# Patient Record
Sex: FEMALE | Race: OTHER | HISPANIC OR LATINO | URBAN - METROPOLITAN AREA
[De-identification: names, ages, dates, MRNs, and addresses within clinical notes are randomized per-mention and may not be internally consistent; named-entity substitution may affect disease eponyms.]

---

## 2020-01-06 ENCOUNTER — INPATIENT (INPATIENT)
Facility: HOSPITAL | Age: 60
LOS: 3 days | Discharge: HOME HEALTH | DRG: 330 | End: 2020-01-10
Attending: SURGERY | Admitting: SURGERY
Payer: COMMERCIAL

## 2020-01-06 ENCOUNTER — TRANSCRIPTION ENCOUNTER (OUTPATIENT)
Age: 60
End: 2020-01-06

## 2020-01-06 VITALS
HEART RATE: 68 BPM | RESPIRATION RATE: 17 BRPM | OXYGEN SATURATION: 97 % | DIASTOLIC BLOOD PRESSURE: 80 MMHG | WEIGHT: 250 LBS | TEMPERATURE: 98 F | SYSTOLIC BLOOD PRESSURE: 168 MMHG

## 2020-01-06 DIAGNOSIS — E87.6 HYPOKALEMIA: ICD-10-CM

## 2020-01-06 DIAGNOSIS — Z90.710 ACQUIRED ABSENCE OF BOTH CERVIX AND UTERUS: ICD-10-CM

## 2020-01-06 DIAGNOSIS — Z90.49 ACQUIRED ABSENCE OF OTHER SPECIFIED PARTS OF DIGESTIVE TRACT: ICD-10-CM

## 2020-01-06 DIAGNOSIS — E66.9 OBESITY, UNSPECIFIED: ICD-10-CM

## 2020-01-06 DIAGNOSIS — K56.699 OTHER INTESTINAL OBSTRUCTION UNSPECIFIED AS TO PARTIAL VERSUS COMPLETE OBSTRUCTION: ICD-10-CM

## 2020-01-06 DIAGNOSIS — Z91.010 ALLERGY TO PEANUTS: ICD-10-CM

## 2020-01-07 DIAGNOSIS — K56.609 UNSPECIFIED INTESTINAL OBSTRUCTION, UNSPECIFIED AS TO PARTIAL VERSUS COMPLETE OBSTRUCTION: ICD-10-CM

## 2020-01-07 DIAGNOSIS — Z90.49 ACQUIRED ABSENCE OF OTHER SPECIFIED PARTS OF DIGESTIVE TRACT: Chronic | ICD-10-CM

## 2020-01-07 DIAGNOSIS — Z90.710 ACQUIRED ABSENCE OF BOTH CERVIX AND UTERUS: Chronic | ICD-10-CM

## 2020-01-07 LAB
ALBUMIN SERPL ELPH-MCNC: 3.6 G/DL — SIGNIFICANT CHANGE UP (ref 3.5–5)
ALP SERPL-CCNC: 99 U/L — SIGNIFICANT CHANGE UP (ref 40–120)
ALT FLD-CCNC: 32 U/L DA — SIGNIFICANT CHANGE UP (ref 10–60)
ANION GAP SERPL CALC-SCNC: 7 MMOL/L — SIGNIFICANT CHANGE UP (ref 5–17)
APTT BLD: 30 SEC — SIGNIFICANT CHANGE UP (ref 27.5–36.3)
AST SERPL-CCNC: 19 U/L — SIGNIFICANT CHANGE UP (ref 10–40)
BASOPHILS # BLD AUTO: 0.02 K/UL — SIGNIFICANT CHANGE UP (ref 0–0.2)
BASOPHILS NFR BLD AUTO: 0.2 % — SIGNIFICANT CHANGE UP (ref 0–2)
BILIRUB SERPL-MCNC: 0.5 MG/DL — SIGNIFICANT CHANGE UP (ref 0.2–1.2)
BUN SERPL-MCNC: 6 MG/DL — LOW (ref 7–18)
CALCIUM SERPL-MCNC: 8.4 MG/DL — SIGNIFICANT CHANGE UP (ref 8.4–10.5)
CHLORIDE SERPL-SCNC: 107 MMOL/L — SIGNIFICANT CHANGE UP (ref 96–108)
CO2 SERPL-SCNC: 26 MMOL/L — SIGNIFICANT CHANGE UP (ref 22–31)
CREAT SERPL-MCNC: 0.65 MG/DL — SIGNIFICANT CHANGE UP (ref 0.5–1.3)
EOSINOPHIL # BLD AUTO: 0.09 K/UL — SIGNIFICANT CHANGE UP (ref 0–0.5)
EOSINOPHIL NFR BLD AUTO: 1 % — SIGNIFICANT CHANGE UP (ref 0–6)
GLUCOSE BLDC GLUCOMTR-MCNC: 111 MG/DL — HIGH (ref 70–99)
GLUCOSE BLDC GLUCOMTR-MCNC: 97 MG/DL — SIGNIFICANT CHANGE UP (ref 70–99)
GLUCOSE SERPL-MCNC: 125 MG/DL — HIGH (ref 70–99)
HCT VFR BLD CALC: 43.7 % — SIGNIFICANT CHANGE UP (ref 34.5–45)
HGB BLD-MCNC: 14 G/DL — SIGNIFICANT CHANGE UP (ref 11.5–15.5)
IMM GRANULOCYTES NFR BLD AUTO: 0.6 % — SIGNIFICANT CHANGE UP (ref 0–1.5)
INR BLD: 0.98 RATIO — SIGNIFICANT CHANGE UP (ref 0.88–1.16)
LIDOCAIN IGE QN: 88 U/L — SIGNIFICANT CHANGE UP (ref 73–393)
LYMPHOCYTES # BLD AUTO: 1.11 K/UL — SIGNIFICANT CHANGE UP (ref 1–3.3)
LYMPHOCYTES # BLD AUTO: 12.3 % — LOW (ref 13–44)
MCHC RBC-ENTMCNC: 29.7 PG — SIGNIFICANT CHANGE UP (ref 27–34)
MCHC RBC-ENTMCNC: 32 GM/DL — SIGNIFICANT CHANGE UP (ref 32–36)
MCV RBC AUTO: 92.8 FL — SIGNIFICANT CHANGE UP (ref 80–100)
MONOCYTES # BLD AUTO: 0.39 K/UL — SIGNIFICANT CHANGE UP (ref 0–0.9)
MONOCYTES NFR BLD AUTO: 4.3 % — SIGNIFICANT CHANGE UP (ref 2–14)
NEUTROPHILS # BLD AUTO: 7.34 K/UL — SIGNIFICANT CHANGE UP (ref 1.8–7.4)
NEUTROPHILS NFR BLD AUTO: 81.6 % — HIGH (ref 43–77)
NRBC # BLD: 0 /100 WBCS — SIGNIFICANT CHANGE UP (ref 0–0)
PLATELET # BLD AUTO: 242 K/UL — SIGNIFICANT CHANGE UP (ref 150–400)
POTASSIUM SERPL-MCNC: 3.5 MMOL/L — SIGNIFICANT CHANGE UP (ref 3.5–5.3)
POTASSIUM SERPL-SCNC: 3.5 MMOL/L — SIGNIFICANT CHANGE UP (ref 3.5–5.3)
PROT SERPL-MCNC: 7.2 G/DL — SIGNIFICANT CHANGE UP (ref 6–8.3)
PROTHROM AB SERPL-ACNC: 10.9 SEC — SIGNIFICANT CHANGE UP (ref 10–12.9)
RBC # BLD: 4.71 M/UL — SIGNIFICANT CHANGE UP (ref 3.8–5.2)
RBC # FLD: 12.8 % — SIGNIFICANT CHANGE UP (ref 10.3–14.5)
SODIUM SERPL-SCNC: 140 MMOL/L — SIGNIFICANT CHANGE UP (ref 135–145)
WBC # BLD: 9 K/UL — SIGNIFICANT CHANGE UP (ref 3.8–10.5)
WBC # FLD AUTO: 9 K/UL — SIGNIFICANT CHANGE UP (ref 3.8–10.5)

## 2020-01-07 PROCEDURE — 99285 EMERGENCY DEPT VISIT HI MDM: CPT

## 2020-01-07 PROCEDURE — 44320 COLOSTOMY: CPT

## 2020-01-07 PROCEDURE — 74177 CT ABD & PELVIS W/CONTRAST: CPT | Mod: 26

## 2020-01-07 PROCEDURE — 43753 TX GASTRO INTUB W/ASP: CPT

## 2020-01-07 PROCEDURE — 44320 COLOSTOMY: CPT | Mod: AS

## 2020-01-07 RX ORDER — SODIUM CHLORIDE 9 MG/ML
1000 INJECTION INTRAMUSCULAR; INTRAVENOUS; SUBCUTANEOUS ONCE
Refills: 0 | Status: COMPLETED | OUTPATIENT
Start: 2020-01-07 | End: 2020-01-07

## 2020-01-07 RX ORDER — DEXTROSE MONOHYDRATE, SODIUM CHLORIDE, AND POTASSIUM CHLORIDE 50; .745; 4.5 G/1000ML; G/1000ML; G/1000ML
1000 INJECTION, SOLUTION INTRAVENOUS
Refills: 0 | Status: DISCONTINUED | OUTPATIENT
Start: 2020-01-07 | End: 2020-01-07

## 2020-01-07 RX ORDER — SODIUM CHLORIDE 9 MG/ML
1000 INJECTION, SOLUTION INTRAVENOUS
Refills: 0 | Status: DISCONTINUED | OUTPATIENT
Start: 2020-01-07 | End: 2020-01-08

## 2020-01-07 RX ORDER — SODIUM CHLORIDE 9 MG/ML
1000 INJECTION, SOLUTION INTRAVENOUS
Refills: 0 | Status: DISCONTINUED | OUTPATIENT
Start: 2020-01-07 | End: 2020-01-07

## 2020-01-07 RX ORDER — MORPHINE SULFATE 50 MG/1
4 CAPSULE, EXTENDED RELEASE ORAL ONCE
Refills: 0 | Status: DISCONTINUED | OUTPATIENT
Start: 2020-01-07 | End: 2020-01-07

## 2020-01-07 RX ORDER — ENOXAPARIN SODIUM 100 MG/ML
40 INJECTION SUBCUTANEOUS EVERY 12 HOURS
Refills: 0 | Status: DISCONTINUED | OUTPATIENT
Start: 2020-01-07 | End: 2020-01-07

## 2020-01-07 RX ORDER — ACETAMINOPHEN 500 MG
1000 TABLET ORAL ONCE
Refills: 0 | Status: COMPLETED | OUTPATIENT
Start: 2020-01-07 | End: 2020-01-08

## 2020-01-07 RX ORDER — HYDROMORPHONE HYDROCHLORIDE 2 MG/ML
0.5 INJECTION INTRAMUSCULAR; INTRAVENOUS; SUBCUTANEOUS
Refills: 0 | Status: DISCONTINUED | OUTPATIENT
Start: 2020-01-07 | End: 2020-01-07

## 2020-01-07 RX ORDER — KETOROLAC TROMETHAMINE 30 MG/ML
15 SYRINGE (ML) INJECTION EVERY 6 HOURS
Refills: 0 | Status: DISCONTINUED | OUTPATIENT
Start: 2020-01-07 | End: 2020-01-07

## 2020-01-07 RX ORDER — HYDROMORPHONE HYDROCHLORIDE 2 MG/ML
1 INJECTION INTRAMUSCULAR; INTRAVENOUS; SUBCUTANEOUS
Refills: 0 | Status: DISCONTINUED | OUTPATIENT
Start: 2020-01-07 | End: 2020-01-07

## 2020-01-07 RX ORDER — MORPHINE SULFATE 50 MG/1
2 CAPSULE, EXTENDED RELEASE ORAL ONCE
Refills: 0 | Status: DISCONTINUED | OUTPATIENT
Start: 2020-01-07 | End: 2020-01-07

## 2020-01-07 RX ORDER — ONDANSETRON 8 MG/1
4 TABLET, FILM COATED ORAL EVERY 6 HOURS
Refills: 0 | Status: DISCONTINUED | OUTPATIENT
Start: 2020-01-07 | End: 2020-01-07

## 2020-01-07 RX ORDER — ONDANSETRON 8 MG/1
4 TABLET, FILM COATED ORAL ONCE
Refills: 0 | Status: COMPLETED | OUTPATIENT
Start: 2020-01-07 | End: 2020-01-07

## 2020-01-07 RX ORDER — BENZOCAINE AND MENTHOL 5; 1 G/100ML; G/100ML
1 LIQUID ORAL EVERY 6 HOURS
Refills: 0 | Status: DISCONTINUED | OUTPATIENT
Start: 2020-01-07 | End: 2020-01-10

## 2020-01-07 RX ORDER — ENOXAPARIN SODIUM 100 MG/ML
40 INJECTION SUBCUTANEOUS EVERY 12 HOURS
Refills: 0 | Status: DISCONTINUED | OUTPATIENT
Start: 2020-01-07 | End: 2020-01-10

## 2020-01-07 RX ORDER — ONDANSETRON 8 MG/1
4 TABLET, FILM COATED ORAL EVERY 6 HOURS
Refills: 0 | Status: DISCONTINUED | OUTPATIENT
Start: 2020-01-07 | End: 2020-01-10

## 2020-01-07 RX ORDER — ACETAMINOPHEN 500 MG
1000 TABLET ORAL ONCE
Refills: 0 | Status: DISCONTINUED | OUTPATIENT
Start: 2020-01-07 | End: 2020-01-07

## 2020-01-07 RX ORDER — MORPHINE SULFATE 50 MG/1
4 CAPSULE, EXTENDED RELEASE ORAL EVERY 6 HOURS
Refills: 0 | Status: DISCONTINUED | OUTPATIENT
Start: 2020-01-07 | End: 2020-01-08

## 2020-01-07 RX ORDER — METOCLOPRAMIDE HCL 10 MG
10 TABLET ORAL ONCE
Refills: 0 | Status: COMPLETED | OUTPATIENT
Start: 2020-01-07 | End: 2020-01-07

## 2020-01-07 RX ORDER — IOHEXOL 300 MG/ML
30 INJECTION, SOLUTION INTRAVENOUS ONCE
Refills: 0 | Status: COMPLETED | OUTPATIENT
Start: 2020-01-07 | End: 2020-01-07

## 2020-01-07 RX ORDER — HYDROMORPHONE HYDROCHLORIDE 2 MG/ML
0.5 INJECTION INTRAMUSCULAR; INTRAVENOUS; SUBCUTANEOUS ONCE
Refills: 0 | Status: DISCONTINUED | OUTPATIENT
Start: 2020-01-07 | End: 2020-01-07

## 2020-01-07 RX ADMIN — SODIUM CHLORIDE 1000 MILLILITER(S): 9 INJECTION INTRAMUSCULAR; INTRAVENOUS; SUBCUTANEOUS at 02:35

## 2020-01-07 RX ADMIN — HYDROMORPHONE HYDROCHLORIDE 0.5 MILLIGRAM(S): 2 INJECTION INTRAMUSCULAR; INTRAVENOUS; SUBCUTANEOUS at 07:53

## 2020-01-07 RX ADMIN — BENZOCAINE AND MENTHOL 1 LOZENGE: 5; 1 LIQUID ORAL at 17:31

## 2020-01-07 RX ADMIN — DEXTROSE MONOHYDRATE, SODIUM CHLORIDE, AND POTASSIUM CHLORIDE 135 MILLILITER(S): 50; .745; 4.5 INJECTION, SOLUTION INTRAVENOUS at 09:56

## 2020-01-07 RX ADMIN — Medication 10 MILLIGRAM(S): at 07:53

## 2020-01-07 RX ADMIN — MORPHINE SULFATE 4 MILLIGRAM(S): 50 CAPSULE, EXTENDED RELEASE ORAL at 20:55

## 2020-01-07 RX ADMIN — MORPHINE SULFATE 4 MILLIGRAM(S): 50 CAPSULE, EXTENDED RELEASE ORAL at 04:16

## 2020-01-07 RX ADMIN — IOHEXOL 30 MILLILITER(S): 300 INJECTION, SOLUTION INTRAVENOUS at 04:25

## 2020-01-07 RX ADMIN — MORPHINE SULFATE 4 MILLIGRAM(S): 50 CAPSULE, EXTENDED RELEASE ORAL at 20:36

## 2020-01-07 RX ADMIN — ONDANSETRON 4 MILLIGRAM(S): 8 TABLET, FILM COATED ORAL at 04:16

## 2020-01-07 RX ADMIN — ONDANSETRON 4 MILLIGRAM(S): 8 TABLET, FILM COATED ORAL at 02:35

## 2020-01-07 RX ADMIN — ENOXAPARIN SODIUM 40 MILLIGRAM(S): 100 INJECTION SUBCUTANEOUS at 17:31

## 2020-01-07 RX ADMIN — MORPHINE SULFATE 2 MILLIGRAM(S): 50 CAPSULE, EXTENDED RELEASE ORAL at 02:36

## 2020-01-07 RX ADMIN — MORPHINE SULFATE 4 MILLIGRAM(S): 50 CAPSULE, EXTENDED RELEASE ORAL at 05:03

## 2020-01-07 RX ADMIN — MORPHINE SULFATE 2 MILLIGRAM(S): 50 CAPSULE, EXTENDED RELEASE ORAL at 04:31

## 2020-01-07 NOTE — PROGRESS NOTE ADULT - ASSESSMENT
60 y/o Male s/p transverse loop colostomy 01/07 for LBO; stable     -NPO  -NGT   -IVF   -Pain medication PRN   -Cepacol PRN   -Fragoso Catheter  -TOV in AM   -Stoma care   -OOB/Ambulate   -Incentive spirometry   -DVT ppx

## 2020-01-07 NOTE — H&P ADULT - NSHPPHYSICALEXAM_GEN_ALL_CORE
T(C): 36.8 (01-06-20 @ 23:56), Max: 36.8 (01-06-20 @ 23:56)  HR: 68 (01-06-20 @ 23:56) (68 - 68)  BP: 168/80 (01-06-20 @ 23:56) (168/80 - 168/80)  RR: 17 (01-06-20 @ 23:56) (17 - 17)  SpO2: 97% (01-06-20 @ 23:56) (97% - 97%)  Wt(kg): --Vital Signs Last 24 Hrs  T(C): 36.8 (06 Jan 2020 23:56), Max: 36.8 (06 Jan 2020 23:56)  T(F): 98.2 (06 Jan 2020 23:56), Max: 98.2 (06 Jan 2020 23:56)  HR: 68 (06 Jan 2020 23:56) (68 - 68)  BP: 168/80 (06 Jan 2020 23:56) (168/80 - 168/80)  BP(mean): --  RR: 17 (06 Jan 2020 23:56) (17 - 17)  SpO2: 97% (06 Jan 2020 23:56) (97% - 97%)    PHYSICAL EXAM:  GENERAL: NAD, well-groomed, well-developed, obese  NECK: Supple, No JVD, Normal thyroid  CHEST/LUNG: Clear to percussion bilaterally; No rales, rhonchi, wheezing, or rubs  HEART: Regular rate and rhythm; No murmurs, rubs, or gallops  ABDOMEN: Soft, obesed distended, tender to deep palpation in the periumbilical area ;

## 2020-01-07 NOTE — H&P ADULT - NSHPLABSRESULTS_GEN_ALL_CORE
14.0   9.00  )-----------( 242      ( 07 Jan 2020 02:35 )             43.7   01-07    140  |  107  |  6<L>  ----------------------------<  125<H>  3.5   |  26  |  0.65    Ca    8.4      07 Jan 2020 02:35    TPro  7.2  /  Alb  3.6  /  TBili  0.5  /  DBili  x   /  AST  19  /  ALT  32  /  AlkPhos  99  01-07    < from: CT Abdomen and Pelvis w/ IV Cont (01.07.20 @ 05:56) >    IMPRESSION:     Question colitis versus mass of the mid and distal descending colon (less likely diverticulitis). Shouldering of the margins and subsequent intraluminal narrowing at the proximal/mid descending colon with upstream dilatation of the fluid-filled colon. Large bowel obstruction cannot be excluded. Recommend clinical correlation and follow-up.    < end of copied text > 14.0   9.00  )-----------( 242      ( 07 Jan 2020 02:35 )             43.7   01-07    140  |  107  |  6<L>  ----------------------------<  125<H>  3.5   |  26  |  0.65    Ca    8.4      07 Jan 2020 02:35    TPro  7.2  /  Alb  3.6  /  TBili  0.5  /  DBili  x   /  AST  19  /  ALT  32  /  AlkPhos  99  01-07    < from: CT Abdomen and Pelvis w/ IV Cont (01.07.20 @ 05:56) >    BOWEL: Small hiatal hernia. Colon diverticulosis. Mid and distal descending colon wall thickening with mild surrounding stranding. Shouldering of the margins and subsequent intraluminal narrowing (4:62) of the proximal/mid descending colon with upstream dilatation of the fluid-filled colon. Small focal fat at the distal descending/proximal sigmoid colon lumen, which may represent a lipoma. The appendix not visualized, but no secondary signs of appendicitis.   PERITONEUM: No drainable fluid collection or free air. Trace free fluid in the left abdomen and pelvis.  VESSELS: Atherosclerosis.   RETROPERITONEUM: No lymphadenopathy.    ABDOMINAL WALL/SOFT TISSUES: Moderate-sized supraumbilical ventral hernia containing fat/omentum. Small fat-containing umbilical hernia.  BONES: Degenerative changes/anterior longitudinal ligament ossification of the spine. Grade 1 anterolisthesis of L4 on L5.    IMPRESSION:     Question colitis versus mass of the mid and distal descending colon (less likely diverticulitis). Shouldering of the margins and subsequent intraluminal narrowing at the proximal/mid descending colon with upstream dilatation of the fluid-filled colon. Large bowel obstruction cannot be excluded. Recommend clinical correlation and follow-up.      < end of copied text >

## 2020-01-07 NOTE — PROGRESS NOTE ADULT - SUBJECTIVE AND OBJECTIVE BOX
INTERVAL HPI/OVERNIGHT EVENTS:    Pt seen and examined at bedside, admits to incisional pain along with sore throat, no nausea or vomiting, no CP or SOB.   Pt is NPO.      Vital Signs Last 24 Hrs  T(C): 36.4 (07 Jan 2020 15:57), Max: 37.1 (07 Jan 2020 09:17)  T(F): 97.6 (07 Jan 2020 15:57), Max: 98.8 (07 Jan 2020 09:17)  HR: 74 (07 Jan 2020 15:57) (60 - 75)  BP: 134/75 (07 Jan 2020 15:57) (134/75 - 170/65)  BP(mean): 94 (07 Jan 2020 14:34) (93 - 128)  RR: 17 (07 Jan 2020 15:57) (11 - 18)  SpO2: 95% (07 Jan 2020 15:57) (88% - 100%)  I&O's Detail    07 Jan 2020 07:01  -  07 Jan 2020 18:57  --------------------------------------------------------  IN:    Lactated Ringers IV Bolus: 1000 mL  Total IN: 1000 mL    OUT:    Indwelling Catheter - Urethral: 100 mL  Total OUT: 100 mL    Total NET: 900 mL        Physical Exam  General: AAOx3, No acute distress  Skin: No jaundice, no icterus  Abdomen: soft, nondistended, incisional TTP, stoma w/ mucosa pink and viable, stool in stoma  : Normal external genitalia, Fragoso Catheter in place, UO yellow and clear   Extremities: non edematous, no calf pain bilaterally        Labs:                        14.0   9.00  )-----------( 242      ( 07 Jan 2020 02:35 )             43.7     01-07    140  |  107  |  6<L>  ----------------------------<  125<H>  3.5   |  26  |  0.65    Ca    8.4      07 Jan 2020 02:35    TPro  7.2  /  Alb  3.6  /  TBili  0.5  /  DBili  x   /  AST  19  /  ALT  32  /  AlkPhos  99  01-07    PT/INR - ( 07 Jan 2020 09:38 )   PT: 10.9 sec;   INR: 0.98 ratio         PTT - ( 07 Jan 2020 09:38 )  PTT:30.0 sec

## 2020-01-07 NOTE — ED PROVIDER NOTE - CLINICAL SUMMARY MEDICAL DECISION MAKING FREE TEXT BOX
58 y/o F patient presents to the ED w/ L sided abd pain and vomiting. Will obtain labs, UA, CT Abd, provide morphine for pain, IV fluids, Zofran for pain, and reassess. 60 y/o F patient presents to the ED w/ L sided abd pain and vomiting. Will obtain labs, UA, CT Abd, provide morphine for pain, IV fluids, Zofran for pain, and reassess.    labs unremarkable, awaiting UA  CT A/P Question colitis versus mass of the mid and distal descending colon (less likely diverticulitis). Shouldering of the margins and subsequent intraluminal narrowing at the proximal/mid descending colon with upstream dilatation of the fluid-filled colon. Large bowel obstruction cannot be excluded. Recommend clinical correlation and follow-up.  Surgery house officer consulted at 630am, will admit for further management.

## 2020-01-07 NOTE — H&P ADULT - NSICDXPASTSURGICALHX_GEN_ALL_CORE_FT
PAST SURGICAL HISTORY:  History of appendectomy     S/P laparoscopic cholecystectomy     S/P MARIELLA (total abdominal hysterectomy)

## 2020-01-07 NOTE — ED ADULT NURSE NOTE - ED STAT RN HANDOFF DETAILS
Report given to Sumaya ROMERO. Pending NGT placement. Dilaudid 0.5 mg IV and and Toni Iv was given as per order. No distress at this time.

## 2020-01-07 NOTE — H&P ADULT - ASSESSMENT
58y/o f with PMHx of Diverticulosis, PSHx of lap macarena, open appendectomy, MARIELLA presents to the ED with Abdominal Pain and Nausea x 3 days . Pain is mostly periumbilical , considered an 8/10 on pain scale, often intermittent and most times sharp with no referral. Admits lots of NBNB vomiting and nausea but denies fever, chills. Last BM was on Saturday . Pt also denies flatus. Afebrile, Labs are wnl, lactic acid is pending but CT scan cannot r/o a LBO . Pt will be admitted to Surgery Dept     Admit to Surgery under Dr Lewis   NPO   NGT  IVF on NPO  f/up labs inc lactic acid  Pain/Nausea control   Serial Abd Exams  DVT PPx 60y/o f with PMHx of Diverticulosis, PSHx of lap macarena, open appendectomy, MARIELLA presents to the ED with Abdominal Pain and Nausea x 3 days . Pain is mostly periumbilical , considered an 8/10 on pain scale, often intermittent and most times sharp with no referral. Admits lots of NBNB vomiting and nausea but denies fever, chills. Last BM was on Saturday . Pt also denies flatus. Afebrile, Labs are wnl, lactic acid is pending but CT scan cannot r/o a LBO . Of note clinically Pain is mostly coming from the Umbilical /Ventral Hernia area .SBO cannot be r/o  Pt will be admitted to Surgery Dept     Admit to Surgery under Dr Lewis   NPO   NGT  IVF on NPO  f/up labs inc lactic acid  Pain/Nausea control   Serial Abd Exams  DVT PPx

## 2020-01-07 NOTE — ED PROVIDER NOTE - OBJECTIVE STATEMENT
60 y/o F patient, w/ PMHx of Abdominal hernia, presents to the ED w/ worsening L sided abd pain that began x3 days ago associated w/ nausea and vomiting. Patient reports she had x2 episodes of vomiting. Patient endorses she noticed her abdominal hernia bulging after onset of pain. Patient notes last bowel movement was x3 days ago. Patient denies fever, diarrhea, or any other acute complaints. Patient denies urinary symptoms. Patient denies history of SBO's in the past. Allergies: Peanuts: Rash and throat tightness.

## 2020-01-07 NOTE — PROGRESS NOTE ADULT - SUBJECTIVE AND OBJECTIVE BOX
pt seen and examined; briefly a 59F with LBO, last passed anything per rectum more than 24 hours ago; - flatus, - stool; before that she was passing liquid and gas; colonoscopy 9 years ago showed diverticulosis only per patient report; she is obese, soft, distended and minimally tender; CT showing LBO with point at mid-descending colon; needs OR; risks, benefits alternatives discussed; all questions answered., agrees to proceed;

## 2020-01-07 NOTE — ED PROVIDER NOTE - NSTIMEPROVIDERCAREINITIATE_GEN_ER
Alert and oriented to person, place and time, memory intact, behavior appropriate to situation, PERRL.
07-Jan-2020 02:50

## 2020-01-07 NOTE — BRIEF OPERATIVE NOTE - PRIMARY SURGEON
Dr Lewis [place] INPATIENT ENCOUNTER   INTERNAL MEDICINE HISTORY AND PHYSICAL    ADMISSION DATE:  6/30/2018  ADMITTING PHYSICIAN:  Lashawn Cee MD  ATTENDING PHYSICIAN:  Lashawn Cee MD  PRIMARY CARE PHYSICIAN:  Jatinder Henriquez DO    CHIEF COMPLAINT/REASON FOR ADMISSION: Confusion, wound check    HISTORY OF PRESENT ILLNESS:    Myah Barron is a 53 year old female with a medical history significant for alcoholic liver cirrhosis, diabetes mellitus type 2, chronic thrombocytopenia, who was brought to the ER of [facility] for evaluation of confusion, right shin wound. Please note that the patient is confused and therefore history taking was limited. As per the nursing home staff, the patient had been having abdominal discomfort, itching around her right shin wound site. No reports of nausea vomiting. Patient was noted to have fever at the nursing home. Patient reports of generalized muscle aches and pains. She denies any chest pain, shortness of breath or cough. No urinary complaints. In the ER patient was noted to be febrile. Lab work showed evidence of urinary tract infection, SBP, elevated ammonia level. Patient was started on IV antibiotics, admitted to the floor for further evaluation and treatment    MEDICATIONS PRIOR TO ADMISSION:    Prescriptions Prior to Admission   Medication Sig Dispense Refill   • furosemide (LASIX) 20 MG tablet Take three 20mg tablets=(60mg) daily.     • cetirizine (ZYRTEC) 10 MG tablet Take 10 mg by mouth daily.     • potassium chloride (KLOR-CON M) 20 MEQ fito ER tablet Take two 20meq tablets=(40meq) two times a day.     • propranolol (INDERAL) 10 MG tablet Take one 10mg tablet and one 20mg tablet=(30mg) two times a day.     • propranolol (INDERAL) 20 MG tablet Take one 20mg tablet and one 10mg tablet=(30mg) daily.     • insulin glargine (LANTUS) 100 UNIT/ML injectable solution Inject 30 Units into the skin nightly. 10 mL 12   • insulin lispro (HUMALOG) 100  UNIT/ML correction dose Inject into the skin 3 times daily (before meals). SCHEDULED DOSE: 10 units  Correction Dose:  - Add 1 unit if metered blood glucose 150 to 199 mg/dL  - Add 2 units if metered blood glucose 200 to 249 mg/dL  - Add 3 units if metered blood glucose 250 to 299 mg/dL  - Add 4 units if metered blood glucose 300 to 349 mg/dL  - Add 5 units if metered blood glucose greater than 349 mg/dL   HOLD dose if meal skipped or not eating.  12   • cholestyramine (QUESTRAN) 4 g packet Take 1 packet by mouth daily (with breakfast).     • QUEtiapine (SEROQUEL) 25 MG tablet Take 25 mg by mouth at bedtime.     • hydroCORTisone (CORTIZONE) 2.5 % cream Apply 1 application topically 2 times daily as needed for Rash. Apply to affected area(s)     • spironolactone (ALDACTONE) 100 MG tablet Take 1.5 tablets by mouth daily. 45 tablet 2   • traZODone (DESYREL) 50 MG tablet Take 50 mg by mouth nightly.      • fluticasone-salmeterol (ADVAIR DISKUS) 500-50 MCG/DOSE inhaler Inhale 1 puff into the lungs two times daily.     • diphenhydrAMINE (BENADRYL) 25 MG capsule Take 25 mg by mouth nightly as needed for Itching.     • ciprofloxacin (CIPRO) 500 MG tablet Take 500 mg by mouth every morning.      • rifAXIMIN (XIFAXAN) 550 MG Tab Take 1 tablet by mouth every 12 hours. For hepatic encephalopathy 180 tablet 3   • zinc sulfate (ZINCATE) 220 MG capsule Take 220 mg by mouth every morning.      • aluminum-magnesium hydroxide-simethicone (MAALOX) 200-200-20 MG/5ML Suspension Take 15 mLs by mouth every 4 hours as needed (GI Upset).     • PARoxetine (PAXIL) 10 MG tablet Take 1 tablet by mouth daily. 30 tablet 0   • albuterol-ipratropium 2.5 mg/0.5 mg (DUONEB) 0.5-2.5 (3) MG/3ML nebulizer solution Take 3 mLs by nebulization every 4 hours as needed for Wheezing or Shortness of Breath.      • calcium carbonate-vitamin D (CALTRATE+D) 600-400 MG-UNIT per tablet Take 1 tablet by mouth 3 times daily (with meals). 90 tablet 0   • vitamin -  therapeutic multivitamins w/minerals (CENTRUM SILVER,THERA-M) TABS Take 1 tablet by mouth daily. 30 tablet 0   • omeprazole (PRILOSEC) 20 MG capsule Take 1 capsule by mouth daily. 90 capsule 3   • folic acid (FOLATE) 1 MG tablet Take 1 tablet by mouth 2 times daily. 30 tablet 6   • tiotropium (SPIRIVA HANDIHALER) 18 MCG inhalation capsule Inhale 1 capsule into the lungs daily.          ALLERGIES:    ALLERGIES:  No Known Allergies    PAST MEDICAL HISTORY:    Past Medical History:   Diagnosis Date   • Cirrhosis (CMS/HCC)    • COPD (chronic obstructive pulmonary disease) (CMS/HCC)    • Diabetes mellitus (CMS/HCC)    • History of ESBL E. coli infection 13    UTI   • Hypertension    • Liver cirrhosis, alcoholic (CMS/HCC)    • Personal history of traumatic fracture    • RAD (reactive airway disease)    • Uncomplicated senile dementia        SURGICAL HISTORY:    Past Surgical History:   Procedure Laterality Date   • Abdomen surgery     •  section, classic         FAMILY HISTORY:    Family History   Problem Relation Age of Onset   • Cancer Mother    • Asthma Sister    • Depression Brother    • Psychiatric Brother    • Depression Sister    • High cholesterol Sister    • Cancer Maternal Grandfather        SOCIAL HISTORY:    Social History   Substance Use Topics   • Smoking status: Current Every Day Smoker     Packs/day: 0.50     Years: 20.00     Types: Cigarettes   • Smokeless tobacco: Never Used      Comment: 5 cig / day   • Alcohol use No       REVIEW OF SYSTEMS:  12 point review of sytem was obtained. No pertinent positives or negatives were appreciated other than those mentioned in the HPI.     PHYSICAL EXAM:    VITAL SIGNS:    Vital Last Value 24 Hour Range   Temperature 99.7 °F (37.6 °C) (18 0300) Temp  Min: 99.7 °F (37.6 °C)  Max: 102.9 °F (39.4 °C)   Pulse 88 (18 0452) Pulse  Min: 88  Max: 100   Respiratory 20 (18 0452) Resp  Min: 20  Max: 24   Non-Invasive  Blood Pressure 140/72  (06/30/18 2326) BP  Min: 100/59  Max: 150/90   Pulse Oximetry 98 % (06/30/18 2326) SpO2  Min: 96 %  Max: 100 %     Vital Today Admitted   Weight 94 kg (06/30/18 2112) Weight: (!) 347 kg (06/30/18 1340)   Height N/A Height: 5' 6\" (167.6 cm) (06/30/18 1340)   BMI N/A BMI (Calculated): 123.73 (06/30/18 1340)     GENERAL: Awake, confused. No apparent distress  HEENT:  Head is normocephalic, atraumatic.  Pupils equal, round, reactive to light.   Mild scleral icterus is noted  NECK:  Supple, no thyromegaly, JVD or carotid bruit appreciated.  LUNGS:  Equal breath sounds noted bilaterally.  No wheeze or rhonchi appreciated.  CARDIOVASCULAR:  Regular rate and rhythm.  Normal S1, S2.  No S3.  ABDOMEN:  Distended, nontender. Small periumbilical hernia. Bowel sounds positive.  EXTREMITIES:  Trace lower extremity edema is noted.  SKIN:  Appeared warm and moist.  PSYCHIATRIC:  Patient is confused  NEUROLOGIC: Awake, confused. Moving all 4 extremities.    LABORATORY DATA:      Recent Labs  Lab 07/01/18  0630 06/30/18  1512   SODIUM 136 135   POTASSIUM 3.2* 3.8   CHLORIDE 103 100   CO2 26 26   BUN 14 14   CREATININE 0.60 0.71   GLUCOSE 233* 218*   ALBUMIN 2.1* 2.3*   AST 60* 95*   BILIRUBIN 4.4* 4.0*       IMAGING STUDIES:    Imaging studies reviewed.      ASSESSMENT:    Acute UTI  Spontaneous bacterial peritonitis  Hepatic encephalopathy  History of alcoholic liver cirrhosis  Chronic thrombocytopenia  Diabetes mellitus type 2  Hypokalemia    PLAN: Patient will be admitted as inpatient. She is expected to remain in the hospital for more than 2 midnights. Patient will be started on IV antibiotics empirically. Will await culture results. We'll start her on lactulose, increase dose. Continue propanolol, rifaximin, zinc. Continue Lasix, Aldactone. Patient will need paracentesis. PT, OT will be consulted. SCDs and teds will be provided for DVT prophylaxis.    Further recommendations will be based on patient hospital course and further  workup while she is here.    Lashawn Cee MD  7/1/2018

## 2020-01-07 NOTE — H&P ADULT - HISTORY OF PRESENT ILLNESS
60y/o f with PMHx of Diverticulosis, PSHx of lap macarena, open appendectomy, MARIELLA presents to the ED with Abdominal Pain and Nausea x 3 days . Pain is mostly periumbilical , considered an 8/10 on pain scale, often intermittent and most times sharp with no referral. Admits lots of NBNB vomiting and nausea but denies fever, chills, chest pain, palpitations SOB or any other complaints. Last BM was on Saturday . Pt also denies flatus. Denies urinary symptoms or any other complaints at this time.

## 2020-01-08 LAB
ANION GAP SERPL CALC-SCNC: 6 MMOL/L — SIGNIFICANT CHANGE UP (ref 5–17)
BASOPHILS # BLD AUTO: 0.03 K/UL — SIGNIFICANT CHANGE UP (ref 0–0.2)
BASOPHILS NFR BLD AUTO: 0.3 % — SIGNIFICANT CHANGE UP (ref 0–2)
BUN SERPL-MCNC: 5 MG/DL — LOW (ref 7–18)
CALCIUM SERPL-MCNC: 7.9 MG/DL — LOW (ref 8.4–10.5)
CHLORIDE SERPL-SCNC: 108 MMOL/L — SIGNIFICANT CHANGE UP (ref 96–108)
CO2 SERPL-SCNC: 28 MMOL/L — SIGNIFICANT CHANGE UP (ref 22–31)
CREAT SERPL-MCNC: 0.78 MG/DL — SIGNIFICANT CHANGE UP (ref 0.5–1.3)
EOSINOPHIL # BLD AUTO: 0.1 K/UL — SIGNIFICANT CHANGE UP (ref 0–0.5)
EOSINOPHIL NFR BLD AUTO: 0.8 % — SIGNIFICANT CHANGE UP (ref 0–6)
GLUCOSE SERPL-MCNC: 121 MG/DL — HIGH (ref 70–99)
HCT VFR BLD CALC: 40.9 % — SIGNIFICANT CHANGE UP (ref 34.5–45)
HCV AB S/CO SERPL IA: 0.06 S/CO — SIGNIFICANT CHANGE UP (ref 0–0.99)
HCV AB SERPL-IMP: SIGNIFICANT CHANGE UP
HGB BLD-MCNC: 13.1 G/DL — SIGNIFICANT CHANGE UP (ref 11.5–15.5)
IMM GRANULOCYTES NFR BLD AUTO: 0.4 % — SIGNIFICANT CHANGE UP (ref 0–1.5)
LYMPHOCYTES # BLD AUTO: 1.78 K/UL — SIGNIFICANT CHANGE UP (ref 1–3.3)
LYMPHOCYTES # BLD AUTO: 15.1 % — SIGNIFICANT CHANGE UP (ref 13–44)
MCHC RBC-ENTMCNC: 30.3 PG — SIGNIFICANT CHANGE UP (ref 27–34)
MCHC RBC-ENTMCNC: 32 GM/DL — SIGNIFICANT CHANGE UP (ref 32–36)
MCV RBC AUTO: 94.7 FL — SIGNIFICANT CHANGE UP (ref 80–100)
MONOCYTES # BLD AUTO: 0.89 K/UL — SIGNIFICANT CHANGE UP (ref 0–0.9)
MONOCYTES NFR BLD AUTO: 7.5 % — SIGNIFICANT CHANGE UP (ref 2–14)
NEUTROPHILS # BLD AUTO: 8.94 K/UL — HIGH (ref 1.8–7.4)
NEUTROPHILS NFR BLD AUTO: 75.9 % — SIGNIFICANT CHANGE UP (ref 43–77)
NRBC # BLD: 0 /100 WBCS — SIGNIFICANT CHANGE UP (ref 0–0)
PLATELET # BLD AUTO: 233 K/UL — SIGNIFICANT CHANGE UP (ref 150–400)
POTASSIUM SERPL-MCNC: 3 MMOL/L — LOW (ref 3.5–5.3)
POTASSIUM SERPL-SCNC: 3 MMOL/L — LOW (ref 3.5–5.3)
RBC # BLD: 4.32 M/UL — SIGNIFICANT CHANGE UP (ref 3.8–5.2)
RBC # FLD: 12.9 % — SIGNIFICANT CHANGE UP (ref 10.3–14.5)
SODIUM SERPL-SCNC: 142 MMOL/L — SIGNIFICANT CHANGE UP (ref 135–145)
WBC # BLD: 11.79 K/UL — HIGH (ref 3.8–10.5)
WBC # FLD AUTO: 11.79 K/UL — HIGH (ref 3.8–10.5)

## 2020-01-08 RX ORDER — POTASSIUM CHLORIDE 20 MEQ
10 PACKET (EA) ORAL
Refills: 0 | Status: COMPLETED | OUTPATIENT
Start: 2020-01-08 | End: 2020-01-08

## 2020-01-08 RX ORDER — SODIUM CHLORIDE 9 MG/ML
500 INJECTION INTRAMUSCULAR; INTRAVENOUS; SUBCUTANEOUS ONCE
Refills: 0 | Status: COMPLETED | OUTPATIENT
Start: 2020-01-08 | End: 2020-01-08

## 2020-01-08 RX ORDER — OXYCODONE AND ACETAMINOPHEN 5; 325 MG/1; MG/1
1 TABLET ORAL EVERY 4 HOURS
Refills: 0 | Status: DISCONTINUED | OUTPATIENT
Start: 2020-01-08 | End: 2020-01-10

## 2020-01-08 RX ORDER — SODIUM CHLORIDE 9 MG/ML
1000 INJECTION, SOLUTION INTRAVENOUS
Refills: 0 | Status: DISCONTINUED | OUTPATIENT
Start: 2020-01-08 | End: 2020-01-10

## 2020-01-08 RX ADMIN — OXYCODONE AND ACETAMINOPHEN 1 TABLET(S): 5; 325 TABLET ORAL at 20:04

## 2020-01-08 RX ADMIN — Medication 100 MILLIEQUIVALENT(S): at 11:49

## 2020-01-08 RX ADMIN — Medication 400 MILLIGRAM(S): at 07:02

## 2020-01-08 RX ADMIN — Medication 100 MILLIEQUIVALENT(S): at 13:46

## 2020-01-08 RX ADMIN — SODIUM CHLORIDE 125 MILLILITER(S): 9 INJECTION, SOLUTION INTRAVENOUS at 10:30

## 2020-01-08 RX ADMIN — ENOXAPARIN SODIUM 40 MILLIGRAM(S): 100 INJECTION SUBCUTANEOUS at 17:35

## 2020-01-08 RX ADMIN — OXYCODONE AND ACETAMINOPHEN 1 TABLET(S): 5; 325 TABLET ORAL at 20:56

## 2020-01-08 RX ADMIN — SODIUM CHLORIDE 250 MILLILITER(S): 9 INJECTION INTRAMUSCULAR; INTRAVENOUS; SUBCUTANEOUS at 07:04

## 2020-01-08 RX ADMIN — Medication 1000 MILLIGRAM(S): at 07:17

## 2020-01-08 RX ADMIN — Medication 100 MILLIEQUIVALENT(S): at 10:30

## 2020-01-08 RX ADMIN — ENOXAPARIN SODIUM 40 MILLIGRAM(S): 100 INJECTION SUBCUTANEOUS at 05:40

## 2020-01-08 NOTE — PROGRESS NOTE ADULT - ASSESSMENT
60 y/o female s/p loop colostomy secondary to large bowel obstruction POD # 1 with hypokalemia     1. oob   2. prn pain control   3. clear liquid diet   4. potassium repletions   5. ostomy teaching

## 2020-01-08 NOTE — PROGRESS NOTE ADULT - SUBJECTIVE AND OBJECTIVE BOX
58 y/o female s/p loop colostomy secondary to large bowel obstruction POD # 1. Patient examined at bedside, complains of ngt, wants it to be removed   No nausea, no vomiting      T(F): 98.7 (01-08-20 @ 05:05), Max: 99.9 (01-07-20 @ 20:21)  HR: 70 (01-08-20 @ 06:51) (60 - 75)  BP: 153/72 (01-08-20 @ 06:51) (134/75 - 170/65)  RR: 19 (01-08-20 @ 06:51) (11 - 19)  SpO2: 94% (01-08-20 @ 06:51) (88% - 100%)  Wt(kg): --      01-07 @ 07:01  -  01-08 @ 07:00  --------------------------------------------------------  IN:    dextrose 5% + sodium chloride 0.9%.: 1550 mL    Lactated Ringers IV Bolus: 1000 mL  Total IN: 2550 mL    OUT:    Colostomy: 100 mL    Indwelling Catheter - Urethral: 550 mL    Nasoenteral Tube: 300 mL  Total OUT: 950 mL    Total NET: 1600 mL                            13.1   11.79 )-----------( 233      ( 08 Jan 2020 07:07 )             40.9   01-08    142  |  108  |  5<L>  ----------------------------<  121<H>  3.0<L>   |  28  |  0.78    Ca    7.9<L>      08 Jan 2020 07:07    TPro  7.2  /  Alb  3.6  /  TBili  0.5  /  DBili  x   /  AST  19  /  ALT  32  /  AlkPhos  99  01-07      Physical Exam  General: AAOx3, No acute distress  Skin: No jaundice, no icterus  Abdomen: soft, nontender, nondistended, + ostomy with air in bag and bowel sweat   : Normal external genitalia, her in place   Extremities: non edematous, no calf pain bilaterally

## 2020-01-09 LAB
ANION GAP SERPL CALC-SCNC: 5 MMOL/L — SIGNIFICANT CHANGE UP (ref 5–17)
BUN SERPL-MCNC: 3 MG/DL — LOW (ref 7–18)
CALCIUM SERPL-MCNC: 7.7 MG/DL — LOW (ref 8.4–10.5)
CHLORIDE SERPL-SCNC: 107 MMOL/L — SIGNIFICANT CHANGE UP (ref 96–108)
CO2 SERPL-SCNC: 28 MMOL/L — SIGNIFICANT CHANGE UP (ref 22–31)
CREAT SERPL-MCNC: 0.56 MG/DL — SIGNIFICANT CHANGE UP (ref 0.5–1.3)
GLUCOSE SERPL-MCNC: 128 MG/DL — HIGH (ref 70–99)
HCT VFR BLD CALC: 36.6 % — SIGNIFICANT CHANGE UP (ref 34.5–45)
HGB BLD-MCNC: 11.7 G/DL — SIGNIFICANT CHANGE UP (ref 11.5–15.5)
MCHC RBC-ENTMCNC: 30.4 PG — SIGNIFICANT CHANGE UP (ref 27–34)
MCHC RBC-ENTMCNC: 32 GM/DL — SIGNIFICANT CHANGE UP (ref 32–36)
MCV RBC AUTO: 95.1 FL — SIGNIFICANT CHANGE UP (ref 80–100)
NRBC # BLD: 0 /100 WBCS — SIGNIFICANT CHANGE UP (ref 0–0)
PLATELET # BLD AUTO: 217 K/UL — SIGNIFICANT CHANGE UP (ref 150–400)
POTASSIUM SERPL-MCNC: 3.3 MMOL/L — LOW (ref 3.5–5.3)
POTASSIUM SERPL-SCNC: 3.3 MMOL/L — LOW (ref 3.5–5.3)
RBC # BLD: 3.85 M/UL — SIGNIFICANT CHANGE UP (ref 3.8–5.2)
RBC # FLD: 12.8 % — SIGNIFICANT CHANGE UP (ref 10.3–14.5)
SODIUM SERPL-SCNC: 140 MMOL/L — SIGNIFICANT CHANGE UP (ref 135–145)
WBC # BLD: 10.9 K/UL — HIGH (ref 3.8–10.5)
WBC # FLD AUTO: 10.9 K/UL — HIGH (ref 3.8–10.5)

## 2020-01-09 RX ORDER — POTASSIUM CHLORIDE 20 MEQ
40 PACKET (EA) ORAL ONCE
Refills: 0 | Status: COMPLETED | OUTPATIENT
Start: 2020-01-09 | End: 2020-01-09

## 2020-01-09 RX ADMIN — ENOXAPARIN SODIUM 40 MILLIGRAM(S): 100 INJECTION SUBCUTANEOUS at 05:35

## 2020-01-09 RX ADMIN — OXYCODONE AND ACETAMINOPHEN 1 TABLET(S): 5; 325 TABLET ORAL at 21:41

## 2020-01-09 RX ADMIN — SODIUM CHLORIDE 125 MILLILITER(S): 9 INJECTION, SOLUTION INTRAVENOUS at 05:58

## 2020-01-09 RX ADMIN — Medication 40 MILLIEQUIVALENT(S): at 10:18

## 2020-01-09 RX ADMIN — OXYCODONE AND ACETAMINOPHEN 1 TABLET(S): 5; 325 TABLET ORAL at 21:11

## 2020-01-09 RX ADMIN — OXYCODONE AND ACETAMINOPHEN 1 TABLET(S): 5; 325 TABLET ORAL at 05:14

## 2020-01-09 RX ADMIN — OXYCODONE AND ACETAMINOPHEN 1 TABLET(S): 5; 325 TABLET ORAL at 04:01

## 2020-01-09 RX ADMIN — ENOXAPARIN SODIUM 40 MILLIGRAM(S): 100 INJECTION SUBCUTANEOUS at 17:21

## 2020-01-09 NOTE — ADVANCED PRACTICE NURSE CONSULT - ASSESSMENT
This is a 59yr old female patient admitted for Intestinal Obstruction, to which a consult was done for Ostomy teaching. The patient has a red stoma with small amounts of semi-soft stool in the ostomy bag and no irritation to the peristomal skin. The patient and  was instructed on how to identify her stomal size (2 1/4 two piece colostomy), empty out the colostomy bag, clean the peristomal site, apply skin prep, and apply the new ostomy bag. The patient and  returned understanding and demonstration without complications. Educational material and my contact information was provided to the patient for further questions or concerns that they may have. I will continue to monitor

## 2020-01-09 NOTE — PROGRESS NOTE ADULT - SUBJECTIVE AND OBJECTIVE BOX
INTERVAL HPI/OVERNIGHT EVENTS:  Pt resting comfortably. No acute complaints.   Tolerating clear liquid diet.   +stool/flatus in ostomy bag.   Denies N/V.  Ambulating well.    MEDICATIONS  (STANDING):  dextrose 5% + sodium chloride 0.45% 1000 milliLiter(s) (125 mL/Hr) IV Continuous <Continuous>  enoxaparin Injectable 40 milliGRAM(s) SubCutaneous every 12 hours    MEDICATIONS  (PRN):  benzocaine 15 mG/menthol 3.6 mG (Sugar-Free) Lozenge 1 Lozenge Oral every 6 hours PRN Sore Throat  ondansetron Injectable 4 milliGRAM(s) IV Push every 6 hours PRN Nausea  oxycodone    5 mG/acetaminophen 325 mG 1 Tablet(s) Oral every 4 hours PRN Moderate Pain (4 - 6)    Vital Signs Last 24 Hrs  T(C): 36.7 (09 Jan 2020 05:27), Max: 37.8 (08 Jan 2020 14:00)  T(F): 98 (09 Jan 2020 05:27), Max: 100 (08 Jan 2020 14:00)  HR: 57 (09 Jan 2020 05:27) (57 - 81)  BP: 124/46 (09 Jan 2020 05:27) (124/46 - 131/68)  BP(mean): --  RR: 18 (09 Jan 2020 05:27) (16 - 18)  SpO2: 98% (09 Jan 2020 05:27) (95% - 98%)    Physical:  General: A&Ox3. NAD.  Abdomen: Soft nondistended, ostomy patent, viable. No surrounding skin discoloration.    I&O's Detail    08 Jan 2020 07:01  -  09 Jan 2020 07:00  --------------------------------------------------------  IN:    dextrose 5% + sodium chloride 0.45%: 1500 mL  Total IN: 1500 mL    OUT:    Colostomy: 450 mL  Total OUT: 450 mL    Total NET: 1050 mL    LABS:                        11.7   10.90 )-----------( 217      ( 09 Jan 2020 06:30 )             36.6             01-09    140  |  107  |  3<L>  ----------------------------<  128<H>  3.3<L>   |  28  |  0.56    Ca    7.7<L>      09 Jan 2020 06:30

## 2020-01-09 NOTE — PROGRESS NOTE ADULT - ASSESSMENT
59y.o. Female s/p transverse loop colostomy POD#2    -Advance diet as tolerated  -VNS set up  -Colostomy education  -d/c planning 1/10/2020    Hypokalemia, improving  -PO repletion

## 2020-01-10 ENCOUNTER — TRANSCRIPTION ENCOUNTER (OUTPATIENT)
Age: 60
End: 2020-01-10

## 2020-01-10 VITALS
OXYGEN SATURATION: 98 % | TEMPERATURE: 98 F | HEART RATE: 66 BPM | RESPIRATION RATE: 18 BRPM | DIASTOLIC BLOOD PRESSURE: 66 MMHG | SYSTOLIC BLOOD PRESSURE: 138 MMHG

## 2020-01-10 LAB
ANION GAP SERPL CALC-SCNC: 6 MMOL/L — SIGNIFICANT CHANGE UP (ref 5–17)
BUN SERPL-MCNC: 6 MG/DL — LOW (ref 7–18)
CALCIUM SERPL-MCNC: 8.4 MG/DL — SIGNIFICANT CHANGE UP (ref 8.4–10.5)
CHLORIDE SERPL-SCNC: 108 MMOL/L — SIGNIFICANT CHANGE UP (ref 96–108)
CO2 SERPL-SCNC: 28 MMOL/L — SIGNIFICANT CHANGE UP (ref 22–31)
CREAT SERPL-MCNC: 0.52 MG/DL — SIGNIFICANT CHANGE UP (ref 0.5–1.3)
GLUCOSE SERPL-MCNC: 91 MG/DL — SIGNIFICANT CHANGE UP (ref 70–99)
HCT VFR BLD CALC: 37.1 % — SIGNIFICANT CHANGE UP (ref 34.5–45)
HGB BLD-MCNC: 11.7 G/DL — SIGNIFICANT CHANGE UP (ref 11.5–15.5)
MCHC RBC-ENTMCNC: 29.7 PG — SIGNIFICANT CHANGE UP (ref 27–34)
MCHC RBC-ENTMCNC: 31.5 GM/DL — LOW (ref 32–36)
MCV RBC AUTO: 94.2 FL — SIGNIFICANT CHANGE UP (ref 80–100)
NRBC # BLD: 0 /100 WBCS — SIGNIFICANT CHANGE UP (ref 0–0)
PLATELET # BLD AUTO: 228 K/UL — SIGNIFICANT CHANGE UP (ref 150–400)
POTASSIUM SERPL-MCNC: 3.4 MMOL/L — LOW (ref 3.5–5.3)
POTASSIUM SERPL-SCNC: 3.4 MMOL/L — LOW (ref 3.5–5.3)
RBC # BLD: 3.94 M/UL — SIGNIFICANT CHANGE UP (ref 3.8–5.2)
RBC # FLD: 12.6 % — SIGNIFICANT CHANGE UP (ref 10.3–14.5)
SODIUM SERPL-SCNC: 142 MMOL/L — SIGNIFICANT CHANGE UP (ref 135–145)
WBC # BLD: 9.38 K/UL — SIGNIFICANT CHANGE UP (ref 3.8–10.5)
WBC # FLD AUTO: 9.38 K/UL — SIGNIFICANT CHANGE UP (ref 3.8–10.5)

## 2020-01-10 PROCEDURE — 74177 CT ABD & PELVIS W/CONTRAST: CPT

## 2020-01-10 PROCEDURE — 99285 EMERGENCY DEPT VISIT HI MDM: CPT | Mod: 25

## 2020-01-10 PROCEDURE — 86850 RBC ANTIBODY SCREEN: CPT

## 2020-01-10 PROCEDURE — 86901 BLOOD TYPING SEROLOGIC RH(D): CPT

## 2020-01-10 PROCEDURE — 82962 GLUCOSE BLOOD TEST: CPT

## 2020-01-10 PROCEDURE — 85730 THROMBOPLASTIN TIME PARTIAL: CPT

## 2020-01-10 PROCEDURE — 85027 COMPLETE CBC AUTOMATED: CPT

## 2020-01-10 PROCEDURE — 93005 ELECTROCARDIOGRAM TRACING: CPT

## 2020-01-10 PROCEDURE — 80053 COMPREHEN METABOLIC PANEL: CPT

## 2020-01-10 PROCEDURE — 86900 BLOOD TYPING SEROLOGIC ABO: CPT

## 2020-01-10 PROCEDURE — 85610 PROTHROMBIN TIME: CPT

## 2020-01-10 PROCEDURE — 36415 COLL VENOUS BLD VENIPUNCTURE: CPT

## 2020-01-10 PROCEDURE — 80048 BASIC METABOLIC PNL TOTAL CA: CPT

## 2020-01-10 PROCEDURE — 86803 HEPATITIS C AB TEST: CPT

## 2020-01-10 PROCEDURE — 83690 ASSAY OF LIPASE: CPT

## 2020-01-10 RX ORDER — ACETAMINOPHEN WITH CODEINE 300MG-30MG
1 TABLET ORAL
Qty: 10 | Refills: 0
Start: 2020-01-10

## 2020-01-10 RX ORDER — POTASSIUM CHLORIDE 20 MEQ
20 PACKET (EA) ORAL ONCE
Refills: 0 | Status: COMPLETED | OUTPATIENT
Start: 2020-01-10 | End: 2020-01-10

## 2020-01-10 RX ADMIN — ENOXAPARIN SODIUM 40 MILLIGRAM(S): 100 INJECTION SUBCUTANEOUS at 05:50

## 2020-01-10 RX ADMIN — Medication 20 MILLIEQUIVALENT(S): at 12:16

## 2020-01-10 NOTE — DISCHARGE NOTE PROVIDER - CARE PROVIDER_API CALL
Valeriy Lewis (MD)  Surgery  [address] [place] [address]  Phone: [phone]  Fax: [phone]  Follow Up Time:

## 2020-01-10 NOTE — DISCHARGE NOTE NURSING/CASE MANAGEMENT/SOCIAL WORK - PATIENT PORTAL LINK FT
You can access the FollowMyHealth Patient Portal offered by [facility] by registering at the following website: [email or web address]. By joining [facility]’s FollowMyHealth portal, you will also be able to view your health information using other applications (apps) compatible with our system.

## 2020-01-10 NOTE — DISCHARGE NOTE PROVIDER - NSFOLLOWUPCLINICS_GEN_ALL_ED_FT
[facility]  Surgery  [address] [place] [address]  Phone: [phone]  Fax: [phone]  Follow Up Time:

## 2020-01-10 NOTE — PROGRESS NOTE ADULT - ASSESSMENT
59y.o. Female s/p transverse loop colostomy 1/7    -d/c planning with VNS  -Colostomy education  -oob ambulate encouraged 59y.o. Female s/p transverse loop colostomy 1/7, hypokalemia    -replete potassium with PO potassium chloride 20meq once   -d/c planning with VNS  -Colostomy education  -oob ambulate encouraged

## 2020-01-10 NOTE — PROGRESS NOTE ADULT - SUBJECTIVE AND OBJECTIVE BOX
Pt resting comfortably. No acute complaints.   Tolerating clear reg diet.   +stool/flatus in ostomy bag.   Denies N/V.  Ambulating well.    MEDICATIONS  (STANDING):  dextrose 5% + sodium chloride 0.45% 1000 milliLiter(s) (125 mL/Hr) IV Continuous <Continuous>  enoxaparin Injectable 40 milliGRAM(s) SubCutaneous every 12 hours    MEDICATIONS  (PRN):  benzocaine 15 mG/menthol 3.6 mG (Sugar-Free) Lozenge 1 Lozenge Oral every 6 hours PRN Sore Throat  ondansetron Injectable 4 milliGRAM(s) IV Push every 6 hours PRN Nausea  oxycodone    5 mG/acetaminophen 325 mG 1 Tablet(s) Oral every 4 hours PRN Moderate Pain (4 - 6)    Vital Signs Last 24 Hrs  T(C): 36.5 (10 Darion 2020 05:02), Max: 36.9 (09 Jan 2020 21:40)  T(F): 97.7 (10 Darion 2020 05:02), Max: 98.4 (09 Jan 2020 21:40)  HR: 60 (10 Darion 2020 05:02) (60 - 70)  BP: 122/64 (10 Darion 2020 05:02) (114/61 - 142/59)  BP(mean): --  RR: 18 (10 Darion 2020 05:02) (17 - 18)  SpO2: 96% (10 Darion 2020 05:02) (96% - 97%)    Physical:  General: A&Ox3. NAD.  Abdomen: Soft nondistended, ostomy patent, viable, with stool and gas. No surrounding skin discoloration.                            11.7   9.38  )-----------( 228      ( 10 Darion 2020 06:19 )             37.1   01-10    142  |  108  |  6<L>  ----------------------------<  91  3.4<L>   |  28  |  0.52    Ca    8.4      10 Darion 2020 06:19

## 2020-01-10 NOTE — DISCHARGE NOTE PROVIDER - NSDCCPCAREPLAN_GEN_ALL_CORE_FT
PRINCIPAL DISCHARGE DIAGNOSIS  Diagnosis: Large bowel obstruction  Assessment and Plan of Treatment: s/p transverse loop colostomy on 1/7/20. surgery was uneventful and patient was discharged home with pain meds, vns servces, and appropriate follow-up

## 2020-01-10 NOTE — DISCHARGE NOTE PROVIDER - NSDCCPTREATMENT_GEN_ALL_CORE_FT
PRINCIPAL PROCEDURE  Procedure: Colostomy, loop, transverse  Findings and Treatment: s/p transverse loop colostomy on 1/7/20. surgery was uneventful and patient was discharged home with pain meds, vns servces, and appropriate follow-up

## 2020-01-10 NOTE — DISCHARGE NOTE PROVIDER - HOSPITAL COURSE
58y/o f with PMHx of Diverticulosis, PSHx of lap macarena, open appendectomy, MARIELLA presents to the ED with Abdominal Pain and Nausea x 3 days . Pain is mostly periumbilical , considered an 8/10 on pain scale, often intermittent and most times sharp with no referral. Admits lots of NBNB vomiting and nausea but denies fever, chills, chest pain, palpitations SOB or any other complaints. Patient was found to have large bowel obstruction. She underwent transverse loop colostomy 1/7/20. Surgery was uneventful and patient was discharged home once regular diet was tolerated with pain meds, vns services for ostomy care, and appropriate follow-up.

## 2020-01-13 PROBLEM — K46.9 UNSPECIFIED ABDOMINAL HERNIA WITHOUT OBSTRUCTION OR GANGRENE: Chronic | Status: ACTIVE | Noted: 2020-01-07

## 2020-01-13 PROBLEM — K57.90 DIVERTICULOSIS OF INTESTINE, PART UNSPECIFIED, WITHOUT PERFORATION OR ABSCESS WITHOUT BLEEDING: Chronic | Status: ACTIVE | Noted: 2020-01-07

## 2020-01-13 PROBLEM — Z00.00 ENCOUNTER FOR PREVENTIVE HEALTH EXAMINATION: Status: ACTIVE | Noted: 2020-01-13

## 2020-01-15 ENCOUNTER — TRANSCRIPTION ENCOUNTER (OUTPATIENT)
Age: 60
End: 2020-01-15

## 2020-01-15 ENCOUNTER — APPOINTMENT (OUTPATIENT)
Dept: SURGERY | Facility: CLINIC | Age: 60
End: 2020-01-15
Payer: COMMERCIAL

## 2020-01-15 VITALS
DIASTOLIC BLOOD PRESSURE: 92 MMHG | BODY MASS INDEX: 49.08 KG/M2 | WEIGHT: 250 LBS | HEART RATE: 65 BPM | SYSTOLIC BLOOD PRESSURE: 169 MMHG | HEIGHT: 60 IN | TEMPERATURE: 98.1 F | OXYGEN SATURATION: 98 %

## 2020-01-15 PROCEDURE — 99024 POSTOP FOLLOW-UP VISIT: CPT

## 2020-01-15 RX ORDER — ADHESIVE REMOVER
PACKET (EA) MISCELLANEOUS
Qty: 1 | Refills: 5 | Status: ACTIVE | COMMUNITY
Start: 2020-01-15 | End: 1900-01-01

## 2020-01-15 RX ORDER — COLOSTOMY BAGS 1 3/4"
POUCH EACH MISCELLANEOUS
Qty: 1 | Refills: 5 | Status: ACTIVE | COMMUNITY
Start: 2020-01-15 | End: 1900-01-01

## 2020-01-15 RX ORDER — OSTOMY SUPPLY 1 3/4"
EACH MISCELLANEOUS
Qty: 1 | Refills: 5 | Status: ACTIVE | COMMUNITY
Start: 2020-01-15 | End: 1900-01-01

## 2020-01-15 NOTE — PLAN
[FreeTextEntry1] : 1) Refer to Dr. Fraga for colonoscopy at appropriate timeframe post-op\par 2) f/u 1 month\par 3) plan for definitive operation after colonoscopy\par 4) colostomy supplies prescribed

## 2020-01-15 NOTE — ASSESSMENT
[FreeTextEntry1] : ROBER WILSON is a 59 year old female who present in the office for postop visit. Patient s/p exploratory laparotomy, transverse loop diverting colostomy on 01/07/2020. Needs completion colonoscopy and ultimately a definitive operation.

## 2020-01-15 NOTE — HISTORY OF PRESENT ILLNESS
[de-identified] : ROBER WILSON is a 59 year old female who present in the office for post hospitalization and postop visit. Patient was in [facility] from 01/07/2020 to 01/10/2020 for Abdominal Pain and Nausea. Patient had CT of abdomen and pelvis done that showed large bowel obstruction. Patient s/p  exploratory laparotomy, transverse loop diverting colostomy on 01/07/2020. She feels well today. No fevers, chills, nausea or vomiting, Her ostomy is functioning.

## 2020-01-15 NOTE — PHYSICAL EXAM
[Normal Breath Sounds] : Normal breath sounds [Normal Heart Sounds] : normal heart sounds [No Rash or Lesion] : No rash or lesion [Alert] : alert [Oriented to Person] : oriented to person [Oriented to Place] : oriented to place [Oriented to Time] : oriented to time [Calm] : calm [de-identified] : NAD [de-identified] : NCAT; sclerae anicteric; [de-identified] : obese, soft, NT, ND, ostomy viable and productive of stool; skin around area is erythematous from contact dermatitis

## 2020-01-17 ENCOUNTER — APPOINTMENT (OUTPATIENT)
Dept: SURGERY | Facility: CLINIC | Age: 60
End: 2020-01-17
Payer: COMMERCIAL

## 2020-01-17 VITALS
RESPIRATION RATE: 12 BRPM | HEIGHT: 60 IN | SYSTOLIC BLOOD PRESSURE: 133 MMHG | OXYGEN SATURATION: 97 % | DIASTOLIC BLOOD PRESSURE: 82 MMHG | HEART RATE: 58 BPM

## 2020-01-17 PROCEDURE — 99024 POSTOP FOLLOW-UP VISIT: CPT

## 2020-01-17 NOTE — PLAN
[FreeTextEntry1] : Please follow up at the office within  3 weeks  and as needed for any questions or concerns

## 2020-01-17 NOTE — PHYSICAL EXAM
[Normal Breath Sounds] : Normal breath sounds [Normal Heart Sounds] : normal heart sounds [Alert] : alert [Oriented to Person] : oriented to person [Oriented to Place] : oriented to place [Oriented to Time] : oriented to time [Calm] : calm [de-identified] : The patient is alert, well-groomed, well developed and cheerful.  [de-identified] : Breath sounds equal and bilateral, no wheezing no rales or rhonchi  [de-identified] : Normoactive bowel sounds, soft and nontender, no hepatosplenomegaly or masses noted, ostomy viable and productive of stool; skin around area is red erythematous from contact dermatitis. [de-identified] : WNL [de-identified] :  good S1, S2, no m/r/g bilateral  [de-identified] : Incision site is healing well. lower pole of the wound is open with minimal serosanguineous drainage

## 2020-01-17 NOTE — HISTORY OF PRESENT ILLNESS
[de-identified] : ROBER WILSON is a 59 year old female who present in the office for post hospitalization and postop visit. Patient was in [facility] from 01/07/2020 to 01/10/2020 for Abdominal Pain and Nausea. Patient had CT of abdomen and pelvis done that showed large bowel obstruction. Patient s/p exploratory laparotomy, transverse loop diverting colostomy on 01/07/2020. Today patient is doing well, Patient complains that the lower pole of the wound got opened yesterday and the site was bleeding oozing with some serosanguineous fluid . Ostomy viable and productive of stool; skin around area is red erythematous from contact dermatitis. Surgical wound is healing well. No sign of inflammation or exudate. Patient denies any fevers, chills, nausea, vomiting, diarrhea or constipation. Patient able to tolerate regular diet with normal bowel movements via colostomy. Patient denies any pain at present time \par   Paramedian Forehead Flap Text: A decision was made to reconstruct the defect utilizing an interpolation axial flap and a staged reconstruction.  A telfa template was made of the defect.  This telfa template was then used to outline the paramedian forehead pedicle flap.  The donor area for the pedicle flap was then injected with anesthesia.  The flap was excised through the skin and subcutaneous tissue down to the layer of the underlying musculature.  The pedicle flap was carefully excised within this deep plane to maintain its blood supply.  The edges of the donor site were undermined.   The donor site was closed in a primary fashion.  The pedicle was then rotated into position and sutured.  Once the tube was sutured into place, adequate blood supply was confirmed with blanching and refill.  The pedicle was then wrapped with xeroform gauze and dressed appropriately with a telfa and gauze bandage to ensure continued blood supply and protect the attached pedicle.

## 2020-01-17 NOTE — ASSESSMENT
[FreeTextEntry1] : ROBER MARINO is a 59 year old female who present in the office  s/p exploratory laparotomy, transverse loop diverting colostomy on 01/07/2020.\par Patient is doing well, with expected post-operative recovery.  Incision site is healing well, lower pole of the wound is open with minimal serosanguineous drainage. There is no evidence of infection or complication, and patient is progressing as expected. Post-operative wound care, activity, restrictions and precautions reinforced.  Patient was instructed no heavy lifting  4 weeks. Patient's questions and concerns addressed to patient's satisfaction. \par

## 2020-01-29 ENCOUNTER — APPOINTMENT (OUTPATIENT)
Dept: SURGERY | Facility: CLINIC | Age: 60
End: 2020-01-29
Payer: COMMERCIAL

## 2020-01-29 VITALS
HEIGHT: 60 IN | DIASTOLIC BLOOD PRESSURE: 92 MMHG | TEMPERATURE: 98 F | BODY MASS INDEX: 47.51 KG/M2 | HEART RATE: 64 BPM | WEIGHT: 242 LBS | SYSTOLIC BLOOD PRESSURE: 153 MMHG

## 2020-01-29 PROCEDURE — 99024 POSTOP FOLLOW-UP VISIT: CPT

## 2020-01-31 NOTE — PLAN
[FreeTextEntry1] : Please follow up at the office within 2 weeks and as needed for any questions or concerns

## 2020-01-31 NOTE — ASSESSMENT
[FreeTextEntry1] : ROBER MARINO is a 59 year old female who present in the office  s/p exploratory laparotomy, transverse loop diverting colostomy on 01/07/2020.\par Patient is doing well, with expected post-operative recovery.  Incision site is healing well, lower pole of the wound is healing and granulating well . There is no evidence of infection or complication, and patient is progressing as expected. Post-operative wound care, activity, restrictions and precautions reinforced.  Patient was instructed no heavy lifting. Patient's questions and concerns addressed to patient's satisfaction. \par

## 2020-02-06 ENCOUNTER — APPOINTMENT (OUTPATIENT)
Dept: GASTROENTEROLOGY | Facility: CLINIC | Age: 60
End: 2020-02-06
Payer: COMMERCIAL

## 2020-02-06 VITALS
OXYGEN SATURATION: 98 % | SYSTOLIC BLOOD PRESSURE: 150 MMHG | TEMPERATURE: 98.4 F | BODY MASS INDEX: 44.53 KG/M2 | HEIGHT: 62 IN | WEIGHT: 242 LBS | DIASTOLIC BLOOD PRESSURE: 88 MMHG | HEART RATE: 57 BPM

## 2020-02-06 PROCEDURE — 99204 OFFICE O/P NEW MOD 45 MIN: CPT

## 2020-02-06 NOTE — CONSULT LETTER
[Dear  ___] : Dear  [unfilled], [Please see my note below.] : Please see my note below. [Consult Letter:] : I had the pleasure of evaluating your patient, [unfilled]. [Consult Closing:] : Thank you very much for allowing me to participate in the care of this patient.  If you have any questions, please do not hesitate to contact me. [Sincerely,] : Sincerely, [FreeTextEntry3] : Phil Rubin MD\par Chief, Gastroenterology [facility]\par

## 2020-02-06 NOTE — PHYSICAL EXAM
[General Appearance - Alert] : alert [General Appearance - In No Acute Distress] : in no acute distress [Sclera] : the sclera and conjunctiva were normal [PERRL With Normal Accommodation] : pupils were equal in size, round, and reactive to light [Extraocular Movements] : extraocular movements were intact [Oropharynx] : the oropharynx was normal [Outer Ear] : the ears and nose were normal in appearance [Neck Appearance] : the appearance of the neck was normal [Neck Cervical Mass (___cm)] : no neck mass was observed [Jugular Venous Distention Increased] : there was no jugular-venous distention [Thyroid Diffuse Enlargement] : the thyroid was not enlarged [Thyroid Nodule] : there were no palpable thyroid nodules [] : no respiratory distress [Auscultation Breath Sounds / Voice Sounds] : lungs were clear to auscultation bilaterally [Heart Rate And Rhythm] : heart rate was normal and rhythm regular [Heart Sounds] : normal S1 and S2 [Murmurs] : no murmurs [Heart Sounds Gallop] : no gallops [Heart Sounds Pericardial Friction Rub] : no pericardial rub [Full Pulse] : the pedal pulses are present [Edema] : there was no peripheral edema [Breast Palpation Mass] : no palpable masses [Bowel Sounds] : normal bowel sounds [FreeTextEntry1] : A female chaperone was present during my exam.

## 2020-02-06 NOTE — HISTORY OF PRESENT ILLNESS
[Heartburn] : denies heartburn [Nausea] : denies nausea [Diarrhea] : denies diarrhea [Vomiting] : denies vomiting [Yellow Skin Or Eyes (Jaundice)] : denies jaundice [Constipation] : denies constipation [Abdominal Pain] : denies abdominal pain [Abdominal Swelling] : denies abdominal swelling [Rectal Pain] : denies rectal pain [GERD] : no gastroesophageal reflux disease [Hiatus Hernia] : no hiatus hernia [Peptic Ulcer Disease] : no peptic ulcer disease [Pancreatitis] : no pancreatitis [Cholelithiasis] : no cholelithiasis [Kidney Stone] : no kidney stone [Inflammatory Bowel Disease] : no inflammatory bowel disease [Irritable Bowel Syndrome] : no irritable bowel syndrome [Diverticulitis] : no diverticulitis [Alcohol Abuse] : no alcohol abuse [de-identified] : \par ROBER WILSON is a 59 year old female who present in the office for post hospitalization and postop visit. Patient was in [facility] from 01/07/2020 to 01/10/2020 for Abdominal Pain and Nausea. Patient had CT of abdomen and pelvis done that showed large bowel obstruction. Patient s/p exploratory laparotomy, transverse loop diverting colostomy on 01/07/2020. Today patient is doing well, Patient complains that the lower pole of the wound got opened yesterday and the site was bleeding oozing with some serosanguineous fluid. Ostomy viable and productive of stool; skin around area is red erythematous from contact dermatitis. Surgical wound is healing well. No sign of inflammation or exudate. Patient denies any fevers, chills, nausea, vomiting, diarrhea or constipation. Patient able to tolerate regular diet with normal bowel movements via colostomy. Patient denies any pain at present time\par Now here for reversal of transverse loop colostomy done 4 weeks ago . CT showed ? of narrowing due to tics or mass. wants reversal. Too early to scope now

## 2020-02-12 ENCOUNTER — APPOINTMENT (OUTPATIENT)
Dept: SURGERY | Facility: CLINIC | Age: 60
End: 2020-02-12
Payer: COMMERCIAL

## 2020-02-12 VITALS
SYSTOLIC BLOOD PRESSURE: 143 MMHG | HEART RATE: 70 BPM | DIASTOLIC BLOOD PRESSURE: 79 MMHG | BODY MASS INDEX: 44.53 KG/M2 | OXYGEN SATURATION: 99 % | WEIGHT: 242 LBS | HEIGHT: 62 IN | TEMPERATURE: 98.2 F

## 2020-02-12 PROCEDURE — 99024 POSTOP FOLLOW-UP VISIT: CPT

## 2020-03-01 RX ORDER — ENEMA 19; 7 G/133ML; G/133ML
7-19 ENEMA RECTAL
Qty: 2 | Refills: 0 | Status: DISCONTINUED | COMMUNITY
Start: 2020-02-06 | End: 2020-03-01

## 2020-03-01 RX ORDER — POLYETHYLENE GLYCOL 3350, SODIUM SULFATE, SODIUM CHLORIDE, POTASSIUM CHLORIDE, ASCORBIC ACID, SODIUM ASCORBATE 140-9-5.2G
140 KIT ORAL
Qty: 1 | Refills: 0 | Status: DISCONTINUED | COMMUNITY
Start: 2020-02-06 | End: 2020-03-01

## 2020-03-01 NOTE — HISTORY OF PRESENT ILLNESS
[de-identified] : ROBER MARINO is a 59 year old female who present in the office for follow up visit. Patient s/p exploratory laparotomy, transverse loop diverting colostomy on 01/07/2020. She presents for follow-up and evaluation of her colostomy site wound. SHe noted some oozing once or twice after changing appliance and wished an evaluation and also for planning regarding reversal. SHe has no other complaints and is feeling quite well.

## 2020-03-01 NOTE — PHYSICAL EXAM
[de-identified] : soft, NT, ND, well healed scars, no hernias; ostomy at upper midline functional and healthy appearing; lower wound is granulating and healing well.\par

## 2020-03-01 NOTE — ASSESSMENT
[FreeTextEntry1] : 59F s/p diverting transverse loop colostomy for sigmoid obstruction likely from diverticular stricture.\par \par 1) f/u after colonoscopy\par 2) plan for reversal and stricture resection after colonoscopy

## 2020-03-11 ENCOUNTER — RESULT REVIEW (OUTPATIENT)
Age: 60
End: 2020-03-11

## 2020-03-11 ENCOUNTER — OUTPATIENT (OUTPATIENT)
Dept: OUTPATIENT SERVICES | Facility: HOSPITAL | Age: 60
LOS: 1 days | End: 2020-03-11
Payer: COMMERCIAL

## 2020-03-11 ENCOUNTER — APPOINTMENT (OUTPATIENT)
Dept: GASTROENTEROLOGY | Facility: HOSPITAL | Age: 60
End: 2020-03-11

## 2020-03-11 DIAGNOSIS — K56.699 OTHER INTESTINAL OBSTRUCTION UNSPECIFIED AS TO PARTIAL VERSUS COMPLETE OBSTRUCTION: ICD-10-CM

## 2020-03-11 DIAGNOSIS — Z90.49 ACQUIRED ABSENCE OF OTHER SPECIFIED PARTS OF DIGESTIVE TRACT: Chronic | ICD-10-CM

## 2020-03-11 DIAGNOSIS — Z90.710 ACQUIRED ABSENCE OF BOTH CERVIX AND UTERUS: Chronic | ICD-10-CM

## 2020-03-11 DIAGNOSIS — Z93.3 COLOSTOMY STATUS: ICD-10-CM

## 2020-03-11 PROCEDURE — C1889: CPT

## 2020-03-11 PROCEDURE — 44394 COLONOSCOPY W/SNARE: CPT

## 2020-03-11 PROCEDURE — 45378 DIAGNOSTIC COLONOSCOPY: CPT

## 2020-03-11 PROCEDURE — 45385 COLONOSCOPY W/LESION REMOVAL: CPT

## 2020-03-11 PROCEDURE — 88305 TISSUE EXAM BY PATHOLOGIST: CPT

## 2020-03-11 PROCEDURE — 45381 COLONOSCOPY SUBMUCOUS NJX: CPT

## 2020-03-11 PROCEDURE — 88305 TISSUE EXAM BY PATHOLOGIST: CPT | Mod: 26

## 2020-03-13 LAB — SURGICAL PATHOLOGY STUDY: SIGNIFICANT CHANGE UP

## 2020-03-18 ENCOUNTER — APPOINTMENT (OUTPATIENT)
Dept: SURGERY | Facility: CLINIC | Age: 60
End: 2020-03-18

## 2020-03-31 ENCOUNTER — APPOINTMENT (OUTPATIENT)
Dept: GASTROENTEROLOGY | Facility: CLINIC | Age: 60
End: 2020-03-31
Payer: COMMERCIAL

## 2020-03-31 PROCEDURE — 99213 OFFICE O/P EST LOW 20 MIN: CPT

## 2020-03-31 NOTE — ASSESSMENT
[FreeTextEntry1] : s/p colostomy with both loop entrances at ostomy site\par PLAN\par Repeat con via ostomy when cases resume.

## 2020-03-31 NOTE — HISTORY OF PRESENT ILLNESS
[Home] : at home, [unfilled] , at the time of the visit. [Medical Office: ([facility])___] : at ~his/her~ medical office located in [place] [Patient] : the patient [Self] : self [FreeTextEntry4] : Kanchan [de-identified] : Doing well and i reviewed my conversation with Dr Lewis regarding her surgically created anatomy and why we had trouble finding the proximal portion of her colon. reviewed pathology of polyp and described how it could have contributed to her obstruction via intussusception.\par Explained covid crisis has postponed a relook. All questions were answered\par Time spent 14 minutes

## 2020-04-08 ENCOUNTER — APPOINTMENT (OUTPATIENT)
Dept: SURGERY | Facility: CLINIC | Age: 60
End: 2020-04-08
Payer: COMMERCIAL

## 2020-04-08 VITALS
WEIGHT: 242 LBS | HEIGHT: 62 IN | OXYGEN SATURATION: 95 % | SYSTOLIC BLOOD PRESSURE: 165 MMHG | BODY MASS INDEX: 44.53 KG/M2 | TEMPERATURE: 98.5 F | DIASTOLIC BLOOD PRESSURE: 95 MMHG | HEART RATE: 69 BPM

## 2020-04-08 PROCEDURE — 99213 OFFICE O/P EST LOW 20 MIN: CPT

## 2020-04-15 NOTE — PHYSICAL EXAM
[JVD] : jugular venous distention ~L [Normal Breath Sounds] : Normal breath sounds [Normal Heart Sounds] : normal heart sounds [No Rash or Lesion] : No rash or lesion [Alert] : alert [Oriented to Person] : oriented to person [Oriented to Place] : oriented to place [Oriented to Time] : oriented to time [Calm] : calm [de-identified] : NAD [de-identified] : NCAT; sclerae anicteric; [de-identified] : obese, soft, NT, midline transverse loop colostomy at upper aspect; it appears normal. upon standing and slight bulge is visible on the right side of the colostomy site--possibly a parastomal hernia

## 2020-04-15 NOTE — HISTORY OF PRESENT ILLNESS
[de-identified] : 59F presents for follow up. She had recently undergone breast surgery and was diagnosed with cancer. She has an appointment with oncology this week. She feels well overall. Reports occasional protrusion of colostomy that is mildly uncomfortable. Today it is normal. No other complaints. Presents for follow-up. She underwent colonoscopy that was incomplete and has another scheduled once the viral pandemic subsides.

## 2020-04-15 NOTE — ASSESSMENT
[FreeTextEntry1] : 59F with morbid obesity, s/p loop transverse colostomy for obstructing colon mass. On colonoscopy found to be a large benign polyp that was removed en toto. \par \par 1) f/u oncology for breast ca plan\par 2) f/u repeat colonoscopy\par 3) would do BE after completion colonoscopy and then schedule for reversal once the pandemic subsides. The lesions of the sigmoid was resected completely and was benign--there is no apparent need for a sigmoid colectomy.

## 2020-04-21 ENCOUNTER — APPOINTMENT (OUTPATIENT)
Dept: GASTROENTEROLOGY | Facility: CLINIC | Age: 60
End: 2020-04-21

## 2020-04-21 NOTE — HISTORY OF PRESENT ILLNESS
[Medical Office: ([facility])___] : at the medical office located in  [Home] : at home, [unfilled] , at the time of the visit. [Patient] : the patient [FreeTextEntry4] : Kanchan

## 2020-04-30 ENCOUNTER — APPOINTMENT (OUTPATIENT)
Dept: GASTROENTEROLOGY | Facility: CLINIC | Age: 60
End: 2020-04-30
Payer: COMMERCIAL

## 2020-04-30 PROCEDURE — 99213 OFFICE O/P EST LOW 20 MIN: CPT | Mod: 95

## 2020-04-30 NOTE — REASON FOR VISIT
[Home] : at home, [unfilled] , at the time of the visit. [Patient] : the patient [Medical Office: ([facility])___] : at the medical office located in  [Self] : self [FreeTextEntry4] : Vandana DOLL)

## 2020-04-30 NOTE — ASSESSMENT
[FreeTextEntry1] : S/p diverting colostomy for repeat look to find and examine other loop\par \par Plan \par Colonoscopy when cases resume from COVID crisis

## 2020-04-30 NOTE — HISTORY OF PRESENT ILLNESS
[Heartburn] : denies heartburn [Vomiting] : denies vomiting [Nausea] : denies nausea [Constipation] : denies constipation [Yellow Skin Or Eyes (Jaundice)] : denies jaundice [Diarrhea] : denies diarrhea [Rectal Pain] : denies rectal pain [Abdominal Pain] : denies abdominal pain [Abdominal Swelling] : denies abdominal swelling [de-identified] : C/o some bulging at stoma and states having some BM's thru rectum. Started radiation to Breast after b/l lumpectomy. No other problems. Still awaits repeat colonoscopy to check other loop not found on recent attempt. Spoke with Dr Lewis. Lesion removed may have been nidus for bowel obstruction.

## 2020-06-10 ENCOUNTER — APPOINTMENT (OUTPATIENT)
Dept: DISASTER EMERGENCY | Facility: CLINIC | Age: 60
End: 2020-06-10

## 2020-06-11 LAB — SARS-COV-2 N GENE NPH QL NAA+PROBE: NOT DETECTED

## 2020-06-12 ENCOUNTER — OUTPATIENT (OUTPATIENT)
Dept: OUTPATIENT SERVICES | Facility: HOSPITAL | Age: 60
LOS: 1 days | End: 2020-06-12
Payer: COMMERCIAL

## 2020-06-12 ENCOUNTER — APPOINTMENT (OUTPATIENT)
Dept: GASTROENTEROLOGY | Facility: HOSPITAL | Age: 60
End: 2020-06-12

## 2020-06-12 DIAGNOSIS — Z90.49 ACQUIRED ABSENCE OF OTHER SPECIFIED PARTS OF DIGESTIVE TRACT: Chronic | ICD-10-CM

## 2020-06-12 DIAGNOSIS — Z93.3 COLOSTOMY STATUS: ICD-10-CM

## 2020-06-12 DIAGNOSIS — Z90.710 ACQUIRED ABSENCE OF BOTH CERVIX AND UTERUS: Chronic | ICD-10-CM

## 2020-06-12 DIAGNOSIS — K56.609 UNSPECIFIED INTESTINAL OBSTRUCTION, UNSPECIFIED AS TO PARTIAL VERSUS COMPLETE OBSTRUCTION: ICD-10-CM

## 2020-06-12 PROCEDURE — 44388 COLONOSCOPY THRU STOMA SPX: CPT

## 2020-07-09 ENCOUNTER — APPOINTMENT (OUTPATIENT)
Dept: RADIOLOGY | Facility: HOSPITAL | Age: 60
End: 2020-07-09
Payer: COMMERCIAL

## 2020-07-09 ENCOUNTER — OUTPATIENT (OUTPATIENT)
Dept: OUTPATIENT SERVICES | Facility: HOSPITAL | Age: 60
LOS: 1 days | End: 2020-07-09
Payer: COMMERCIAL

## 2020-07-09 ENCOUNTER — RESULT REVIEW (OUTPATIENT)
Age: 60
End: 2020-07-09

## 2020-07-09 DIAGNOSIS — Z90.49 ACQUIRED ABSENCE OF OTHER SPECIFIED PARTS OF DIGESTIVE TRACT: Chronic | ICD-10-CM

## 2020-07-09 DIAGNOSIS — Z90.710 ACQUIRED ABSENCE OF BOTH CERVIX AND UTERUS: Chronic | ICD-10-CM

## 2020-07-09 DIAGNOSIS — Z93.3 COLOSTOMY STATUS: ICD-10-CM

## 2020-07-09 PROCEDURE — 74270 X-RAY XM COLON 1CNTRST STD: CPT | Mod: 26

## 2020-07-09 PROCEDURE — 74270 X-RAY XM COLON 1CNTRST STD: CPT

## 2020-07-09 NOTE — DATA REVIEWED
Pt notified of result    She said that when she came in for her lab she was fasting and did not get any water in at all. She normally drinks lots of water throughout the day and her urine is clear by the end of the day    Please advise in what you would like her to do about her sodium, potassium and kidney levels   [FreeTextEntry1] : EXAM:  CT ABDOMEN AND PELVIS IC                      \par \par \par PROCEDURE DATE:  01/07/2020  \par \par \par \par INTERPRETATION:  CLINICAL INFORMATION: Left-sided abdominal pain and vomiting.\par \par PROCEDURE: \par Helical axial images were obtained from the domes of the diaphragm through the pubic symphysis following the administration of intravenous contrast. 90 mls of Omnipaque-350 administered without complication. 10 ml(s) discarded. Coronal and sagittal reformats were also obtained.\par \par COMPARISON: None.\par \par FINDINGS:\par \par LOWER CHEST: Subsegmental atelectasis. Borderline enlarged heart.\par \par LIVER: Unremarkable.\par GALLBLADDER/BILE DUCTS: No intrahepatic biliary dilatation. Common bile duct dilatation, reflecting postcholecystectomy state. \par PANCREAS: Diffuse fatty atrophy.\par SPLEEN: Unremarkable.\par \par ADRENALS: Unremarkable.\par KIDNEYS/URETERS: Nonspecific mild bilateral perinephric stranding without hydroureteronephrosis.  \par BLADDER: Partially distended.\par REPRODUCTIVE ORGANS: Unremarkable.\par \par BOWEL: Small hiatal hernia. Colon diverticulosis. Mid and distal descending colon wall thickening with mild surrounding stranding. Shouldering of the margins and subsequent intraluminal narrowing (4:62) of the proximal/mid descending colon with upstream dilatation of the fluid-filled colon. Small focal fat at the distal descending/proximal sigmoid colon lumen, which may represent a lipoma. The appendix not visualized, but no secondary signs of appendicitis. \par PERITONEUM: No drainable fluid collection or free air. Trace free fluid in the left abdomen and pelvis.\par VESSELS: Atherosclerosis. \par RETROPERITONEUM: No lymphadenopathy.  \par ABDOMINAL WALL/SOFT TISSUES: Moderate-sized supraumbilical ventral hernia containing fat/omentum. Small fat-containing umbilical hernia.\par BONES: Degenerative changes/anterior longitudinal ligament ossification of the spine. Grade 1 anterolisthesis of L4 on L5.\par \par IMPRESSION: \par \par Question colitis versus mass of the mid and distal descending colon (less likely diverticulitis). Shouldering of the margins and subsequent intraluminal narrowing at the proximal/mid descending colon with upstream dilatation of the fluid-filled colon. Large bowel obstruction cannot be excluded. Recommend clinical correlation and follow-up.\par \par Additional findings as described.\par \par \par \par \par \par \par \par LITA GARCIA M.D., ATTENDING RADIOLOGIST\par This document has been electronically signed. Jan 7 2020  6:27AM\par   \par \par   \par \par

## 2020-07-15 ENCOUNTER — APPOINTMENT (OUTPATIENT)
Dept: SURGERY | Facility: CLINIC | Age: 60
End: 2020-07-15
Payer: COMMERCIAL

## 2020-07-15 PROCEDURE — 99213 OFFICE O/P EST LOW 20 MIN: CPT | Mod: 95

## 2020-07-21 LAB — SARS-COV-2 N GENE NPH QL NAA+PROBE: NOT DETECTED

## 2020-07-22 ENCOUNTER — TRANSCRIPTION ENCOUNTER (OUTPATIENT)
Age: 60
End: 2020-07-22

## 2020-07-22 ENCOUNTER — OUTPATIENT (OUTPATIENT)
Dept: OUTPATIENT SERVICES | Facility: HOSPITAL | Age: 60
LOS: 1 days | End: 2020-07-22

## 2020-07-22 VITALS
OXYGEN SATURATION: 98 % | WEIGHT: 253.97 LBS | DIASTOLIC BLOOD PRESSURE: 47 MMHG | HEART RATE: 61 BPM | RESPIRATION RATE: 18 BRPM | HEIGHT: 60 IN | TEMPERATURE: 98 F | SYSTOLIC BLOOD PRESSURE: 150 MMHG

## 2020-07-22 DIAGNOSIS — Z93.3 COLOSTOMY STATUS: ICD-10-CM

## 2020-07-22 DIAGNOSIS — Z93.3 COLOSTOMY STATUS: Chronic | ICD-10-CM

## 2020-07-22 DIAGNOSIS — Z01.818 ENCOUNTER FOR OTHER PREPROCEDURAL EXAMINATION: ICD-10-CM

## 2020-07-22 DIAGNOSIS — Z98.890 OTHER SPECIFIED POSTPROCEDURAL STATES: Chronic | ICD-10-CM

## 2020-07-22 DIAGNOSIS — Z90.49 ACQUIRED ABSENCE OF OTHER SPECIFIED PARTS OF DIGESTIVE TRACT: Chronic | ICD-10-CM

## 2020-07-22 DIAGNOSIS — Z90.89 ACQUIRED ABSENCE OF OTHER ORGANS: Chronic | ICD-10-CM

## 2020-07-22 DIAGNOSIS — Z90.710 ACQUIRED ABSENCE OF BOTH CERVIX AND UTERUS: Chronic | ICD-10-CM

## 2020-07-22 RX ORDER — CHLORHEXIDINE GLUCONATE 213 G/1000ML
1 SOLUTION TOPICAL
Qty: 1 | Refills: 0
Start: 2020-07-22

## 2020-07-22 NOTE — H&P PST ADULT - NSICDXPASTSURGICALHX_GEN_ALL_CORE_FT
PAST SURGICAL HISTORY:  Colostomy present     History of appendectomy     History of lumpectomy of both breasts     History of tonsillectomy     S/P laparoscopic cholecystectomy     S/P MARIELLA (total abdominal hysterectomy)

## 2020-07-22 NOTE — H&P PST ADULT - NSANTHOSAYNRD_GEN_A_CORE
No. LARISSA screening performed.  STOP BANG Legend: 0-2 = LOW Risk; 3-4 = INTERMEDIATE Risk; 5-8 = HIGH Risk

## 2020-07-22 NOTE — H&P PST ADULT - NSICDXPROBLEM_GEN_ALL_CORE_FT
PROBLEM DIAGNOSES  Problem: Colostomy status  Assessment and Plan: Patient scheduled for Laparoscopic Partial Colostomy Possible Open on 7/23/20.

## 2020-07-22 NOTE — H&P PST ADULT - ASSESSMENT
59-year-old female with history of obesity, diverticulosis, abdominal hernia, S/P colostomy bilateral breast cancer, S/P lumpectomy scheduled for Laparoscopic Partial Colostomy, Possible Open on 7/23/20.

## 2020-07-23 ENCOUNTER — RESULT REVIEW (OUTPATIENT)
Age: 60
End: 2020-07-23

## 2020-07-23 ENCOUNTER — APPOINTMENT (OUTPATIENT)
Dept: SURGERY | Facility: HOSPITAL | Age: 60
End: 2020-07-23

## 2020-07-23 ENCOUNTER — INPATIENT (INPATIENT)
Facility: HOSPITAL | Age: 60
LOS: 3 days | Discharge: HOME | DRG: 330 | End: 2020-07-27
Attending: SURGERY | Admitting: SURGERY
Payer: COMMERCIAL

## 2020-07-23 VITALS
SYSTOLIC BLOOD PRESSURE: 150 MMHG | OXYGEN SATURATION: 98 % | WEIGHT: 242.07 LBS | TEMPERATURE: 98 F | RESPIRATION RATE: 16 BRPM | DIASTOLIC BLOOD PRESSURE: 47 MMHG | HEART RATE: 61 BPM

## 2020-07-23 DIAGNOSIS — Z90.710 ACQUIRED ABSENCE OF BOTH CERVIX AND UTERUS: Chronic | ICD-10-CM

## 2020-07-23 DIAGNOSIS — Z98.890 OTHER SPECIFIED POSTPROCEDURAL STATES: ICD-10-CM

## 2020-07-23 DIAGNOSIS — K46.9 UNSPECIFIED ABDOMINAL HERNIA WITHOUT OBSTRUCTION OR GANGRENE: ICD-10-CM

## 2020-07-23 DIAGNOSIS — Z98.890 OTHER SPECIFIED POSTPROCEDURAL STATES: Chronic | ICD-10-CM

## 2020-07-23 DIAGNOSIS — Z90.49 ACQUIRED ABSENCE OF OTHER SPECIFIED PARTS OF DIGESTIVE TRACT: Chronic | ICD-10-CM

## 2020-07-23 DIAGNOSIS — Z90.89 ACQUIRED ABSENCE OF OTHER ORGANS: Chronic | ICD-10-CM

## 2020-07-23 DIAGNOSIS — Z93.3 COLOSTOMY STATUS: ICD-10-CM

## 2020-07-23 DIAGNOSIS — K57.90 DIVERTICULOSIS OF INTESTINE, PART UNSPECIFIED, WITHOUT PERFORATION OR ABSCESS WITHOUT BLEEDING: ICD-10-CM

## 2020-07-23 DIAGNOSIS — Z93.3 COLOSTOMY STATUS: Chronic | ICD-10-CM

## 2020-07-23 DIAGNOSIS — C50.919 MALIGNANT NEOPLASM OF UNSPECIFIED SITE OF UNSPECIFIED FEMALE BREAST: ICD-10-CM

## 2020-07-23 DIAGNOSIS — R10.9 UNSPECIFIED ABDOMINAL PAIN: ICD-10-CM

## 2020-07-23 LAB
A1C WITH ESTIMATED AVERAGE GLUCOSE RESULT: 5.6 % — SIGNIFICANT CHANGE UP (ref 4–5.6)
BLD GP AB SCN SERPL QL: SIGNIFICANT CHANGE UP
ESTIMATED AVERAGE GLUCOSE: 114 MG/DL — SIGNIFICANT CHANGE UP (ref 68–114)
GLUCOSE BLDC GLUCOMTR-MCNC: 103 MG/DL — HIGH (ref 70–99)
GLUCOSE BLDC GLUCOMTR-MCNC: 134 MG/DL — HIGH (ref 70–99)

## 2020-07-23 PROCEDURE — 88305 TISSUE EXAM BY PATHOLOGIST: CPT | Mod: 26

## 2020-07-23 PROCEDURE — 44204 LAPARO PARTIAL COLECTOMY: CPT

## 2020-07-23 PROCEDURE — 44204 LAPARO PARTIAL COLECTOMY: CPT | Mod: AS

## 2020-07-23 PROCEDURE — 99221 1ST HOSP IP/OBS SF/LOW 40: CPT

## 2020-07-23 RX ORDER — SODIUM CHLORIDE 9 MG/ML
1000 INJECTION, SOLUTION INTRAVENOUS
Refills: 0 | Status: DISCONTINUED | OUTPATIENT
Start: 2020-07-23 | End: 2020-07-23

## 2020-07-23 RX ORDER — ACETAMINOPHEN 500 MG
1000 TABLET ORAL ONCE
Refills: 0 | Status: COMPLETED | OUTPATIENT
Start: 2020-07-24 | End: 2020-07-24

## 2020-07-23 RX ORDER — SODIUM CHLORIDE 9 MG/ML
3 INJECTION INTRAMUSCULAR; INTRAVENOUS; SUBCUTANEOUS EVERY 8 HOURS
Refills: 0 | Status: DISCONTINUED | OUTPATIENT
Start: 2020-07-23 | End: 2020-07-23

## 2020-07-23 RX ORDER — MORPHINE SULFATE 50 MG/1
2 CAPSULE, EXTENDED RELEASE ORAL EVERY 6 HOURS
Refills: 0 | Status: DISCONTINUED | OUTPATIENT
Start: 2020-07-23 | End: 2020-07-25

## 2020-07-23 RX ORDER — ACETAMINOPHEN 500 MG
1000 TABLET ORAL ONCE
Refills: 0 | Status: COMPLETED | OUTPATIENT
Start: 2020-07-23 | End: 2020-07-23

## 2020-07-23 RX ORDER — ACETAMINOPHEN 500 MG
1000 TABLET ORAL ONCE
Refills: 0 | Status: DISCONTINUED | OUTPATIENT
Start: 2020-07-23 | End: 2020-07-23

## 2020-07-23 RX ORDER — HEPARIN SODIUM 5000 [USP'U]/ML
5000 INJECTION INTRAVENOUS; SUBCUTANEOUS EVERY 8 HOURS
Refills: 0 | Status: DISCONTINUED | OUTPATIENT
Start: 2020-07-24 | End: 2020-07-27

## 2020-07-23 RX ORDER — MORPHINE SULFATE 50 MG/1
4 CAPSULE, EXTENDED RELEASE ORAL EVERY 6 HOURS
Refills: 0 | Status: DISCONTINUED | OUTPATIENT
Start: 2020-07-23 | End: 2020-07-25

## 2020-07-23 RX ORDER — ALVIMOPAN 12 MG/1
12 CAPSULE ORAL ONCE
Refills: 0 | Status: COMPLETED | OUTPATIENT
Start: 2020-07-23 | End: 2020-07-23

## 2020-07-23 RX ORDER — HYDROMORPHONE HYDROCHLORIDE 2 MG/ML
0.5 INJECTION INTRAMUSCULAR; INTRAVENOUS; SUBCUTANEOUS
Refills: 0 | Status: DISCONTINUED | OUTPATIENT
Start: 2020-07-23 | End: 2020-07-23

## 2020-07-23 RX ORDER — HYDROMORPHONE HYDROCHLORIDE 2 MG/ML
1 INJECTION INTRAMUSCULAR; INTRAVENOUS; SUBCUTANEOUS
Refills: 0 | Status: DISCONTINUED | OUTPATIENT
Start: 2020-07-23 | End: 2020-07-23

## 2020-07-23 RX ORDER — CEFOTETAN DISODIUM 1 G
2 VIAL (EA) INJECTION EVERY 12 HOURS
Refills: 0 | Status: COMPLETED | OUTPATIENT
Start: 2020-07-23 | End: 2020-07-24

## 2020-07-23 RX ORDER — SODIUM CHLORIDE 9 MG/ML
1000 INJECTION, SOLUTION INTRAVENOUS
Refills: 0 | Status: DISCONTINUED | OUTPATIENT
Start: 2020-07-23 | End: 2020-07-25

## 2020-07-23 RX ADMIN — Medication 400 MILLIGRAM(S): at 20:35

## 2020-07-23 RX ADMIN — HYDROMORPHONE HYDROCHLORIDE 0.5 MILLIGRAM(S): 2 INJECTION INTRAMUSCULAR; INTRAVENOUS; SUBCUTANEOUS at 16:31

## 2020-07-23 RX ADMIN — SODIUM CHLORIDE 3 MILLILITER(S): 9 INJECTION INTRAMUSCULAR; INTRAVENOUS; SUBCUTANEOUS at 10:32

## 2020-07-23 RX ADMIN — MORPHINE SULFATE 4 MILLIGRAM(S): 50 CAPSULE, EXTENDED RELEASE ORAL at 18:11

## 2020-07-23 RX ADMIN — MORPHINE SULFATE 2 MILLIGRAM(S): 50 CAPSULE, EXTENDED RELEASE ORAL at 23:15

## 2020-07-23 RX ADMIN — MORPHINE SULFATE 2 MILLIGRAM(S): 50 CAPSULE, EXTENDED RELEASE ORAL at 22:42

## 2020-07-23 RX ADMIN — Medication 100 GRAM(S): at 23:19

## 2020-07-23 RX ADMIN — Medication 1000 MILLIGRAM(S): at 21:05

## 2020-07-23 RX ADMIN — ALVIMOPAN 12 MILLIGRAM(S): 12 CAPSULE ORAL at 10:32

## 2020-07-23 RX ADMIN — MORPHINE SULFATE 4 MILLIGRAM(S): 50 CAPSULE, EXTENDED RELEASE ORAL at 17:56

## 2020-07-23 NOTE — CONSULT NOTE ADULT - ASSESSMENT
Confidential Drug Utilization Report  Search Terms: Fiorella Smith, 1960   Search Date: 07/23/2020 16:23:01 PM   The Drug Utilization Report below displays all of the controlled substance prescriptions, if any, that your patient has filled in the last twelve months. The information displayed on this report is compiled from pharmacy submissions to the Department, and accurately reflects the information as submitted by the pharmacies.  This report was requested by: Love Dillard | Reference #: [id number]   You have not added a KELSEY number. Keeping your KELSEY number(s) up to date on the My KELSEY Numbers </doh2/applinks/cspnp/myDeaNumbers> page will enable the separation of your prescriptions from others in the search results.   Others' Prescriptions  Patient Name: Fiorella Arteaga   YOB: 1960   Address: [address] [place] [address]   Sex: Female   Rx Written	Rx Dispensed	Drug	Quantity	Days Supply	Prescriber Name	Payment Method	Dispenser	  01/02/2020	01/07/2020	qsymia 7.5 mg-46 mg capsule 	30	30	Stevie Heredia DO	[place]	[facility]	  10/04/2019	10/07/2019	qsymia 7.5 mg-46 mg capsule 	30	30	Stevie Heredia DO	[place]	[facility]	  07/18/2019	08/19/2019	qsymia 7.5 mg-46 mg capsule 	30	30	Stevie Heredia DO	[place]	[facility]	  07/22/2019	08/19/2019	qsymia 3.75 mg-23 mg capsule 	14	14	Stevie Heredia DO	[place]	[facility]	  * - Drugs marked with an asterisk are compound drugs. If the compound drug is made up of more than one controlled substance, then each controlled substance will be a separate row in the table.

## 2020-07-23 NOTE — ASU PREOP CHECKLIST - SELECT TESTS ORDERED
Results in MD note/Type and Screen/HgA1C and T&S sent stat to the LAB as ordered (drawn by NICK Garzon)

## 2020-07-23 NOTE — CONSULT NOTE ADULT - SUBJECTIVE AND OBJECTIVE BOX
Source of information: ROBER WILSON, Chart review  Patient language: English  : n/a    HPI:  Patient Reports emergency surgery requiring a colostomy due to a stricture. (22 Jul 2020 15:46)      /Patient is a 59y old  Female who presents with a a colon stricture and is now s/p Exploratory laparotomy, Laparoscopic partial colectomy on 7/23 , POD #0. Pt seen and examined at bedside. Reports pain score 9/10 and not tolerable SCALE USED: (1-10 VNRS). Pt describes pain as aching, non-radiating, alleviated by pain medication, exacerbated by movement. Pt currently NPO. Denies lethargy, nausea, vomiting, constipation, itchiness. Patient stated goal for pain control: to be able to take deep breaths, get out of bed to chair and ambulate with tolerable pain control. Pt denies taking medications for pain at home.     PAST MEDICAL & SURGICAL HISTORY:  Breast cancer: Bilateral  Abdominal hernia  Diverticulosis  Colostomy present  History of lumpectomy of both breasts  History of tonsillectomy  S/P laparoscopic cholecystectomy  S/P MARIELLA (total abdominal hysterectomy)  History of appendectomy      FAMILY HISTORY:  FH: hypertension      Social History:   [X ] Denies ETOH use, illicit drug use and smoking    Allergies    No Known Drug Allergies  peanuts (Other; Rash)      MEDICATIONS  (STANDING):  acetaminophen  IVPB .. 1000 milliGRAM(s) IV Intermittent once  cefoTEtan  IVPB 2 Gram(s) IV Intermittent every 12 hours  lactated ringers. 1000 milliLiter(s) (150 mL/Hr) IV Continuous <Continuous>    MEDICATIONS  (PRN):  morphine  - Injectable 4 milliGRAM(s) IV Push every 6 hours PRN Severe Pain (7 - 10)      Vital Signs Last 24 Hrs  T(C): 36.9 (23 Jul 2020 17:41), Max: 37.4 (23 Jul 2020 16:01)  T(F): 98.4 (23 Jul 2020 17:41), Max: 99.3 (23 Jul 2020 16:01)  HR: 70 (23 Jul 2020 17:41) (61 - 70)  BP: 140/47 (23 Jul 2020 17:41) (111/51 - 150/47)  BP(mean): 89 (23 Jul 2020 17:01) (68 - 89)  RR: 18 (23 Jul 2020 17:41) (12 - 21)  SpO2: 98% (23 Jul 2020 17:41) (95% - 99%)    LABS: Reviewed.    CAPILLARY BLOOD GLUCOSE      POCT Blood Glucose.: 134 mg/dL (23 Jul 2020 15:58)  POCT Blood Glucose.: 103 mg/dL (23 Jul 2020 10:12)    ORT Score -   Family Hx of substance abuse	Female	      Male  Alcohol 	                                           1                     3  Illegal drugs	                                   2                     3  Rx drugs                                           4 	                  4  Personal Hx of substance abuse		  Alcohol 	                                          3	                  3  Illegal drugs                                     4	                  4  Rx drugs                                            5 	                  5  Age between 16- 45 years	           1                     1  hx preadolescent sexual abuse	   3 	                  0  Psychological disease		  ADD, OCD, bipolar, schizophrenia   2	          2  Depression                                           1 	          1  Total: 0    a score of 3 or lower indicates low risk for opioid abuse		  a score of 4-7 indicates moderate risk for opioid abuse		  a score of 8 or higher indicates high risk for opioid abuse  	    REVIEW OF SYSTEMS:  CONSTITUTIONAL: No fever or fatigue  RESPIRATORY: No cough, wheezing, chills or hemoptysis; No shortness of breath  CARDIOVASCULAR: No chest pain, palpitations, dizziness, or leg swelling  GASTROINTESTINAL: + abdominal pain. No nausea, vomiting; No diarrhea or constipation.   GENITOURINARY: No dysuria, frequency, hematuria, retention or incontinence  MUSCULOSKELETAL: No joint pain or swelling; No muscle, back, or extremity pain, no upper or lower motor strength weakness, no saddle anesthesia, bowel/bladder incontinence  NEURO: No numbness/ tingling in b/l LE   PSYCHIATRIC: No depression, anxiety, mood swings, or difficulty sleeping    PHYSICAL EXAM:  GENERAL:  Alert & Oriented X3, NAD, Good concentration  CHEST/LUNG: Clear to auscultation bilaterally; No rales, rhonchi, wheezing, or rubs; O2 NC  HEART: Regular rate and rhythm; No murmurs, rubs, or gallops  ABDOMEN: Obese, Soft, tender to palpation, No bowel sounds present, ex lap left lower quadrant KAYCE dressing c/d/i, Rt lower colostomy in place.   GENITOURINARY: Urinary catheter in place draining clear yellow urine  EXTREMITIES:  2+ Peripheral Pulses, No cyanosis, or edema  MUSCULOSKELETAL: Motor Strength 5/5 B/L upper and lower extremities; moves all extremities equally against gravity; ROM intact; negative SLR  SKIN: No rashes or lesions    Risk factors associated with adverse outcomes related to opioid treatment  [ ]  Concurrent benzodiazepine use  [ ]  History/ Active substance use or alcohol use disorder  [ ] Psychiatric co-morbidity  [ ] Sleep apnea  [ ] COPD  [X ] BMI> 35  [ ] Liver dysfunction  [ ] Renal dysfunction  [ ] CHF  [ ] Smoker  [ X]  Age > 60 years    [X ]  [place]  Reviewed and Copied to Chart. See below.    Plan of care and goal oriented pain management treatment options were discussed with patient and /or primary care giver; all questions and concerns were addressed and care was aligned with patient's wishes.    Educated patient on goal oriented pain management treatment options

## 2020-07-23 NOTE — BRIEF OPERATIVE NOTE - NSICDXBRIEFPOSTOP_GEN_ALL_CORE_FT
POST-OP DIAGNOSIS:  Stricture of colon 23-Jul-2020 16:05:59 due to diverticular disease Chayito Molina

## 2020-07-23 NOTE — CONSULT NOTE ADULT - PROBLEM SELECTOR RECOMMENDATION 9
Pt with postoperative abdominal pain which is somatic and neuropathic in nature due to s/p Exploratory laparotomy, Laparoscopic partial colectomy on 7/23 , POD #0.   Pt NPO.   Opioid pain recommendations   - Continue morphine 2/4mg IV q6h PRN moderate/severe pain   - Consider transitioning to oxycodone 5/10mg PO q6h PRN moderate/ severe pain once tolerating PO.  Non-opioid pain recommendations   - Continue Acetaminophen 1 gram IV q 6 hours for 24 hours only. Monitor LFTs  Bowel Regimen  - Per primary team due to abdominal surgery  Mild pain   - Non-pharmacological pain treatment recommendations  - Warm/ Cool packs PRN   - Repositioning, imagery, relaxation, distraction.  - Physical therapy OOB if no contraindications   Recommendations discussed with primary team and RN

## 2020-07-23 NOTE — BRIEF OPERATIVE NOTE - NSICDXBRIEFPROCEDURE_GEN_ALL_CORE_FT
PROCEDURES:  Exploratory laparotomy 23-Jul-2020 16:05:20  Chayito Molina  Laparoscopic partial colectomy 23-Jul-2020 16:04:56  Chayito Molina

## 2020-07-24 LAB
ALBUMIN SERPL ELPH-MCNC: 3 G/DL — LOW (ref 3.5–5)
ALP SERPL-CCNC: 93 U/L — SIGNIFICANT CHANGE UP (ref 40–120)
ALT FLD-CCNC: 34 U/L DA — SIGNIFICANT CHANGE UP (ref 10–60)
ANION GAP SERPL CALC-SCNC: 6 MMOL/L — SIGNIFICANT CHANGE UP (ref 5–17)
AST SERPL-CCNC: 21 U/L — SIGNIFICANT CHANGE UP (ref 10–40)
BILIRUB DIRECT SERPL-MCNC: <0.1 MG/DL — SIGNIFICANT CHANGE UP (ref 0–0.2)
BILIRUB INDIRECT FLD-MCNC: >0.5 MG/DL — SIGNIFICANT CHANGE UP (ref 0.2–1)
BILIRUB SERPL-MCNC: 0.6 MG/DL — SIGNIFICANT CHANGE UP (ref 0.2–1.2)
BUN SERPL-MCNC: 6 MG/DL — LOW (ref 7–18)
CALCIUM SERPL-MCNC: 8.4 MG/DL — SIGNIFICANT CHANGE UP (ref 8.4–10.5)
CHLORIDE SERPL-SCNC: 107 MMOL/L — SIGNIFICANT CHANGE UP (ref 96–108)
CO2 SERPL-SCNC: 28 MMOL/L — SIGNIFICANT CHANGE UP (ref 22–31)
CREAT SERPL-MCNC: 0.81 MG/DL — SIGNIFICANT CHANGE UP (ref 0.5–1.3)
GLUCOSE SERPL-MCNC: 131 MG/DL — HIGH (ref 70–99)
HCT VFR BLD CALC: 39.5 % — SIGNIFICANT CHANGE UP (ref 34.5–45)
HGB BLD-MCNC: 12.7 G/DL — SIGNIFICANT CHANGE UP (ref 11.5–15.5)
MCHC RBC-ENTMCNC: 30.5 PG — SIGNIFICANT CHANGE UP (ref 27–34)
MCHC RBC-ENTMCNC: 32.2 GM/DL — SIGNIFICANT CHANGE UP (ref 32–36)
MCV RBC AUTO: 94.7 FL — SIGNIFICANT CHANGE UP (ref 80–100)
NRBC # BLD: 0 /100 WBCS — SIGNIFICANT CHANGE UP (ref 0–0)
PLATELET # BLD AUTO: 241 K/UL — SIGNIFICANT CHANGE UP (ref 150–400)
POTASSIUM SERPL-MCNC: 3.7 MMOL/L — SIGNIFICANT CHANGE UP (ref 3.5–5.3)
POTASSIUM SERPL-SCNC: 3.7 MMOL/L — SIGNIFICANT CHANGE UP (ref 3.5–5.3)
PROT SERPL-MCNC: 6.9 G/DL — SIGNIFICANT CHANGE UP (ref 6–8.3)
RBC # BLD: 4.17 M/UL — SIGNIFICANT CHANGE UP (ref 3.8–5.2)
RBC # FLD: 12.6 % — SIGNIFICANT CHANGE UP (ref 10.3–14.5)
SODIUM SERPL-SCNC: 141 MMOL/L — SIGNIFICANT CHANGE UP (ref 135–145)
WBC # BLD: 11.38 K/UL — HIGH (ref 3.8–10.5)
WBC # FLD AUTO: 11.38 K/UL — HIGH (ref 3.8–10.5)

## 2020-07-24 RX ADMIN — SODIUM CHLORIDE 150 MILLILITER(S): 9 INJECTION, SOLUTION INTRAVENOUS at 00:45

## 2020-07-24 RX ADMIN — Medication 100 GRAM(S): at 11:22

## 2020-07-24 RX ADMIN — Medication 1000 MILLIGRAM(S): at 18:31

## 2020-07-24 RX ADMIN — MORPHINE SULFATE 4 MILLIGRAM(S): 50 CAPSULE, EXTENDED RELEASE ORAL at 22:00

## 2020-07-24 RX ADMIN — HEPARIN SODIUM 5000 UNIT(S): 5000 INJECTION INTRAVENOUS; SUBCUTANEOUS at 14:21

## 2020-07-24 RX ADMIN — Medication 400 MILLIGRAM(S): at 05:33

## 2020-07-24 RX ADMIN — HYDROMORPHONE HYDROCHLORIDE 0.5 MILLIGRAM(S): 2 INJECTION INTRAMUSCULAR; INTRAVENOUS; SUBCUTANEOUS at 20:40

## 2020-07-24 RX ADMIN — HEPARIN SODIUM 5000 UNIT(S): 5000 INJECTION INTRAVENOUS; SUBCUTANEOUS at 05:33

## 2020-07-24 RX ADMIN — MORPHINE SULFATE 2 MILLIGRAM(S): 50 CAPSULE, EXTENDED RELEASE ORAL at 15:43

## 2020-07-24 RX ADMIN — HEPARIN SODIUM 5000 UNIT(S): 5000 INJECTION INTRAVENOUS; SUBCUTANEOUS at 21:42

## 2020-07-24 RX ADMIN — Medication 1000 MILLIGRAM(S): at 06:03

## 2020-07-24 RX ADMIN — Medication 400 MILLIGRAM(S): at 01:00

## 2020-07-24 RX ADMIN — SODIUM CHLORIDE 150 MILLILITER(S): 9 INJECTION, SOLUTION INTRAVENOUS at 11:21

## 2020-07-24 RX ADMIN — Medication 1000 MILLIGRAM(S): at 01:30

## 2020-07-24 RX ADMIN — MORPHINE SULFATE 2 MILLIGRAM(S): 50 CAPSULE, EXTENDED RELEASE ORAL at 16:00

## 2020-07-24 RX ADMIN — Medication 400 MILLIGRAM(S): at 18:16

## 2020-07-24 RX ADMIN — MORPHINE SULFATE 4 MILLIGRAM(S): 50 CAPSULE, EXTENDED RELEASE ORAL at 21:45

## 2020-07-24 RX ADMIN — Medication 1000 MILLIGRAM(S): at 11:45

## 2020-07-24 RX ADMIN — Medication 400 MILLIGRAM(S): at 11:22

## 2020-07-25 LAB
ANION GAP SERPL CALC-SCNC: 8 MMOL/L — SIGNIFICANT CHANGE UP (ref 5–17)
BUN SERPL-MCNC: 9 MG/DL — SIGNIFICANT CHANGE UP (ref 7–18)
CALCIUM SERPL-MCNC: 8.4 MG/DL — SIGNIFICANT CHANGE UP (ref 8.4–10.5)
CHLORIDE SERPL-SCNC: 107 MMOL/L — SIGNIFICANT CHANGE UP (ref 96–108)
CO2 SERPL-SCNC: 27 MMOL/L — SIGNIFICANT CHANGE UP (ref 22–31)
CREAT SERPL-MCNC: 0.6 MG/DL — SIGNIFICANT CHANGE UP (ref 0.5–1.3)
GLUCOSE SERPL-MCNC: 91 MG/DL — SIGNIFICANT CHANGE UP (ref 70–99)
HCT VFR BLD CALC: 35.3 % — SIGNIFICANT CHANGE UP (ref 34.5–45)
HGB BLD-MCNC: 11.3 G/DL — LOW (ref 11.5–15.5)
MCHC RBC-ENTMCNC: 30.1 PG — SIGNIFICANT CHANGE UP (ref 27–34)
MCHC RBC-ENTMCNC: 32 GM/DL — SIGNIFICANT CHANGE UP (ref 32–36)
MCV RBC AUTO: 93.9 FL — SIGNIFICANT CHANGE UP (ref 80–100)
NRBC # BLD: 0 /100 WBCS — SIGNIFICANT CHANGE UP (ref 0–0)
PLATELET # BLD AUTO: 209 K/UL — SIGNIFICANT CHANGE UP (ref 150–400)
POTASSIUM SERPL-MCNC: 3.4 MMOL/L — LOW (ref 3.5–5.3)
POTASSIUM SERPL-SCNC: 3.4 MMOL/L — LOW (ref 3.5–5.3)
RBC # BLD: 3.76 M/UL — LOW (ref 3.8–5.2)
RBC # FLD: 13 % — SIGNIFICANT CHANGE UP (ref 10.3–14.5)
SODIUM SERPL-SCNC: 142 MMOL/L — SIGNIFICANT CHANGE UP (ref 135–145)
WBC # BLD: 8.65 K/UL — SIGNIFICANT CHANGE UP (ref 3.8–10.5)
WBC # FLD AUTO: 8.65 K/UL — SIGNIFICANT CHANGE UP (ref 3.8–10.5)

## 2020-07-25 RX ORDER — OXYCODONE AND ACETAMINOPHEN 5; 325 MG/1; MG/1
1 TABLET ORAL EVERY 6 HOURS
Refills: 0 | Status: DISCONTINUED | OUTPATIENT
Start: 2020-07-25 | End: 2020-07-27

## 2020-07-25 RX ORDER — ACETAMINOPHEN 500 MG
650 TABLET ORAL EVERY 6 HOURS
Refills: 0 | Status: DISCONTINUED | OUTPATIENT
Start: 2020-07-25 | End: 2020-07-27

## 2020-07-25 RX ORDER — MORPHINE SULFATE 50 MG/1
2 CAPSULE, EXTENDED RELEASE ORAL EVERY 6 HOURS
Refills: 0 | Status: DISCONTINUED | OUTPATIENT
Start: 2020-07-25 | End: 2020-07-27

## 2020-07-25 RX ADMIN — HEPARIN SODIUM 5000 UNIT(S): 5000 INJECTION INTRAVENOUS; SUBCUTANEOUS at 05:28

## 2020-07-25 RX ADMIN — OXYCODONE AND ACETAMINOPHEN 1 TABLET(S): 5; 325 TABLET ORAL at 18:12

## 2020-07-25 RX ADMIN — OXYCODONE AND ACETAMINOPHEN 1 TABLET(S): 5; 325 TABLET ORAL at 10:51

## 2020-07-25 RX ADMIN — HEPARIN SODIUM 5000 UNIT(S): 5000 INJECTION INTRAVENOUS; SUBCUTANEOUS at 14:15

## 2020-07-25 RX ADMIN — OXYCODONE AND ACETAMINOPHEN 1 TABLET(S): 5; 325 TABLET ORAL at 18:45

## 2020-07-25 RX ADMIN — OXYCODONE AND ACETAMINOPHEN 1 TABLET(S): 5; 325 TABLET ORAL at 11:21

## 2020-07-25 RX ADMIN — HEPARIN SODIUM 5000 UNIT(S): 5000 INJECTION INTRAVENOUS; SUBCUTANEOUS at 21:40

## 2020-07-26 LAB
ANION GAP SERPL CALC-SCNC: 6 MMOL/L — SIGNIFICANT CHANGE UP (ref 5–17)
BUN SERPL-MCNC: 7 MG/DL — SIGNIFICANT CHANGE UP (ref 7–18)
CALCIUM SERPL-MCNC: 8.2 MG/DL — LOW (ref 8.4–10.5)
CHLORIDE SERPL-SCNC: 107 MMOL/L — SIGNIFICANT CHANGE UP (ref 96–108)
CO2 SERPL-SCNC: 30 MMOL/L — SIGNIFICANT CHANGE UP (ref 22–31)
CREAT SERPL-MCNC: 0.63 MG/DL — SIGNIFICANT CHANGE UP (ref 0.5–1.3)
GLUCOSE SERPL-MCNC: 94 MG/DL — SIGNIFICANT CHANGE UP (ref 70–99)
HCT VFR BLD CALC: 35 % — SIGNIFICANT CHANGE UP (ref 34.5–45)
HGB BLD-MCNC: 11.1 G/DL — LOW (ref 11.5–15.5)
MCHC RBC-ENTMCNC: 30.2 PG — SIGNIFICANT CHANGE UP (ref 27–34)
MCHC RBC-ENTMCNC: 31.7 GM/DL — LOW (ref 32–36)
MCV RBC AUTO: 95.1 FL — SIGNIFICANT CHANGE UP (ref 80–100)
NRBC # BLD: 0 /100 WBCS — SIGNIFICANT CHANGE UP (ref 0–0)
PLATELET # BLD AUTO: 233 K/UL — SIGNIFICANT CHANGE UP (ref 150–400)
POTASSIUM SERPL-MCNC: 3.4 MMOL/L — LOW (ref 3.5–5.3)
POTASSIUM SERPL-SCNC: 3.4 MMOL/L — LOW (ref 3.5–5.3)
RBC # BLD: 3.68 M/UL — LOW (ref 3.8–5.2)
RBC # FLD: 12.9 % — SIGNIFICANT CHANGE UP (ref 10.3–14.5)
SODIUM SERPL-SCNC: 143 MMOL/L — SIGNIFICANT CHANGE UP (ref 135–145)
WBC # BLD: 7.39 K/UL — SIGNIFICANT CHANGE UP (ref 3.8–10.5)
WBC # FLD AUTO: 7.39 K/UL — SIGNIFICANT CHANGE UP (ref 3.8–10.5)

## 2020-07-26 RX ORDER — POTASSIUM CHLORIDE 20 MEQ
20 PACKET (EA) ORAL
Refills: 0 | Status: COMPLETED | OUTPATIENT
Start: 2020-07-26 | End: 2020-07-26

## 2020-07-26 RX ADMIN — Medication 20 MILLIEQUIVALENT(S): at 11:56

## 2020-07-26 RX ADMIN — OXYCODONE AND ACETAMINOPHEN 1 TABLET(S): 5; 325 TABLET ORAL at 17:51

## 2020-07-26 RX ADMIN — HEPARIN SODIUM 5000 UNIT(S): 5000 INJECTION INTRAVENOUS; SUBCUTANEOUS at 21:35

## 2020-07-26 RX ADMIN — OXYCODONE AND ACETAMINOPHEN 1 TABLET(S): 5; 325 TABLET ORAL at 17:21

## 2020-07-26 RX ADMIN — HEPARIN SODIUM 5000 UNIT(S): 5000 INJECTION INTRAVENOUS; SUBCUTANEOUS at 13:59

## 2020-07-26 RX ADMIN — HEPARIN SODIUM 5000 UNIT(S): 5000 INJECTION INTRAVENOUS; SUBCUTANEOUS at 05:15

## 2020-07-27 ENCOUNTER — TRANSCRIPTION ENCOUNTER (OUTPATIENT)
Age: 60
End: 2020-07-27

## 2020-07-27 VITALS
DIASTOLIC BLOOD PRESSURE: 83 MMHG | TEMPERATURE: 98 F | OXYGEN SATURATION: 97 % | HEART RATE: 84 BPM | RESPIRATION RATE: 17 BRPM | SYSTOLIC BLOOD PRESSURE: 133 MMHG

## 2020-07-27 LAB
ANION GAP SERPL CALC-SCNC: 7 MMOL/L — SIGNIFICANT CHANGE UP (ref 5–17)
BUN SERPL-MCNC: 7 MG/DL — SIGNIFICANT CHANGE UP (ref 7–18)
CALCIUM SERPL-MCNC: 8.5 MG/DL — SIGNIFICANT CHANGE UP (ref 8.4–10.5)
CHLORIDE SERPL-SCNC: 108 MMOL/L — SIGNIFICANT CHANGE UP (ref 96–108)
CO2 SERPL-SCNC: 28 MMOL/L — SIGNIFICANT CHANGE UP (ref 22–31)
CREAT SERPL-MCNC: 0.6 MG/DL — SIGNIFICANT CHANGE UP (ref 0.5–1.3)
GLUCOSE SERPL-MCNC: 91 MG/DL — SIGNIFICANT CHANGE UP (ref 70–99)
HCT VFR BLD CALC: 36.6 % — SIGNIFICANT CHANGE UP (ref 34.5–45)
HGB BLD-MCNC: 11.5 G/DL — SIGNIFICANT CHANGE UP (ref 11.5–15.5)
MCHC RBC-ENTMCNC: 30.3 PG — SIGNIFICANT CHANGE UP (ref 27–34)
MCHC RBC-ENTMCNC: 31.4 GM/DL — LOW (ref 32–36)
MCV RBC AUTO: 96.6 FL — SIGNIFICANT CHANGE UP (ref 80–100)
NRBC # BLD: 0 /100 WBCS — SIGNIFICANT CHANGE UP (ref 0–0)
PLATELET # BLD AUTO: 246 K/UL — SIGNIFICANT CHANGE UP (ref 150–400)
POTASSIUM SERPL-MCNC: 4.1 MMOL/L — SIGNIFICANT CHANGE UP (ref 3.5–5.3)
POTASSIUM SERPL-SCNC: 4.1 MMOL/L — SIGNIFICANT CHANGE UP (ref 3.5–5.3)
RBC # BLD: 3.79 M/UL — LOW (ref 3.8–5.2)
RBC # FLD: 13 % — SIGNIFICANT CHANGE UP (ref 10.3–14.5)
SODIUM SERPL-SCNC: 143 MMOL/L — SIGNIFICANT CHANGE UP (ref 135–145)
WBC # BLD: 6.98 K/UL — SIGNIFICANT CHANGE UP (ref 3.8–10.5)
WBC # FLD AUTO: 6.98 K/UL — SIGNIFICANT CHANGE UP (ref 3.8–10.5)

## 2020-07-27 PROCEDURE — 80048 BASIC METABOLIC PNL TOTAL CA: CPT

## 2020-07-27 PROCEDURE — 86850 RBC ANTIBODY SCREEN: CPT

## 2020-07-27 PROCEDURE — 80076 HEPATIC FUNCTION PANEL: CPT

## 2020-07-27 PROCEDURE — 86900 BLOOD TYPING SEROLOGIC ABO: CPT

## 2020-07-27 PROCEDURE — 86901 BLOOD TYPING SEROLOGIC RH(D): CPT

## 2020-07-27 PROCEDURE — 82962 GLUCOSE BLOOD TEST: CPT

## 2020-07-27 PROCEDURE — 88305 TISSUE EXAM BY PATHOLOGIST: CPT

## 2020-07-27 PROCEDURE — 99231 SBSQ HOSP IP/OBS SF/LOW 25: CPT

## 2020-07-27 PROCEDURE — 83036 HEMOGLOBIN GLYCOSYLATED A1C: CPT

## 2020-07-27 PROCEDURE — 36415 COLL VENOUS BLD VENIPUNCTURE: CPT

## 2020-07-27 PROCEDURE — C1889: CPT

## 2020-07-27 PROCEDURE — 85027 COMPLETE CBC AUTOMATED: CPT

## 2020-07-27 RX ADMIN — HEPARIN SODIUM 5000 UNIT(S): 5000 INJECTION INTRAVENOUS; SUBCUTANEOUS at 05:58

## 2020-07-27 NOTE — REASON FOR VISIT
[Follow-Up: _____] : a [unfilled] follow-up visit [Home] : at home, [unfilled] , at the time of the visit. [Medical Office: ([facility])___] : at the medical office located in  [Verbal consent obtained from patient] : the patient, [unfilled]

## 2020-07-27 NOTE — HISTORY OF PRESENT ILLNESS
[de-identified] : ROBER WILSON is a 59 year old female who is here for preop visit. Patient is scheduled for laparoscopic assisted left colectomy to resect the stricture seen on barium enema. I discussed the planned operation with the patient as the stricture should be resected prior to closure of the colostomy to prevent clinically significant leak. Risks, benefits alternatives discussed with ample time for questions.

## 2020-07-27 NOTE — DISCHARGE NOTE PROVIDER - CARE PROVIDER_API CALL
Valeriy Lewis  SURGERY  [id number] [place] [address]  Phone: [phone]  Fax: [phone]  Follow Up Time:

## 2020-07-27 NOTE — ASSESSMENT
[FreeTextEntry1] : 59F with diverticular disease and identified stricture causing LBO s/p diverting ostomy in January 2020. Now for lap assisted left colon resection. Risks, benefits alternatives discussed all questions answered. agrees to proceed.Closure of colostomy would be arranged after recovery fro this procedure.

## 2020-07-27 NOTE — PROGRESS NOTE ADULT - SUBJECTIVE AND OBJECTIVE BOX
S/p left partial colon resection POD#2  Patient seen and examined at bedside.   Admits to ostomy function.  Denies nausea/ vomiting.   Voiding and ambulating.    Vital Signs Last 24 Hrs  T(F): 97.7 (07-25-20 @ 05:36), Max: 98 (07-24-20 @ 13:05)  HR: 63 (07-25-20 @ 05:36)  BP: 129/63 (07-25-20 @ 05:36)  RR: 18 (07-25-20 @ 05:36)  SpO2: 100% (07-25-20 @ 05:36)  POCT Blood Glucose.: 134 mg/dL (23 Jul 2020 15:58)    GENERAL: Alert, NAD  CHEST/LUNG: respirations nonlabored  ABDOMEN: soft, appropriate alek-incisional tenderness, mild distention, obese, KAYCE intact with good seal, colostomy pink and viable with dark loose stool and air in bag.  EXTREMITIES:  no calf tenderness, No edema    I&O's Detail    LABS:                        11.3   8.65  )-----------( 209      ( 25 Jul 2020 08:05 )             35.3     07-25    142  |  107  |  9   ----------------------------<  91  3.4<L>   |  27  |  0.60    Ca    8.4      25 Jul 2020 08:05    TPro  6.9  /  Alb  3.0<L>  /  TBili  0.6  /  DBili  <0.1  /  AST  21  /  ALT  34  /  AlkPhos  93  07-24
S/p left partial colon resection POD#3  Patient seen and examined at bedside.   Admits to ostomy function.  Denies nausea/ vomiting.   Voiding and ambulating.    Vital Signs Last 24 Hrs  T(F): 98 (07-26-20 @ 05:13), Max: 98.7 (07-25-20 @ 13:16)  HR: 63 (07-26-20 @ 05:13)  BP: 143/61 (07-26-20 @ 05:13)  RR: 18 (07-26-20 @ 05:13)  SpO2: 100% (07-26-20 @ 05:13)    GENERAL: Alert, NAD  CHEST/LUNG: respirations nonlabored  ABDOMEN: soft, appropriate alek-incisional tenderness, mild distention, obese, KAYCE intact with good seal, colostomy pink and viable with dark loose stool and air in bag.  EXTREMITIES:  no calf tenderness, No edema      I&O's Detail    25 Jul 2020 07:01  -  26 Jul 2020 07:00  --------------------------------------------------------  IN:  Total IN: 0 mL    OUT:    Colostomy: 250 mL  Total OUT: 250 mL    Total NET: -250 mL    LABS:                        11.1   7.39  )-----------( 233      ( 26 Jul 2020 08:34 )             35.0     07-26    143  |  107  |  7   ----------------------------<  94  3.4<L>   |  30  |  0.63    Ca    8.2<L>      26 Jul 2020 08:34
SUBJECTIVE    Nausea [ ] YES [X ] NO  Vomiting [ ] YES [X ] NO  Voiding normally [ ] YES [X] NO-her in place for strict I/O  Flatus [ ] YES [X ] NO--stoma viable but no function yet  BM [ ] YES [ X] NO       Diarrhea [ ] YES [ ] NO  Pain under control [X ] YES [ ] NO  NPO  Ambulated [X ] YES NO [ ]        VITALS    ICU Vital Signs Last 24 Hrs  T(C): 36.6 (24 Jul 2020 05:41), Max: 37.4 (23 Jul 2020 16:01)  T(F): 97.9 (24 Jul 2020 05:41), Max: 99.4 (23 Jul 2020 21:37)  HR: 66 (24 Jul 2020 05:41) (61 - 73)  BP: 146/82 (24 Jul 2020 05:41) (111/51 - 150/47)  BP(mean): 89 (23 Jul 2020 17:01) (68 - 89)  ABP: --  ABP(mean): --  RR: 16 (24 Jul 2020 05:41) (12 - 21)  SpO2: 96% (24 Jul 2020 05:41) (95% - 99%)    I&O's Detail    23 Jul 2020 07:01  -  24 Jul 2020 07:00  --------------------------------------------------------  IN:  Total IN: 0 mL    OUT:    Indwelling Catheter - Urethral: 700 mL  Total OUT: 700 mL    Total NET: -700 mL            PHYSICAL EXAMINATION    Abdomen: obese, soft, ND appropriately tender along incision; KAYCE with good seal; stoma viable and pink; no flatus in bag as yet    I&O's Summary    23 Jul 2020 07:01  -  24 Jul 2020 07:00  --------------------------------------------------------  IN: 0 mL / OUT: 700 mL / NET: -700 mL        LABS                        12.7   11.38 )-----------( 241      ( 24 Jul 2020 07:55 )             39.5                       MEDICATIONS:  MEDICATIONS  (STANDING):  acetaminophen  IVPB .. 1000 milliGRAM(s) IV Intermittent once  acetaminophen  IVPB .. 1000 milliGRAM(s) IV Intermittent once  cefoTEtan  IVPB 2 Gram(s) IV Intermittent every 12 hours  heparin   Injectable 5000 Unit(s) SubCutaneous every 8 hours  lactated ringers. 1000 milliLiter(s) (150 mL/Hr) IV Continuous <Continuous>    MEDICATIONS  (PRN):  morphine  - Injectable 4 milliGRAM(s) IV Push every 6 hours PRN Severe Pain (7 - 10)  morphine  - Injectable 2 milliGRAM(s) IV Push every 6 hours PRN Moderate Pain (4 - 6)      RELEVANT IMAGING:
SUBJECTIVE    Nausea [ ] YES [X ] NO  Vomiting [ ] YES [X ] NO  Voiding normally [X ] YES [ ] NO  Flatus [X ] YES [ ] NO  BM [X ] YES [ ] NO--flatus and stool in colostomy       Diarrhea [ ] YES [X ] NO  Pain under control [X ] YES [ ] NO  Tolerating regular diet  Ambulated [X ] YES NO [ ]        VITALS    ICU Vital Signs Last 24 Hrs  T(C): 36.7 (27 Jul 2020 05:38), Max: 37.4 (26 Jul 2020 21:58)  T(F): 98 (27 Jul 2020 05:38), Max: 99.4 (26 Jul 2020 21:58)  HR: 69 (27 Jul 2020 05:38) (68 - 69)  BP: 152/85 (27 Jul 2020 05:38) (128/60 - 152/85)  BP(mean): --  ABP: --  ABP(mean): --  RR: 18 (27 Jul 2020 05:38) (17 - 18)  SpO2: 96% (27 Jul 2020 05:38) (96% - 98%)    I&O's Detail        PHYSICAL EXAMINATION    Abdomen: obese, soft, appropriately tender along incision and LLQ; stoma viable and productive of stool and flatus    I&O's Summary      LABS                        11.5   6.98  )-----------( 246      ( 27 Jul 2020 07:51 )             36.6             07-27    143  |  108  |  7   ----------------------------<  91  4.1   |  28  |  0.60    Ca    8.5      27 Jul 2020 07:51          MEDICATIONS:  MEDICATIONS  (STANDING):  heparin   Injectable 5000 Unit(s) SubCutaneous every 8 hours    MEDICATIONS  (PRN):  acetaminophen   Tablet .. 650 milliGRAM(s) Oral every 6 hours PRN Mild Pain (1 - 3)  oxycodone    5 mG/acetaminophen 325 mG 1 Tablet(s) Oral every 6 hours PRN Moderate Pain (4 - 6)
Surgery    Subjective:  Pt resting comfortably. c/o hotness in room and back being uncomfortably from lying in bed.  c/o incisional pain, not fully controlled with Ofirmev.  NPO.   Denies N/V  Fragoso in place.    T(C): 37.4 (07-23-20 @ 21:37), Max: 37.4 (07-23-20 @ 16:01)  HR: 73 (07-23-20 @ 21:37) (61 - 73)  BP: 130/50 (07-23-20 @ 21:37) (111/51 - 150/47)  RR: 16 (07-23-20 @ 21:37) (12 - 21)  SpO2: 96% (07-23-20 @ 21:37) (95% - 99%)    Physical:  Gen: A&O x3  Abd: Soft ND, Dressing C/D/I. KAYCE dressing with good seal. Ostomy pink, patent. No output noted in bag.   UO clear
Source of information: ROBER WILSON, Chart review  Patient language: English  : n/a    HPI:  Patient Reports emergency surgery requiring a colostomy due to a stricture. (22 Jul 2020 15:46)      /Patient is a 59y old  Female who presents with a a colon stricture and is now s/p Exploratory laparotomy, Laparoscopic partial colectomy on 7/23 , POD #4. Pt seen and examined at bedside. Reports pain score 5/10 and tolerable SCALE USED: (1-10 VNRS). Pt describes pain as aching, non-radiating, alleviated by pain medication, exacerbated by movement. Reports Percocet helps manage pain effectively. Pt ambulating with tolerable pain control. Pt tolerating PO diet. Denies lethargy, nausea, vomiting, constipation, itchiness. Patient stated goal for pain control: to be able to take deep breaths, get out of bed to chair and ambulate with tolerable pain control. Pt denies taking medications for pain at home. Plan to be discharged home today.     PAST MEDICAL & SURGICAL HISTORY:  Breast cancer: Bilateral  Abdominal hernia  Diverticulosis  Colostomy present  History of lumpectomy of both breasts  History of tonsillectomy  S/P laparoscopic cholecystectomy  S/P MARIELLA (total abdominal hysterectomy)  History of appendectomy      FAMILY HISTORY:  FH: hypertension      Social History:   [X ] Denies ETOH use, illicit drug use and smoking    Allergies    No Known Drug Allergies  peanuts (Other; Rash)      MEDICATIONS  (STANDING):  heparin   Injectable 5000 Unit(s) SubCutaneous every 8 hours    MEDICATIONS  (PRN):  acetaminophen   Tablet .. 650 milliGRAM(s) Oral every 6 hours PRN Mild Pain (1 - 3)  oxycodone    5 mG/acetaminophen 325 mG 1 Tablet(s) Oral every 6 hours PRN Moderate Pain (4 - 6)      Vital Signs Last 24 Hrs  T(C): 36.6 (27 Jul 2020 11:45), Max: 37.4 (26 Jul 2020 21:58)  T(F): 97.9 (27 Jul 2020 11:45), Max: 99.4 (26 Jul 2020 21:58)  HR: 84 (27 Jul 2020 11:45) (68 - 84)  BP: 133/83 (27 Jul 2020 11:45) (128/60 - 152/85)  BP(mean): --  RR: 17 (27 Jul 2020 11:45) (17 - 18)  SpO2: 97% (27 Jul 2020 11:45) (96% - 98%)    LABS: Reviewed                          11.5   6.98  )-----------( 246      ( 27 Jul 2020 07:51 )             36.6     07-27    143  |  108  |  7   ----------------------------<  91  4.1   |  28  |  0.60    Ca    8.5      27 Jul 2020 07:51      ORT Score -   Family Hx of substance abuse	Female	      Male  Alcohol 	                                           1                     3  Illegal drugs	                                   2                     3  Rx drugs                                           4 	                  4  Personal Hx of substance abuse		  Alcohol 	                                          3	                  3  Illegal drugs                                     4	                  4  Rx drugs                                            5 	                  5  Age between 16- 45 years	           1                     1  hx preadolescent sexual abuse	   3 	                  0  Psychological disease		  ADD, OCD, bipolar, schizophrenia   2	          2  Depression                                           1 	          1  Total: 0    a score of 3 or lower indicates low risk for opioid abuse		  a score of 4-7 indicates moderate risk for opioid abuse		  a score of 8 or higher indicates high risk for opioid abuse  	    REVIEW OF SYSTEMS:  CONSTITUTIONAL: No fever or fatigue  RESPIRATORY: No cough, wheezing, chills or hemoptysis; No shortness of breath  CARDIOVASCULAR: No chest pain, palpitations, dizziness, or leg swelling  GASTROINTESTINAL: + abdominal pain. No nausea, vomiting; No diarrhea or constipation.   GENITOURINARY: No dysuria, frequency, hematuria, retention or incontinence  MUSCULOSKELETAL: No joint pain or swelling; No muscle, back, or extremity pain, no upper or lower motor strength weakness, no saddle anesthesia, bowel/bladder incontinence  NEURO: No numbness/ tingling in b/l LE   PSYCHIATRIC: No depression, anxiety, mood swings, or difficulty sleeping    PHYSICAL EXAM:  GENERAL:  Alert & Oriented X3, NAD, Good concentration  CHEST/LUNG: Clear to auscultation bilaterally; No rales, rhonchi, wheezing, or rubs; O2 NC  HEART: Regular rate and rhythm; No murmurs, rubs, or gallops  ABDOMEN: Obese, Soft, tender to palpation, + bowel sounds, ex lap left lower quadrant KAYCE dressing c/d/i, Rt lower colostomy in place with brown stool noted.   EXTREMITIES:  2+ Peripheral Pulses, No cyanosis, or edema  MUSCULOSKELETAL: Motor Strength 5/5 B/L upper and lower extremities; moves all extremities equally against gravity; ROM intact; negative SLR  SKIN: No rashes or lesions    Risk factors associated with adverse outcomes related to opioid treatment  [ ]  Concurrent benzodiazepine use  [ ]  History/ Active substance use or alcohol use disorder  [ ] Psychiatric co-morbidity  [ ] Sleep apnea  [ ] COPD  [X ] BMI> 35  [ ] Liver dysfunction  [ ] Renal dysfunction  [ ] CHF  [ ] Smoker  [ X]  Age > 60 years    [X ]  [place]  Reviewed and Copied to Chart. See below.    Plan of care and goal oriented pain management treatment options were discussed with patient and /or primary care giver; all questions and concerns were addressed and care was aligned with patient's wishes.    Educated patient on goal oriented pain management treatment options     07-27-20 @ 12:22

## 2020-07-27 NOTE — PHYSICAL EXAM
[JVD] : no jugular venous distention  [Normal Breath Sounds] : Normal breath sounds [Normal Heart Sounds] : normal heart sounds [Tender] : was nontender [No Rash or Lesion] : No rash or lesion [Alert] : alert [Oriented to Person] : oriented to person [Oriented to Place] : oriented to place [de-identified] : NAD [Oriented to Time] : oriented to time [Calm] : calm [de-identified] : obese, soft, ND, NT; viable ostomy with stool in bag [de-identified] : NCAT; sclerae anicteric;

## 2020-07-27 NOTE — DISCHARGE NOTE PROVIDER - NSDCCPTREATMENT_GEN_ALL_CORE_FT
PRINCIPAL PROCEDURE  Procedure: Exploratory laparotomy  Findings and Treatment: status post exploratory lapartomy, laparoscopic partial colectomy with stoma formation. Do not lift anything heavier than 10 pounds. You may take over the counter medications such as tylenol or advil for mild pain as needed. Call for any fever over 101, nausea, vomiting, or severe pain. You may shower 48 hours postoperatively, you may remove dressing at that point. Keep white steri-strips in place and allow them to fall off on their own.      SECONDARY PROCEDURE  Procedure: Laparoscopic partial colectomy  Findings and Treatment: Colostomy

## 2020-07-27 NOTE — PROGRESS NOTE ADULT - PROBLEM SELECTOR PLAN 1
Pt with postoperative abdominal pain which is somatic and neuropathic in nature due to s/p Exploratory laparotomy, Laparoscopic partial colectomy on 7/23 , POD #4.  Opioid pain recommendations   - Discontinued morphine IV.  - Continue Percocet 5-325mg PO q6h PRN severe pain  Bowel Regimen  - Per primary team due to abdominal surgery  Mild pain   - Non-pharmacological pain treatment recommendations  - Warm/ Cool packs PRN   - Repositioning, imagery, relaxation, distraction.  - Physical therapy OOB if no contraindications   Recommendations discussed with primary team and RN.

## 2020-07-27 NOTE — DISCHARGE NOTE NURSING/CASE MANAGEMENT/SOCIAL WORK - PATIENT PORTAL LINK FT
You can access the FollowMyHealth Patient Portal offered by [facility] by registering at the following website: [email or web address]. By joining [facility]’s FollowMyHealth portal, you will also be able to view your health information using other applications (apps) compatible with our system.

## 2020-07-27 NOTE — DISCHARGE NOTE PROVIDER - HOSPITAL COURSE
59F with PMHx of diverticulitis who was found to have colonic stricture underwent exploratory laparotomy and Laparoscopic partial colectomy 7/23/20 with colostomy formation. Patient vital signs are now stable, pain is well controlled, tolerating a diet, and colostomy is functioning appropriately. Patient is now stable and cleared for discharge.

## 2020-07-27 NOTE — CHART NOTE - NSCHARTNOTEFT_GEN_A_CORE
Removal of KAYCE dressing at bedside  No induration, erythema, bleeding, or discharged are noted.  Incision was clean and dry. Covered with Methylex dressing  Stoma viable and productive of stool and flatus.

## 2020-07-27 NOTE — PROGRESS NOTE ADULT - ASSESSMENT
Confidential Drug Utilization Report  Search Terms: Fiorella Smith, 1960   Search Date: 07/23/2020 16:23:01 PM   The Drug Utilization Report below displays all of the controlled substance prescriptions, if any, that your patient has filled in the last twelve months. The information displayed on this report is compiled from pharmacy submissions to the Department, and accurately reflects the information as submitted by the pharmacies.  This report was requested by: Love Dillard | Reference #: [id number]   You have not added a KELSEY number. Keeping your KELSEY number(s) up to date on the My KELSEY Numbers </doh2/applinks/cspnp/myDeaNumbers> page will enable the separation of your prescriptions from others in the search results.   Others' Prescriptions  Patient Name: Fiorella Arteaga   YOB: 1960   Address: [address] [place] [address]   Sex: Female   Rx Written	Rx Dispensed	Drug	Quantity	Days Supply	Prescriber Name	Payment Method	Dispenser	  01/02/2020	01/07/2020	qsymia 7.5 mg-46 mg capsule 	30	30	Stevie Heredia DO	[place]	[facility]	  10/04/2019	10/07/2019	qsymia 7.5 mg-46 mg capsule 	30	30	Stevie Heredia DO	[place]	[facility]	  07/18/2019	08/19/2019	qsymia 7.5 mg-46 mg capsule 	30	30	Stevie Heredia DO	[place]	[facility]	  07/22/2019	08/19/2019	qsymia 3.75 mg-23 mg capsule 	14	14	Stevie Heredia DO	[place]	[facility]	  * - Drugs marked with an asterisk are compound drugs. If the compound drug is made up of more than one controlled substance, then each controlled substance will be a separate row in the table.
59F s/p lap assisted left colectomy. Afebrile, nml VS and tolerating diet with normal ostomy function    1) remove KAYCE dressing  2) DC home
59F with morbid obesity (truncal) s/o laparoscopic assisted left segmental colon resection. Afebrile, nml VS.    1) CBC noted--no evidence of bleeding  2) DVT chemoprophylaxis  3) abx to end after 2 doses from OR  4) ambulate  5) awaiting bowel function before beginning diet
59y old Female s/p left partial colon resection POD#2    - advance to clear liquid diet  - monitor ostomy function   - continue KAYCE for wound care  - follow up pathology  - DVT prophylaxis, Incentive Spirometer, OOB, Ambulating, pain control
59y old Female s/p left partial colon resection POD#3, hypokalemia    - advance to regular diet  - monitor ostomy function   - continue KAYCE for wound care  - oral potassium replacement  - follow up pathology  - DVT prophylaxis, Incentive Spirometer, OOB, Ambulating, pain control
59y.o. Female s/p left partial colectomy    -Keep NPO  -IVF  -Pain control prn  -mechanical DVT ppx only tonight, restart subq heparin in AM  -no Toradol tonight  -complete 24h periop abx  -Incentive spirometry

## 2020-07-27 NOTE — DISCHARGE NOTE PROVIDER - NSDCMRMEDTOKEN_GEN_ALL_CORE_FT
biotin: 1 tab(s) orally once a day  Citracal + D: 1 tab(s) orally once a day  letrozole 2.5 mg oral tablet: 1 tab(s) orally once a day

## 2020-07-27 NOTE — DISCHARGE NOTE PROVIDER - NSDCCPCAREPLAN_GEN_ALL_CORE_FT
PRINCIPAL DISCHARGE DIAGNOSIS  Diagnosis: Colonic stricture  Assessment and Plan of Treatment:       SECONDARY DISCHARGE DIAGNOSES  Diagnosis: Diverticulosis  Assessment and Plan of Treatment: Diverticulosis

## 2020-07-28 LAB — SURGICAL PATHOLOGY STUDY: SIGNIFICANT CHANGE UP

## 2020-07-29 ENCOUNTER — APPOINTMENT (OUTPATIENT)
Dept: SURGERY | Facility: CLINIC | Age: 60
End: 2020-07-29
Payer: COMMERCIAL

## 2020-07-29 VITALS
HEIGHT: 62 IN | WEIGHT: 242 LBS | OXYGEN SATURATION: 96 % | BODY MASS INDEX: 44.53 KG/M2 | DIASTOLIC BLOOD PRESSURE: 78 MMHG | TEMPERATURE: 96.5 F | HEART RATE: 82 BPM | SYSTOLIC BLOOD PRESSURE: 133 MMHG

## 2020-07-29 PROCEDURE — 99024 POSTOP FOLLOW-UP VISIT: CPT

## 2020-08-07 RX ORDER — ERYTHROMYCIN 500 MG/1
500 TABLET, FILM COATED ORAL
Qty: 3 | Refills: 0 | Status: DISCONTINUED | COMMUNITY
Start: 2020-07-22 | End: 2020-08-07

## 2020-08-07 RX ORDER — ENEMA 19; 7 G/133ML; G/133ML
7-19 ENEMA RECTAL TWICE DAILY
Qty: 2 | Refills: 0 | Status: DISCONTINUED | COMMUNITY
Start: 2020-07-22 | End: 2020-08-07

## 2020-08-07 RX ORDER — SODIUM SULFATE, POTASSIUM SULFATE, MAGNESIUM SULFATE 17.5; 3.13; 1.6 G/ML; G/ML; G/ML
17.5-3.13-1.6 SOLUTION, CONCENTRATE ORAL
Qty: 1 | Refills: 0 | Status: DISCONTINUED | COMMUNITY
Start: 2020-06-09 | End: 2020-08-07

## 2020-08-07 RX ORDER — NEOMYCIN SULFATE 500 MG/1
500 TABLET ORAL
Qty: 6 | Refills: 0 | Status: DISCONTINUED | COMMUNITY
Start: 2020-07-22 | End: 2020-08-07

## 2020-08-07 NOTE — HISTORY OF PRESENT ILLNESS
[de-identified] : ROBER WILSON is a 59 year old female who presents in the office for postop visit. Patient had Exploratory laparotomy and Laparoscopic partial colectomy done on 07/23/2020. No fevers, chills, nausea, vomiting, diarrhea or constipation. Tolerating regular diet with normal BM. Pain is well controlled\par

## 2020-08-07 NOTE — PHYSICAL EXAM
[de-identified] : obese, soft, LLQ incision healing well without erythema, drainage or induration
none

## 2020-08-07 NOTE — ASSESSMENT
[FreeTextEntry1] : ROBER WILSON is a 59 year old female s/p Exploratory laparotomy and Laparoscopic partial colectomy. Given anatomic difficulty and morbid obesity with truncal distribution,. she will need midline laparotomy, takewdown of splenic flexure, takedown of current colostomy and anastomosis. SHe will then be diverted with a loop ileostomy. It is too risky to attempt the surgery without another diversion and her body habitus will prevent colocolostomy without taking down current diverting loop. She relates her diverticular disease has been going on for more than 10 years, unmanaged. I have also recommended she lose weight to reduce the risks of complications which include VTE, wound infection, incisional hernia, leak and other complications.

## 2020-08-12 ENCOUNTER — APPOINTMENT (OUTPATIENT)
Dept: SURGERY | Facility: CLINIC | Age: 60
End: 2020-08-12
Payer: COMMERCIAL

## 2020-08-12 PROCEDURE — 99024 POSTOP FOLLOW-UP VISIT: CPT

## 2020-08-12 NOTE — HISTORY OF PRESENT ILLNESS
[de-identified] : ROBER WILSON is a 60 year old female who presents in the office for postop and follow up visit. Patient s/p Exploratory laparotomy and Laparoscopic partial colectomy done on 07/23/2020. Episode of cramping and chills over the weekend (no fever) that resolved after giving herself an enema and passing old stool from the rectum. She is now feeling well and returns via telehealth to discuss future steps. She is walking 2 - 3 miles a day and feels quite well.

## 2020-08-12 NOTE — ASSESSMENT
[FreeTextEntry1] : ROBER WILSON is a 60 year old female who presents in the office s/p Exploratory laparotomy and Laparoscopic partial colectomy done on 07/23/2020. Will need joint operation with C/R surgery to do ex-lap, takedown of stoma, takedown of splenic flexure, completion sigmoid colectomy, anastomosis and diverting lop ileostomy in RUQ. Risks, benefits alternatives were discussed and all questions were answered. \par

## 2020-09-09 ENCOUNTER — APPOINTMENT (OUTPATIENT)
Dept: SURGERY | Facility: CLINIC | Age: 60
End: 2020-09-09
Payer: COMMERCIAL

## 2020-09-09 VITALS
HEIGHT: 62 IN | HEART RATE: 71 BPM | BODY MASS INDEX: 46.56 KG/M2 | SYSTOLIC BLOOD PRESSURE: 165 MMHG | WEIGHT: 253 LBS | OXYGEN SATURATION: 98 % | DIASTOLIC BLOOD PRESSURE: 74 MMHG

## 2020-09-09 DIAGNOSIS — Z82.49 FAMILY HISTORY OF ISCHEMIC HEART DISEASE AND OTHER DISEASES OF THE CIRCULATORY SYSTEM: ICD-10-CM

## 2020-09-09 DIAGNOSIS — Z80.3 FAMILY HISTORY OF MALIGNANT NEOPLASM OF BREAST: ICD-10-CM

## 2020-09-09 DIAGNOSIS — Z78.9 OTHER SPECIFIED HEALTH STATUS: ICD-10-CM

## 2020-09-09 DIAGNOSIS — Z83.3 FAMILY HISTORY OF DIABETES MELLITUS: ICD-10-CM

## 2020-09-09 DIAGNOSIS — Z82.0 FAMILY HISTORY OF EPILEPSY AND OTHER DISEASES OF THE NERVOUS SYSTEM: ICD-10-CM

## 2020-09-09 PROCEDURE — 99024 POSTOP FOLLOW-UP VISIT: CPT

## 2020-09-09 NOTE — ASSESSMENT
[FreeTextEntry1] : ROBER WILSON is a 60 year old female s/p Exploratory laparotomy and Laparoscopic partial colectomy done on 07/23/2020. Discussed options during surgery for creating continuity, including a diverting loop ileostomy to protect the anastmosis (or anatomoses). Risks, benefits alternatives discussed, all questions answered and agrees to proceed. Will arrange to perform surgery with Dr. Noel at [facility]. Plan for end October to have given 3 months to reduce adhesion tenacity. She is at high risk for complications given body habitus and duration of diverticulitis (> 10 years). She is also aware of he need for further surgery.

## 2020-09-09 NOTE — HISTORY OF PRESENT ILLNESS
[de-identified] : ROBER WILSON is a 60 year old female who presents in the office for postop and follow up visit. Patient s/p Exploratory laparotomy and Laparoscopic partial colectomy done on 07/23/2020. She presents for discussion for future operation to recreate intestinal continuity. Seen together with Dr. Valeriy Noel from colorectal surgery.

## 2020-10-12 ENCOUNTER — OUTPATIENT (OUTPATIENT)
Dept: OUTPATIENT SERVICES | Facility: HOSPITAL | Age: 60
LOS: 1 days | End: 2020-10-12

## 2020-10-12 VITALS
TEMPERATURE: 97 F | HEART RATE: 70 BPM | WEIGHT: 259.93 LBS | RESPIRATION RATE: 16 BRPM | HEIGHT: 61 IN | OXYGEN SATURATION: 99 % | DIASTOLIC BLOOD PRESSURE: 70 MMHG | SYSTOLIC BLOOD PRESSURE: 110 MMHG

## 2020-10-12 DIAGNOSIS — E66.01 MORBID (SEVERE) OBESITY DUE TO EXCESS CALORIES: ICD-10-CM

## 2020-10-12 DIAGNOSIS — T14.8XXA OTHER INJURY OF UNSPECIFIED BODY REGION, INITIAL ENCOUNTER: ICD-10-CM

## 2020-10-12 DIAGNOSIS — K57.92 DIVERTICULITIS OF INTESTINE, PART UNSPECIFIED, WITHOUT PERFORATION OR ABSCESS WITHOUT BLEEDING: ICD-10-CM

## 2020-10-12 DIAGNOSIS — Z90.710 ACQUIRED ABSENCE OF BOTH CERVIX AND UTERUS: Chronic | ICD-10-CM

## 2020-10-12 DIAGNOSIS — Z98.890 OTHER SPECIFIED POSTPROCEDURAL STATES: Chronic | ICD-10-CM

## 2020-10-12 DIAGNOSIS — K57.32 DIVERTICULITIS OF LARGE INTESTINE WITHOUT PERFORATION OR ABSCESS WITHOUT BLEEDING: ICD-10-CM

## 2020-10-12 DIAGNOSIS — Z93.3 COLOSTOMY STATUS: Chronic | ICD-10-CM

## 2020-10-12 DIAGNOSIS — Z90.49 ACQUIRED ABSENCE OF OTHER SPECIFIED PARTS OF DIGESTIVE TRACT: Chronic | ICD-10-CM

## 2020-10-12 DIAGNOSIS — Z90.89 ACQUIRED ABSENCE OF OTHER ORGANS: Chronic | ICD-10-CM

## 2020-10-12 LAB
BLD GP AB SCN SERPL QL: NEGATIVE — SIGNIFICANT CHANGE UP
RH IG SCN BLD-IMP: POSITIVE — SIGNIFICANT CHANGE UP

## 2020-10-12 RX ORDER — LETROZOLE 2.5 MG/1
1 TABLET, FILM COATED ORAL
Qty: 0 | Refills: 0 | DISCHARGE

## 2020-10-12 RX ORDER — SODIUM CHLORIDE 9 MG/ML
1000 INJECTION, SOLUTION INTRAVENOUS
Refills: 0 | Status: DISCONTINUED | OUTPATIENT
Start: 2020-10-19 | End: 2020-10-20

## 2020-10-12 NOTE — H&P PST ADULT - NEGATIVE NEUROLOGICAL SYMPTOMS
no difficulty walking/no weakness/no paresthesias/no transient paralysis/no vertigo/no loss of sensation/no generalized seizures

## 2020-10-12 NOTE — H&P PST ADULT - NSICDXPASTSURGICALHX_GEN_ALL_CORE_FT
PAST SURGICAL HISTORY:  Colostomy present     History of appendectomy     History of lumpectomy of both breasts 2020    History of partial colectomy 7/2020    History of tonsillectomy     S/P laparoscopic cholecystectomy 1999    S/P MARIELLA (total abdominal hysterectomy) 2000, due to "infection"     PAST SURGICAL HISTORY:  Colostomy present     History of appendectomy     History of lumpectomy of both breasts 3/2020    History of partial colectomy 7/2020    History of tonsillectomy     S/P laparoscopic cholecystectomy 1999    S/P MARIELLA (total abdominal hysterectomy) 2000, due to "infection"

## 2020-10-12 NOTE — H&P PST ADULT - NSICDXPROBLEM_GEN_ALL_CORE_FT
PROBLEM DIAGNOSES  Problem: Diverticulitis  Assessment and Plan:        PROBLEM DIAGNOSES  Problem: Diverticulitis  Assessment and Plan:   Pt is scheduled for open lower anterior resection incisional hernia repair with abdominal wall reconstruction on 10/19/20.  Verbal and written pre op instructions reviewed with patient and pt able to verbalize understanding.   Verbal and written instructions given with chlorhexidine wash, pt able to verbalize understanding via teach back method.    COVID testing scheduled for 10/16/20 per patient.  Pt to obtain medical eval as requested by surgeon, in chart.    Problem: Skin excoriation  Assessment and Plan: Surgeon notified via email regarding skin excoriation around colostomy site.    Problem: Morbid obesity  Assessment and Plan: Pt met STOP BANG score for LARISSA precaution. OR booking notified via fax.   OR booking notified pt's BMI 49.

## 2020-10-12 NOTE — H&P PST ADULT - NSICDXPASTMEDICALHX_GEN_ALL_CORE_FT
PAST MEDICAL HISTORY:  Abdominal hernia     Breast cancer Bilateral, s/p RT    Diverticulitis     Diverticulosis      PAST MEDICAL HISTORY:  Abdominal hernia     Breast cancer Bilateral, s/p RT. Stopped letrozole Aug/Sept 2020    Diverticulitis     Diverticulosis

## 2020-10-12 NOTE — H&P PST ADULT - HISTORY OF PRESENT ILLNESS
61 yo female with preop dx. diverticulitis present to pre surgical testing.  Pt is scheduled for open lower anterior resection incisional hernia repair with abdominal wall reconstruction.  Pt is s/p partial colectomy, creation of colostomy 7/2020 for diverticulitis.

## 2020-10-12 NOTE — H&P PST ADULT - MUSCULOSKELETAL
negative details… detailed exam ROM intact/no joint warmth/no calf tenderness/no joint swelling/no joint erythema/normal strength

## 2020-10-12 NOTE — H&P PST ADULT - ENMT
"Oncology Rooming Note    September 18, 2020 3:09 PM   Nancy Bronson is a 48 year old female who presents for:    Chief Complaint   Patient presents with     Oncology Clinic Visit     MELANOMA      Initial Vitals: /86   Pulse 95   Resp 18   Wt 98.6 kg (217 lb 4.8 oz)   LMP 12/02/2012   SpO2 97%   BMI 35.09 kg/m   Estimated body mass index is 35.09 kg/m  as calculated from the following:    Height as of 3/3/20: 1.676 m (5' 5.98\").    Weight as of this encounter: 98.6 kg (217 lb 4.8 oz). Body surface area is 2.14 meters squared.  No Pain (0) Comment: Data Unavailable   Patient's last menstrual period was 12/02/2012.  Allergies reviewed: Yes  Medications reviewed: Yes    Medications: Medication refills not needed today.  Pharmacy name entered into [employer]:    [facility] [place], [place] - [address]  [id number] - [place], [place] - [address]  AB[address]ENESES    Clinical concerns: No new concerns today  Dr Richardson  was notified.      Liz Adams            " negative details… detailed exam mouth

## 2020-10-12 NOTE — H&P PST ADULT - NEGATIVE ENMT SYMPTOMS
no nasal congestion/no dysphagia/no ear pain/no tinnitus/no vertigo/no sinus symptoms/no hearing difficulty

## 2020-10-16 ENCOUNTER — APPOINTMENT (OUTPATIENT)
Dept: DISASTER EMERGENCY | Facility: CLINIC | Age: 60
End: 2020-10-16

## 2020-10-16 NOTE — ASU PATIENT PROFILE, ADULT - PSH
Colostomy present    History of appendectomy    History of lumpectomy of both breasts  3/2020  History of partial colectomy  7/2020  History of tonsillectomy    S/P laparoscopic cholecystectomy  1999  S/P MARIELLA (total abdominal hysterectomy)  2000, due to "infection"

## 2020-10-16 NOTE — ASU PATIENT PROFILE, ADULT - NS PRO LAST MENSTRUAL
Needs to establish care with a new PCP before further refills    
Patient scheduled to est care with Dr. Ghosh.   
not applicable

## 2020-10-16 NOTE — ASU PATIENT PROFILE, ADULT - PMH
Abdominal hernia    Breast cancer  Bilateral, s/p RT. Stopped letrozole Aug/Sept 2020  Diverticulitis    Diverticulosis

## 2020-10-17 LAB — SARS-COV-2 N GENE NPH QL NAA+PROBE: NOT DETECTED

## 2020-10-18 ENCOUNTER — TRANSCRIPTION ENCOUNTER (OUTPATIENT)
Age: 60
End: 2020-10-18

## 2020-10-19 ENCOUNTER — APPOINTMENT (OUTPATIENT)
Dept: COLORECTAL SURGERY | Facility: HOSPITAL | Age: 60
End: 2020-10-19
Payer: COMMERCIAL

## 2020-10-19 ENCOUNTER — INPATIENT (INPATIENT)
Facility: HOSPITAL | Age: 60
LOS: 5 days | Discharge: HOME HEALTH | End: 2020-10-25
Attending: STUDENT IN AN ORGANIZED HEALTH CARE EDUCATION/TRAINING PROGRAM | Admitting: STUDENT IN AN ORGANIZED HEALTH CARE EDUCATION/TRAINING PROGRAM
Payer: COMMERCIAL

## 2020-10-19 ENCOUNTER — RESULT REVIEW (OUTPATIENT)
Age: 60
End: 2020-10-19

## 2020-10-19 VITALS
WEIGHT: 259.93 LBS | DIASTOLIC BLOOD PRESSURE: 82 MMHG | HEART RATE: 61 BPM | TEMPERATURE: 99 F | SYSTOLIC BLOOD PRESSURE: 158 MMHG | OXYGEN SATURATION: 97 % | HEIGHT: 61 IN | RESPIRATION RATE: 16 BRPM

## 2020-10-19 DIAGNOSIS — Z90.710 ACQUIRED ABSENCE OF BOTH CERVIX AND UTERUS: Chronic | ICD-10-CM

## 2020-10-19 DIAGNOSIS — Z90.49 ACQUIRED ABSENCE OF OTHER SPECIFIED PARTS OF DIGESTIVE TRACT: Chronic | ICD-10-CM

## 2020-10-19 DIAGNOSIS — Z98.890 OTHER SPECIFIED POSTPROCEDURAL STATES: Chronic | ICD-10-CM

## 2020-10-19 DIAGNOSIS — Z90.89 ACQUIRED ABSENCE OF OTHER ORGANS: Chronic | ICD-10-CM

## 2020-10-19 DIAGNOSIS — K57.32 DIVERTICULITIS OF LARGE INTESTINE WITHOUT PERFORATION OR ABSCESS WITHOUT BLEEDING: ICD-10-CM

## 2020-10-19 DIAGNOSIS — Z93.3 COLOSTOMY STATUS: Chronic | ICD-10-CM

## 2020-10-19 LAB
ANION GAP SERPL CALC-SCNC: 12 MMO/L — SIGNIFICANT CHANGE UP (ref 7–14)
BASE EXCESS BLDA CALC-SCNC: -0.8 MMOL/L — SIGNIFICANT CHANGE UP
BASE EXCESS BLDA CALC-SCNC: -1.7 MMOL/L — SIGNIFICANT CHANGE UP
BASE EXCESS BLDA CALC-SCNC: -3.4 MMOL/L — SIGNIFICANT CHANGE UP
BUN SERPL-MCNC: 7 MG/DL — SIGNIFICANT CHANGE UP (ref 7–23)
CA-I BLDA-SCNC: 1.11 MMOL/L — LOW (ref 1.15–1.29)
CA-I BLDA-SCNC: 1.15 MMOL/L — SIGNIFICANT CHANGE UP (ref 1.15–1.29)
CA-I BLDA-SCNC: 1.17 MMOL/L — SIGNIFICANT CHANGE UP (ref 1.15–1.29)
CALCIUM SERPL-MCNC: 7.9 MG/DL — LOW (ref 8.4–10.5)
CHLORIDE SERPL-SCNC: 105 MMOL/L — SIGNIFICANT CHANGE UP (ref 98–107)
CO2 SERPL-SCNC: 21 MMOL/L — LOW (ref 22–31)
CREAT SERPL-MCNC: 0.52 MG/DL — SIGNIFICANT CHANGE UP (ref 0.5–1.3)
GLUCOSE BLDA-MCNC: 131 MG/DL — HIGH (ref 70–99)
GLUCOSE BLDA-MCNC: 165 MG/DL — HIGH (ref 70–99)
GLUCOSE BLDA-MCNC: 194 MG/DL — HIGH (ref 70–99)
GLUCOSE SERPL-MCNC: 198 MG/DL — HIGH (ref 70–99)
HCO3 BLDA-SCNC: 21 MMOL/L — LOW (ref 22–26)
HCO3 BLDA-SCNC: 23 MMOL/L — SIGNIFICANT CHANGE UP (ref 22–26)
HCO3 BLDA-SCNC: 24 MMOL/L — SIGNIFICANT CHANGE UP (ref 22–26)
HCT VFR BLD CALC: 41.2 % — SIGNIFICANT CHANGE UP (ref 34.5–45)
HCT VFR BLDA CALC: 38.6 % — SIGNIFICANT CHANGE UP (ref 34.5–46.5)
HCT VFR BLDA CALC: 39.4 % — SIGNIFICANT CHANGE UP (ref 34.5–46.5)
HCT VFR BLDA CALC: 42.1 % — SIGNIFICANT CHANGE UP (ref 34.5–46.5)
HGB BLD-MCNC: 12.9 G/DL — SIGNIFICANT CHANGE UP (ref 11.5–15.5)
HGB BLDA-MCNC: 12.6 G/DL — SIGNIFICANT CHANGE UP (ref 11.5–15.5)
HGB BLDA-MCNC: 12.8 G/DL — SIGNIFICANT CHANGE UP (ref 11.5–15.5)
HGB BLDA-MCNC: 13.7 G/DL — SIGNIFICANT CHANGE UP (ref 11.5–15.5)
LACTATE BLDA-SCNC: 2.3 MMOL/L — HIGH (ref 0.5–2)
MAGNESIUM SERPL-MCNC: 1.5 MG/DL — LOW (ref 1.6–2.6)
MCHC RBC-ENTMCNC: 29 PG — SIGNIFICANT CHANGE UP (ref 27–34)
MCHC RBC-ENTMCNC: 31.3 % — LOW (ref 32–36)
MCV RBC AUTO: 92.6 FL — SIGNIFICANT CHANGE UP (ref 80–100)
NRBC # FLD: 0 K/UL — SIGNIFICANT CHANGE UP (ref 0–0)
PCO2 BLDA: 39 MMHG — SIGNIFICANT CHANGE UP (ref 32–48)
PCO2 BLDA: 42 MMHG — SIGNIFICANT CHANGE UP (ref 32–48)
PCO2 BLDA: 43 MMHG — SIGNIFICANT CHANGE UP (ref 32–48)
PH BLDA: 7.32 PH — LOW (ref 7.35–7.45)
PH BLDA: 7.37 PH — SIGNIFICANT CHANGE UP (ref 7.35–7.45)
PH BLDA: 7.38 PH — SIGNIFICANT CHANGE UP (ref 7.35–7.45)
PHOSPHATE SERPL-MCNC: 4.1 MG/DL — SIGNIFICANT CHANGE UP (ref 2.5–4.5)
PLATELET # BLD AUTO: 279 K/UL — SIGNIFICANT CHANGE UP (ref 150–400)
PMV BLD: 10.4 FL — SIGNIFICANT CHANGE UP (ref 7–13)
PO2 BLDA: 102 MMHG — SIGNIFICANT CHANGE UP (ref 83–108)
PO2 BLDA: 205 MMHG — HIGH (ref 83–108)
PO2 BLDA: 76 MMHG — LOW (ref 83–108)
POTASSIUM BLDA-SCNC: 3.4 MMOL/L — SIGNIFICANT CHANGE UP (ref 3.4–4.5)
POTASSIUM SERPL-MCNC: 3.7 MMOL/L — SIGNIFICANT CHANGE UP (ref 3.5–5.3)
POTASSIUM SERPL-SCNC: 3.7 MMOL/L — SIGNIFICANT CHANGE UP (ref 3.5–5.3)
RBC # BLD: 4.45 M/UL — SIGNIFICANT CHANGE UP (ref 3.8–5.2)
RBC # FLD: 12.3 % — SIGNIFICANT CHANGE UP (ref 10.3–14.5)
RH IG SCN BLD-IMP: POSITIVE — SIGNIFICANT CHANGE UP
SAO2 % BLDA: 95.4 % — SIGNIFICANT CHANGE UP (ref 95–99)
SAO2 % BLDA: 97.2 % — SIGNIFICANT CHANGE UP (ref 95–99)
SAO2 % BLDA: 99.6 % — HIGH (ref 95–99)
SODIUM BLDA-SCNC: 136 MMOL/L — SIGNIFICANT CHANGE UP (ref 136–146)
SODIUM BLDA-SCNC: 137 MMOL/L — SIGNIFICANT CHANGE UP (ref 136–146)
SODIUM BLDA-SCNC: 137 MMOL/L — SIGNIFICANT CHANGE UP (ref 136–146)
SODIUM SERPL-SCNC: 138 MMOL/L — SIGNIFICANT CHANGE UP (ref 135–145)
WBC # BLD: 11.92 K/UL — HIGH (ref 3.8–10.5)
WBC # FLD AUTO: 11.92 K/UL — HIGH (ref 3.8–10.5)

## 2020-10-19 PROCEDURE — 88302 TISSUE EXAM BY PATHOLOGIST: CPT | Mod: 26

## 2020-10-19 PROCEDURE — 44310 ILEOSTOMY/JEJUNOSTOMY: CPT

## 2020-10-19 PROCEDURE — 50715 RELEASE OF URETER: CPT | Mod: 59,LT

## 2020-10-19 PROCEDURE — 44626 REPAIR BOWEL OPENING: CPT | Mod: 59

## 2020-10-19 PROCEDURE — 88304 TISSUE EXAM BY PATHOLOGIST: CPT | Mod: 26

## 2020-10-19 PROCEDURE — 44625 REPAIR BOWEL OPENING: CPT

## 2020-10-19 PROCEDURE — 45330 DIAGNOSTIC SIGMOIDOSCOPY: CPT

## 2020-10-19 PROCEDURE — 12002 RPR S/N/AX/GEN/TRNK2.6-7.5CM: CPT | Mod: 59

## 2020-10-19 PROCEDURE — 88305 TISSUE EXAM BY PATHOLOGIST: CPT | Mod: 26

## 2020-10-19 PROCEDURE — 49561: CPT | Mod: 59

## 2020-10-19 PROCEDURE — 88307 TISSUE EXAM BY PATHOLOGIST: CPT | Mod: 26

## 2020-10-19 RX ORDER — HEPARIN SODIUM 5000 [USP'U]/ML
5000 INJECTION INTRAVENOUS; SUBCUTANEOUS EVERY 8 HOURS
Refills: 0 | Status: DISCONTINUED | OUTPATIENT
Start: 2020-10-19 | End: 2020-10-22

## 2020-10-19 RX ORDER — ONDANSETRON 8 MG/1
4 TABLET, FILM COATED ORAL ONCE
Refills: 0 | Status: COMPLETED | OUTPATIENT
Start: 2020-10-19 | End: 2020-10-19

## 2020-10-19 RX ORDER — HYDROMORPHONE HYDROCHLORIDE 2 MG/ML
0.5 INJECTION INTRAMUSCULAR; INTRAVENOUS; SUBCUTANEOUS
Refills: 0 | Status: DISCONTINUED | OUTPATIENT
Start: 2020-10-19 | End: 2020-10-19

## 2020-10-19 RX ORDER — ACETAMINOPHEN 500 MG
1000 TABLET ORAL EVERY 6 HOURS
Refills: 0 | Status: COMPLETED | OUTPATIENT
Start: 2020-10-19 | End: 2020-10-20

## 2020-10-19 RX ORDER — MAGNESIUM SULFATE 500 MG/ML
2 VIAL (ML) INJECTION ONCE
Refills: 0 | Status: COMPLETED | OUTPATIENT
Start: 2020-10-19 | End: 2020-10-19

## 2020-10-19 RX ORDER — ONDANSETRON 8 MG/1
4 TABLET, FILM COATED ORAL ONCE
Refills: 0 | Status: DISCONTINUED | OUTPATIENT
Start: 2020-10-19 | End: 2020-10-19

## 2020-10-19 RX ORDER — NALOXONE HYDROCHLORIDE 4 MG/.1ML
0.1 SPRAY NASAL
Refills: 0 | Status: DISCONTINUED | OUTPATIENT
Start: 2020-10-19 | End: 2020-10-22

## 2020-10-19 RX ORDER — HYDROMORPHONE HYDROCHLORIDE 2 MG/ML
0.25 INJECTION INTRAMUSCULAR; INTRAVENOUS; SUBCUTANEOUS
Refills: 0 | Status: DISCONTINUED | OUTPATIENT
Start: 2020-10-19 | End: 2020-10-19

## 2020-10-19 RX ORDER — HYDROMORPHONE HYDROCHLORIDE 2 MG/ML
0.5 INJECTION INTRAMUSCULAR; INTRAVENOUS; SUBCUTANEOUS
Refills: 0 | Status: DISCONTINUED | OUTPATIENT
Start: 2020-10-19 | End: 2020-10-22

## 2020-10-19 RX ADMIN — HYDROMORPHONE HYDROCHLORIDE 0.5 MILLIGRAM(S): 2 INJECTION INTRAMUSCULAR; INTRAVENOUS; SUBCUTANEOUS at 15:59

## 2020-10-19 RX ADMIN — Medication 50 GRAM(S): at 17:13

## 2020-10-19 RX ADMIN — ONDANSETRON 4 MILLIGRAM(S): 8 TABLET, FILM COATED ORAL at 22:57

## 2020-10-19 RX ADMIN — Medication 1000 MILLIGRAM(S): at 21:11

## 2020-10-19 RX ADMIN — Medication 400 MILLIGRAM(S): at 20:41

## 2020-10-19 RX ADMIN — HEPARIN SODIUM 5000 UNIT(S): 5000 INJECTION INTRAVENOUS; SUBCUTANEOUS at 23:33

## 2020-10-19 RX ADMIN — SODIUM CHLORIDE 30 MILLILITER(S): 9 INJECTION, SOLUTION INTRAVENOUS at 07:01

## 2020-10-19 RX ADMIN — HYDROMORPHONE HYDROCHLORIDE 0.5 MILLIGRAM(S): 2 INJECTION INTRAMUSCULAR; INTRAVENOUS; SUBCUTANEOUS at 16:14

## 2020-10-19 RX ADMIN — SODIUM CHLORIDE 125 MILLILITER(S): 9 INJECTION, SOLUTION INTRAVENOUS at 16:59

## 2020-10-19 NOTE — PATIENT PROFILE ADULT - STATED REASON FOR ADMISSION
exploratory laparotomy, lysis of adhesions, transverse loop colostomy closed with TA stapler and the re-anastomosed., sigmoidectomy with EEA anastomosis, loop ileostomy, primary ventral hernia repair.

## 2020-10-19 NOTE — PATIENT PROFILE ADULT - DOES PATIENT HAVE ADVANCE DIRECTIVE
Ms. Dunne Javi Antonio MD    
No

## 2020-10-19 NOTE — BRIEF OPERATIVE NOTE - NSICDXBRIEFPROCEDURE_GEN_ALL_CORE_FT
PROCEDURES:  Flexible sigmoidoscopy 19-Oct-2020 15:34:26  Danny Ibarra  Ventral hernia repair 19-Oct-2020 15:34:13  Danny Ibarra  Reversal of Ernestine procedure 19-Oct-2020 15:33:59  Danny Ibarra

## 2020-10-19 NOTE — CHART NOTE - NSCHARTNOTEFT_GEN_A_CORE
Subjective: Patient complaining of pain exacerbated with motion. No return of bowel function yet. She endorses nausea but no emesis. Denies CP/SOB. Has not been OOB.     Objective:  Physical Exam:   Gen: NAD, nontoxic, appears nauseous  Chest: rrr, nonlabored respiration  Abd: s/nt/nd; ostomy pink and viable w/ red rubber cath in place, producing bilious succus into ostomy bag. MIL bulb serosanguineous  : her in draining clear urine    Vital Signs Last 24 Hrs  T(C): 36.8 (19 Oct 2020 19:56), Max: 37 (19 Oct 2020 06:38)  T(F): 98.3 (19 Oct 2020 19:56), Max: 98.6 (19 Oct 2020 06:38)  HR: 70 (19 Oct 2020 19:56) (60 - 73)  BP: 139/68 (19 Oct 2020 19:56) (110/52 - 158/82)  BP(mean): 87 (19 Oct 2020 17:30) (64 - 87)  RR: 18 (19 Oct 2020 19:56) (16 - 21)  SpO2: 95% (19 Oct 2020 19:56) (92% - 99%)    I&O's Detail    19 Oct 2020 07:01  -  19 Oct 2020 23:44  --------------------------------------------------------  IN:    IV PiggyBack: 50 mL    Lactated Ringers: 475 mL  Total IN: 525 mL    OUT:    Bulb (mL): 25 mL    Indwelling Catheter - Urethral (mL): 225 mL  Total OUT: 250 mL    Total NET: 275 mL      MEDICATIONS  (STANDING):  acetaminophen  IVPB .. 1000 milliGRAM(s) IV Intermittent every 6 hours  heparin   Injectable 5000 Unit(s) SubCutaneous every 8 hours  hydromorphone (10 MICROgram(s)/mL) + bupivacaine 0.0625% in 0.9% Sodium Chloride PCEA 250 milliLiter(s) Epidural PCA Continuous  lactated ringers. 1000 milliLiter(s) (125 mL/Hr) IV Continuous <Continuous>    MEDICATIONS  (PRN):  HYDROmorphone  Injectable 0.5 milliGRAM(s) IV Push every 3 hours PRN Severe Break Through Pain  hydromorphone (10 MICROgram(s)/mL) + bupivacaine 0.0625% in 0.9% Sodium Chloride PCEA Rescue Clinician  Bolus 5 milliLiter(s) Epidural every 15 minutes PRN for Pain Score greater than 6  naloxone Injectable 0.1 milliGRAM(s) IV Push every 3 minutes PRN For ANY of the following changes in patient status:  A. RR LESS THAN 10 breaths per minute, B. Oxygen saturation LESS THAN 90%, C. Sedation score of 6      LABS:                        12.9   11.92 )-----------( 279      ( 19 Oct 2020 15:50 )             41.2     10-19    138  |  105  |  7   ----------------------------<  198<H>  3.7   |  21<L>  |  0.52    Ca    7.9<L>      19 Oct 2020 15:50  Phos  4.1     10-19  Mg     1.5     10-19            ABO Interpretation: O (10-19-20 @ 06:55)      A/P: 60 F s/p Ernestine reversal, loop ileostomy, hernia repair for diverticulitis. Stable  - NPO/IVF  - monitor MIL output  - Monitor I/Os and vitals  - dvt ppx: sqh  - pain control: tylenol, dilaudid  - f/u am labs    A team surgery [id number]

## 2020-10-19 NOTE — CHART NOTE - NSCHARTNOTEFT_GEN_A_CORE
CAPRINI SCORE [CLOT]    AGE RELATED RISK FACTORS                                                       MOBILITY RELATED FACTORS  [ ] Age 41-60 years                                            (1 Point)                  [ ] Bed rest                                                        (1 Point)  [ 2] Age: 61-74 years                                           (2 Points)                 [ ] Plaster cast                                                   (2 Points)  [ ] Age= 75 years                                              (3 Points)                 [ ] Bed bound for more than 72 hours                 (2 Points)    DISEASE RELATED RISK FACTORS                                               GENDER SPECIFIC FACTORS  [ ] Edema in the lower extremities                       (1 Point)                  [ ] Pregnancy                                                     (1 Point)  [ ] Varicose veins                                               (1 Point)                  [ ] Post-partum < 6 weeks                                   (1 Point)             [ 1] BMI > 25 Kg/m2                                            (1 Point)                  [ ] Hormonal therapy  or oral contraception          (1 Point)                 [ ] Sepsis (in the previous month)                        (1 Point)                  [ ] History of pregnancy complications                 (1 point)  [ ] Pneumonia or serious lung disease                                               [ ] Unexplained or recurrent                     (1 Point)           (in the previous month)                               (1 Point)  [ ] Abnormal pulmonary function test                     (1 Point)                 SURGERY RELATED RISK FACTORS  [ ] Acute myocardial infarction                              (1 Point)                 [ ]  Section                                             (1 Point)  [ ] Congestive heart failure (in the previous month)  (1 Point)               [ ] Minor surgery                                                  (1 Point)   [ ] Inflammatory bowel disease                             (1 Point)                 [ ] Arthroscopic surgery                                        (2 Points)  [ ] Central venous access                                      (2 Points)                [2 ] General surgery lasting more than 45 minutes   (2 Points)       [ ] Stroke (in the previous month)                          (5 Points)               [ ] Elective arthroplasty                                         (5 Points)             [2 ] Previous or present malignancy                       (2 points)                                                                                                                                       HEMATOLOGY RELATED FACTORS                                                 TRAUMA RELATED RISK FACTORS  [ ] Prior episodes of VTE                                     (3 Points)                [ ] Fracture of the hip, pelvis, or leg                       (5 Points)  [ ] Positive family history for VTE                         (3 Points)                 [ ] Acute spinal cord injury (in the previous month)  (5 Points)  [ ] Prothrombin 03312 A                                     (3 Points)                 [ ] Paralysis  (less than 1 month)                             (5 Points)  [ ] Factor V Leiden                                             (3 Points)                  [ ] Multiple Trauma within 1 month                        (5 Points)  [ ] Lupus anticoagulants                                     (3 Points)                                                           [ ] Anticardiolipin antibodies                               (3 Points)                                                       [ ] High homocysteine in the blood                      (3 Points)                                             [ ] Other congenital or acquired thrombophilia      (3 Points)                                                [ ] Heparin induced thrombocytopenia                  (3 Points)                                          OTHER RISK FACTORS                                           (1 Point)  BMI >40, smoking, diabetes requiring insulin, chemotherapy  blood transfusions and length of surgery over 2 hours  Total Score [    7      ]    Caprini Score 0 - 2:  Low Risk, No VTE Prophylaxis required for most patients, encourage ambulation  Caprini Score 3 - 6:  At Risk, pharmacologic VTE prophylaxis is indicated for most patients (in the absence of a contraindication)  Caprini Score Greater than or = 7:  High Risk, pharmacologic VTE prophylaxis is indicated for most patients (in the absence of a contraindication) CAPRINI SCORE [CLOT]    AGE RELATED RISK FACTORS                                                       MOBILITY RELATED FACTORS  [ ] Age 41-60 years                                            (1 Point)                  [ ] Bed rest                                                        (1 Point)  [ 2] Age: 61-74 years                                           (2 Points)                 [ ] Plaster cast                                                   (2 Points)  [ ] Age= 75 years                                              (3 Points)                 [ ] Bed bound for more than 72 hours                 (2 Points)    DISEASE RELATED RISK FACTORS                                               GENDER SPECIFIC FACTORS  [ ] Edema in the lower extremities                       (1 Point)                  [ ] Pregnancy                                                     (1 Point)  [ ] Varicose veins                                               (1 Point)                  [ ] Post-partum < 6 weeks                                   (1 Point)             [ 1] BMI > 25 Kg/m2                                            (1 Point)                  [ ] Hormonal therapy  or oral contraception          (1 Point)                 [ ] Sepsis (in the previous month)                        (1 Point)                  [ ] History of pregnancy complications                 (1 point)  [ ] Pneumonia or serious lung disease                                               [ ] Unexplained or recurrent                     (1 Point)           (in the previous month)                               (1 Point)  [ ] Abnormal pulmonary function test                     (1 Point)                 SURGERY RELATED RISK FACTORS  [ ] Acute myocardial infarction                              (1 Point)                 [ ]  Section                                             (1 Point)  [ ] Congestive heart failure (in the previous month)  (1 Point)               [ ] Minor surgery                                                  (1 Point)   [ ] Inflammatory bowel disease                             (1 Point)                 [ ] Arthroscopic surgery                                        (2 Points)  [ ] Central venous access                                      (2 Points)                [2 ] General surgery lasting more than 45 minutes   (2 Points)       [ ] Stroke (in the previous month)                          (5 Points)               [ ] Elective arthroplasty                                         (5 Points)             [2 ] Previous or present malignancy                       (2 points)                                                                                                                                       HEMATOLOGY RELATED FACTORS                                                 TRAUMA RELATED RISK FACTORS  [ ] Prior episodes of VTE                                     (3 Points)                [ ] Fracture of the hip, pelvis, or leg                       (5 Points)  [ ] Positive family history for VTE                         (3 Points)                 [ ] Acute spinal cord injury (in the previous month)  (5 Points)  [ ] Prothrombin 58330 A                                     (3 Points)                 [ ] Paralysis  (less than 1 month)                             (5 Points)  [ ] Factor V Leiden                                             (3 Points)                  [ ] Multiple Trauma within 1 month                        (5 Points)  [ ] Lupus anticoagulants                                     (3 Points)                                                           [ ] Anticardiolipin antibodies                               (3 Points)                                                       [ ] High homocysteine in the blood                      (3 Points)                                             [ ] Other congenital or acquired thrombophilia      (3 Points)                                                [ ] Heparin induced thrombocytopenia                  (3 Points)                                          OTHER RISK FACTORS                                           (1 Point)  BMI >40, smoking, diabetes requiring insulin, chemotherapy +1  blood transfusions and length of surgery over 2 hours    Total Score [    8     ]    Caprini Score 0 - 2:  Low Risk, No VTE Prophylaxis required for most patients, encourage ambulation  Caprini Score 3 - 6:  At Risk, pharmacologic VTE prophylaxis is indicated for most patients (in the absence of a contraindication)  Caprini Score Greater than or = 7:  High Risk, pharmacologic VTE prophylaxis is indicated for most patients (in the absence of a contraindication)

## 2020-10-19 NOTE — BRIEF OPERATIVE NOTE - OPERATION/FINDINGS
exploratory laparotomy, lysis of adhesions, transverse loop colostomy closed with TA stapler and the re-anastomosed., sigmoidectomy with EEA anastomosis, loop ileostomy, primary ventral hernia repair. In summary has 2 anastomosis: 1 in pelvis and one where transverse loop colostomy was.

## 2020-10-20 LAB
ANION GAP SERPL CALC-SCNC: 14 MMO/L — SIGNIFICANT CHANGE UP (ref 7–14)
APTT BLD: 25.3 SEC — LOW (ref 27–36.3)
BUN SERPL-MCNC: 10 MG/DL — SIGNIFICANT CHANGE UP (ref 7–23)
CALCIUM SERPL-MCNC: 8.5 MG/DL — SIGNIFICANT CHANGE UP (ref 8.4–10.5)
CHLORIDE SERPL-SCNC: 103 MMOL/L — SIGNIFICANT CHANGE UP (ref 98–107)
CO2 SERPL-SCNC: 23 MMOL/L — SIGNIFICANT CHANGE UP (ref 22–31)
CREAT SERPL-MCNC: 0.73 MG/DL — SIGNIFICANT CHANGE UP (ref 0.5–1.3)
GLUCOSE SERPL-MCNC: 171 MG/DL — HIGH (ref 70–99)
HCT VFR BLD CALC: 38.7 % — SIGNIFICANT CHANGE UP (ref 34.5–45)
HGB BLD-MCNC: 12.1 G/DL — SIGNIFICANT CHANGE UP (ref 11.5–15.5)
INR BLD: 1.06 — SIGNIFICANT CHANGE UP (ref 0.88–1.16)
MAGNESIUM SERPL-MCNC: 2.2 MG/DL — SIGNIFICANT CHANGE UP (ref 1.6–2.6)
MCHC RBC-ENTMCNC: 29.1 PG — SIGNIFICANT CHANGE UP (ref 27–34)
MCHC RBC-ENTMCNC: 31.3 % — LOW (ref 32–36)
MCV RBC AUTO: 93 FL — SIGNIFICANT CHANGE UP (ref 80–100)
NRBC # FLD: 0 K/UL — SIGNIFICANT CHANGE UP (ref 0–0)
PHOSPHATE SERPL-MCNC: 4.1 MG/DL — SIGNIFICANT CHANGE UP (ref 2.5–4.5)
PLATELET # BLD AUTO: 256 K/UL — SIGNIFICANT CHANGE UP (ref 150–400)
PMV BLD: 10.8 FL — SIGNIFICANT CHANGE UP (ref 7–13)
POTASSIUM SERPL-MCNC: 4.2 MMOL/L — SIGNIFICANT CHANGE UP (ref 3.5–5.3)
POTASSIUM SERPL-SCNC: 4.2 MMOL/L — SIGNIFICANT CHANGE UP (ref 3.5–5.3)
PROTHROM AB SERPL-ACNC: 12 SEC — SIGNIFICANT CHANGE UP (ref 10.6–13.6)
RBC # BLD: 4.16 M/UL — SIGNIFICANT CHANGE UP (ref 3.8–5.2)
RBC # FLD: 12.8 % — SIGNIFICANT CHANGE UP (ref 10.3–14.5)
SODIUM SERPL-SCNC: 140 MMOL/L — SIGNIFICANT CHANGE UP (ref 135–145)
WBC # BLD: 11.66 K/UL — HIGH (ref 3.8–10.5)
WBC # FLD AUTO: 11.66 K/UL — HIGH (ref 3.8–10.5)

## 2020-10-20 PROCEDURE — 74018 RADEX ABDOMEN 1 VIEW: CPT | Mod: 26

## 2020-10-20 PROCEDURE — 93010 ELECTROCARDIOGRAM REPORT: CPT

## 2020-10-20 PROCEDURE — 71045 X-RAY EXAM CHEST 1 VIEW: CPT | Mod: 26

## 2020-10-20 RX ORDER — ONDANSETRON 8 MG/1
4 TABLET, FILM COATED ORAL EVERY 6 HOURS
Refills: 0 | Status: DISCONTINUED | OUTPATIENT
Start: 2020-10-20 | End: 2020-10-22

## 2020-10-20 RX ORDER — ONDANSETRON 8 MG/1
4 TABLET, FILM COATED ORAL ONCE
Refills: 0 | Status: COMPLETED | OUTPATIENT
Start: 2020-10-20 | End: 2020-10-20

## 2020-10-20 RX ORDER — METOCLOPRAMIDE HCL 10 MG
5 TABLET ORAL ONCE
Refills: 0 | Status: COMPLETED | OUTPATIENT
Start: 2020-10-20 | End: 2020-10-20

## 2020-10-20 RX ORDER — SODIUM CHLORIDE 9 MG/ML
1000 INJECTION, SOLUTION INTRAVENOUS
Refills: 0 | Status: DISCONTINUED | OUTPATIENT
Start: 2020-10-20 | End: 2020-10-22

## 2020-10-20 RX ORDER — ROPIVACAINE HCL/PF 5 MG/ML
200 AMPUL (ML) INJECTION
Refills: 0 | Status: DISCONTINUED | OUTPATIENT
Start: 2020-10-20 | End: 2020-10-22

## 2020-10-20 RX ORDER — DIPHENHYDRAMINE HCL 50 MG
12.5 CAPSULE ORAL ONCE
Refills: 0 | Status: DISCONTINUED | OUTPATIENT
Start: 2020-10-20 | End: 2020-10-21

## 2020-10-20 RX ORDER — ROPIVACAINE HCL/PF 5 MG/ML
4 AMPUL (ML) INJECTION
Refills: 0 | Status: DISCONTINUED | OUTPATIENT
Start: 2020-10-20 | End: 2020-10-22

## 2020-10-20 RX ORDER — DEXAMETHASONE 0.5 MG/5ML
4 ELIXIR ORAL EVERY 6 HOURS
Refills: 0 | Status: DISCONTINUED | OUTPATIENT
Start: 2020-10-20 | End: 2020-10-22

## 2020-10-20 RX ORDER — ACETAMINOPHEN 500 MG
1000 TABLET ORAL EVERY 6 HOURS
Refills: 0 | Status: COMPLETED | OUTPATIENT
Start: 2020-10-20 | End: 2020-10-21

## 2020-10-20 RX ORDER — SODIUM CHLORIDE 9 MG/ML
1000 INJECTION, SOLUTION INTRAVENOUS ONCE
Refills: 0 | Status: COMPLETED | OUTPATIENT
Start: 2020-10-20 | End: 2020-10-20

## 2020-10-20 RX ADMIN — Medication 1000 MILLIGRAM(S): at 09:01

## 2020-10-20 RX ADMIN — ONDANSETRON 4 MILLIGRAM(S): 8 TABLET, FILM COATED ORAL at 01:31

## 2020-10-20 RX ADMIN — SODIUM CHLORIDE 125 MILLILITER(S): 9 INJECTION, SOLUTION INTRAVENOUS at 09:48

## 2020-10-20 RX ADMIN — SODIUM CHLORIDE 125 MILLILITER(S): 9 INJECTION, SOLUTION INTRAVENOUS at 17:37

## 2020-10-20 RX ADMIN — Medication 200 MILLILITER(S): at 20:12

## 2020-10-20 RX ADMIN — HEPARIN SODIUM 5000 UNIT(S): 5000 INJECTION INTRAVENOUS; SUBCUTANEOUS at 05:56

## 2020-10-20 RX ADMIN — Medication 200 MILLILITER(S): at 18:49

## 2020-10-20 RX ADMIN — HEPARIN SODIUM 5000 UNIT(S): 5000 INJECTION INTRAVENOUS; SUBCUTANEOUS at 21:52

## 2020-10-20 RX ADMIN — Medication 4 MILLIGRAM(S): at 11:19

## 2020-10-20 RX ADMIN — ONDANSETRON 4 MILLIGRAM(S): 8 TABLET, FILM COATED ORAL at 09:48

## 2020-10-20 RX ADMIN — HEPARIN SODIUM 5000 UNIT(S): 5000 INJECTION INTRAVENOUS; SUBCUTANEOUS at 14:44

## 2020-10-20 RX ADMIN — Medication 1000 MILLIGRAM(S): at 18:07

## 2020-10-20 RX ADMIN — Medication 400 MILLIGRAM(S): at 08:31

## 2020-10-20 RX ADMIN — SODIUM CHLORIDE 1000 MILLILITER(S): 9 INJECTION, SOLUTION INTRAVENOUS at 09:48

## 2020-10-20 RX ADMIN — Medication 400 MILLIGRAM(S): at 02:46

## 2020-10-20 RX ADMIN — Medication 400 MILLIGRAM(S): at 17:37

## 2020-10-20 RX ADMIN — Medication 1000 MILLIGRAM(S): at 03:16

## 2020-10-20 RX ADMIN — Medication 5 MILLIGRAM(S): at 15:45

## 2020-10-20 NOTE — PROGRESS NOTE ADULT - SUBJECTIVE AND OBJECTIVE BOX
Surgery Progress Note    SUBJECTIVE: Pt seen and examined at bedside. Patient comfortable and in no-apparent distress. No nausea, vomiting, diarrhea. Pain is controlled.     Vital Signs Last 24 Hrs  T(C): 36.5 (20 Oct 2020 05:34), Max: 37 (19 Oct 2020 06:38)  T(F): 97.7 (20 Oct 2020 05:34), Max: 98.6 (19 Oct 2020 06:38)  HR: 70 (20 Oct 2020 05:34) (60 - 73)  BP: 140/76 (20 Oct 2020 05:34) (110/52 - 158/82)  BP(mean): 87 (19 Oct 2020 17:30) (64 - 87)  RR: 16 (20 Oct 2020 05:34) (16 - 21)  SpO2: 96% (20 Oct 2020 05:34) (92% - 99%)    Physical Exam:  Gen: NAD, nontoxic, appears nauseous  Chest: rrr, nonlabored respiration  Abd: s/nt/nd; ostomy pink and viable w/ red rubber cath in place, producing bilious succus into ostomy bag. MIL bulb serosanguineous  : her in draining clear urine      LABS:                        12.9   11.92 )-----------( 279      ( 19 Oct 2020 15:50 )             41.2     10-19    138  |  105  |  7   ----------------------------<  198<H>  3.7   |  21<L>  |  0.52    Ca    7.9<L>      19 Oct 2020 15:50  Phos  4.1     10-19  Mg     1.5     10-19            INs and OUTs:    10-19-20 @ 07:01  -  10-20-20 @ 05:37  --------------------------------------------------------  IN: 1725 mL / OUT: 655 mL / NET: 1070 mL     Surgery Progress Note    SUBJECTIVE: Pt seen and examined at bedside. Patient comfortable and in no-apparent distress. No nausea, vomiting, diarrhea. Pain is controlled.     Vital Signs Last 24 Hrs  T(C): 36.5 (20 Oct 2020 05:34), Max: 37 (19 Oct 2020 06:38)  T(F): 97.7 (20 Oct 2020 05:34), Max: 98.6 (19 Oct 2020 06:38)  HR: 70 (20 Oct 2020 05:34) (60 - 73)  BP: 140/76 (20 Oct 2020 05:34) (110/52 - 158/82)  BP(mean): 87 (19 Oct 2020 17:30) (64 - 87)  RR: 16 (20 Oct 2020 05:34) (16 - 21)  SpO2: 96% (20 Oct 2020 05:34) (92% - 99%)    Physical Exam:  Gen: NAD, nontoxic, appears nauseous  Chest: rrr, nonlabored respiration  Abd: s/nt/nd; ostomy pink and viable w/ red rubber cath in place, producing bilious succus into ostomy bag. MIL bulb serosanguineous  : her in draining clear urine      LABS:    CBC (10-20 @ 07:40)                              12.1                           11.66<H>  )----------------(  256        --    % Neutrophils, --    % Lymphocytes, ANC: --                                  38.7    CBC (10-19 @ 15:50)                              12.9                           11.92<H>  )----------------(  279        --    % Neutrophils, --    % Lymphocytes, ANC: --                                  41.2      BMP (10-19 @ 15:50)             138     |  105     |  7     		Ca++ --      Ca 7.9<L>             ---------------------------------( 198<H>		Mg 1.5<L>             3.7     |  21<L>   |  0.52  			Ph 4.1             ABG (10-19 @ 15:50)     7.32<L> / 43 / 102 / 21<L> / -3.4 / 97.2%     Lactate: 2.3<H>    ABG (10-19 @ 09:50)     7.38 / 39 / 205<H> / 23 / -1.7 / 99.6<H>%     Lactate:

## 2020-10-20 NOTE — CHART NOTE - NSCHARTNOTEFT_GEN_A_CORE
Patient seen at the bedside earlier complaining of nausea and pruritus. PCEA continuous rate initially reduced to 2ml/hr and now placed on hold for 2 hours per request of A team.  Discussed patient with team, and ok to order IV Benadryl 12.5 mg x1 and then reassess in 2 hours.

## 2020-10-20 NOTE — CHART NOTE - NSCHARTNOTEFT_GEN_A_CORE
Patient seen at the bedside after epidural on hold x2 hours.  Patient states that her nausea is resolving.  Patient currently is in 4-5/10 pain. Dilaudid and Bupivacaine PCEA discontinued, after being on hold x2 hours, reservoir 106.3ml and wasted solution with RN Rosaura.  Patient ordered Ropivacaine PCEA and it was connected by pain service.  Will endorse patient to overnight pain service coverage.  Pain service team to see patient in the morning.

## 2020-10-20 NOTE — PROGRESS NOTE ADULT - ASSESSMENT
A/P: 60 F  POD#1 Ernestine reversal, loop ileostomy, hernia repair for diverticulitis. Stable  - NPO/IVF  - monitor MIL output  - Monitor I/Os and vitals  - dvt ppx: sqh  - pain control: tylenol, dilaudid  - f/u am labs    A team surgery [id number].   A/P: 60 F  POD#1 Ernestine reversal, loop ileostomy, hernia repair for diverticulitis. Stable  - NPO with sips/IVF, 1L Bolus  - monitor MIL output  - Monitor I/Os and vitals  - dvt ppx: sqh  - pain control: tylenol, dilaudid    A team surgery [id number].

## 2020-10-20 NOTE — PROGRESS NOTE ADULT - SUBJECTIVE AND OBJECTIVE BOX
Day _2__ of Anesthesia Pain Management Service    SUBJECTIVE:    Therapy:	  [ ] IV PCA	   [x ] Epidural           [ ] s/p Spinal Opoid              [ ] Postpartum infusion	  [ ] Patient controlled regional anesthesia (PCRA)    [ ] prn Analgesics    OBJECTIVE:   [x ] No new signs     [ ] Other:    Side Effects:  [ ] None			[ x] Other: PONV    Assessment of Catheter Site:		[x ] Intact		[ ] Other:    ASSESSMENT/PLAN  [x ] Continue current therapy    [ ] Therapy changed to:    [ ] IV PCA       [ ] Epidural     [ ] prn Analgesics     Comments: patient feels less nauseous after dose adjustment

## 2020-10-20 NOTE — PROGRESS NOTE ADULT - SUBJECTIVE AND OBJECTIVE BOX
Anesthesia Pain Management Service: Day _2_ of Epidural    SUBJECTIVE: Patient doing well with PCEA and no problems.  Pain Scale Score:   Refer to charted pain scores    THERAPY:  [x ] Epidural Bupivacaine 0.0625% and Hydromorphone  		[ X] 10 micrograms/mL	[ ] 5 micrograms/mL  [ ] Epidural Bupivacaine 0.0625% and Fentanyl - 2 micrograms/mL  [ ] Epidural Ropivacaine 0.1% plain – 1 mg/mL  [ ] Patient Controlled Regional Anesthesia (PCRA) Ropivacaine  		[ ] 0.2%			[ ] 0.1%    Demand dose __3_ lockout __15_ (minutes) Continuous Rate _6__ Total: __100.4__ ml used (in past 24 hours)      MEDICATIONS  (STANDING):  heparin   Injectable 5000 Unit(s) SubCutaneous every 8 hours  hydromorphone (10 MICROgram(s)/mL) + bupivacaine 0.0625% in 0.9% Sodium Chloride PCEA 250 milliLiter(s) Epidural PCA Continuous  lactated ringers Bolus 1000 milliLiter(s) IV Bolus once  lactated ringers. 1000 milliLiter(s) (125 mL/Hr) IV Continuous <Continuous>    MEDICATIONS  (PRN):  dexAMETHasone  Injectable 4 milliGRAM(s) IV Push every 6 hours PRN Nausea, IF ondansetron is ineffective after 30 - 60 minutes  HYDROmorphone  Injectable 0.5 milliGRAM(s) IV Push every 3 hours PRN Severe Break Through Pain  hydromorphone (10 MICROgram(s)/mL) + bupivacaine 0.0625% in 0.9% Sodium Chloride PCEA Rescue Clinician  Bolus 5 milliLiter(s) Epidural every 15 minutes PRN for Pain Score greater than 6  naloxone Injectable 0.1 milliGRAM(s) IV Push every 3 minutes PRN For ANY of the following changes in patient status:  A. RR LESS THAN 10 breaths per minute, B. Oxygen saturation LESS THAN 90%, C. Sedation score of 6  ondansetron Injectable 4 milliGRAM(s) IV Push every 6 hours PRN Nausea      OBJECTIVE: sitting up in bed     Assessment of Catheter Site:	[ ] Left	[ ] Right  [x ] Epidural 	[ ] Femoral	      [ ] Saphenous   [ ] Supraclavicular   [ ] Other:    [x ] Dressing intact	[x ] Site non-tender	[ x] Site without erythema, discharge, edema  [x ] Epidural tubing and connection checked	[x] Gross neurological exam within normal limits  [ ] Catheter removed – tip intact		[ ] Afebrile  	[ ] Febrile: ___   [ X] see Temp under VS below)                          12.1   11.66 )-----------( 256      ( 20 Oct 2020 07:40 )             38.7     Vital Signs Last 24 Hrs  T(C): 36.5 (10-20-20 @ 05:34), Max: 36.9 (10-19-20 @ 15:15)  T(F): 97.7 (10-20-20 @ 05:34), Max: 98.4 (10-19-20 @ 15:15)  HR: 70 (10-20-20 @ 05:34) (60 - 73)  BP: 140/76 (10-20-20 @ 05:34) (110/52 - 144/57)  BP(mean): 87 (10-19-20 @ 17:30) (64 - 87)  RR: 16 (10-20-20 @ 05:34) (16 - 21)  SpO2: 96% (10-20-20 @ 05:34) (92% - 99%)      Sedation Score:	[x ] Alert	[ ] Drowsy	[ ] Arousable	[ ] Asleep	[ ] Unresponsive    Side Effects:	[ ] None	[X ] Nausea	[ ] Vomiting	[ ] Pruritus  		[ ] Weakness		[ ] Numbness	[ ] Other:    ASSESSMENT/ PLAN:    Therapy to  be:	[x ] Continue   [ ] Discontinued   [ ] Change to prn Analgesics    Documentation and Verification of current medications:  [ X ] Done	[ ] Not done, not eligible, reason:    Comments: pt. complaining of nausea on epidural antemetic ordered if no improvement will decrease rate on epidural. Continue current therapy as for now.     Progress Note written now but Patient was seen earlier.

## 2020-10-21 LAB
ANION GAP SERPL CALC-SCNC: 10 MMO/L — SIGNIFICANT CHANGE UP (ref 7–14)
APTT BLD: 24.2 SEC — LOW (ref 27–36.3)
BUN SERPL-MCNC: 10 MG/DL — SIGNIFICANT CHANGE UP (ref 7–23)
CALCIUM SERPL-MCNC: 8.6 MG/DL — SIGNIFICANT CHANGE UP (ref 8.4–10.5)
CHLORIDE SERPL-SCNC: 102 MMOL/L — SIGNIFICANT CHANGE UP (ref 98–107)
CO2 SERPL-SCNC: 26 MMOL/L — SIGNIFICANT CHANGE UP (ref 22–31)
CREAT SERPL-MCNC: 0.64 MG/DL — SIGNIFICANT CHANGE UP (ref 0.5–1.3)
GLUCOSE SERPL-MCNC: 119 MG/DL — HIGH (ref 70–99)
HCT VFR BLD CALC: 33 % — LOW (ref 34.5–45)
HGB BLD-MCNC: 10 G/DL — LOW (ref 11.5–15.5)
INR BLD: 0.97 — SIGNIFICANT CHANGE UP (ref 0.88–1.16)
MAGNESIUM SERPL-MCNC: 2.3 MG/DL — SIGNIFICANT CHANGE UP (ref 1.6–2.6)
MCHC RBC-ENTMCNC: 29 PG — SIGNIFICANT CHANGE UP (ref 27–34)
MCHC RBC-ENTMCNC: 30.3 % — LOW (ref 32–36)
MCV RBC AUTO: 95.7 FL — SIGNIFICANT CHANGE UP (ref 80–100)
NRBC # FLD: 0 K/UL — SIGNIFICANT CHANGE UP (ref 0–0)
PHOSPHATE SERPL-MCNC: 1.7 MG/DL — LOW (ref 2.5–4.5)
PLATELET # BLD AUTO: 260 K/UL — SIGNIFICANT CHANGE UP (ref 150–400)
PMV BLD: 11.1 FL — SIGNIFICANT CHANGE UP (ref 7–13)
POTASSIUM SERPL-MCNC: 3.7 MMOL/L — SIGNIFICANT CHANGE UP (ref 3.5–5.3)
POTASSIUM SERPL-SCNC: 3.7 MMOL/L — SIGNIFICANT CHANGE UP (ref 3.5–5.3)
PROTHROM AB SERPL-ACNC: 11.2 SEC — SIGNIFICANT CHANGE UP (ref 10.6–13.6)
RBC # BLD: 3.45 M/UL — LOW (ref 3.8–5.2)
RBC # FLD: 12.8 % — SIGNIFICANT CHANGE UP (ref 10.3–14.5)
SODIUM SERPL-SCNC: 138 MMOL/L — SIGNIFICANT CHANGE UP (ref 135–145)
WBC # BLD: 11.62 K/UL — HIGH (ref 3.8–10.5)
WBC # FLD AUTO: 11.62 K/UL — HIGH (ref 3.8–10.5)

## 2020-10-21 RX ORDER — ACETAMINOPHEN 500 MG
1000 TABLET ORAL ONCE
Refills: 0 | Status: COMPLETED | OUTPATIENT
Start: 2020-10-21 | End: 2020-10-21

## 2020-10-21 RX ORDER — ONDANSETRON 8 MG/1
4 TABLET, FILM COATED ORAL EVERY 4 HOURS
Refills: 0 | Status: DISCONTINUED | OUTPATIENT
Start: 2020-10-21 | End: 2020-10-23

## 2020-10-21 RX ORDER — MORPHINE SULFATE 50 MG/1
2 CAPSULE, EXTENDED RELEASE ORAL EVERY 4 HOURS
Refills: 0 | Status: DISCONTINUED | OUTPATIENT
Start: 2020-10-21 | End: 2020-10-22

## 2020-10-21 RX ORDER — POTASSIUM PHOSPHATE, MONOBASIC POTASSIUM PHOSPHATE, DIBASIC 236; 224 MG/ML; MG/ML
30 INJECTION, SOLUTION INTRAVENOUS ONCE
Refills: 0 | Status: COMPLETED | OUTPATIENT
Start: 2020-10-21 | End: 2020-10-21

## 2020-10-21 RX ORDER — POTASSIUM CHLORIDE 20 MEQ
40 PACKET (EA) ORAL DAILY
Refills: 0 | Status: DISCONTINUED | OUTPATIENT
Start: 2020-10-21 | End: 2020-10-23

## 2020-10-21 RX ADMIN — Medication 400 MILLIGRAM(S): at 12:12

## 2020-10-21 RX ADMIN — Medication 1000 MILLIGRAM(S): at 23:03

## 2020-10-21 RX ADMIN — SODIUM CHLORIDE 125 MILLILITER(S): 9 INJECTION, SOLUTION INTRAVENOUS at 08:19

## 2020-10-21 RX ADMIN — Medication 1000 MILLIGRAM(S): at 12:42

## 2020-10-21 RX ADMIN — HEPARIN SODIUM 5000 UNIT(S): 5000 INJECTION INTRAVENOUS; SUBCUTANEOUS at 22:17

## 2020-10-21 RX ADMIN — Medication 1000 MILLIGRAM(S): at 06:34

## 2020-10-21 RX ADMIN — Medication 400 MILLIGRAM(S): at 06:04

## 2020-10-21 RX ADMIN — Medication 200 MILLILITER(S): at 08:17

## 2020-10-21 RX ADMIN — Medication 200 MILLILITER(S): at 20:07

## 2020-10-21 RX ADMIN — Medication 400 MILLIGRAM(S): at 22:33

## 2020-10-21 RX ADMIN — HEPARIN SODIUM 5000 UNIT(S): 5000 INJECTION INTRAVENOUS; SUBCUTANEOUS at 15:47

## 2020-10-21 RX ADMIN — Medication 1000 MILLIGRAM(S): at 00:30

## 2020-10-21 RX ADMIN — HEPARIN SODIUM 5000 UNIT(S): 5000 INJECTION INTRAVENOUS; SUBCUTANEOUS at 06:04

## 2020-10-21 RX ADMIN — Medication 400 MILLIGRAM(S): at 00:00

## 2020-10-21 RX ADMIN — Medication 40 MILLIEQUIVALENT(S): at 12:12

## 2020-10-21 RX ADMIN — POTASSIUM PHOSPHATE, MONOBASIC POTASSIUM PHOSPHATE, DIBASIC 83.33 MILLIMOLE(S): 236; 224 INJECTION, SOLUTION INTRAVENOUS at 18:13

## 2020-10-21 NOTE — PROGRESS NOTE ADULT - SUBJECTIVE AND OBJECTIVE BOX
Anesthesia Pain Management Service- Attending Addendum    SUBJECTIVE: Pt doing well with IV PCA without problems reported.    Therapy:	  [ X] IV PCA	   [ ] Epidural           [ ] s/p Spinal Opoid              [ ] Postpartum infusion	  [ ] Patient controlled regional anesthesia (PCRA)    [ ] prn Analgesics    Allergies    No Known Drug Allergies  peanuts (Anaphylaxis)    Intolerances      MEDICATIONS  (STANDING):  dextrose 5% + sodium chloride 0.45%. 1000 milliLiter(s) (125 mL/Hr) IV Continuous <Continuous>  heparin   Injectable 5000 Unit(s) SubCutaneous every 8 hours  potassium chloride    Tablet ER 40 milliEquivalent(s) Oral daily  potassium phosphate IVPB 30 milliMole(s) IV Intermittent once  ropivacaine 0.2% in 0.9% Sodium Chloride PCEA 200 milliLiter(s) Epidural PCA Continuous    MEDICATIONS  (PRN):  dexAMETHasone  Injectable 4 milliGRAM(s) IV Push every 6 hours PRN Nausea, IF ondansetron is ineffective after 30 - 60 minutes  HYDROmorphone  Injectable 0.5 milliGRAM(s) IV Push every 3 hours PRN Severe Break Through Pain  naloxone Injectable 0.1 milliGRAM(s) IV Push every 3 minutes PRN For ANY of the following changes in patient status:  A. RR LESS THAN 10 breaths per minute, B. Oxygen saturation LESS THAN 90%, C. Sedation score of 6  ondansetron Injectable 4 milliGRAM(s) IV Push every 6 hours PRN Nausea  ropivacaine 0.2% in 0.9% Sodium Chloride PCEA Rescue Clinician Bolus 4 milliLiter(s) Epidural every 15 minutes PRN for Pain Score greater than 6      OBJECTIVE:   [X] No new signs     [ ] Other:    Side Effects:  [X ] None			[ ] Other:    Assessment of Catheter Site:		[ ] Intact		[ ] Other:    ASSESSMENT/PLAN  [ X] Continue current therapy    [ ] Therapy changed to:    [ ] IV PCA       [ ] Epidural     [ ] prn Analgesics     Comments: Anesthesia Pain Management Service- Attending Addendum    SUBJECTIVE: Pt doing well with PCEA without problems reported.    Therapy:	  [ ] IV PCA	   [ X] Epidural           [ ] s/p Spinal Opoid              [ ] Postpartum infusion	  [ ] Patient controlled regional anesthesia (PCRA)    [ ] prn Analgesics    Allergies    No Known Drug Allergies  peanuts (Anaphylaxis)    Intolerances      MEDICATIONS  (STANDING):  dextrose 5% + sodium chloride 0.45%. 1000 milliLiter(s) (125 mL/Hr) IV Continuous <Continuous>  heparin   Injectable 5000 Unit(s) SubCutaneous every 8 hours  potassium chloride    Tablet ER 40 milliEquivalent(s) Oral daily  potassium phosphate IVPB 30 milliMole(s) IV Intermittent once  ropivacaine 0.2% in 0.9% Sodium Chloride PCEA 200 milliLiter(s) Epidural PCA Continuous    MEDICATIONS  (PRN):  dexAMETHasone  Injectable 4 milliGRAM(s) IV Push every 6 hours PRN Nausea, IF ondansetron is ineffective after 30 - 60 minutes  HYDROmorphone  Injectable 0.5 milliGRAM(s) IV Push every 3 hours PRN Severe Break Through Pain  naloxone Injectable 0.1 milliGRAM(s) IV Push every 3 minutes PRN For ANY of the following changes in patient status:  A. RR LESS THAN 10 breaths per minute, B. Oxygen saturation LESS THAN 90%, C. Sedation score of 6  ondansetron Injectable 4 milliGRAM(s) IV Push every 6 hours PRN Nausea  ropivacaine 0.2% in 0.9% Sodium Chloride PCEA Rescue Clinician Bolus 4 milliLiter(s) Epidural every 15 minutes PRN for Pain Score greater than 6      OBJECTIVE:   [X] No new signs     [ ] Other:    Side Effects:  [X ] None			[ ] Other:    Assessment of Catheter Site:		[ X] Intact		[ ] Other:    ASSESSMENT/PLAN  [ X] Continue current therapy    [ ] Therapy changed to:    [ ] IV PCA       [ ] Epidural     [ ] prn Analgesics     Comments: Continue current PCEA settings.

## 2020-10-21 NOTE — PROGRESS NOTE ADULT - SUBJECTIVE AND OBJECTIVE BOX
Anesthesia Pain Management Service: Day 3_ of Epidural    SUBJECTIVE: Patient doing well with PCEA and no problems.  Patient states she no longer has nausea and that her pain is controlled. Patient claims she really only has pain when she moves.  Pain Scale Score: 3/10 when sitting, 5/10 with activity  Refer to charted pain scores    THERAPY:  [ ] Epidural Bupivacaine 0.0625% and Hydromorphone  		[ X] 10 micrograms/mL	[ ] 5 micrograms/mL  [ ] Epidural Bupivacaine 0.0625% and Fentanyl - 2 micrograms/mL  [ ] Epidural Ropivacaine 0.1% plain – 1 mg/mL  [x ] Patient Controlled Regional Anesthesia (PCRA) Ropivacaine  		[ x] 0.2%			[ ] 0.1%    Demand dose __3_ lockout __15_ (minutes) Continuous Rate _4__ Total: __68.6__ ml used (in past 24 hours)      MEDICATIONS  (STANDING):  acetaminophen  IVPB .. 1000 milliGRAM(s) IV Intermittent every 6 hours  dextrose 5% + sodium chloride 0.45%. 1000 milliLiter(s) (125 mL/Hr) IV Continuous <Continuous>  heparin   Injectable 5000 Unit(s) SubCutaneous every 8 hours  ropivacaine 0.2% in 0.9% Sodium Chloride PCEA 200 milliLiter(s) Epidural PCA Continuous    MEDICATIONS  (PRN):  dexAMETHasone  Injectable 4 milliGRAM(s) IV Push every 6 hours PRN Nausea, IF ondansetron is ineffective after 30 - 60 minutes  HYDROmorphone  Injectable 0.5 milliGRAM(s) IV Push every 3 hours PRN Severe Break Through Pain  naloxone Injectable 0.1 milliGRAM(s) IV Push every 3 minutes PRN For ANY of the following changes in patient status:  A. RR LESS THAN 10 breaths per minute, B. Oxygen saturation LESS THAN 90%, C. Sedation score of 6  ondansetron Injectable 4 milliGRAM(s) IV Push every 6 hours PRN Nausea  ropivacaine 0.2% in 0.9% Sodium Chloride PCEA Rescue Clinician Bolus 4 milliLiter(s) Epidural every 15 minutes PRN for Pain Score greater than 6      OBJECTIVE:    Assessment of Catheter Site:	[ ] Left	[ ] Right  [x ] Epidural 	[ ] Femoral	      [ ] Saphenous   [ ] Supraclavicular   [ ] Other:    [x ] Dressing intact	[x ] Site non-tender	[ x] Site without erythema, discharge, edema  [x ] Epidural tubing and connection checked	[x] Gross neurological exam within normal limits  [ ] Catheter removed – tip intact		[ ] Afebrile  	[ ] Febrile: ___   [ X] see Temp under VS below)    PT/INR - ( 21 Oct 2020 07:58 )   PT: 11.2 SEC;   INR: 0.97          PTT - ( 21 Oct 2020 07:58 )  PTT:24.2 SEC                      12.1   11.66 )-----------( 256      ( 20 Oct 2020 07:40 )             38.7     Vital Signs Last 24 Hrs  T(C): 37.1 (10-21-20 @ 06:15), Max: 37.2 (10-20-20 @ 21:41)  T(F): 98.7 (10-21-20 @ 06:15), Max: 98.9 (10-20-20 @ 21:41)  HR: 72 (10-21-20 @ 06:15) (62 - 78)  BP: 132/65 (10-21-20 @ 06:15) (101/46 - 150/73)  BP(mean): --  RR: 16 (10-21-20 @ 06:15) (16 - 18)  SpO2: 97% (10-21-20 @ 06:15) (93% - 100%)      Sedation Score:	[x ] Alert	[ ] Drowsy	[ ] Arousable	[ ] Asleep	[ ] Unresponsive    Side Effects:	[x ] None	[ ] Nausea	[ ] Vomiting	[ ] Pruritus  		[ ] Weakness		[ ] Numbness	[ ] Other:    ASSESSMENT/ PLAN:    Therapy to  be:	[x ] Continue   [ ] Discontinued   [ ] Change to prn Analgesics    Documentation and Verification of current medications:  [ X ] Done	[ ] Not done, not eligible, reason:    Comments: Doing OK with epidural and may continue, with IV Tylenol.     Anesthesia Pain Management Service: Day 3_ of Epidural    SUBJECTIVE: Patient doing well with PCEA and no problems.  Patient states she no longer has nausea and that her pain is controlled. Patient claims she really only has pain when she moves.  Pain Scale Score: 3/10 when sitting, 5/10 with activity  Refer to charted pain scores    THERAPY:  [ ] Epidural Bupivacaine 0.0625% and Hydromorphone  		[ X] 10 micrograms/mL	[ ] 5 micrograms/mL  [ ] Epidural Bupivacaine 0.0625% and Fentanyl - 2 micrograms/mL  [ ] Epidural Ropivacaine 0.1% plain – 1 mg/mL  [x ] Patient Controlled Regional Anesthesia (PCRA) Ropivacaine  		[ x] 0.2%			[ ] 0.1%    Demand dose __3_ lockout __15_ (minutes) Continuous Rate _4__ Total: __68.6__ ml used (in past 24 hours)      MEDICATIONS  (STANDING):  acetaminophen  IVPB .. 1000 milliGRAM(s) IV Intermittent every 6 hours  dextrose 5% + sodium chloride 0.45%. 1000 milliLiter(s) (125 mL/Hr) IV Continuous <Continuous>  heparin   Injectable 5000 Unit(s) SubCutaneous every 8 hours  ropivacaine 0.2% in 0.9% Sodium Chloride PCEA 200 milliLiter(s) Epidural PCA Continuous    MEDICATIONS  (PRN):  dexAMETHasone  Injectable 4 milliGRAM(s) IV Push every 6 hours PRN Nausea, IF ondansetron is ineffective after 30 - 60 minutes  HYDROmorphone  Injectable 0.5 milliGRAM(s) IV Push every 3 hours PRN Severe Break Through Pain  naloxone Injectable 0.1 milliGRAM(s) IV Push every 3 minutes PRN For ANY of the following changes in patient status:  A. RR LESS THAN 10 breaths per minute, B. Oxygen saturation LESS THAN 90%, C. Sedation score of 6  ondansetron Injectable 4 milliGRAM(s) IV Push every 6 hours PRN Nausea  ropivacaine 0.2% in 0.9% Sodium Chloride PCEA Rescue Clinician Bolus 4 milliLiter(s) Epidural every 15 minutes PRN for Pain Score greater than 6      OBJECTIVE:    Assessment of Catheter Site:	[ ] Left	[ ] Right  [x ] Epidural 	[ ] Femoral	      [ ] Saphenous   [ ] Supraclavicular   [ ] Other:    [x ] Dressing intact	[x ] Site non-tender	[ x] Site without erythema, discharge, edema  [x ] Epidural tubing and connection checked	[x] Gross neurological exam within normal limits  [ ] Catheter removed – tip intact		[ ] Afebrile  	[ ] Febrile: ___   [ X] see Temp under VS below)    PT/INR - ( 21 Oct 2020 07:58 )   PT: 11.2 SEC;   INR: 0.97          PTT - ( 21 Oct 2020 07:58 )  PTT:24.2 SEC                      12.1   11.66 )-----------( 256      ( 20 Oct 2020 07:40 )             38.7     Vital Signs Last 24 Hrs  T(C): 37.1 (10-21-20 @ 06:15), Max: 37.2 (10-20-20 @ 21:41)  T(F): 98.7 (10-21-20 @ 06:15), Max: 98.9 (10-20-20 @ 21:41)  HR: 72 (10-21-20 @ 06:15) (62 - 78)  BP: 132/65 (10-21-20 @ 06:15) (101/46 - 150/73)  BP(mean): --  RR: 16 (10-21-20 @ 06:15) (16 - 18)  SpO2: 97% (10-21-20 @ 06:15) (93% - 100%)      Sedation Score:	[x ] Alert	[ ] Drowsy	[ ] Arousable	[ ] Asleep	[ ] Unresponsive    Side Effects:	[x ] None	[ ] Nausea	[ ] Vomiting	[ ] Pruritus  		[ ] Weakness		[ ] Numbness	[ ] Other:    ASSESSMENT/ PLAN:    Therapy to  be:	[x ] Continue   [ ] Discontinued   [ ] Change to prn Analgesics    Documentation and Verification of current medications:  [ X ] Done	[ ] Not done, not eligible, reason:    Comments: Doing OK with epidural and may continue, with IV Tylenol.  Offered to increase her continuous rate on the PCEA, but patient refused at this time.  Patient is really concerned about her her catheter which makes her feel uncomfortable and she wants it removed.      Anesthesia Pain Management Service: Day 3_ of Epidural    SUBJECTIVE: Patient doing well with PCEA and no problems.  Patient states she no longer has nausea and that her pain is controlled. Patient claims she really only has pain when she moves.  Pain Scale Score: 3/10 when sitting, 5/10 with activity  Refer to charted pain scores    THERAPY:  [ ] Epidural Bupivacaine 0.0625% and Hydromorphone  		[ X] 10 micrograms/mL	[ ] 5 micrograms/mL  [ ] Epidural Bupivacaine 0.0625% and Fentanyl - 2 micrograms/mL  [ ] Epidural Ropivacaine 0.1% plain – 1 mg/mL  [x ] Patient Controlled Regional Anesthesia (PCRA) Ropivacaine  		[ x] 0.2%			[ ] 0.1%    Demand dose __3_ lockout __15_ (minutes) Continuous Rate _4__ Total: __68.6__ ml used (in past 24 hours)      MEDICATIONS  (STANDING):  acetaminophen  IVPB .. 1000 milliGRAM(s) IV Intermittent every 6 hours  dextrose 5% + sodium chloride 0.45%. 1000 milliLiter(s) (125 mL/Hr) IV Continuous <Continuous>  heparin   Injectable 5000 Unit(s) SubCutaneous every 8 hours  ropivacaine 0.2% in 0.9% Sodium Chloride PCEA 200 milliLiter(s) Epidural PCA Continuous    MEDICATIONS  (PRN):  dexAMETHasone  Injectable 4 milliGRAM(s) IV Push every 6 hours PRN Nausea, IF ondansetron is ineffective after 30 - 60 minutes  HYDROmorphone  Injectable 0.5 milliGRAM(s) IV Push every 3 hours PRN Severe Break Through Pain  naloxone Injectable 0.1 milliGRAM(s) IV Push every 3 minutes PRN For ANY of the following changes in patient status:  A. RR LESS THAN 10 breaths per minute, B. Oxygen saturation LESS THAN 90%, C. Sedation score of 6  ondansetron Injectable 4 milliGRAM(s) IV Push every 6 hours PRN Nausea  ropivacaine 0.2% in 0.9% Sodium Chloride PCEA Rescue Clinician Bolus 4 milliLiter(s) Epidural every 15 minutes PRN for Pain Score greater than 6      OBJECTIVE:  Patient sitting up in chair.    Assessment of Catheter Site:	[ ] Left	[ ] Right  [x ] Epidural 	[ ] Femoral	      [ ] Saphenous   [ ] Supraclavicular   [ ] Other:    [x ] Dressing intact	[x ] Site non-tender	[ x] Site without erythema, discharge, edema  [x ] Epidural tubing and connection checked	[x] Gross neurological exam within normal limits  [ ] Catheter removed – tip intact		[ ] Afebrile  	[ ] Febrile: ___   [ X] see Temp under VS below)    PT/INR - ( 21 Oct 2020 07:58 )   PT: 11.2 SEC;   INR: 0.97          PTT - ( 21 Oct 2020 07:58 )  PTT:24.2 SEC                      12.1   11.66 )-----------( 256      ( 20 Oct 2020 07:40 )             38.7     Vital Signs Last 24 Hrs  T(C): 37.1 (10-21-20 @ 06:15), Max: 37.2 (10-20-20 @ 21:41)  T(F): 98.7 (10-21-20 @ 06:15), Max: 98.9 (10-20-20 @ 21:41)  HR: 72 (10-21-20 @ 06:15) (62 - 78)  BP: 132/65 (10-21-20 @ 06:15) (101/46 - 150/73)  BP(mean): --  RR: 16 (10-21-20 @ 06:15) (16 - 18)  SpO2: 97% (10-21-20 @ 06:15) (93% - 100%)      Sedation Score:	[x ] Alert	[ ] Drowsy	[ ] Arousable	[ ] Asleep	[ ] Unresponsive    Side Effects:	[x ] None	[ ] Nausea	[ ] Vomiting	[ ] Pruritus  		[ ] Weakness		[ ] Numbness	[ ] Other:    ASSESSMENT/ PLAN:    Therapy to  be:	[x ] Continue   [ ] Discontinued   [ ] Change to prn Analgesics    Documentation and Verification of current medications:  [ X ] Done	[ ] Not done, not eligible, reason:    Comments: Doing OK with epidural and may continue, with IV Tylenol.  Offered to increase her continuous rate on the PCEA, but patient refused at this time.  Patient is really concerned about her her catheter which makes her feel uncomfortable and she wants it removed.

## 2020-10-21 NOTE — PHYSICAL THERAPY INITIAL EVALUATION ADULT - DISCHARGE DISPOSITION, PT EVAL
Anticipating discharge home; no skilled PT needs. Will follow while at [facility].

## 2020-10-21 NOTE — PHYSICAL THERAPY INITIAL EVALUATION ADULT - ADDITIONAL COMMENTS
Pt lives in an apartment with elevator access. Pt lives with family and reports ambulating independently prior to admission.     Pt was left seated in bedside chair as found, all lines/tubes intact and call bell within reach, RN aware.

## 2020-10-21 NOTE — PROGRESS NOTE ADULT - SUBJECTIVE AND OBJECTIVE BOX
SUBJECTIVE:   Patient seen and examined at bedside during AM rounds. No acute events overnight.   Nausea subsided upon switch to Ropivacaine PCEA. Sitting on a chair.   Passing flatus, but no BM. Pain well controlled.     OBJECTIVE: T(C): 37.1 (10-21-20 @ 06:15), Max: 37.2 (10-20-20 @ 21:41)  HR: 72 (10-21-20 @ 06:15) (62 - 78)  BP: 132/65 (10-21-20 @ 06:15) (101/46 - 150/73)  RR: 16 (10-21-20 @ 06:15) (16 - 18)  SpO2: 97% (10-21-20 @ 06:15) (93% - 100%)  Wt(kg): --  I&O's Summary    20 Oct 2020 07:01  -  21 Oct 2020 07:00  --------------------------------------------------------  IN: 4440 mL / OUT: 1688 mL / NET: 2752 mL      I&O's Detail    20 Oct 2020 07:01  -  21 Oct 2020 07:00  --------------------------------------------------------  IN:    dextrose 5% + sodium chloride 0.45%: 1900 mL    IV PiggyBack: 300 mL    Lactated Ringers: 1000 mL    Lactated Ringers Bolus: 1000 mL    Oral Fluid: 240 mL  Total IN: 4440 mL    OUT:    Bulb (mL): 163 mL    Estimated Blood Loss (mL): 0 mL    Ileostomy (mL): 650 mL    Indwelling Catheter - Urethral (mL): 875 mL    Voided (mL): 0 mL  Total OUT: 1688 mL    Total NET: 2752 mL        General: NAD  Resp: nonlabored breathing, normal cw expansion  CV: RRR  Abdomen: soft, nontender, nondistended. ostomy pink and viable w/ red rubber cath in place, producing bilious succus into ostomy bag. MIL bulb w/ minimal serous outpt  Vasc: WWP    MEDICATIONS  (STANDING):  acetaminophen  IVPB .. 1000 milliGRAM(s) IV Intermittent every 6 hours  dextrose 5% + sodium chloride 0.45%. 1000 milliLiter(s) (125 mL/Hr) IV Continuous <Continuous>  heparin   Injectable 5000 Unit(s) SubCutaneous every 8 hours  ropivacaine 0.2% in 0.9% Sodium Chloride PCEA 200 milliLiter(s) Epidural PCA Continuous    MEDICATIONS  (PRN):  dexAMETHasone  Injectable 4 milliGRAM(s) IV Push every 6 hours PRN Nausea, IF ondansetron is ineffective after 30 - 60 minutes  HYDROmorphone  Injectable 0.5 milliGRAM(s) IV Push every 3 hours PRN Severe Break Through Pain  naloxone Injectable 0.1 milliGRAM(s) IV Push every 3 minutes PRN For ANY of the following changes in patient status:  A. RR LESS THAN 10 breaths per minute, B. Oxygen saturation LESS THAN 90%, C. Sedation score of 6  ondansetron Injectable 4 milliGRAM(s) IV Push every 6 hours PRN Nausea  ropivacaine 0.2% in 0.9% Sodium Chloride PCEA Rescue Clinician Bolus 4 milliLiter(s) Epidural every 15 minutes PRN for Pain Score greater than 6      LABS:                        12.1   11.66 )-----------( 256      ( 20 Oct 2020 07:40 )             38.7     10-20    140  |  103  |  10  ----------------------------<  171<H>  4.2   |  23  |  0.73    Ca    8.5      20 Oct 2020 07:40  Phos  4.1     10-20  Mg     2.2     10-20      PT/INR - ( 20 Oct 2020 07:40 )   PT: 12.0 SEC;   INR: 1.06          PTT - ( 20 Oct 2020 07:40 )  PTT:25.3 SEC      RADIOLOGY & ADDITIONAL STUDIES:    Assessment: 60F on POD#2 s/p Ernestine reversal, loop ileostomy, hernia repair for diverticulitis. Recovering appropriately.     - Pain control: PCEA and IV Tylenol  - CLD   - monitor MIL output  - Monitor I/Os and vitals  - dvt ppx: [facility]    A team surgery [id number].

## 2020-10-21 NOTE — PHYSICAL THERAPY INITIAL EVALUATION ADULT - PERTINENT HX OF CURRENT PROBLEM, REHAB EVAL
Pt is a 60 year old female on POD#2 s/p Ernestine reversal, loop ileostomy, hernia repair for diverticulitis. PMH: hernia, breast cancer, diverticulitis, diverticulosis.

## 2020-10-21 NOTE — PHYSICAL THERAPY INITIAL EVALUATION ADULT - GENERAL OBSERVATIONS, REHAB EVAL
Pt received seated in bedside chair, +IV/PCEA, +her, +pulse oximeter, +MIL drain, in no apparent distress. Pt agreeable to participate in physical therapy evaluation.

## 2020-10-21 NOTE — PHYSICAL THERAPY INITIAL EVALUATION ADULT - PATIENT PROFILE REVIEW, REHAB EVAL
PT orders received: Out of bed to chair. Consult with RN Malika KNOX, patient may participate in PT evaluation./yes

## 2020-10-21 NOTE — PROVIDER CONTACT NOTE (OTHER) - SITUATION
Patient Her catheter is leaking, patient her balloon assessed, 9mL in balloon, 1 mL Added, still leaking,

## 2020-10-21 NOTE — PROVIDER CONTACT NOTE (OTHER) - ACTION/TREATMENT ORDERED:
Team [id number], no further actions at this time, it is okay for slightly to be leaking, continue to monitor her.

## 2020-10-22 LAB
ANION GAP SERPL CALC-SCNC: 11 MMO/L — SIGNIFICANT CHANGE UP (ref 7–14)
APTT BLD: 24 SEC — LOW (ref 27–36.3)
BUN SERPL-MCNC: 5 MG/DL — LOW (ref 7–23)
CALCIUM SERPL-MCNC: 8.3 MG/DL — LOW (ref 8.4–10.5)
CHLORIDE SERPL-SCNC: 104 MMOL/L — SIGNIFICANT CHANGE UP (ref 98–107)
CO2 SERPL-SCNC: 25 MMOL/L — SIGNIFICANT CHANGE UP (ref 22–31)
CREAT SERPL-MCNC: 0.53 MG/DL — SIGNIFICANT CHANGE UP (ref 0.5–1.3)
GLUCOSE SERPL-MCNC: 118 MG/DL — HIGH (ref 70–99)
HCT VFR BLD CALC: 32.5 % — LOW (ref 34.5–45)
HGB BLD-MCNC: 10 G/DL — LOW (ref 11.5–15.5)
INR BLD: 0.97 — SIGNIFICANT CHANGE UP (ref 0.88–1.16)
MAGNESIUM SERPL-MCNC: 2.2 MG/DL — SIGNIFICANT CHANGE UP (ref 1.6–2.6)
MCHC RBC-ENTMCNC: 29.3 PG — SIGNIFICANT CHANGE UP (ref 27–34)
MCHC RBC-ENTMCNC: 30.8 % — LOW (ref 32–36)
MCV RBC AUTO: 95.3 FL — SIGNIFICANT CHANGE UP (ref 80–100)
NRBC # FLD: 0 K/UL — SIGNIFICANT CHANGE UP (ref 0–0)
PHOSPHATE SERPL-MCNC: 2.9 MG/DL — SIGNIFICANT CHANGE UP (ref 2.5–4.5)
PLATELET # BLD AUTO: 261 K/UL — SIGNIFICANT CHANGE UP (ref 150–400)
PMV BLD: 10.6 FL — SIGNIFICANT CHANGE UP (ref 7–13)
POTASSIUM SERPL-MCNC: 3.6 MMOL/L — SIGNIFICANT CHANGE UP (ref 3.5–5.3)
POTASSIUM SERPL-SCNC: 3.6 MMOL/L — SIGNIFICANT CHANGE UP (ref 3.5–5.3)
PROTHROM AB SERPL-ACNC: 11.1 SEC — SIGNIFICANT CHANGE UP (ref 10.6–13.6)
RBC # BLD: 3.41 M/UL — LOW (ref 3.8–5.2)
RBC # FLD: 12.7 % — SIGNIFICANT CHANGE UP (ref 10.3–14.5)
SODIUM SERPL-SCNC: 140 MMOL/L — SIGNIFICANT CHANGE UP (ref 135–145)
WBC # BLD: 11.22 K/UL — HIGH (ref 3.8–10.5)
WBC # FLD AUTO: 11.22 K/UL — HIGH (ref 3.8–10.5)

## 2020-10-22 RX ORDER — POTASSIUM CHLORIDE 20 MEQ
40 PACKET (EA) ORAL ONCE
Refills: 0 | Status: COMPLETED | OUTPATIENT
Start: 2020-10-22 | End: 2020-10-22

## 2020-10-22 RX ORDER — HYDROMORPHONE HYDROCHLORIDE 2 MG/ML
0.5 INJECTION INTRAMUSCULAR; INTRAVENOUS; SUBCUTANEOUS
Refills: 0 | Status: DISCONTINUED | OUTPATIENT
Start: 2020-10-22 | End: 2020-10-23

## 2020-10-22 RX ORDER — ENOXAPARIN SODIUM 100 MG/ML
40 INJECTION SUBCUTANEOUS DAILY
Refills: 0 | Status: DISCONTINUED | OUTPATIENT
Start: 2020-10-22 | End: 2020-10-25

## 2020-10-22 RX ORDER — SODIUM CHLORIDE 9 MG/ML
1000 INJECTION, SOLUTION INTRAVENOUS ONCE
Refills: 0 | Status: COMPLETED | OUTPATIENT
Start: 2020-10-22 | End: 2020-10-22

## 2020-10-22 RX ORDER — OXYCODONE HYDROCHLORIDE 5 MG/1
5 TABLET ORAL
Refills: 0 | Status: DISCONTINUED | OUTPATIENT
Start: 2020-10-22 | End: 2020-10-25

## 2020-10-22 RX ORDER — ACETAMINOPHEN 500 MG
1000 TABLET ORAL EVERY 6 HOURS
Refills: 0 | Status: COMPLETED | OUTPATIENT
Start: 2020-10-22 | End: 2020-10-23

## 2020-10-22 RX ORDER — OXYCODONE HYDROCHLORIDE 5 MG/1
2.5 TABLET ORAL
Refills: 0 | Status: DISCONTINUED | OUTPATIENT
Start: 2020-10-22 | End: 2020-10-25

## 2020-10-22 RX ADMIN — Medication 200 MILLILITER(S): at 07:59

## 2020-10-22 RX ADMIN — Medication 40 MILLIEQUIVALENT(S): at 11:01

## 2020-10-22 RX ADMIN — HEPARIN SODIUM 5000 UNIT(S): 5000 INJECTION INTRAVENOUS; SUBCUTANEOUS at 05:41

## 2020-10-22 RX ADMIN — Medication 400 MILLIGRAM(S): at 20:00

## 2020-10-22 RX ADMIN — Medication 200 MILLILITER(S): at 02:27

## 2020-10-22 RX ADMIN — OXYCODONE HYDROCHLORIDE 5 MILLIGRAM(S): 5 TABLET ORAL at 15:18

## 2020-10-22 RX ADMIN — OXYCODONE HYDROCHLORIDE 5 MILLIGRAM(S): 5 TABLET ORAL at 11:30

## 2020-10-22 RX ADMIN — OXYCODONE HYDROCHLORIDE 5 MILLIGRAM(S): 5 TABLET ORAL at 20:55

## 2020-10-22 RX ADMIN — SODIUM CHLORIDE 1000 MILLILITER(S): 9 INJECTION, SOLUTION INTRAVENOUS at 08:55

## 2020-10-22 RX ADMIN — OXYCODONE HYDROCHLORIDE 5 MILLIGRAM(S): 5 TABLET ORAL at 11:00

## 2020-10-22 RX ADMIN — ENOXAPARIN SODIUM 40 MILLIGRAM(S): 100 INJECTION SUBCUTANEOUS at 16:47

## 2020-10-22 RX ADMIN — Medication 1000 MILLIGRAM(S): at 20:15

## 2020-10-22 RX ADMIN — OXYCODONE HYDROCHLORIDE 5 MILLIGRAM(S): 5 TABLET ORAL at 21:30

## 2020-10-22 RX ADMIN — Medication 400 MILLIGRAM(S): at 13:25

## 2020-10-22 RX ADMIN — Medication 1000 MILLIGRAM(S): at 13:38

## 2020-10-22 NOTE — PROGRESS NOTE ADULT - SUBJECTIVE AND OBJECTIVE BOX
Anesthesia Pain Management Service: Day 4__ of Epidural    SUBJECTIVE: Patient doing well with PCEA and no problems.  Pain Scale Score:  6/10  Refer to charted pain scores    THERAPY:  [x ] Epidural Bupivacaine 0.0625% and Hydromorphone  		[X ] 10 micrograms/mL	[ ] 5 micrograms/mL  [ ] Epidural Bupivacaine 0.0625% and Fentanyl - 2 micrograms/mL  [ ] Epidural Ropivacaine 0.1% plain – 1 mg/mL  [ ] Patient Controlled Regional Anesthesia (PCRA) Ropivacaine  		[ ] 0.2%			[ ] 0.1%    Demand dose __3_ lockout __15_ (minutes) Continuous Rate _4__ Total: _162.5__ Daily      MEDICATIONS  (STANDING):  acetaminophen  IVPB .. 1000 milliGRAM(s) IV Intermittent every 6 hours  dextrose 5% + sodium chloride 0.45%. 1000 milliLiter(s) (125 mL/Hr) IV Continuous <Continuous>  heparin   Injectable 5000 Unit(s) SubCutaneous every 8 hours  potassium chloride    Tablet ER 40 milliEquivalent(s) Oral daily  potassium chloride   Powder 40 milliEquivalent(s) Oral once    MEDICATIONS  (PRN):  HYDROmorphone  Injectable 0.5 milliGRAM(s) IV Push every 3 hours PRN Severe Break Through Pain  naloxone Injectable 0.1 milliGRAM(s) IV Push every 3 minutes PRN For ANY of the following changes in patient status:  A. RR LESS THAN 10 breaths per minute, B. Oxygen saturation LESS THAN 90%, C. Sedation score of 6  ondansetron Injectable 4 milliGRAM(s) IV Push every 4 hours PRN Nausea  ondansetron Injectable 4 milliGRAM(s) IV Push every 6 hours PRN Nausea  oxyCODONE    IR 5 milliGRAM(s) Oral every 3 hours PRN Severe Pain (7 - 10)  oxyCODONE    IR 2.5 milliGRAM(s) Oral every 3 hours PRN Moderate Pain (4 - 6)      OBJECTIVE:  Patient is sitting up in chair.    Assessment of Catheter Site:	[ ] Left	[ ] Right  [x ] Epidural 	[ ] Femoral	      [ ] Saphenous   [ ] Supraclavicular   [ ] Other:    [x ] Dressing intact	[x ] Site non-tender	[ x] Site without erythema, discharge, edema  [x ] Epidural tubing and connection checked	[x] Gross neurological exam within normal limits  [X ] Catheter removed – tip intact		[ ] Afebrile	  [ ] Febrile: ___       [ X] see Temp under VS below)    PT/INR - ( 22 Oct 2020 07:05 )   PT: 11.1 SEC;   INR: 0.97          PTT - ( 22 Oct 2020 07:05 )  PTT:24.0 SEC                      10.0   11.22 )-----------( 261      ( 22 Oct 2020 07:05 )             32.5     Vital Signs Last 24 Hrs  T(C): 36.8 (10-22-20 @ 06:45), Max: 37.5 (10-21-20 @ 22:10)  T(F): 98.3 (10-22-20 @ 06:45), Max: 99.5 (10-21-20 @ 22:10)  HR: 68 (10-22-20 @ 06:45) (67 - 74)  BP: 142/71 (10-22-20 @ 06:45) (112/54 - 156/75)  BP(mean): --  RR: 17 (10-22-20 @ 06:45) (16 - 18)  SpO2: 95% (10-22-20 @ 06:45) (94% - 98%)      Sedation Score:	[x ] Alert	[ ] Drowsy	[ ] Arousable	[ ] Asleep	[ ] Unresponsive    Side Effects:	[x ] None	[ ] Nausea	[ ] Vomiting	[ ] Pruritus  		[ ] Weakness		[ ] Numbness	[ ] Other:    ASSESSMENT/ PLAN:    Therapy to  be:	[ ] Continue   [ X] Discontinued   [ X] Change to prn Analgesics    Documentation and Verification of current medications:  [ X ] Done	[ ] Not done, not eligible, reason:    Comments: PCEA discontinued and changed to IV/oral opioid and/or non-opioid Adjuvant analgesics to be used at this point.

## 2020-10-22 NOTE — ADVANCED PRACTICE NURSE CONSULT - ASSESSMENT
Removed Ileostomy pouch, cleansed skin with water, patted dry. Reinforced with patient how to properly assess stoma viability and peristomal skin. Patient actively participated in stoma measurement, creating template, cutting wafer, applying barrier ring (to protect peristomal skin from enzymatic irritation), applying pouch. Reviewed s/s to report to MD. Importance of hydration and dietary changes with an ileostomy discussed.     Stoma size: 1 3/4" x 1 5/8". See A&I flowsheet for additional assessment details.     Patient requesting to have her  present for additional teaching, will coordinate for tomorrow with ostomy team.

## 2020-10-22 NOTE — PROGRESS NOTE ADULT - ASSESSMENT
Assessment:   60F on POD#2 s/p Ernestine reversal, loop ileostomy, for diverticulitis and ventral hernia repair. Recovering appropriately.   Ostomy bag with 2L output over the past 24 hours.     Plan:  - 1L bolus of LR   - FU labs, replete prn  - Please record ostomy output, replete 1:1 if output>1L.  - Please discontinue PCEA and continue with ATC IV Tylenol  - Please discontinue Fragoso catheter  - CLD   - Continue to monitor ostomy and MIL outputs  - Monitor I/Os and vitals  - DVT ppx: [facility]    A team surgery [id number].

## 2020-10-22 NOTE — PROGRESS NOTE ADULT - SUBJECTIVE AND OBJECTIVE BOX
STATUS POST:  Ernestine's reversal, loop ileostomy, ventral hernia repair     POST OPERATIVE DAY #: 3    SUBJECTIVE:   Patient seen and examined during AM rounds. No acute events overnight.   Reports mild pain. No chest pain, SOB, nausea or vomiting.   Ostomy with high output overnight. Reports leakage around Fragoso catheter.     OBJECTIVE: T(C): 36.8 (10-22-20 @ 06:45), Max: 37.5 (10-21-20 @ 22:10)  HR: 68 (10-22-20 @ 06:45) (67 - 80)  BP: 142/71 (10-22-20 @ 06:45) (112/54 - 156/75)  RR: 17 (10-22-20 @ 06:45) (16 - 18)  SpO2: 95% (10-22-20 @ 06:45) (94% - 98%)  Wt(kg): --  I&O's Summary    21 Oct 2020 07:01  -  22 Oct 2020 07:00  --------------------------------------------------------  IN: 2730 mL / OUT: 3980 mL / NET: -1250 mL      I&O's Detail    21 Oct 2020 07:01  -  22 Oct 2020 07:00  --------------------------------------------------------  IN:    dextrose 5% + sodium chloride 0.45%: 2500 mL    IV PiggyBack: 100 mL    Oral Fluid: 130 mL  Total IN: 2730 mL    OUT:    Bulb (mL): 70 mL    Estimated Blood Loss (mL): 0 mL    Ileostomy (mL): 2160 mL    Indwelling Catheter - Urethral (mL): 1750 mL  Total OUT: 3980 mL    Total NET: -1250 mL        General: NAD  Resp: nonlabored breathing, normal cw expansion  CV: RRR  Abdomen: soft, nontender, mildly distended. ostomy bag with high output. midline incision c.d.i. R MIL w/ minimal serous output.   Vasc: WWP      MEDICATIONS  (STANDING):  dextrose 5% + sodium chloride 0.45%. 1000 milliLiter(s) (125 mL/Hr) IV Continuous <Continuous>  heparin   Injectable 5000 Unit(s) SubCutaneous every 8 hours  lactated ringers Bolus 1000 milliLiter(s) IV Bolus once  potassium chloride    Tablet ER 40 milliEquivalent(s) Oral daily  ropivacaine 0.2% in 0.9% Sodium Chloride PCEA 200 milliLiter(s) Epidural PCA Continuous    MEDICATIONS  (PRN):  dexAMETHasone  Injectable 4 milliGRAM(s) IV Push every 6 hours PRN Nausea, IF ondansetron is ineffective after 30 - 60 minutes  HYDROmorphone  Injectable 0.5 milliGRAM(s) IV Push every 3 hours PRN Severe Break Through Pain  morphine  - Injectable 2 milliGRAM(s) IV Push every 4 hours PRN Moderate Pain (4 - 6)  naloxone Injectable 0.1 milliGRAM(s) IV Push every 3 minutes PRN For ANY of the following changes in patient status:  A. RR LESS THAN 10 breaths per minute, B. Oxygen saturation LESS THAN 90%, C. Sedation score of 6  ondansetron Injectable 4 milliGRAM(s) IV Push every 4 hours PRN Nausea  ondansetron Injectable 4 milliGRAM(s) IV Push every 6 hours PRN Nausea  ropivacaine 0.2% in 0.9% Sodium Chloride PCEA Rescue Clinician Bolus 4 milliLiter(s) Epidural every 15 minutes PRN for Pain Score greater than 6      LABS:                        10.0   11.62 )-----------( 260      ( 21 Oct 2020 07:58 )             33.0     10-21    138  |  102  |  10  ----------------------------<  119<H>  3.7   |  26  |  0.64    Ca    8.6      21 Oct 2020 07:58  Phos  1.7     10-21  Mg     2.3     10-21      PT/INR - ( 21 Oct 2020 07:58 )   PT: 11.2 SEC;   INR: 0.97          PTT - ( 21 Oct 2020 07:58 )  PTT:24.2 SEC

## 2020-10-22 NOTE — PROGRESS NOTE ADULT - SUBJECTIVE AND OBJECTIVE BOX
Anesthesia Pain Management Service    SUBJECTIVE: Pt doing well with PCEA removed & no problems reported.    Therapy:	  [ ] IV PCA	   [ X] Epidural stopped          [ ] s/p Spinal Opoid              [ ] Postpartum infusion	  [ ] Patient controlled regional anesthesia (PCRA)    [ ] prn Analgesics    Allergies    No Known Drug Allergies  peanuts (Anaphylaxis)    Intolerances      MEDICATIONS  (STANDING):  acetaminophen  IVPB .. 1000 milliGRAM(s) IV Intermittent every 6 hours  dextrose 5% + sodium chloride 0.45%. 1000 milliLiter(s) (125 mL/Hr) IV Continuous <Continuous>  heparin   Injectable 5000 Unit(s) SubCutaneous every 8 hours  potassium chloride    Tablet ER 40 milliEquivalent(s) Oral daily  potassium chloride   Powder 40 milliEquivalent(s) Oral once    MEDICATIONS  (PRN):  HYDROmorphone  Injectable 0.5 milliGRAM(s) IV Push every 3 hours PRN Severe Break Through Pain  naloxone Injectable 0.1 milliGRAM(s) IV Push every 3 minutes PRN For ANY of the following changes in patient status:  A. RR LESS THAN 10 breaths per minute, B. Oxygen saturation LESS THAN 90%, C. Sedation score of 6  ondansetron Injectable 4 milliGRAM(s) IV Push every 4 hours PRN Nausea  ondansetron Injectable 4 milliGRAM(s) IV Push every 6 hours PRN Nausea  oxyCODONE    IR 5 milliGRAM(s) Oral every 3 hours PRN Severe Pain (7 - 10)  oxyCODONE    IR 2.5 milliGRAM(s) Oral every 3 hours PRN Moderate Pain (4 - 6)      OBJECTIVE:   [X] No new signs     [ ] Other:    Side Effects:  [X ] None			[ ] Other:    Assessment of Catheter Site:		[ ] Intact		[ ] Other:    ASSESSMENT/PLAN  [ ] Continue current therapy    [X ] Therapy changed to:    [ ] IV PCA       [ ] Epidural     [ X] prn Analgesics     Comments: Epidural removed & oral/IV opioids and/or non-opioid Adjuvant analgesics to be used at this point.     Progress Note written now but Patient was seen earlier.

## 2020-10-22 NOTE — ADVANCED PRACTICE NURSE CONSULT - REASON FOR CONSULT
Patient seen for follow up Ileostomy teaching. Patient has a history of colostomy in the past, states  does the pouch changes.

## 2020-10-22 NOTE — ADVANCED PRACTICE NURSE CONSULT - RECOMMEDATIONS
Supplies utilized and provided for home at bedside.   Stoma size: 1 3/4" x 1 5/8"    Liquid barrier film    Skin barrier ring- item # [id number]  Flat waffer (2 1/4")- item # [id number]   Drainable pouch (2 1/4")- item # [id number]     Please contact Wound/Ostomy Care Service Line if we can be of further assistance (ext [phone]).

## 2020-10-23 ENCOUNTER — TRANSCRIPTION ENCOUNTER (OUTPATIENT)
Age: 60
End: 2020-10-23

## 2020-10-23 LAB
ANION GAP SERPL CALC-SCNC: 11 MMO/L — SIGNIFICANT CHANGE UP (ref 7–14)
BUN SERPL-MCNC: 6 MG/DL — LOW (ref 7–23)
CALCIUM SERPL-MCNC: 8.9 MG/DL — SIGNIFICANT CHANGE UP (ref 8.4–10.5)
CHLORIDE SERPL-SCNC: 102 MMOL/L — SIGNIFICANT CHANGE UP (ref 98–107)
CO2 SERPL-SCNC: 27 MMOL/L — SIGNIFICANT CHANGE UP (ref 22–31)
CREAT SERPL-MCNC: 0.55 MG/DL — SIGNIFICANT CHANGE UP (ref 0.5–1.3)
GLUCOSE SERPL-MCNC: 99 MG/DL — SIGNIFICANT CHANGE UP (ref 70–99)
HCT VFR BLD CALC: 34 % — LOW (ref 34.5–45)
HGB BLD-MCNC: 10.5 G/DL — LOW (ref 11.5–15.5)
MAGNESIUM SERPL-MCNC: 2.2 MG/DL — SIGNIFICANT CHANGE UP (ref 1.6–2.6)
MCHC RBC-ENTMCNC: 29.1 PG — SIGNIFICANT CHANGE UP (ref 27–34)
MCHC RBC-ENTMCNC: 30.9 % — LOW (ref 32–36)
MCV RBC AUTO: 94.2 FL — SIGNIFICANT CHANGE UP (ref 80–100)
NRBC # FLD: 0 K/UL — SIGNIFICANT CHANGE UP (ref 0–0)
PHOSPHATE SERPL-MCNC: 3.7 MG/DL — SIGNIFICANT CHANGE UP (ref 2.5–4.5)
PLATELET # BLD AUTO: 288 K/UL — SIGNIFICANT CHANGE UP (ref 150–400)
PMV BLD: 10.9 FL — SIGNIFICANT CHANGE UP (ref 7–13)
POTASSIUM SERPL-MCNC: 3.4 MMOL/L — LOW (ref 3.5–5.3)
POTASSIUM SERPL-SCNC: 3.4 MMOL/L — LOW (ref 3.5–5.3)
RBC # BLD: 3.61 M/UL — LOW (ref 3.8–5.2)
RBC # FLD: 12.5 % — SIGNIFICANT CHANGE UP (ref 10.3–14.5)
SODIUM SERPL-SCNC: 140 MMOL/L — SIGNIFICANT CHANGE UP (ref 135–145)
WBC # BLD: 9.8 K/UL — SIGNIFICANT CHANGE UP (ref 3.8–10.5)
WBC # FLD AUTO: 9.8 K/UL — SIGNIFICANT CHANGE UP (ref 3.8–10.5)

## 2020-10-23 RX ORDER — ACETAMINOPHEN 500 MG
3 TABLET ORAL
Qty: 0 | Refills: 0 | DISCHARGE
Start: 2020-10-23

## 2020-10-23 RX ORDER — ACETAMINOPHEN 500 MG
975 TABLET ORAL EVERY 6 HOURS
Refills: 0 | Status: DISCONTINUED | OUTPATIENT
Start: 2020-10-23 | End: 2020-10-25

## 2020-10-23 RX ORDER — POTASSIUM CHLORIDE 20 MEQ
40 PACKET (EA) ORAL ONCE
Refills: 0 | Status: COMPLETED | OUTPATIENT
Start: 2020-10-23 | End: 2020-10-23

## 2020-10-23 RX ADMIN — OXYCODONE HYDROCHLORIDE 5 MILLIGRAM(S): 5 TABLET ORAL at 22:33

## 2020-10-23 RX ADMIN — Medication 975 MILLIGRAM(S): at 18:09

## 2020-10-23 RX ADMIN — OXYCODONE HYDROCHLORIDE 5 MILLIGRAM(S): 5 TABLET ORAL at 01:00

## 2020-10-23 RX ADMIN — Medication 40 MILLIEQUIVALENT(S): at 10:07

## 2020-10-23 RX ADMIN — Medication 1000 MILLIGRAM(S): at 09:59

## 2020-10-23 RX ADMIN — OXYCODONE HYDROCHLORIDE 5 MILLIGRAM(S): 5 TABLET ORAL at 00:15

## 2020-10-23 RX ADMIN — ENOXAPARIN SODIUM 40 MILLIGRAM(S): 100 INJECTION SUBCUTANEOUS at 12:43

## 2020-10-23 RX ADMIN — Medication 1000 MILLIGRAM(S): at 03:30

## 2020-10-23 RX ADMIN — Medication 400 MILLIGRAM(S): at 03:00

## 2020-10-23 RX ADMIN — OXYCODONE HYDROCHLORIDE 5 MILLIGRAM(S): 5 TABLET ORAL at 16:16

## 2020-10-23 RX ADMIN — Medication 975 MILLIGRAM(S): at 18:39

## 2020-10-23 RX ADMIN — OXYCODONE HYDROCHLORIDE 5 MILLIGRAM(S): 5 TABLET ORAL at 15:46

## 2020-10-23 RX ADMIN — Medication 400 MILLIGRAM(S): at 09:29

## 2020-10-23 RX ADMIN — OXYCODONE HYDROCHLORIDE 5 MILLIGRAM(S): 5 TABLET ORAL at 23:03

## 2020-10-23 NOTE — ADVANCED PRACTICE NURSE CONSULT - RECOMMEDATIONS
Appliance:      Skin barrier ring- item # [id number]      Flat waffer (2 1/4")- item # [id number]      Drainable pouch (2 1/4")- item # [id number]

## 2020-10-23 NOTE — DISCHARGE NOTE PROVIDER - NSDCFUADDINST_GEN_ALL_CORE_FT
.WOUND CARE:  Please keep incisions clean and dry. Please do not Scrub or rub incisions. Do not use lotion or powder on incisions.  BATHING: You may shower and/or sponge bathe. You may use warm soapy water in the shower and rinse, pat dry. ACTIVITY: No heavy lifting or straining. Otherwise, you may return to your usual level of physical activity. If you are taking narcotic pain medication DO NOT drive a car, operate machinery or make important decisions. DIET: Return to your usual diet. NOTIFY YOUR SURGEON IF YOU HAVE: any bleeding that does not stop, any pus draining from your wound(s), any fever (over 100.4 F) persistent nausea/vomiting, or if your pain is not controlled on your discharge pain medications, unable to urinate. Please follow up with your primary care physician in one week regarding your hospitalization, bring copies of your discharge paperwork. Please follow up with your surgeon, Dr. Noel within ONE WEEK of discharge You will be discharged with MIL drains. You will need to empty them and record outputs accurately. This will be taught to you by the nursing staff. Please do not remove the MIL drains. They will be removed in the office. Please bring to the office accurate records of output.    .WOUND CARE:  Please keep incisions clean and dry. Please do not Scrub or rub incisions. Do not use lotion or powder on incisions.  BATHING: You may shower and/or sponge bathe. You may use warm soapy water in the shower and rinse, pat dry. ACTIVITY: No heavy lifting or straining. Otherwise, you may return to your usual level of physical activity. If you are taking narcotic pain medication DO NOT drive a car, operate machinery or make important decisions. DIET: Return to your usual diet. NOTIFY YOUR SURGEON IF YOU HAVE: any bleeding that does not stop, any pus draining from your wound(s), any fever (over 100.4 F) persistent nausea/vomiting, or if your pain is not controlled on your discharge pain medications, unable to urinate. Please follow up with your primary care physician in one week regarding your hospitalization, bring copies of your discharge paperwork. Please follow up with your surgeon, Dr. Noel within ONE WEEK of discharge

## 2020-10-23 NOTE — ADVANCED PRACTICE NURSE CONSULT - ASSESSMENT
Patient seen for follow up questions, visit from ostomy RN yesterday. Patient has history of colostomy.  and patient able to perform pouching changes/care at home with colostomy. Patient requested follow up visit with  at bedside to review steps to pouch change and to discuss use of barrier ring/paste. Discussed dehydration risk and signs of dehydration and signs to report to MD. Patient and  had no further questions. Discharge supplies were provided.

## 2020-10-23 NOTE — DISCHARGE NOTE PROVIDER - NSDCHHNEEDSERVICE_GEN_ALL_CORE
Ostomy care and management/Wound care and assessment Other, specify.../Teaching and training/Wound care and assessment/Ostomy care and management

## 2020-10-23 NOTE — DISCHARGE NOTE PROVIDER - NSDCMRMEDTOKEN_GEN_ALL_CORE_FT
acetaminophen 325 mg oral tablet: 3 tab(s) orally every 6 hours  aloe: 1 cap(s) orally once a day. Last dose 10/12/20.  Anamu: 1 cap(s) orally once a day. Last dose 10/12/20.  biotin: 1 tab(s) orally once a day  Citracal + D: 1 tab(s) orally once a day  Zinc: 1 tab(s) orally once a day   acetaminophen 325 mg oral tablet: 3 tab(s) orally every 6 hours  aloe: 1 cap(s) orally once a day. Last dose 10/12/20.  Anamu: 1 cap(s) orally once a day. Last dose 10/12/20.  biotin: 1 tab(s) orally once a day  calcium carbonate 500 mg (200 mg elemental calcium) oral tablet, chewable: 2 tab(s) orally once  Citracal + D: 1 tab(s) orally once a day  enoxaparin 40 mg/0.4 mL injectable solution: 40 milligram(s) subcutaneously once a day MDD:40  loperamide 2 mg oral capsule: 1 cap(s) orally 3 times a day  Zinc: 1 tab(s) orally once a day

## 2020-10-23 NOTE — DISCHARGE NOTE PROVIDER - NSDCCPCAREPLAN_GEN_ALL_CORE_FT
PRINCIPAL DISCHARGE DIAGNOSIS  Diagnosis: Status post Ernestine procedure  Assessment and Plan of Treatment:

## 2020-10-23 NOTE — SBIRT NOTE ADULT - NSSBIRTALCPOSREINDET_GEN_A_CORE
Provided SBIRT services: Full screen Positive. Positive reinforcement provided given patient currently within healthy guidelines. Education materials reviewed and given to patient.   Pt is a former smoker however no immediate needs.

## 2020-10-23 NOTE — PROGRESS NOTE ADULT - SUBJECTIVE AND OBJECTIVE BOX
STATUS POST:  Ernestine's reversal, loop ileostomy, ventral hernia repair     POST OPERATIVE DAY #: 4    SUBJECTIVE:   Patient seen and examined during AM rounds. No acute events overnight.   Reports mild pain. No chest pain, SOB, nausea or vomiting.   Ostomy with high output overnight.    OBJECTIVE: T(C):  VITALS  T(C): 36.8 (10-23-20 @ 06:00), Max: 37.1 (10-22-20 @ 21:48)  HR: 68 (10-23-20 @ 06:00) (66 - 78)  BP: 142/67 (10-23-20 @ 06:00) (136/60 - 147/71)  BP(mean): --  ABP: --  ABP(mean): --  RR: 17 (10-23-20 @ 06:00) (16 - 18)  SpO2: 97% (10-23-20 @ 06:00) (97% - 99%)  Wt(kg): --  CVP(mm Hg): --  CI: --  CAPILLARY BLOOD GLUCOSE       N/A      10-22 @ 07:01  -  10-23 @ 07:00  --------------------------------------------------------  IN:    dextrose 5% + sodium chloride 0.45%: 1000 mL    IV PiggyBack: 1400 mL    Oral Fluid: 590 mL  Total IN: 2990 mL    OUT:    Bulb (mL): 55 mL    Ileostomy (mL): 3400 mL    Voided (mL): 1250 mL  Total OUT: 4705 mL    Total NET: -1715 mL      General: NAD  Resp: nonlabored breathing, normal cw expansion  CV: RRR  Abdomen: soft, nontender, mildly distended. ostomy bag with high output. midline incision c.d.i. R MIL w/ minimal serous output.          LABS:  CBC (10-22 @ 07:05)                              10.0<L>                         11.22<H>  )----------------(  261        --    % Neutrophils, --    % Lymphocytes, ANC: --                                  32.5<L>    BMP (10-22 @ 07:05)             140     |  104     |  5<L>  		Ca++ --      Ca 8.3<L>             ---------------------------------( 118<H>		Mg 2.2                3.6     |  25      |  0.53  			Ph 2.9         Coags (10-22 @ 07:05)  aPTT 24.0<L> / INR 0.97 / PT 11.1          Assessment and Plan:   · Assessment	  Assessment:   60F on POD#2 s/p Ernestine reversal, loop ileostomy, for diverticulitis and ventral hernia repair. Recovering appropriately.   Ostomy bag with 2L output over the past 24 hours.     Plan:  - FU labs, replete prn  - Please record ostomy output, replete 1:1 if output>1L.  - reg diet  - Continue to monitor ostomy and MIL outputs  - Monitor I/Os and vitals  - DVT ppx: [facility] [place]    A team surgery [id number].

## 2020-10-23 NOTE — DISCHARGE NOTE PROVIDER - CARE PROVIDER_API CALL
Valeriy Noel  COLON/RECTAL SURGERY  [facility], [address] [place] [id number]  Phone: [phone]  Fax: [phone]  Follow Up Time: 1 week

## 2020-10-23 NOTE — DISCHARGE NOTE PROVIDER - NSDCCPTREATMENT_GEN_ALL_CORE_FT
PRINCIPAL PROCEDURE  Procedure: Reversal, Ernestine procedure  Findings and Treatment: .WOUND CARE:  Please keep incisions clean and dry. Please do not Scrub or rub incisions. Do not use lotion or powder on incisions.   BATHING: You may shower and/or sponge bathe. You may use warm soapy water in the shower and rinse, pat dry.  ACTIVITY: No heavy lifting or straining. Otherwise, you may return to your usual level of physical activity. If you are taking narcotic pain medication DO NOT drive a car, operate machinery or make important decisions.  DIET: Return to your usual diet.  NOTIFY YOUR SURGEON IF YOU HAVE: any bleeding that does not stop, any pus draining from your wound(s), any fever (over 100.4 F) persistent nausea/vomiting, or if your pain is not controlled on your discharge pain medications, unable to urinate.  Please follow up with your primary care physician in one week regarding your hospitalization, bring copies of your discharge paperwork.  Please follow up with your surgeon, Dr. Noel within ONE WEEK of discharge        SECONDARY PROCEDURE  Procedure: Creation, ileostomy  Findings and Treatment:     Procedure: Ventral hernia repair with mesh  Findings and Treatment:

## 2020-10-24 LAB
ANION GAP SERPL CALC-SCNC: 11 MMO/L — SIGNIFICANT CHANGE UP (ref 7–14)
BUN SERPL-MCNC: 7 MG/DL — SIGNIFICANT CHANGE UP (ref 7–23)
CALCIUM SERPL-MCNC: 8.8 MG/DL — SIGNIFICANT CHANGE UP (ref 8.4–10.5)
CHLORIDE SERPL-SCNC: 103 MMOL/L — SIGNIFICANT CHANGE UP (ref 98–107)
CO2 SERPL-SCNC: 27 MMOL/L — SIGNIFICANT CHANGE UP (ref 22–31)
CREAT SERPL-MCNC: 0.51 MG/DL — SIGNIFICANT CHANGE UP (ref 0.5–1.3)
GLUCOSE SERPL-MCNC: 100 MG/DL — HIGH (ref 70–99)
HCT VFR BLD CALC: 30.3 % — LOW (ref 34.5–45)
HGB BLD-MCNC: 9.7 G/DL — LOW (ref 11.5–15.5)
INR BLD: 1.02 — SIGNIFICANT CHANGE UP (ref 0.88–1.16)
MAGNESIUM SERPL-MCNC: 2.1 MG/DL — SIGNIFICANT CHANGE UP (ref 1.6–2.6)
MCHC RBC-ENTMCNC: 29.5 PG — SIGNIFICANT CHANGE UP (ref 27–34)
MCHC RBC-ENTMCNC: 32 % — SIGNIFICANT CHANGE UP (ref 32–36)
MCV RBC AUTO: 92.1 FL — SIGNIFICANT CHANGE UP (ref 80–100)
NRBC # FLD: 0 K/UL — SIGNIFICANT CHANGE UP (ref 0–0)
PHOSPHATE SERPL-MCNC: 4.3 MG/DL — SIGNIFICANT CHANGE UP (ref 2.5–4.5)
PLATELET # BLD AUTO: 298 K/UL — SIGNIFICANT CHANGE UP (ref 150–400)
PMV BLD: 10.9 FL — SIGNIFICANT CHANGE UP (ref 7–13)
POTASSIUM SERPL-MCNC: 3.3 MMOL/L — LOW (ref 3.5–5.3)
POTASSIUM SERPL-SCNC: 3.3 MMOL/L — LOW (ref 3.5–5.3)
PROTHROM AB SERPL-ACNC: 11.6 SEC — SIGNIFICANT CHANGE UP (ref 10.6–13.6)
RBC # BLD: 3.29 M/UL — LOW (ref 3.8–5.2)
RBC # FLD: 12.7 % — SIGNIFICANT CHANGE UP (ref 10.3–14.5)
SODIUM SERPL-SCNC: 141 MMOL/L — SIGNIFICANT CHANGE UP (ref 135–145)
WBC # BLD: 9.18 K/UL — SIGNIFICANT CHANGE UP (ref 3.8–10.5)
WBC # FLD AUTO: 9.18 K/UL — SIGNIFICANT CHANGE UP (ref 3.8–10.5)

## 2020-10-24 RX ORDER — FAMOTIDINE 10 MG/ML
20 INJECTION INTRAVENOUS ONCE
Refills: 0 | Status: COMPLETED | OUTPATIENT
Start: 2020-10-24 | End: 2020-10-24

## 2020-10-24 RX ORDER — POTASSIUM CHLORIDE 20 MEQ
20 PACKET (EA) ORAL ONCE
Refills: 0 | Status: COMPLETED | OUTPATIENT
Start: 2020-10-24 | End: 2020-10-24

## 2020-10-24 RX ORDER — LOPERAMIDE HCL 2 MG
2 TABLET ORAL THREE TIMES A DAY
Refills: 0 | Status: DISCONTINUED | OUTPATIENT
Start: 2020-10-24 | End: 2020-10-25

## 2020-10-24 RX ORDER — POTASSIUM CHLORIDE 20 MEQ
10 PACKET (EA) ORAL
Refills: 0 | Status: COMPLETED | OUTPATIENT
Start: 2020-10-24 | End: 2020-10-24

## 2020-10-24 RX ADMIN — Medication 975 MILLIGRAM(S): at 00:49

## 2020-10-24 RX ADMIN — Medication 100 MILLIEQUIVALENT(S): at 16:42

## 2020-10-24 RX ADMIN — Medication 975 MILLIGRAM(S): at 19:01

## 2020-10-24 RX ADMIN — Medication 2 MILLIGRAM(S): at 13:39

## 2020-10-24 RX ADMIN — Medication 975 MILLIGRAM(S): at 13:39

## 2020-10-24 RX ADMIN — Medication 975 MILLIGRAM(S): at 18:31

## 2020-10-24 RX ADMIN — FAMOTIDINE 20 MILLIGRAM(S): 10 INJECTION INTRAVENOUS at 21:43

## 2020-10-24 RX ADMIN — Medication 100 MILLIEQUIVALENT(S): at 18:39

## 2020-10-24 RX ADMIN — Medication 975 MILLIGRAM(S): at 07:33

## 2020-10-24 RX ADMIN — ENOXAPARIN SODIUM 40 MILLIGRAM(S): 100 INJECTION SUBCUTANEOUS at 13:58

## 2020-10-24 RX ADMIN — Medication 30 MILLILITER(S): at 22:47

## 2020-10-24 RX ADMIN — Medication 2 MILLIGRAM(S): at 21:05

## 2020-10-24 RX ADMIN — Medication 20 MILLIEQUIVALENT(S): at 13:39

## 2020-10-24 RX ADMIN — Medication 100 MILLIEQUIVALENT(S): at 13:39

## 2020-10-24 RX ADMIN — Medication 975 MILLIGRAM(S): at 14:09

## 2020-10-24 RX ADMIN — Medication 975 MILLIGRAM(S): at 07:03

## 2020-10-24 RX ADMIN — Medication 975 MILLIGRAM(S): at 00:19

## 2020-10-24 NOTE — PROGRESS NOTE ADULT - SUBJECTIVE AND OBJECTIVE BOX
Surgery Progress Note    SUBJECTIVE: Pt seen and examined at bedside. Patient comfortable and in no-apparent distress. No nausea, vomiting, diarrhea. Pain is controlled. +Gas/+BM. Tolerating diet.    Vital Signs Last 24 Hrs  T(C): 36.6 (24 Oct 2020 02:00), Max: 36.9 (23 Oct 2020 10:09)  T(F): 97.9 (24 Oct 2020 02:00), Max: 98.4 (23 Oct 2020 10:09)  HR: 80 (24 Oct 2020 02:00) (69 - 80)  BP: 152/78 (24 Oct 2020 02:00) (132/67 - 155/80)  BP(mean): --  RR: 17 (24 Oct 2020 02:00) (16 - 19)  SpO2: 100% (24 Oct 2020 02:00) (98% - 100%)    Physical Exam:  General Appearance: Appears well, NAD  Respiratory: No labored breathing  CV: Pulse regularly present  Abdomen: Soft, nontender, ***incision c/d/i    LABS:                        10.5   9.80  )-----------( 288      ( 23 Oct 2020 07:23 )             34.0     10-23    140  |  102  |  6<L>  ----------------------------<  99  3.4<L>   |  27  |  0.55    Ca    8.9      23 Oct 2020 07:23  Phos  3.7     10-23  Mg     2.2     10-23            INs and OUTs:    10-23-20 @ 07:01  -  10-24-20 @ 07:00  --------------------------------------------------------  IN: 300 mL / OUT: 1277.5 mL / NET: -977.5 mL     Surgery Progress Note    SUBJECTIVE: Pt seen and examined at bedside. Patient comfortable and in no-apparent distress. Tolerating regular diet without nausea, vomiting. Ostomy output still high, but thickening with regular diet. Pain is controlled.      Vital Signs Last 24 Hrs  T(C): 36.6 (24 Oct 2020 02:00), Max: 36.9 (23 Oct 2020 10:09)  T(F): 97.9 (24 Oct 2020 02:00), Max: 98.4 (23 Oct 2020 10:09)  HR: 80 (24 Oct 2020 02:00) (69 - 80)  BP: 152/78 (24 Oct 2020 02:00) (132/67 - 155/80)  BP(mean): --  RR: 17 (24 Oct 2020 02:00) (16 - 19)  SpO2: 100% (24 Oct 2020 02:00) (98% - 100%)    Physical Exam:  General Appearance: Appears well, NAD  Respiratory: No labored breathing  CV: Pulse regularly present  Abdomen: oft, nontender, mildly distended. ostomy bag with gas and liquid stool; midline incision c.d.i. R MIL w/ minimal serous output.     LABS:                        10.5   9.80  )-----------( 288      ( 23 Oct 2020 07:23 )             34.0     10-23    140  |  102  |  6<L>  ----------------------------<  99  3.4<L>   |  27  |  0.55    Ca    8.9      23 Oct 2020 07:23  Phos  3.7     10-23  Mg     2.2     10-23            INs and OUTs:    10-23-20 @ 07:01  -  10-24-20 @ 07:00  --------------------------------------------------------  IN: 300 mL / OUT: 1277.5 mL / NET: -977.5 mL

## 2020-10-24 NOTE — PROGRESS NOTE ADULT - ASSESSMENT
Assessment:   60F on POD#2 s/p Ernestine reversal, loop ileostomy, for diverticulitis and ventral hernia repair. Recovering appropriately.  Ostomy bag with 1575 L output over the past 24 hours.     Plan:  - FU labs, replete prn  - Please record ostomy output, replete 1:1 for every cc >1L.  - reg diet  - Continue to monitor ostomy and MIL outputs  - Monitor I/Os and vitals  - DVT ppx: [facility]    A team surgery [id number] Assessment:   60F on POD#2 s/p Ernestine reversal, loop ileostomy, for diverticulitis and ventral hernia repair. Recovering appropriately.  Ostomy bag with 1575 L output over the past 24 hours.     Plan:  - FU labs, replete prn  - starting loperamide 2 mg TID before meals  - Please record ostomy output, replete 1:1 for every cc >1L.  - reg diet  - Continue to monitor ostomy and MIL outputs  - Monitor I/Os and vitals  - DVT ppx: [facility]    A team surgery [id number]

## 2020-10-25 ENCOUNTER — TRANSCRIPTION ENCOUNTER (OUTPATIENT)
Age: 60
End: 2020-10-25

## 2020-10-25 VITALS
SYSTOLIC BLOOD PRESSURE: 133 MMHG | TEMPERATURE: 98 F | HEART RATE: 68 BPM | OXYGEN SATURATION: 100 % | RESPIRATION RATE: 18 BRPM | DIASTOLIC BLOOD PRESSURE: 60 MMHG

## 2020-10-25 LAB
ANION GAP SERPL CALC-SCNC: 12 MMO/L — SIGNIFICANT CHANGE UP (ref 7–14)
BUN SERPL-MCNC: 6 MG/DL — LOW (ref 7–23)
CALCIUM SERPL-MCNC: 9.1 MG/DL — SIGNIFICANT CHANGE UP (ref 8.4–10.5)
CHLORIDE SERPL-SCNC: 103 MMOL/L — SIGNIFICANT CHANGE UP (ref 98–107)
CO2 SERPL-SCNC: 26 MMOL/L — SIGNIFICANT CHANGE UP (ref 22–31)
CREAT SERPL-MCNC: 0.51 MG/DL — SIGNIFICANT CHANGE UP (ref 0.5–1.3)
GLUCOSE SERPL-MCNC: 117 MG/DL — HIGH (ref 70–99)
HCT VFR BLD CALC: 32.2 % — LOW (ref 34.5–45)
HGB BLD-MCNC: 10.2 G/DL — LOW (ref 11.5–15.5)
MAGNESIUM SERPL-MCNC: 2.1 MG/DL — SIGNIFICANT CHANGE UP (ref 1.6–2.6)
MCHC RBC-ENTMCNC: 29 PG — SIGNIFICANT CHANGE UP (ref 27–34)
MCHC RBC-ENTMCNC: 31.7 % — LOW (ref 32–36)
MCV RBC AUTO: 91.5 FL — SIGNIFICANT CHANGE UP (ref 80–100)
NRBC # FLD: 0 K/UL — SIGNIFICANT CHANGE UP (ref 0–0)
PHOSPHATE SERPL-MCNC: 4.1 MG/DL — SIGNIFICANT CHANGE UP (ref 2.5–4.5)
PLATELET # BLD AUTO: 351 K/UL — SIGNIFICANT CHANGE UP (ref 150–400)
PMV BLD: 10.4 FL — SIGNIFICANT CHANGE UP (ref 7–13)
POTASSIUM SERPL-MCNC: 3.7 MMOL/L — SIGNIFICANT CHANGE UP (ref 3.5–5.3)
POTASSIUM SERPL-SCNC: 3.7 MMOL/L — SIGNIFICANT CHANGE UP (ref 3.5–5.3)
RBC # BLD: 3.52 M/UL — LOW (ref 3.8–5.2)
RBC # FLD: 12.4 % — SIGNIFICANT CHANGE UP (ref 10.3–14.5)
SODIUM SERPL-SCNC: 141 MMOL/L — SIGNIFICANT CHANGE UP (ref 135–145)
WBC # BLD: 9.65 K/UL — SIGNIFICANT CHANGE UP (ref 3.8–10.5)
WBC # FLD AUTO: 9.65 K/UL — SIGNIFICANT CHANGE UP (ref 3.8–10.5)

## 2020-10-25 PROCEDURE — 93010 ELECTROCARDIOGRAM REPORT: CPT

## 2020-10-25 RX ORDER — ENOXAPARIN SODIUM 100 MG/ML
40 INJECTION SUBCUTANEOUS
Qty: 1120 | Refills: 0
Start: 2020-10-25 | End: 2020-11-21

## 2020-10-25 RX ORDER — CALCIUM CARBONATE 500(1250)
2 TABLET ORAL
Qty: 0 | Refills: 0 | DISCHARGE
Start: 2020-10-25

## 2020-10-25 RX ORDER — LOPERAMIDE HCL 2 MG
1 TABLET ORAL
Qty: 0 | Refills: 0 | DISCHARGE
Start: 2020-10-25

## 2020-10-25 RX ORDER — POTASSIUM CHLORIDE 20 MEQ
20 PACKET (EA) ORAL
Refills: 0 | Status: DISCONTINUED | OUTPATIENT
Start: 2020-10-25 | End: 2020-10-25

## 2020-10-25 RX ORDER — CALCIUM CARBONATE 500(1250)
2 TABLET ORAL ONCE
Refills: 0 | Status: COMPLETED | OUTPATIENT
Start: 2020-10-25 | End: 2020-10-25

## 2020-10-25 RX ADMIN — Medication 2 TABLET(S): at 13:43

## 2020-10-25 RX ADMIN — Medication 2 MILLIGRAM(S): at 06:16

## 2020-10-25 RX ADMIN — Medication 975 MILLIGRAM(S): at 00:11

## 2020-10-25 RX ADMIN — Medication 975 MILLIGRAM(S): at 12:01

## 2020-10-25 RX ADMIN — Medication 975 MILLIGRAM(S): at 11:31

## 2020-10-25 RX ADMIN — Medication 975 MILLIGRAM(S): at 00:41

## 2020-10-25 RX ADMIN — ENOXAPARIN SODIUM 40 MILLIGRAM(S): 100 INJECTION SUBCUTANEOUS at 11:30

## 2020-10-25 NOTE — PROVIDER CONTACT NOTE (OTHER) - ASSESSMENT
Provider asking if nurse can teach patient about not taking Imodium if the ileostomy bag is less than 500mL over 8 hours or in between Imodium doses.

## 2020-10-25 NOTE — PROGRESS NOTE ADULT - ATTENDING COMMENTS
-monitor I&Os  -oob  -dvt ppx  -pain control  -dc epidural
Ostomy output improved and under 1L. Tolerating diet and having adequate pain control. Pt is eager to go home.    abd soft, non-distended, non-tender to palpation  incision c/d/i w/ staples in place  drain in place to bulb suction  ileostomy w/ thickened liquid output    - continue LRD  - drain teaching  - continue imodium   - d/c home today, reviewed ideal ostomy output w/ patient and how to change her imodium w/ it    Remington Valle MD  Attending Physician
-sips of clears  -OOB  -f/u pain service  -continue her until DC epidural  -dvt ppx
Ostomy output improving but still over 1.5L. Tolerating diet and having adequate pain control    abd soft, non-distended, non-tender to palpation  incision c/d/i w/ staples in place  ostomy in place w/ liquid output in pouch    - continue regular diet  - start imodium TID 20-30mins before meals   - f/u ostomy output    Remington Valle MD  Attending Physician

## 2020-10-25 NOTE — PROGRESS NOTE ADULT - ASSESSMENT
Assessment:   60F on POD#3 s/p Ernestine reversal, loop ileostomy, for diverticulitis and ventral hernia repair. Recovering appropriately, decreasing and thickening ostomy output on imodium.    Plan:  - FU labs, replete prn  - continue loperamide 2 mg TID before meals  - Please record ostomy output, replete 1:1 for every cc >1L.  - reg diet  - Continue to monitor ostomy and MIL outputs  - Monitor I/Os and vitals  - DVT ppx: [facility]    A team surgery [id number] Assessment:   60F on POD#3 s/p Ernestine reversal, loop ileostomy, for diverticulitis and ventral hernia repair. Recovering appropriately, decreasing and thickening ostomy output on imodium.    Plan:  - FU labs, replete prn  - continue loperamide 2 mg TID before meals  - Please record ostomy output, replete 1:1 for every cc >1L.  - reg diet  - Continue to monitor ostomy and MIL outputs  - Monitor I/Os and vitals  - DVT ppx: [facility]  - Dispo planning     A team surgery [id number]

## 2020-10-25 NOTE — PROGRESS NOTE ADULT - SUBJECTIVE AND OBJECTIVE BOX
Surgery Progress Note    SUBJECTIVE: Pt seen and examined at bedside. Episodes of persistent epigastric "burning" sensation similar to heartburn, mildly relieved by Pepcid and Maalox. EKG similar to previous. Pt given vanilla ice cream with improvement in discomfort. This AM, patient comfortable and in no-apparent distress. Tolerating regular diet without nausea or vomiting. Ostomy output thickening and decreasing since starting imodium with meals yesterday.    Vital Signs Last 24 Hrs  T(C): 36.4 (24 Oct 2020 21:37), Max: 36.9 (24 Oct 2020 10:21)  T(F): 97.5 (24 Oct 2020 21:37), Max: 98.4 (24 Oct 2020 10:21)  HR: 63 (24 Oct 2020 21:56) (56 - 80)  BP: 156/81 (24 Oct 2020 21:37) (130/77 - 159/81)  BP(mean): --  RR: 16 (24 Oct 2020 21:37) (16 - 18)  SpO2: 97% (24 Oct 2020 21:37) (97% - 100%)    Physical Exam:  General Appearance: Appears well, NAD  Respiratory: No labored breathing  CV: Pulse regularly present  Abdomen: Softly distended, nontender; MIL with ss output, midline incision with staples c/d/i; ostomy pink, red rubber in place, gas + liquid stool in bag    LABS:                        9.7    9.18  )-----------( 298      ( 24 Oct 2020 07:00 )             30.3     10-24    141  |  103  |  7   ----------------------------<  100<H>  3.3<L>   |  27  |  0.51    Ca    8.8      24 Oct 2020 07:00  Phos  4.3     10-24  Mg     2.1     10-24      PT/INR - ( 24 Oct 2020 07:00 )   PT: 11.6 SEC;   INR: 1.02                INs and OUTs:    10-23-20 @ 07:01  -  10-24-20 @ 07:00  --------------------------------------------------------  IN: 400 mL / OUT: 2487.5 mL / NET: -2087.5 mL    10-24-20 @ 07:01  -  10-25-20 @ 01:40  --------------------------------------------------------  IN: 100 mL / OUT: 607.5 mL / NET: -507.5 mL

## 2020-10-25 NOTE — DISCHARGE NOTE NURSING/CASE MANAGEMENT/SOCIAL WORK - NSDCPNINST_GEN_ALL_CORE
Do not scrub your incision sites or remove the dressings.  Do not use any  powder, lotion or cream on or near incision sites. Watch for signs of infection; redness, swelling, fever, chills or heat, temperature of 100.4 or higher or pain unrelieved by pain medication, report such symptoms to the MD. No driving while taking pain medication, it causes drowsiness & constipation. Drink 6-8 glasses of fluids daily to promote hydration. If unable to tolerate diet and experiencing nausea/vomiting, call your provider. No heavy lifting, pulling or pushing heavy objects. Follow up with the MD.

## 2020-10-25 NOTE — DISCHARGE NOTE NURSING/CASE MANAGEMENT/SOCIAL WORK - PATIENT PORTAL LINK FT
You can access the FollowMyHealth Patient Portal offered by [facility] by registering at the following website: [email or web address]. By joining [email or web address]’s FollowMyHealth portal, you will also be able to view your health information using other applications (apps) compatible with our system.

## 2020-10-27 PROBLEM — K57.92 DIVERTICULITIS OF INTESTINE, PART UNSPECIFIED, WITHOUT PERFORATION OR ABSCESS WITHOUT BLEEDING: Chronic | Status: ACTIVE | Noted: 2020-10-12

## 2020-10-29 ENCOUNTER — APPOINTMENT (OUTPATIENT)
Dept: COLORECTAL SURGERY | Facility: CLINIC | Age: 60
End: 2020-10-29
Payer: COMMERCIAL

## 2020-10-29 VITALS
HEART RATE: 72 BPM | TEMPERATURE: 98.4 F | RESPIRATION RATE: 14 BRPM | DIASTOLIC BLOOD PRESSURE: 80 MMHG | OXYGEN SATURATION: 97 % | SYSTOLIC BLOOD PRESSURE: 156 MMHG

## 2020-10-29 PROCEDURE — 99024 POSTOP FOLLOW-UP VISIT: CPT

## 2020-10-29 NOTE — ASSESSMENT
[FreeTextEntry1] : Diverticulitis\par -Patient progressing well\par -Follow up in one week for remaining staple removal\par -Likely to remove drain at that time output approximately 30 cc per day currently\par -Rubber catheter removed\par -All questions answered

## 2020-10-29 NOTE — HISTORY OF PRESENT ILLNESS
[FreeTextEntry1] : Status post Ernestine's reversal, colostomy reversal and ileostomy creation. Patient progressing well. Tolerating diet. Stoma functioning approximately 6-800 cc per day on Imodium. No fevers or chills no nausea or vomiting.

## 2020-11-05 ENCOUNTER — APPOINTMENT (OUTPATIENT)
Dept: COLORECTAL SURGERY | Facility: CLINIC | Age: 60
End: 2020-11-05
Payer: COMMERCIAL

## 2020-11-05 VITALS — TEMPERATURE: 98.3 F

## 2020-11-05 DIAGNOSIS — K56.609 UNSPECIFIED INTESTINAL OBSTRUCTION, UNSPECIFIED AS TO PARTIAL VERSUS COMPLETE OBSTRUCTION: ICD-10-CM

## 2020-11-05 PROCEDURE — 99024 POSTOP FOLLOW-UP VISIT: CPT

## 2020-11-05 NOTE — ASSESSMENT
[FreeTextEntry1] : Status post colon resection x2 with ileostomy\par -Patient progressing well\par -Data staples removed\par -Continue draining to output less than 15 cc per day. Patient with large potential space due to flap raising During midline hernia repair\par -followup in 2 weeks

## 2020-11-05 NOTE — HISTORY OF PRESENT ILLNESS
[FreeTextEntry1] : Status post low anterior resection and colostomy reversal with ileostomy. Patient progressing well. Tolerating diet. Drain output still 20-30 cc per day stoma functioning

## 2020-11-19 ENCOUNTER — APPOINTMENT (OUTPATIENT)
Dept: COLORECTAL SURGERY | Facility: CLINIC | Age: 60
End: 2020-11-19
Payer: COMMERCIAL

## 2020-11-19 PROCEDURE — 99024 POSTOP FOLLOW-UP VISIT: CPT

## 2020-11-19 NOTE — HISTORY OF PRESENT ILLNESS
[FreeTextEntry1] : Status post colostomy reversal sigmoid colon resection and ileostomy creation. Patient progressing well. Tolerating diet stoma functioning increased Stoma output as patient discontinued ImodiumOtherwise without complaint drain has almost fallen out completely

## 2020-12-15 ENCOUNTER — APPOINTMENT (OUTPATIENT)
Dept: COLORECTAL SURGERY | Facility: CLINIC | Age: 60
End: 2020-12-15
Payer: COMMERCIAL

## 2020-12-15 VITALS — TEMPERATURE: 97.2 F

## 2020-12-15 PROCEDURE — 99072 ADDL SUPL MATRL&STAF TM PHE: CPT

## 2020-12-15 PROCEDURE — 45330 DIAGNOSTIC SIGMOIDOSCOPY: CPT | Mod: 58

## 2021-01-07 ENCOUNTER — APPOINTMENT (OUTPATIENT)
Dept: RADIOLOGY | Facility: HOSPITAL | Age: 61
End: 2021-01-07
Payer: COMMERCIAL

## 2021-01-07 ENCOUNTER — RESULT REVIEW (OUTPATIENT)
Age: 61
End: 2021-01-07

## 2021-01-07 ENCOUNTER — OUTPATIENT (OUTPATIENT)
Dept: OUTPATIENT SERVICES | Facility: HOSPITAL | Age: 61
LOS: 1 days | End: 2021-01-07

## 2021-01-07 DIAGNOSIS — Z90.89 ACQUIRED ABSENCE OF OTHER ORGANS: Chronic | ICD-10-CM

## 2021-01-07 DIAGNOSIS — Z90.49 ACQUIRED ABSENCE OF OTHER SPECIFIED PARTS OF DIGESTIVE TRACT: Chronic | ICD-10-CM

## 2021-01-07 DIAGNOSIS — Z98.890 OTHER SPECIFIED POSTPROCEDURAL STATES: Chronic | ICD-10-CM

## 2021-01-07 DIAGNOSIS — Z90.710 ACQUIRED ABSENCE OF BOTH CERVIX AND UTERUS: Chronic | ICD-10-CM

## 2021-01-07 DIAGNOSIS — K57.32 DIVERTICULITIS OF LARGE INTESTINE WITHOUT PERFORATION OR ABSCESS WITHOUT BLEEDING: ICD-10-CM

## 2021-01-07 DIAGNOSIS — Z93.3 COLOSTOMY STATUS: Chronic | ICD-10-CM

## 2021-01-07 PROCEDURE — 74270 X-RAY XM COLON 1CNTRST STD: CPT | Mod: 26

## 2021-01-13 ENCOUNTER — OUTPATIENT (OUTPATIENT)
Dept: OUTPATIENT SERVICES | Facility: HOSPITAL | Age: 61
LOS: 1 days | End: 2021-01-13

## 2021-01-13 VITALS
SYSTOLIC BLOOD PRESSURE: 138 MMHG | DIASTOLIC BLOOD PRESSURE: 76 MMHG | HEART RATE: 92 BPM | RESPIRATION RATE: 16 BRPM | HEIGHT: 59 IN | TEMPERATURE: 99 F | OXYGEN SATURATION: 97 % | WEIGHT: 244.05 LBS

## 2021-01-13 DIAGNOSIS — Z98.890 OTHER SPECIFIED POSTPROCEDURAL STATES: Chronic | ICD-10-CM

## 2021-01-13 DIAGNOSIS — Z90.89 ACQUIRED ABSENCE OF OTHER ORGANS: Chronic | ICD-10-CM

## 2021-01-13 DIAGNOSIS — Z93.3 COLOSTOMY STATUS: Chronic | ICD-10-CM

## 2021-01-13 DIAGNOSIS — K57.32 DIVERTICULITIS OF LARGE INTESTINE WITHOUT PERFORATION OR ABSCESS WITHOUT BLEEDING: ICD-10-CM

## 2021-01-13 DIAGNOSIS — Z90.49 ACQUIRED ABSENCE OF OTHER SPECIFIED PARTS OF DIGESTIVE TRACT: Chronic | ICD-10-CM

## 2021-01-13 DIAGNOSIS — R94.31 ABNORMAL ELECTROCARDIOGRAM [ECG] [EKG]: ICD-10-CM

## 2021-01-13 DIAGNOSIS — Z98.890 OTHER SPECIFIED POSTPROCEDURAL STATES: ICD-10-CM

## 2021-01-13 DIAGNOSIS — Z90.710 ACQUIRED ABSENCE OF BOTH CERVIX AND UTERUS: Chronic | ICD-10-CM

## 2021-01-13 DIAGNOSIS — E66.01 MORBID (SEVERE) OBESITY DUE TO EXCESS CALORIES: ICD-10-CM

## 2021-01-13 LAB
ALBUMIN SERPL ELPH-MCNC: 4.5 G/DL — SIGNIFICANT CHANGE UP (ref 3.3–5)
ALP SERPL-CCNC: 135 U/L — HIGH (ref 40–120)
ALT FLD-CCNC: 27 U/L — SIGNIFICANT CHANGE UP (ref 4–33)
ANION GAP SERPL CALC-SCNC: 13 MMOL/L — SIGNIFICANT CHANGE UP (ref 7–14)
AST SERPL-CCNC: 21 U/L — SIGNIFICANT CHANGE UP (ref 4–32)
BILIRUB SERPL-MCNC: 0.4 MG/DL — SIGNIFICANT CHANGE UP (ref 0.2–1.2)
BUN SERPL-MCNC: 15 MG/DL — SIGNIFICANT CHANGE UP (ref 7–23)
CALCIUM SERPL-MCNC: 9.6 MG/DL — SIGNIFICANT CHANGE UP (ref 8.4–10.5)
CHLORIDE SERPL-SCNC: 100 MMOL/L — SIGNIFICANT CHANGE UP (ref 98–107)
CO2 SERPL-SCNC: 25 MMOL/L — SIGNIFICANT CHANGE UP (ref 22–31)
CREAT SERPL-MCNC: 0.9 MG/DL — SIGNIFICANT CHANGE UP (ref 0.5–1.3)
GLUCOSE SERPL-MCNC: 107 MG/DL — HIGH (ref 70–99)
HCT VFR BLD CALC: 44.8 % — SIGNIFICANT CHANGE UP (ref 34.5–45)
HGB BLD-MCNC: 13.8 G/DL — SIGNIFICANT CHANGE UP (ref 11.5–15.5)
MCHC RBC-ENTMCNC: 27.3 PG — SIGNIFICANT CHANGE UP (ref 27–34)
MCHC RBC-ENTMCNC: 30.8 GM/DL — LOW (ref 32–36)
MCV RBC AUTO: 88.5 FL — SIGNIFICANT CHANGE UP (ref 80–100)
NRBC # BLD: 0 /100 WBCS — SIGNIFICANT CHANGE UP
NRBC # FLD: 0 K/UL — SIGNIFICANT CHANGE UP
PLATELET # BLD AUTO: 325 K/UL — SIGNIFICANT CHANGE UP (ref 150–400)
POTASSIUM SERPL-MCNC: 3.7 MMOL/L — SIGNIFICANT CHANGE UP (ref 3.5–5.3)
POTASSIUM SERPL-SCNC: 3.7 MMOL/L — SIGNIFICANT CHANGE UP (ref 3.5–5.3)
PROT SERPL-MCNC: 7.9 G/DL — SIGNIFICANT CHANGE UP (ref 6–8.3)
RBC # BLD: 5.06 M/UL — SIGNIFICANT CHANGE UP (ref 3.8–5.2)
RBC # FLD: 14.5 % — SIGNIFICANT CHANGE UP (ref 10.3–14.5)
SODIUM SERPL-SCNC: 138 MMOL/L — SIGNIFICANT CHANGE UP (ref 135–145)
WBC # BLD: 8.72 K/UL — SIGNIFICANT CHANGE UP (ref 3.8–10.5)
WBC # FLD AUTO: 8.72 K/UL — SIGNIFICANT CHANGE UP (ref 3.8–10.5)

## 2021-01-13 NOTE — H&P PST ADULT - NSICDXPROBLEM_GEN_ALL_CORE_FT
PROBLEM DIAGNOSES  Problem: H/O ileostomy  Assessment and Plan: reverse of ileostomy   CBC CMP T&S   EKG 10/2020 on chart  Preop instructions and antibacterial soap given and explained (verbal and written), with teach back.   Pt with URI on day 1/5 Nitrofurantoin. Pt will see PMD for pre-op eval prior to surgery  covid test scheduled 1/22/21 per pt    Problem: Abnormal EKG  Assessment and Plan: Faxed to  PMD, comparison requested on chart.  Pt to obtain pre-op eval.     Problem: Morbid obesity  Assessment and Plan: LARISSA precautions, OR booking informed

## 2021-01-13 NOTE — H&P PST ADULT - ASSESSMENT
59 yo female with hx of diverticulitis, Pt is s/p partial colectomy, creation of colostomy due to diverticulitis.  s/p open lower anterior resection incisional hernia repair with abdominal wall reconstruction 10/2020.  Now  for reverse of ileostomy

## 2021-01-13 NOTE — H&P PST ADULT - NSICDXPASTSURGICALHX_GEN_ALL_CORE_FT
PAST SURGICAL HISTORY:  Colostomy present     H/O hernia repair 10/2020    History of appendectomy     History of lumpectomy of both breasts 3/2020    History of partial colectomy 7/2020    History of tonsillectomy     S/P laparoscopic cholecystectomy 1999    S/P MARIELLA (total abdominal hysterectomy) 2000, due to "infection"

## 2021-01-13 NOTE — H&P PST ADULT - GASTROINTESTINAL COMMENTS
ileostomy, drains liquid stool to drainage bag.  , ileostomy intact, to drainage bag.  Draining well.

## 2021-01-13 NOTE — H&P PST ADULT - HISTORY OF PRESENT ILLNESS
61 yo female with hx of diverticulitis, Pt is s/p partial colectomy, creation of colostomy diverticulitis.   s/p open lower anterior resection incisional hernia repair with abdominal wall reconstruction 10/2020.  Scheduled for reverse of ileostomy  59 yo female with hx of diverticulitis, Pt is s/p partial colectomy, creation of colostomy due to diverticulitis.  s/p open lower anterior resection incisional hernia repair with abdominal wall reconstruction 10/2020.  Now  for reverse of ileostomy

## 2021-01-13 NOTE — H&P PST ADULT - NSICDXPASTMEDICALHX_GEN_ALL_CORE_FT
PAST MEDICAL HISTORY:  Abdominal hernia     Breast cancer Bilateral, s/p RT. Stopped letrozole Aug/Sept 2020    Diverticulitis     Diverticulosis      PAST MEDICAL HISTORY:  Abdominal hernia     Breast cancer Bilateral, s/p RT.    Diverticulitis     Diverticulosis     Morbid obesity

## 2021-01-16 DIAGNOSIS — Z01.818 ENCOUNTER FOR OTHER PREPROCEDURAL EXAMINATION: ICD-10-CM

## 2021-01-22 ENCOUNTER — APPOINTMENT (OUTPATIENT)
Dept: DISASTER EMERGENCY | Facility: CLINIC | Age: 61
End: 2021-01-22

## 2021-01-22 NOTE — ASU PATIENT PROFILE, ADULT - PMH
Abdominal hernia    Breast cancer  Bilateral, s/p RT.  Diverticulitis    Diverticulosis    Morbid obesity

## 2021-01-24 ENCOUNTER — TRANSCRIPTION ENCOUNTER (OUTPATIENT)
Age: 61
End: 2021-01-24

## 2021-01-24 LAB — SARS-COV-2 N GENE NPH QL NAA+PROBE: NOT DETECTED

## 2021-01-25 ENCOUNTER — RESULT REVIEW (OUTPATIENT)
Age: 61
End: 2021-01-25

## 2021-01-25 ENCOUNTER — APPOINTMENT (OUTPATIENT)
Dept: COLORECTAL SURGERY | Facility: HOSPITAL | Age: 61
End: 2021-01-25
Payer: COMMERCIAL

## 2021-01-25 ENCOUNTER — INPATIENT (INPATIENT)
Facility: HOSPITAL | Age: 61
LOS: 1 days | Discharge: HOME | End: 2021-01-27
Attending: STUDENT IN AN ORGANIZED HEALTH CARE EDUCATION/TRAINING PROGRAM | Admitting: STUDENT IN AN ORGANIZED HEALTH CARE EDUCATION/TRAINING PROGRAM
Payer: COMMERCIAL

## 2021-01-25 VITALS
TEMPERATURE: 98 F | DIASTOLIC BLOOD PRESSURE: 64 MMHG | RESPIRATION RATE: 16 BRPM | OXYGEN SATURATION: 98 % | HEIGHT: 59 IN | WEIGHT: 244.05 LBS | HEART RATE: 61 BPM | SYSTOLIC BLOOD PRESSURE: 143 MMHG

## 2021-01-25 DIAGNOSIS — Z90.49 ACQUIRED ABSENCE OF OTHER SPECIFIED PARTS OF DIGESTIVE TRACT: Chronic | ICD-10-CM

## 2021-01-25 DIAGNOSIS — Z98.890 OTHER SPECIFIED POSTPROCEDURAL STATES: Chronic | ICD-10-CM

## 2021-01-25 DIAGNOSIS — K57.32 DIVERTICULITIS OF LARGE INTESTINE WITHOUT PERFORATION OR ABSCESS WITHOUT BLEEDING: ICD-10-CM

## 2021-01-25 DIAGNOSIS — Z93.3 COLOSTOMY STATUS: Chronic | ICD-10-CM

## 2021-01-25 DIAGNOSIS — Z90.710 ACQUIRED ABSENCE OF BOTH CERVIX AND UTERUS: Chronic | ICD-10-CM

## 2021-01-25 DIAGNOSIS — Z90.89 ACQUIRED ABSENCE OF OTHER ORGANS: Chronic | ICD-10-CM

## 2021-01-25 PROCEDURE — 44625 REPAIR BOWEL OPENING: CPT

## 2021-01-25 PROCEDURE — 88302 TISSUE EXAM BY PATHOLOGIST: CPT | Mod: 26

## 2021-01-25 PROCEDURE — 88307 TISSUE EXAM BY PATHOLOGIST: CPT | Mod: 26

## 2021-01-25 RX ORDER — NITROFURANTOIN MACROCRYSTAL 50 MG
100 CAPSULE ORAL
Refills: 0 | Status: DISCONTINUED | OUTPATIENT
Start: 2021-01-25 | End: 2021-01-27

## 2021-01-25 RX ORDER — HEPARIN SODIUM 5000 [USP'U]/ML
5000 INJECTION INTRAVENOUS; SUBCUTANEOUS EVERY 12 HOURS
Refills: 0 | Status: DISCONTINUED | OUTPATIENT
Start: 2021-01-25 | End: 2021-01-26

## 2021-01-25 RX ORDER — NALOXONE HYDROCHLORIDE 4 MG/.1ML
0.1 SPRAY NASAL
Refills: 0 | Status: DISCONTINUED | OUTPATIENT
Start: 2021-01-25 | End: 2021-01-26

## 2021-01-25 RX ORDER — SODIUM CHLORIDE 9 MG/ML
1000 INJECTION, SOLUTION INTRAVENOUS
Refills: 0 | Status: DISCONTINUED | OUTPATIENT
Start: 2021-01-25 | End: 2021-01-27

## 2021-01-25 RX ORDER — HYDROMORPHONE HYDROCHLORIDE 2 MG/ML
0.5 INJECTION INTRAMUSCULAR; INTRAVENOUS; SUBCUTANEOUS
Refills: 0 | Status: DISCONTINUED | OUTPATIENT
Start: 2021-01-25 | End: 2021-01-25

## 2021-01-25 RX ORDER — GABAPENTIN 400 MG/1
600 CAPSULE ORAL ONCE
Refills: 0 | Status: COMPLETED | OUTPATIENT
Start: 2021-01-25 | End: 2021-01-25

## 2021-01-25 RX ORDER — CELECOXIB 200 MG/1
200 CAPSULE ORAL ONCE
Refills: 0 | Status: COMPLETED | OUTPATIENT
Start: 2021-01-25 | End: 2021-01-25

## 2021-01-25 RX ORDER — SODIUM CHLORIDE 9 MG/ML
1000 INJECTION, SOLUTION INTRAVENOUS
Refills: 0 | Status: DISCONTINUED | OUTPATIENT
Start: 2021-01-25 | End: 2021-01-25

## 2021-01-25 RX ORDER — OXYCODONE HYDROCHLORIDE 5 MG/1
5 TABLET ORAL EVERY 4 HOURS
Refills: 0 | Status: DISCONTINUED | OUTPATIENT
Start: 2021-01-25 | End: 2021-01-25

## 2021-01-25 RX ORDER — ACETAMINOPHEN 500 MG
1000 TABLET ORAL EVERY 6 HOURS
Refills: 0 | Status: DISCONTINUED | OUTPATIENT
Start: 2021-01-25 | End: 2021-01-26

## 2021-01-25 RX ORDER — HYDROMORPHONE HYDROCHLORIDE 2 MG/ML
0.25 INJECTION INTRAMUSCULAR; INTRAVENOUS; SUBCUTANEOUS EVERY 4 HOURS
Refills: 0 | Status: DISCONTINUED | OUTPATIENT
Start: 2021-01-25 | End: 2021-01-25

## 2021-01-25 RX ORDER — HYDROMORPHONE HYDROCHLORIDE 2 MG/ML
1 INJECTION INTRAMUSCULAR; INTRAVENOUS; SUBCUTANEOUS
Refills: 0 | Status: DISCONTINUED | OUTPATIENT
Start: 2021-01-25 | End: 2021-01-25

## 2021-01-25 RX ORDER — ONDANSETRON 8 MG/1
4 TABLET, FILM COATED ORAL EVERY 6 HOURS
Refills: 0 | Status: DISCONTINUED | OUTPATIENT
Start: 2021-01-25 | End: 2021-01-26

## 2021-01-25 RX ADMIN — HEPARIN SODIUM 5000 UNIT(S): 5000 INJECTION INTRAVENOUS; SUBCUTANEOUS at 18:20

## 2021-01-25 RX ADMIN — Medication 100 MILLIGRAM(S): at 18:20

## 2021-01-25 RX ADMIN — SODIUM CHLORIDE 40 MILLILITER(S): 9 INJECTION, SOLUTION INTRAVENOUS at 10:36

## 2021-01-25 RX ADMIN — GABAPENTIN 600 MILLIGRAM(S): 400 CAPSULE ORAL at 07:14

## 2021-01-25 RX ADMIN — Medication 400 MILLIGRAM(S): at 15:41

## 2021-01-25 RX ADMIN — CELECOXIB 200 MILLIGRAM(S): 200 CAPSULE ORAL at 07:13

## 2021-01-25 RX ADMIN — ONDANSETRON 4 MILLIGRAM(S): 8 TABLET, FILM COATED ORAL at 19:29

## 2021-01-25 RX ADMIN — Medication 400 MILLIGRAM(S): at 21:34

## 2021-01-25 NOTE — CHART NOTE - NSCHARTNOTEFT_GEN_A_CORE
Denise Toro Patient Age: 61 year old  MESSAGE: Interpreting service used: No      IM/FP- Refused to provide any further information-     Caller is requesting to speak to RN- Darlene- Connect call to Call Center Triage queue- Route message Call Center Triage ([phone]).     WEIGHT AND HEIGHT:   Wt Readings from Last 1 Encounters:   11/07/20 88.9 kg (196 lb)     Ht Readings from Last 1 Encounters:   11/03/20 5' 6\" (1.676 m)     BMI Readings from Last 1 Encounters:   11/07/20 31.64 kg/m²       ALLERGIES:  Patient has no known allergies.  Current Outpatient Medications   Medication   • DULoxetine (CYMBALTA) 30 MG capsule   • nabumetone (RELAFEN) 500 MG tablet   • cyclobenzaprine (FLEXERIL) 10 MG tablet   • lisinopril-hydroCHLOROthiazide (ZESTORETIC) 20-12.5 MG per tablet     No current facility-administered medications for this visit.      PHARMACY to use: Ask patient.          Pharmacy preference(s) on file:   [id number] - [place] - [address] AT [facility] & [place]  [address] [place] [place] [address]  Phone: [phone] Fax: [phone]      CALL BACK INFO: Ok to leave response (including medical information) on answering machine  ROUTING: Patient's physician/staff        PCP: Stevan Lockhart MD         INS: Payor: [employer] [place] / Plan: [facility] [place] / Product Type: [place] Risk   PATIENT ADDRESS:  [address]  [place] [address]     Surgery Post-Op Note    Pre-Op Dx: Diverticulitis s/p ileostomy  Procedure: Closure, ileostomy  Surgeon: Dr. Noel    SUBJECTIVE:  Pt seen and examined at the bedside. Patient states she feels tired, but feels well otherwise. Denies abdominal pain. Tolerated sips of clears. Denies fever, chills, CP, dyspnea, SOB,  Denies F/C/N/V. Pain controlled with medication.     OBJECTIVE:  Vital Signs Last 24 Hrs  T(C): 36.4 (25 Jan 2021 12:00), Max: 36.7 (25 Jan 2021 06:54)  T(F): 97.5 (25 Jan 2021 12:00), Max: 98.1 (25 Jan 2021 06:54)  HR: 57 (25 Jan 2021 14:00) (51 - 70)  BP: 110/48 (25 Jan 2021 14:00) (104/87 - 143/64)  BP(mean): 62 (25 Jan 2021 14:00) (62 - 91)  RR: 12 (25 Jan 2021 14:00) (12 - 21)  SpO2: 98% (25 Jan 2021 14:00) (96% - 100%)    Physical Exam:  General: NAD, resting comfortably in bed  Neuro: A/O x 3, no focal deficits  Pulmonary: Nonlabored breathing, no respiratory distress  Cardiovascular: NSR  Abdominal: soft, ATTP. ND  Incision: C/D/I   Drains:   Extremities: WWP    LABS:            CAPILLARY BLOOD GLUCOSE                IMAGING:    ASSESSMENT:60y Female now 4hours s/p     PLAN:  - Pain control  - Encourage IS  - Nausea control PRN  - Monitor vitals  - Diet: IVF  - Monitor I+Os  - OOB/ Ambulate  - DVT ppx:      Team Surgery  [id number] Surgery Post-Op Note    Pre-Op Dx: Diverticulitis s/p ileostomy  Procedure: Closure, ileostomy  Surgeon: Dr. Noel    SUBJECTIVE:  Pt seen and examined at the bedside. Patient states she feels tired, but feels well otherwise. Denies abdominal pain. Tolerating sips of clears. Denies fever, chills, CP, dyspnea, SOB, nausea, vomiting. Making adequate urine via Fragoso catheter.     OBJECTIVE:  Vital Signs Last 24 Hrs  T(C): 36.4 (25 Jan 2021 12:00), Max: 36.7 (25 Jan 2021 06:54)  T(F): 97.5 (25 Jan 2021 12:00), Max: 98.1 (25 Jan 2021 06:54)  HR: 57 (25 Jan 2021 14:00) (51 - 70)  BP: 110/48 (25 Jan 2021 14:00) (104/87 - 143/64)  BP(mean): 62 (25 Jan 2021 14:00) (62 - 91)  RR: 12 (25 Jan 2021 14:00) (12 - 21)  SpO2: 98% (25 Jan 2021 14:00) (96% - 100%)    Physical Exam:  General: NAD, resting comfortably in bed  Neuro: A/O x 3, no focal deficits  Pulmonary: Nonlabored breathing, no respiratory distress  Cardiovascular: NSR, pulse regularly present  Abdominal: obese abdomen, soft, nondistended, appropriately TTP R-kenrick abdomen; no rebound no guarding; Incision C/D/I with minimal SS strikethrough; healed midline laparotomy scar noted  Extremities: WWP, 2+ pedal pulses b/l      ASSESSMENT:60y F PMH obesity, b/l breast cancer s/p RT, and diverticulitis s/p loop transverse colostomy 1/20, s/p ex-lap, partial colectomy 7/20; s/p ex lap, NATALY, transverse loop colostomy reversal, ventral hernia repair, and loop ileostomy creation 10/20. She presented to [facility] today and is now s/p ileostomy reversal, recovering appropriately.     PLAN:  - ERAS protocol  - Pain control with epidural, d/c tomorrow   - Continuous pulse ox with epidural in place  - Encourage IS while in bed  - Nausea control PRN  - Diet: Clears  - Continue IVF @40 until tolerating enough PO  - Monitor I+Os  - DVT ppx: VAUGHN OLMOS Team Surgery  [id number]

## 2021-01-25 NOTE — ASU PREOP CHECKLIST - COMMENTS
Famotidine anastrozole and cefdinir  with sip of water at 4am Clear ensure at 5am IN ASU Celebrex and gabapentin at 7:10 am

## 2021-01-26 LAB
ANION GAP SERPL CALC-SCNC: 13 MMOL/L — SIGNIFICANT CHANGE UP (ref 7–14)
APTT BLD: 26.6 SEC — LOW (ref 27–36.3)
BUN SERPL-MCNC: 8 MG/DL — SIGNIFICANT CHANGE UP (ref 7–23)
CALCIUM SERPL-MCNC: 8.6 MG/DL — SIGNIFICANT CHANGE UP (ref 8.4–10.5)
CHLORIDE SERPL-SCNC: 106 MMOL/L — SIGNIFICANT CHANGE UP (ref 98–107)
CO2 SERPL-SCNC: 22 MMOL/L — SIGNIFICANT CHANGE UP (ref 22–31)
CREAT SERPL-MCNC: 0.59 MG/DL — SIGNIFICANT CHANGE UP (ref 0.5–1.3)
GLUCOSE SERPL-MCNC: 139 MG/DL — HIGH (ref 70–99)
HCT VFR BLD CALC: 41.1 % — SIGNIFICANT CHANGE UP (ref 34.5–45)
HGB BLD-MCNC: 12.7 G/DL — SIGNIFICANT CHANGE UP (ref 11.5–15.5)
INR BLD: 0.95 RATIO — SIGNIFICANT CHANGE UP (ref 0.88–1.16)
MAGNESIUM SERPL-MCNC: 1.9 MG/DL — SIGNIFICANT CHANGE UP (ref 1.6–2.6)
MCHC RBC-ENTMCNC: 27.2 PG — SIGNIFICANT CHANGE UP (ref 27–34)
MCHC RBC-ENTMCNC: 30.9 GM/DL — LOW (ref 32–36)
MCV RBC AUTO: 88 FL — SIGNIFICANT CHANGE UP (ref 80–100)
NRBC # BLD: 0 /100 WBCS — SIGNIFICANT CHANGE UP
NRBC # FLD: 0 K/UL — SIGNIFICANT CHANGE UP
PHOSPHATE SERPL-MCNC: 4.7 MG/DL — HIGH (ref 2.5–4.5)
PLATELET # BLD AUTO: 268 K/UL — SIGNIFICANT CHANGE UP (ref 150–400)
POTASSIUM SERPL-MCNC: 4.2 MMOL/L — SIGNIFICANT CHANGE UP (ref 3.5–5.3)
POTASSIUM SERPL-SCNC: 4.2 MMOL/L — SIGNIFICANT CHANGE UP (ref 3.5–5.3)
PROTHROM AB SERPL-ACNC: 10.9 SEC — SIGNIFICANT CHANGE UP (ref 10.6–13.6)
RBC # BLD: 4.67 M/UL — SIGNIFICANT CHANGE UP (ref 3.8–5.2)
RBC # FLD: 14.7 % — HIGH (ref 10.3–14.5)
SODIUM SERPL-SCNC: 141 MMOL/L — SIGNIFICANT CHANGE UP (ref 135–145)
WBC # BLD: 13.09 K/UL — HIGH (ref 3.8–10.5)
WBC # FLD AUTO: 13.09 K/UL — HIGH (ref 3.8–10.5)

## 2021-01-26 RX ORDER — ACETAMINOPHEN 500 MG
1000 TABLET ORAL EVERY 6 HOURS
Refills: 0 | Status: DISCONTINUED | OUTPATIENT
Start: 2021-01-26 | End: 2021-01-26

## 2021-01-26 RX ORDER — ANASTROZOLE 1 MG/1
1 TABLET ORAL DAILY
Refills: 0 | Status: DISCONTINUED | OUTPATIENT
Start: 2021-01-26 | End: 2021-01-27

## 2021-01-26 RX ORDER — ROPIVACAINE HCL/PF 5 MG/ML
200 AMPUL (ML) INJECTION
Refills: 0 | Status: DISCONTINUED | OUTPATIENT
Start: 2021-01-26 | End: 2021-01-26

## 2021-01-26 RX ORDER — KETOROLAC TROMETHAMINE 30 MG/ML
15 SYRINGE (ML) INJECTION EVERY 6 HOURS
Refills: 0 | Status: DISCONTINUED | OUTPATIENT
Start: 2021-01-26 | End: 2021-01-27

## 2021-01-26 RX ORDER — ONDANSETRON 8 MG/1
4 TABLET, FILM COATED ORAL EVERY 6 HOURS
Refills: 0 | Status: DISCONTINUED | OUTPATIENT
Start: 2021-01-26 | End: 2021-01-27

## 2021-01-26 RX ORDER — NALOXONE HYDROCHLORIDE 4 MG/.1ML
0.1 SPRAY NASAL
Refills: 0 | Status: DISCONTINUED | OUTPATIENT
Start: 2021-01-26 | End: 2021-01-27

## 2021-01-26 RX ORDER — ROPIVACAINE HCL/PF 5 MG/ML
5 AMPUL (ML) INJECTION
Refills: 0 | Status: DISCONTINUED | OUTPATIENT
Start: 2021-01-26 | End: 2021-01-26

## 2021-01-26 RX ORDER — HEPARIN SODIUM 5000 [USP'U]/ML
5000 INJECTION INTRAVENOUS; SUBCUTANEOUS EVERY 8 HOURS
Refills: 0 | Status: DISCONTINUED | OUTPATIENT
Start: 2021-01-26 | End: 2021-01-27

## 2021-01-26 RX ORDER — ACETAMINOPHEN 500 MG
1000 TABLET ORAL EVERY 6 HOURS
Refills: 0 | Status: COMPLETED | OUTPATIENT
Start: 2021-01-26 | End: 2021-01-27

## 2021-01-26 RX ADMIN — Medication 400 MILLIGRAM(S): at 16:56

## 2021-01-26 RX ADMIN — SODIUM CHLORIDE 40 MILLILITER(S): 9 INJECTION, SOLUTION INTRAVENOUS at 22:43

## 2021-01-26 RX ADMIN — Medication 100 MILLIGRAM(S): at 12:17

## 2021-01-26 RX ADMIN — Medication 400 MILLIGRAM(S): at 22:44

## 2021-01-26 RX ADMIN — HEPARIN SODIUM 5000 UNIT(S): 5000 INJECTION INTRAVENOUS; SUBCUTANEOUS at 07:03

## 2021-01-26 RX ADMIN — Medication 100 MILLIGRAM(S): at 22:43

## 2021-01-26 RX ADMIN — SODIUM CHLORIDE 40 MILLILITER(S): 9 INJECTION, SOLUTION INTRAVENOUS at 08:07

## 2021-01-26 RX ADMIN — ANASTROZOLE 1 MILLIGRAM(S): 1 TABLET ORAL at 13:06

## 2021-01-26 NOTE — PROGRESS NOTE ADULT - SUBJECTIVE AND OBJECTIVE BOX
A TEAM SURGERY DAILY PROGRESS NOTE    S: Patient seen and examined at bedside. Patient complains of nausea secondary to epidural. Episodes of emesis. Unable to tolerate liquids yet. Passed a small amount of flatus. No bowel movement.     O: Vital Signs Last 24 Hrs  T(C): 36.6 (26 Jan 2021 08:20), Max: 36.8 (26 Jan 2021 07:00)  T(F): 97.8 (26 Jan 2021 08:20), Max: 98.2 (26 Jan 2021 07:00)  HR: 62 (26 Jan 2021 08:20) (51 - 70)  BP: 120/60 (26 Jan 2021 08:20) (104/87 - 137/53)  BP(mean): 69 (26 Jan 2021 07:00) (57 - 91)  RR: 18 (26 Jan 2021 08:20) (7 - 21)  SpO2: 96% (26 Jan 2021 08:20) (94% - 100%)    I&O's Detail    25 Jan 2021 07:01  -  26 Jan 2021 07:00  --------------------------------------------------------  IN:    IV PiggyBack: 100 mL    Lactated Ringers: 800 mL    Oral Fluid: 1040 mL  Total IN: 1940 mL    OUT:    Estimated Blood Loss (mL): 60 mL    Indwelling Catheter - Urethral (mL): 1320 mL  Total OUT: 1380 mL    Total NET: 560 mL        General: alert and oriented, NAD  Resp: airway patent, respirations unlabored  CVS: regular rate and rhythm  Abdomen: soft, appropriately tender, non-distended  dressing: c/d/i  Extremities: no edema  Skin: warm, dry, appropriate color                          12.7   13.09 )-----------( 268      ( 26 Jan 2021 06:37 )             41.1   01-26    141  |  106  |  8   ----------------------------<  139<H>  4.2   |  22  |  0.59    Ca    8.6      26 Jan 2021 06:37  Phos  4.7     01-26  Mg     1.9     01-26

## 2021-01-26 NOTE — PROGRESS NOTE ADULT - SUBJECTIVE AND OBJECTIVE BOX
Anesthesia Pain Management Service: Day _2_ of Epidural    SUBJECTIVE: Patient doing well with PCEA and no problems.  Pain Scale Score: 0/10  Refer to charted pain scores    THERAPY:  ( )Epidural Bupivacaine 0.0625% and Hydromorphone  		[X ] 10 micrograms/mL	[ ] 5 micrograms/mL  [ ] Epidural Bupivacaine 0.0625% and Fentanyl - 2 micrograms/mL  [x Epidural Ropivacaine 0.1% plain – 1 mg/mL  [ ] Patient Controlled Regional Anesthesia (PCRA) Ropivacaine  		[ ] 0.2%			[ ] 0.1%    Demand dose __0_ lockout _0_ (minutes) Continuous Rate __0_ Total: ___ epidural on hold since this morning secondary to nausea      MEDICATIONS  (STANDING):  acetaminophen  IVPB .. 1000 milliGRAM(s) IV Intermittent every 6 hours  anastrozole 1 milliGRAM(s) Oral daily  heparin   Injectable 5000 Unit(s) SubCutaneous every 8 hours  ketorolac   Injectable 15 milliGRAM(s) IV Push every 6 hours  lactated ringers. 1000 milliLiter(s) (40 mL/Hr) IV Continuous <Continuous>  nitrofurantoin monohydrate/macrocrystals (MACROBID) 100 milliGRAM(s) Oral two times a day    MEDICATIONS  (PRN):  naloxone Injectable 0.1 milliGRAM(s) IV Push every 3 minutes PRN For ANY of the following changes in patient status:  A. RR LESS THAN 10 breaths per minute, B. Oxygen saturation LESS THAN 90%, C. Sedation score of 6  ondansetron Injectable 4 milliGRAM(s) IV Push every 6 hours PRN Nausea      OBJECTIVE:  Patient is lying in bed.     Assessment of Catheter Site:	[ ] Left	[ ] Right  [x ] Epidural 	[ ] Femoral	      [ ] Saphenous   [ ] Supraclavicular   [ ] Other:    [x ] Dressing intact	[x ] Site non-tender	[ x] Site without erythema, discharge, edema  [x ] Epidural tubing and connection checked	[x] Gross neurological exam within normal limits  [X ] Catheter removed – tip intact		[ ] Afebrile	  [ ] Febrile: ___       [ X] see Temp under VS below)    PT/INR - ( 26 Jan 2021 11:54 )   PT: 10.9 sec;   INR: 0.95 ratio         PTT - ( 26 Jan 2021 11:54 )  PTT:26.6 sec                      12.7   13.09 )-----------( 268      ( 26 Jan 2021 06:37 )             41.1     Vital Signs Last 24 Hrs  T(C): 37.1 (01-26-21 @ 12:45), Max: 37.1 (01-26-21 @ 12:45)  T(F): 98.7 (01-26-21 @ 12:45), Max: 98.7 (01-26-21 @ 12:45)  HR: 62 (01-26-21 @ 12:45) (54 - 69)  BP: 121/58 (01-26-21 @ 12:45) (105/48 - 131/48)  BP(mean): 69 (01-26-21 @ 07:00) (57 - 74)  RR: 17 (01-26-21 @ 12:45) (7 - 20)  SpO2: 100% (01-26-21 @ 12:45) (94% - 100%)      Sedation Score:	[x ] Alert	[ ] Drowsy	[ ] Arousable	[ ] Asleep	[ ] Unresponsive    Side Effects:	[x ] None	[ ] Nausea	[ ] Vomiting	[ ] Pruritus  		[ ] Weakness		[ ] Numbness	[ ] Other:    ASSESSMENT/ PLAN:    Therapy to  be:	[ ] Continue   [ X] Discontinued   [ X] Change to prn Analgesics    Documentation and Verification of current medications:  [ X ] Done	[ ] Not done, not eligible, reason:    Comments: PCEA discontinued and changed to IV non-opioid Adjuvant analgesics to be used at this point.

## 2021-01-26 NOTE — PROGRESS NOTE ADULT - SUBJECTIVE AND OBJECTIVE BOX
Anesthesia Pain Management Service: Day _2_ of Epidural    SUBJECTIVE: Patient want epidural removed because the epidural medication makes her feel nauseous.  Pain Scale Score: 0/10   Refer to charted pain scores    THERAPY:  [x ] Epidural Bupivacaine 0.0625% and Hydromorphone  		[ X] 10 micrograms/mL	[ ] 5 micrograms/mL  [ ] Epidural Bupivacaine 0.0625% and Fentanyl - 2 micrograms/mL  [ ] Epidural Ropivacaine 0.1% plain – 1 mg/mL  [ ] Patient Controlled Regional Anesthesia (PCRA) Ropivacaine  		[ ] 0.2%			[ ] 0.1%    Demand dose __3_ lockout __15_ (minutes) Continuous Rate _6__ Total: _118.5___ ml used (in past 24 hours)      MEDICATIONS  (STANDING):  acetaminophen  IVPB .. 1000 milliGRAM(s) IV Intermittent every 6 hours  anastrozole 1 milliGRAM(s) Oral daily  lactated ringers. 1000 milliLiter(s) (40 mL/Hr) IV Continuous <Continuous>  nitrofurantoin monohydrate/macrocrystals (MACROBID) 100 milliGRAM(s) Oral two times a day  ropivacaine 0.2% in 0.9% Sodium Chloride PCEA 200 milliLiter(s) Epidural PCA Continuous    MEDICATIONS  (PRN):  naloxone Injectable 0.1 milliGRAM(s) IV Push every 3 minutes PRN For ANY of the following changes in patient status:  A. RR LESS THAN 10 breaths per minute, B. Oxygen saturation LESS THAN 90%, C. Sedation score of 6  ondansetron Injectable 4 milliGRAM(s) IV Push every 6 hours PRN Nausea  ropivacaine 0.2% in 0.9% Sodium Chloride PCEA Rescue Clinician Bolus 5 milliLiter(s) Epidural every 15 minutes PRN for Pain Score greater than 6      OBJECTIVE:  Patient is sitting up in bed.    Assessment of Catheter Site:	[ ] Left	[ ] Right  [x ] Epidural 	[ ] Femoral	      [ ] Saphenous   [ ] Supraclavicular   [ ] Other:    [x ] Dressing intact	[x ] Site non-tender	[ x] Site without erythema, discharge, edema  [x ] Epidural tubing and connection checked	[x] Gross neurological exam within normal limits  [ ] Catheter removed – tip intact		[ ] Afebrile  	[ ] Febrile: ___   [ X] see Temp under VS below)                          12.7   13.09 )-----------( 268      ( 26 Jan 2021 06:37 )             41.1     Vital Signs Last 24 Hrs  T(C): 36.6 (01-26-21 @ 08:20), Max: 36.8 (01-26-21 @ 07:00)  T(F): 97.8 (01-26-21 @ 08:20), Max: 98.2 (01-26-21 @ 07:00)  HR: 62 (01-26-21 @ 08:20) (51 - 69)  BP: 120/60 (01-26-21 @ 08:20) (105/48 - 131/48)  BP(mean): 69 (01-26-21 @ 07:00) (57 - 74)  RR: 18 (01-26-21 @ 08:20) (7 - 20)  SpO2: 96% (01-26-21 @ 08:20) (94% - 100%)      Sedation Score:	[x ] Alert	[ ] Drowsy	[ ] Arousable	[ ] Asleep	[ ] Unresponsive    Side Effects:	[x ] None	[ ] Nausea	[ ] Vomiting	[ ] Pruritus  		[ ] Weakness		[ ] Numbness	[ ] Other:    ASSESSMENT/ PLAN:    Therapy to  be:	[x ] Continue   [ ] Discontinued   [ ] Change to prn Analgesics    Documentation and Verification of current medications:  [ X ] Done	[ ] Not done, not eligible, reason:    Comments: Epidural on hold, Surgeon wants epidural removed, waiting for COAG labs to result.  Recommend non-opioid adjuvant analgesics to be used when possible and when allowed by primary surgical team.   Anesthesia Pain Management Service: Day _2_ of Epidural    SUBJECTIVE: Patient want epidural removed because the epidural medication makes her feel nauseous.  Pain Scale Score: 0/10   Refer to charted pain scores    THERAPY:  [x ] Epidural Bupivacaine 0.0625% and Hydromorphone  		[ X] 10 micrograms/mL	[ ] 5 micrograms/mL  [ ] Epidural Bupivacaine 0.0625% and Fentanyl - 2 micrograms/mL  [ ] Epidural Ropivacaine 0.1% plain – 1 mg/mL  [ ] Patient Controlled Regional Anesthesia (PCRA) Ropivacaine  		[ ] 0.2%			[ ] 0.1%    Demand dose __3_ lockout __15_ (minutes) Continuous Rate _6__ Total: _118.5___ ml used (in past 24 hours)      MEDICATIONS  (STANDING):  acetaminophen  IVPB .. 1000 milliGRAM(s) IV Intermittent every 6 hours  anastrozole 1 milliGRAM(s) Oral daily  lactated ringers. 1000 milliLiter(s) (40 mL/Hr) IV Continuous <Continuous>  nitrofurantoin monohydrate/macrocrystals (MACROBID) 100 milliGRAM(s) Oral two times a day  ropivacaine 0.2% in 0.9% Sodium Chloride PCEA 200 milliLiter(s) Epidural PCA Continuous    MEDICATIONS  (PRN):  naloxone Injectable 0.1 milliGRAM(s) IV Push every 3 minutes PRN For ANY of the following changes in patient status:  A. RR LESS THAN 10 breaths per minute, B. Oxygen saturation LESS THAN 90%, C. Sedation score of 6  ondansetron Injectable 4 milliGRAM(s) IV Push every 6 hours PRN Nausea  ropivacaine 0.2% in 0.9% Sodium Chloride PCEA Rescue Clinician Bolus 5 milliLiter(s) Epidural every 15 minutes PRN for Pain Score greater than 6      OBJECTIVE:  Patient is sitting up in bed.    Assessment of Catheter Site:	[ ] Left	[ ] Right  [x ] Epidural 	[ ] Femoral	      [ ] Saphenous   [ ] Supraclavicular   [ ] Other:    [x ] Dressing intact	[x ] Site non-tender	[ x] Site without erythema, discharge, edema  [x ] Epidural tubing and connection checked	[x] Gross neurological exam within normal limits  [ ] Catheter removed – tip intact		[ ] Afebrile  	[ ] Febrile: ___   [ X] see Temp under VS below)                          12.7   13.09 )-----------( 268      ( 26 Jan 2021 06:37 )             41.1     Vital Signs Last 24 Hrs  T(C): 36.6 (01-26-21 @ 08:20), Max: 36.8 (01-26-21 @ 07:00)  T(F): 97.8 (01-26-21 @ 08:20), Max: 98.2 (01-26-21 @ 07:00)  HR: 62 (01-26-21 @ 08:20) (51 - 69)  BP: 120/60 (01-26-21 @ 08:20) (105/48 - 131/48)  BP(mean): 69 (01-26-21 @ 07:00) (57 - 74)  RR: 18 (01-26-21 @ 08:20) (7 - 20)  SpO2: 96% (01-26-21 @ 08:20) (94% - 100%)      Sedation Score:	[x ] Alert	[ ] Drowsy	[ ] Arousable	[ ] Asleep	[ ] Unresponsive    Side Effects:	[ ] None	[ x] Nausea	[ ] Vomiting	[ ] Pruritus  		[ ] Weakness		[ ] Numbness	[ ] Other:    ASSESSMENT/ PLAN:    Therapy to  be:	[x ] Continue   [ ] Discontinued   [ ] Change to prn Analgesics    Documentation and Verification of current medications:  [ X ] Done	[ ] Not done, not eligible, reason:    Comments: Epidural on hold, Surgeon wants epidural removed, waiting for COAG labs to result.  Recommend non-opioid adjuvant analgesics to be used when possible and when allowed by primary surgical team.

## 2021-01-26 NOTE — PROGRESS NOTE ADULT - ASSESSMENT
ASSESSMENT:60y F PMH obesity, b/l breast cancer s/p RT, and diverticulitis s/p loop transverse colostomy 1/20, s/p ex-lap, partial colectomy 7/20; s/p ex lap, NATALY, transverse loop colostomy reversal, ventral hernia repair, and loop ileostomy creation 10/20. She presented to [facility] today and is now s/p ileostomy reversal, recovering appropriately.     PLAN:  - ERAS protocol  - Pain control -- will remove epidural and add toradol  - Continuous pulse ox with epidural in place  - Encourage IS while in bed  - Nausea control PRN  - Diet: Clears  - Continue IVF @40 until tolerating enough PO; possible advance to LRD in PM  - Monitor I+Os  - DVT ppx: H    A Team Surgery  [id number].

## 2021-01-26 NOTE — PROGRESS NOTE ADULT - SUBJECTIVE AND OBJECTIVE BOX
Day _2__ of Anesthesia Pain Management Service    SUBJECTIVE:    Therapy:	  [ ] IV PCA	   [ x] Epidural           [ ] s/p Spinal Opoid              [ ] Postpartum infusion	  [ ] Patient controlled regional anesthesia (PCRA)    [ ] prn Analgesics    OBJECTIVE:   [ x] No new signs     [ ] Other:    Side Effects:  [x ] None			[ ] Other:    Assessment of Catheter Site:		[x ] Intact		[ ] Other:    ASSESSMENT/PLAN  [ ] Continue current therapy    [ x] Therapy changed to:    [ ] IV PCA       [ ] Epidural     [x ] prn Analgesics     Comments:

## 2021-01-27 ENCOUNTER — TRANSCRIPTION ENCOUNTER (OUTPATIENT)
Age: 61
End: 2021-01-27

## 2021-01-27 VITALS
DIASTOLIC BLOOD PRESSURE: 91 MMHG | RESPIRATION RATE: 18 BRPM | OXYGEN SATURATION: 99 % | TEMPERATURE: 98 F | SYSTOLIC BLOOD PRESSURE: 145 MMHG | HEART RATE: 69 BPM

## 2021-01-27 LAB
ANION GAP SERPL CALC-SCNC: 12 MMOL/L — SIGNIFICANT CHANGE UP (ref 7–14)
BUN SERPL-MCNC: 8 MG/DL — SIGNIFICANT CHANGE UP (ref 7–23)
CALCIUM SERPL-MCNC: 8.5 MG/DL — SIGNIFICANT CHANGE UP (ref 8.4–10.5)
CHLORIDE SERPL-SCNC: 102 MMOL/L — SIGNIFICANT CHANGE UP (ref 98–107)
CO2 SERPL-SCNC: 26 MMOL/L — SIGNIFICANT CHANGE UP (ref 22–31)
CREAT SERPL-MCNC: 0.59 MG/DL — SIGNIFICANT CHANGE UP (ref 0.5–1.3)
GLUCOSE SERPL-MCNC: 77 MG/DL — SIGNIFICANT CHANGE UP (ref 70–99)
HCT VFR BLD CALC: 39.5 % — SIGNIFICANT CHANGE UP (ref 34.5–45)
HGB BLD-MCNC: 12.4 G/DL — SIGNIFICANT CHANGE UP (ref 11.5–15.5)
MAGNESIUM SERPL-MCNC: 2 MG/DL — SIGNIFICANT CHANGE UP (ref 1.6–2.6)
MCHC RBC-ENTMCNC: 27.9 PG — SIGNIFICANT CHANGE UP (ref 27–34)
MCHC RBC-ENTMCNC: 31.4 GM/DL — LOW (ref 32–36)
MCV RBC AUTO: 89 FL — SIGNIFICANT CHANGE UP (ref 80–100)
NRBC # BLD: 0 /100 WBCS — SIGNIFICANT CHANGE UP
NRBC # FLD: 0 K/UL — SIGNIFICANT CHANGE UP
PHOSPHATE SERPL-MCNC: 2.7 MG/DL — SIGNIFICANT CHANGE UP (ref 2.5–4.5)
PLATELET # BLD AUTO: 261 K/UL — SIGNIFICANT CHANGE UP (ref 150–400)
POTASSIUM SERPL-MCNC: 3.2 MMOL/L — LOW (ref 3.5–5.3)
POTASSIUM SERPL-SCNC: 3.2 MMOL/L — LOW (ref 3.5–5.3)
RBC # BLD: 4.44 M/UL — SIGNIFICANT CHANGE UP (ref 3.8–5.2)
RBC # FLD: 15.1 % — HIGH (ref 10.3–14.5)
SODIUM SERPL-SCNC: 140 MMOL/L — SIGNIFICANT CHANGE UP (ref 135–145)
WBC # BLD: 9.89 K/UL — SIGNIFICANT CHANGE UP (ref 3.8–10.5)
WBC # FLD AUTO: 9.89 K/UL — SIGNIFICANT CHANGE UP (ref 3.8–10.5)

## 2021-01-27 RX ORDER — ACETAMINOPHEN 500 MG
2 TABLET ORAL
Qty: 0 | Refills: 0 | DISCHARGE
Start: 2021-01-27

## 2021-01-27 RX ORDER — POTASSIUM CHLORIDE 20 MEQ
40 PACKET (EA) ORAL ONCE
Refills: 0 | Status: COMPLETED | OUTPATIENT
Start: 2021-01-27 | End: 2021-01-27

## 2021-01-27 RX ORDER — OXYCODONE HYDROCHLORIDE 5 MG/1
5 TABLET ORAL EVERY 6 HOURS
Refills: 0 | Status: DISCONTINUED | OUTPATIENT
Start: 2021-01-27 | End: 2021-01-27

## 2021-01-27 RX ORDER — ACETAMINOPHEN 500 MG
650 TABLET ORAL EVERY 6 HOURS
Refills: 0 | Status: DISCONTINUED | OUTPATIENT
Start: 2021-01-27 | End: 2021-01-27

## 2021-01-27 RX ORDER — LOPERAMIDE HCL 2 MG
0 TABLET ORAL
Qty: 0 | Refills: 0 | DISCHARGE

## 2021-01-27 RX ADMIN — Medication 100 MILLIGRAM(S): at 04:59

## 2021-01-27 RX ADMIN — Medication 15 MILLIGRAM(S): at 12:36

## 2021-01-27 RX ADMIN — ANASTROZOLE 1 MILLIGRAM(S): 1 TABLET ORAL at 17:50

## 2021-01-27 RX ADMIN — HEPARIN SODIUM 5000 UNIT(S): 5000 INJECTION INTRAVENOUS; SUBCUTANEOUS at 04:58

## 2021-01-27 RX ADMIN — Medication 400 MILLIGRAM(S): at 12:36

## 2021-01-27 RX ADMIN — HEPARIN SODIUM 5000 UNIT(S): 5000 INJECTION INTRAVENOUS; SUBCUTANEOUS at 12:36

## 2021-01-27 RX ADMIN — Medication 400 MILLIGRAM(S): at 04:59

## 2021-01-27 RX ADMIN — Medication 40 MILLIEQUIVALENT(S): at 12:37

## 2021-01-27 NOTE — DISCHARGE NOTE PROVIDER - NSDCACTIVITY_GEN_ALL_CORE
Return to Work/School allowed/Sex allowed/Showering allowed/Stairs allowed/Driving allowed/Walking - Indoors allowed/Walking - Outdoors allowed

## 2021-01-27 NOTE — DISCHARGE NOTE NURSING/CASE MANAGEMENT/SOCIAL WORK - PATIENT PORTAL LINK FT
You can access the FollowMyHealth Patient Portal offered by [facility] by registering at the following website: [email or web address]. By joining [employer]’s FollowMyHealth portal, you will also be able to view your health information using other applications (apps) compatible with our system.

## 2021-01-27 NOTE — SBIRT NOTE ADULT - NSSBIRTALCACTION/INTER_GEN_A_CORE
PT ACUTE  Treatment Session          Pt seen on [facility] nursing unit.                                                Frequency Comments: X, M T Th F (see around 9 as able, pt request)                                                                                                               Admitting complaint: Dehydration [E86.0]  Hypokalemia [E87.6]  Hypomagnesemia [E83.42]  Falls frequently [R29.6]  Weakness of both lower extremities [R29.898]                                     Precautions  Other Precautions: Fall Risk (07/13/17 0923)  Precautions Comments: Metastatic bone CA (07/13/17 0923)    SUBJECTIVE: Patient's Personal Goal: Get strnger (07/14/17 0925)  Subjective: Patient agreeable to therapy session. Figiting in bed but unable to verbalize what was wrong (07/14/17 0925)  Subjective/Objective Comments: RN Shelli aware of session. Pt in bed at start of session and up in chair at end of session with alarm activated and call light in reach. Whiteboard and CNA Abiola updated with mobility recommendations to utilize ceiling lift (07/14/17 0925)    OBJECTIVE:  Basic Lines: Capped IV (07/14/17 0925)  Safety Measures: Alarms (07/14/17 0925)    RN reported Kuhn Fall Scale Score: 95    ASSESSMENT:   PT tx completed with patient making slow progress towards discharge goals. Primary barrier to progress includes progressive BLE weakness due to spinal mets. Emphasis of session included bed mobility, transfers, and patient education. Patient continues to demonstrate BLE weakness, poor activity tolerance, impaired balance, and decreased insight into deficits impacting functional mobility. Overall patient presents at a total assist level which is below functional baseline of independent. See below for assist levels and therapy plan of care. Anticipate patient will require additional rehab following hospitalization due to significant weakness with patient previously living alone in an upper level condo. Continue skilled PT  to improve impairements in order to obtain prior functional level.     EDUCATION:   On this date, the patient was educated on sequencing for bed mobility, hand placement for transfers, and continued PT plan of care.    The response to education was: Verbalizes understanding and Needs reinforcement    PT Identified Barriers to Discharge: medical, significant BLE weakness, 30 stairs to enter, poor insight into deficits, lives alone     PLAN:   Continue skilled PT, including the following Treatment/Interventions: Functional transfer training;Strengthening;Endurance training;Bed mobility;Gait training;Safety Education;Neuromuscular re-education;Stairs retraining (07/14/17 0925)   Frequency Comments: X, M T Th F (see around 9 as able, pt request) (07/14/17 0925)    Treatment Plan for Next Session: progress transfers, BLE strengthening     RECOMMENDATIONS FOR DISCHARGE:  Recommendation for Discharge: PT: Less intensive rehab (07/14/17 0925)    PT/OT Mobility Equipment for Discharge: has a cane (07/13/17 0923)  PT/OT ADL Equipment for Discharge: will monitor. BENJIE needed (07/13/17 0923)    Last 24 hours of Functional Data  Bed Mobility   Bed Mobility  Supine to Sit: Maximal Assist (Max) (07/14/17 0925)  Bed Mobility Comments: Max assist for supine to sit with assist at BLE and at trunk. Pt able to assist with BUE on bed rails (07/14/17 0925)    Transfers  Transfers  Sit to Stand: Total Assist - Dependent (07/14/17 0925)  Stand to Sit: Total Assist - Dependent (07/14/17 0925)  Stand Pivot Transfers: Total Assist - Dependent (07/14/17 0925)  Assistive Device/: 2 People;Gait Belt (07/14/17 0925)  Transfer Comments 1: Education provided on transfers with klever stedy. Max assist x2 from elevated bed height to stand with use of klever stedy to transfer to chair (07/14/17 0925)      Gait  Gait  Gait Assistance: Not attempted due to safety concerns (07/14/17 0925),      Balance  Balance  Sitting - Static: Minimal Assist  (Min) (07/14/17 0925)  Sitting - Dynamic: Moderate Assist (Mod) (07/14/17 0925)  Balance Comments #1: Posterior lean in sitting despite BUE support on railings (07/14/17 0925)    Patient's Personal Goal: Get strnger (07/14/17 0925)    Therapy Goals:    Goals  Short Term Goals to Be Reviewed On: 07/20/17 (07/13/17 0909)  Short Term Goals = Discharge Goals: No (07/13/17 0909)  Goal Agreement: Patient agrees with goals and treatment plan (07/13/17 0909)  Bed Mobility Short Term Goal: min assist x1 (07/13/17 0909)  Bed Mobility Discharge Goal: modified independent (07/13/17 0909)  Transfer Short Term Goal: sit<>stand with mod assist x1 (07/13/17 0909)  Transfer Discharge Goal: bed to chair transfer with modified independence (07/13/17 0909)  Ambulation Short Term Goal: set once appropriate (07/13/17 0909)  Stairs Short Term Goal: set once appropriate (07/13/17 0909)  Goals Comments: Anticipate patient will require additional rehab in order to acheive long term goals (07/13/17 0909)      PT Time Spent: 40 minutes (07/14/17 0925)    See PT flowsheet for full details regarding the PT therapy provided.         Positive reinforcement

## 2021-01-27 NOTE — DISCHARGE NOTE NURSING/CASE MANAGEMENT/SOCIAL WORK - NSDCPNINST_GEN_ALL_CORE
Keep surgical incisions clean and dry. For any signs of infection such as fever over 100.4 F, new pain that cannot be controlled with ordered medication, unusual discharge, and or chills, please call your primary care provider or visit the emergency room.

## 2021-01-27 NOTE — DISCHARGE NOTE PROVIDER - NSDCCPCAREPLAN_GEN_ALL_CORE_FT
PRINCIPAL DISCHARGE DIAGNOSIS  Diagnosis: H/O ileostomy  Assessment and Plan of Treatment: WOUND CARE:  Keep penrose drain in place. It will be evaluated for removal at your follow up appointment. Please keep incisions clean and dry. Please do not Scrub or rub incisions. Do not use lotion or powder on incisions.   BATHING: You may shower and/or sponge bathe. You may use warm soapy water in the shower and rinse, pat dry.  ACTIVITY: No heavy lifting or straining. Otherwise, you may return to your usual level of physical activity. If you are taking narcotic pain medication DO NOT drive a car, operate machinery or make important decisions.  DIET: Return to your usual diet.  NOTIFY YOUR SURGEON IF YOU HAVE: any bleeding that does not stop, any pus draining from your wound(s), any fever (over 100.4 F) persistent nausea/vomiting, or if your pain is not controlled on your discharge pain medications, unable to urinate.  Please follow up with your primary care physician in one week regarding your hospitalization, bring copies of your discharge paperwork.  Please follow up with your surgeon, Dr. Noel. Call to make an appointment [phone]      SECONDARY DISCHARGE DIAGNOSES  Diagnosis: H/O ileostomy  Assessment and Plan of Treatment:      PRINCIPAL DISCHARGE DIAGNOSIS  Diagnosis: H/O ileostomy  Assessment and Plan of Treatment: WOUND CARE:  Keep penrose drain in place. It will be evaluated for removal at your follow up appointment. Please keep incisions clean and dry. Please do not Scrub or rub incisions. Do not use lotion or powder on incisions.   BATHING: You may shower and/or sponge bathe. You may use warm soapy water in the shower and rinse, pat dry.  ACTIVITY: No heavy lifting or straining. Otherwise, you may return to your usual level of physical activity. If you are taking narcotic pain medication DO NOT drive a car, operate machinery or make important decisions.  DIET: Return to your usual diet.  NOTIFY YOUR SURGEON IF YOU HAVE: any bleeding that does not stop, any pus draining from your wound(s), any fever (over 100.4 F) persistent nausea/vomiting, or if your pain is not controlled on your discharge pain medications, unable to urinate.  Please follow up with your primary care physician in one week regarding your hospitalization, bring copies of your discharge paperwork.  Please follow up with your surgeon, Dr. Noel. Call [phone] to make an appointment.      SECONDARY DISCHARGE DIAGNOSES  Diagnosis: H/O ileostomy  Assessment and Plan of Treatment:

## 2021-01-27 NOTE — DISCHARGE NOTE PROVIDER - HOSPITAL COURSE
This patient is a 61 yo female with hx of diverticulitis, Patient is s/p partial colectomy, creation of colostomy due to diverticulitis.  Also s/p open lower anterior resection incisional hernia repair with abdominal wall reconstruction 10/2020. Patient presented to [facility] on 1/25/21 for scheduled ileostomy reversal with Dr. Noel. Patient Patient tolerated the procedure well, without complication. Patient remained hemodynamically stable in the PACU and transferred to the surgical floor. Diet was restarted and advanced as tolerated. Pain control was transitioned from IV to PO pain meds. At this time, patient is currently ambulating, voiding, tolerating a regular diet. Pain well controlled on PO pain meds. Patient has been deemed stable for discharge home with follow up as an outpatient.

## 2021-01-27 NOTE — PROGRESS NOTE ADULT - SUBJECTIVE AND OBJECTIVE BOX
Surgery   Team Note    STATUS POST:  Ileostomy Reversal    POST OPERATIVE DAY #: 2    Subjective:    Vital Signs Last 24 Hrs  T(C): 36.6 (27 Jan 2021 04:56), Max: 37.1 (26 Jan 2021 12:45)  T(F): 97.8 (27 Jan 2021 04:56), Max: 98.7 (26 Jan 2021 12:45)  HR: 58 (27 Jan 2021 04:56) (58 - 96)  BP: 139/70 (27 Jan 2021 04:56) (107/35 - 139/70)  BP(mean): --  RR: 18 (27 Jan 2021 04:56) (17 - 18)  SpO2: 97% (27 Jan 2021 04:56) (96% - 100%)    Objective:  General Appearance: Appears well, NAD  Neck: Supple  Chest: Equal expansion bilaterally, breathing comfortably on RA  CV: S1, S2  Abdomen: Soft, nondistended, appropriate incisional tenderness, dressings clean/dry/intact  Extremities: Grossly symmetric, SCD's in place     I&O's Summary    26 Jan 2021 07:01  -  27 Jan 2021 07:00  --------------------------------------------------------  IN: 0 mL / OUT: 400 mL / NET: -400 mL      I&O's Detail    26 Jan 2021 07:01  -  27 Jan 2021 07:00  --------------------------------------------------------  IN:  Total IN: 0 mL    OUT:    Voided (mL): 400 mL  Total OUT: 400 mL    Total NET: -400 mL          MEDICATIONS  (STANDING):  acetaminophen  IVPB .. 1000 milliGRAM(s) IV Intermittent every 6 hours  anastrozole 1 milliGRAM(s) Oral daily  heparin   Injectable 5000 Unit(s) SubCutaneous every 8 hours  ketorolac   Injectable 15 milliGRAM(s) IV Push every 6 hours  nitrofurantoin monohydrate/macrocrystals (MACROBID) 100 milliGRAM(s) Oral two times a day    MEDICATIONS  (PRN):  naloxone Injectable 0.1 milliGRAM(s) IV Push every 3 minutes PRN For ANY of the following changes in patient status:  A. RR LESS THAN 10 breaths per minute, B. Oxygen saturation LESS THAN 90%, C. Sedation score of 6  ondansetron Injectable 4 milliGRAM(s) IV Push every 6 hours PRN Nausea  oxyCODONE    IR 5 milliGRAM(s) Oral every 6 hours PRN Severe Pain (7 - 10)      LABS:                        12.4   9.89  )-----------( 261      ( 27 Jan 2021 07:24 )             39.5     01-26    141  |  106  |  8   ----------------------------<  139<H>  4.2   |  22  |  0.59    Ca    8.6      26 Jan 2021 06:37  Phos  4.7     01-26  Mg     1.9     01-26      PT/INR - ( 26 Jan 2021 11:54 )   PT: 10.9 sec;   INR: 0.95 ratio         PTT - ( 26 Jan 2021 11:54 )  PTT:26.6 sec      RADIOLOGY & ADDITIONAL STUDIES: Surgery A Team Note    STATUS POST:  Ileostomy Reversal    POST OPERATIVE DAY #: 2    Subjective: Patient seen on morning rounds with team.  Patient feels well.  Denies nausea/emesis. Patient with +flatus/+BM.  Tolerating CLD.  Patient is hungry.  Pain is controlled.      Vital Signs Last 24 Hrs  T(C): 36.6 (27 Jan 2021 04:56), Max: 37.1 (26 Jan 2021 12:45)  T(F): 97.8 (27 Jan 2021 04:56), Max: 98.7 (26 Jan 2021 12:45)  HR: 58 (27 Jan 2021 04:56) (58 - 96)  BP: 139/70 (27 Jan 2021 04:56) (107/35 - 139/70)  BP(mean): --  RR: 18 (27 Jan 2021 04:56) (17 - 18)  SpO2: 97% (27 Jan 2021 04:56) (96% - 100%)    Objective:  General Appearance: Appears well, NAD.  A&O.  Neck: Supple  Chest: Equal expansion bilaterally, breathing comfortably on RA.  CV: S1, S2 @60  Abdomen: Soft, nondistended, appropriate incisional tenderness.  Dressings with minimal serosanguinous drainage.  Dressing changed.  Pensose in place.  Extremities: Grossly symmetric, SCD's in place     I&O's Summary    26 Jan 2021 07:01  -  27 Jan 2021 07:00  --------------------------------------------------------  IN: 0 mL / OUT: 400 mL / NET: -400 mL      I&O's Detail    26 Jan 2021 07:01  -  27 Jan 2021 07:00  --------------------------------------------------------  IN:  Total IN: 0 mL    OUT:    Voided (mL): 400 mL  Total OUT: 400 mL    Total NET: -400 mL    MEDICATIONS  (STANDING):  acetaminophen  IVPB .. 1000 milliGRAM(s) IV Intermittent every 6 hours  anastrozole 1 milliGRAM(s) Oral daily  heparin   Injectable 5000 Unit(s) SubCutaneous every 8 hours  ketorolac   Injectable 15 milliGRAM(s) IV Push every 6 hours  nitrofurantoin monohydrate/macrocrystals (MACROBID) 100 milliGRAM(s) Oral two times a day    MEDICATIONS  (PRN):  naloxone Injectable 0.1 milliGRAM(s) IV Push every 3 minutes PRN For ANY of the following changes in patient status:  A. RR LESS THAN 10 breaths per minute, B. Oxygen saturation LESS THAN 90%, C. Sedation score of 6  ondansetron Injectable 4 milliGRAM(s) IV Push every 6 hours PRN Nausea  oxyCODONE    IR 5 milliGRAM(s) Oral every 6 hours PRN Severe Pain (7 - 10)    LABS:                        12.4   9.89  )-----------( 261      ( 27 Jan 2021 07:24 )             39.5     01-26    141  |  106  |  8   ----------------------------<  139<H>  4.2   |  22  |  0.59    Ca    8.6      26 Jan 2021 06:37  Phos  4.7     01-26  Mg     1.9     01-26      PT/INR - ( 26 Jan 2021 11:54 )   PT: 10.9 sec;   INR: 0.95 ratio         PTT - ( 26 Jan 2021 11:54 )  PTT:26.6 sec

## 2021-01-27 NOTE — DISCHARGE NOTE PROVIDER - CARE PROVIDER_API CALL
Valeriy Noel)  ColonRectal Surgery; Surgery  [facility], [address] [place] [address]  Phone: [phone]  Fax: [phone]  Follow Up Time: 2 weeks

## 2021-01-27 NOTE — PROGRESS NOTE ADULT - ASSESSMENT
60y F PMH obesity, b/l breast cancer s/p RT, and diverticulitis s/p loop transverse colostomy 1/20, s/p ex-lap, partial colectomy 7/20; s/p ex lap, NATALY, transverse loop colostomy reversal, ventral hernia repair, and loop ileostomy creation 10/20. Patient is now s/p ileostomy reversal 1/25/21, recovering appropriately.     PLAN:  - ERAS protocol  - Pain control   - Encourage IS while in bed  - Diet: Advanced to LRD today  - Monitor I+Os  - DVT ppx: SQH  - DISPO PLAN: If patient tolerates LRD this AM with good u/o, possible d/c home this afternoon.    A Team Surgery  [id number].   60y F PMH obesity, b/l breast cancer s/p RT, and diverticulitis s/p loop transverse colostomy 1/20, s/p ex-lap, partial colectomy 7/20; s/p ex lap, NATALY, transverse loop colostomy reversal, ventral hernia repair, and loop ileostomy creation 10/20. Patient is now s/p ileostomy reversal 1/25/21, recovering appropriately.     PLAN:  - ERAS protocol  - Pain control   - Encourage IS while in bed  - Diet: Advance to LRD today  - Monitor I+Os  - DVT ppx: SQH  - DISPO PLAN: If patient tolerates LRD this AM with good u/o, possible d/c home this afternoon.    A Team Surgery  [id number].   59 yo female with h/o partial colectomy, creation of colostomy due to diverticulitis.  S/p open lower anterior resection incisional hernia repair, transverse loop colostomy reversal, loop ileostomy creation with abdominal wall reconstruction 10/2020.  Now s/p ileostomy reversal 1/25/21.    PLAN:  - ERAS protocol  - Pain control   - Encourage IS while in bed  - Diet: Advance to LRD today  - Monitor I+Os  - DVT ppx: SQH  - DISPO PLAN: If patient tolerates LRD this AM with good u/o, possible d/c home this afternoon.    A Team Surgery  [id number].   61 yo female with h/o partial colectomy, creation of colostomy due to diverticulitis.  S/p open lower anterior resection incisional hernia repair, transverse loop colostomy reversal, loop ileostomy creation with abdominal wall reconstruction 10/2020.  Now s/p ileostomy reversal 1/25/21.    PLAN:  - ERAS protocol  - Pain control   - Encourage IS while in bed  - Diet: Advance to LRD today  - Hypokalemia - replace potassium  - Monitor I+Os  - DVT ppx: SQH  - DISPO PLAN: If patient tolerates LRD this AM with good u/o, possible d/c home this afternoon.    A Team Surgery  [id number].

## 2021-01-27 NOTE — DISCHARGE NOTE PROVIDER - NSDCMRMEDTOKEN_GEN_ALL_CORE_FT
acetaminophen 325 mg oral capsule: 2 cap(s) orally every 6 hours  anastrozole 1 mg oral tablet: 1 tab(s) orally once a day  biotin: 1 tab(s) orally once a day  Motrin 400 mg oral tablet: 1 tab(s) orally every 6 hours  nitrofurantoin macrocrystals 100 mg oral capsule: 1 cap(s) orally 2 times a day x 4 days  turmeric: 1 cap(s) orally once a day, last dose 1/17/2021  Vitamin C: 1 tab(s) orally once a day  vitamin d: 1 tab(s) orally once a day  Zinc: 1 tab(s) orally once a day   acetaminophen 325 mg oral capsule: 2 cap(s) orally every 6 hours  acetaminophen 325 mg oral tablet: 2 tab(s) orally every 6 hours  anastrozole 1 mg oral tablet: 1 tab(s) orally once a day  biotin: 1 tab(s) orally once a day  Motrin 400 mg oral tablet: 1 tab(s) orally every 6 hours  nitrofurantoin macrocrystals 100 mg oral capsule: 1 cap(s) orally 2 times a day x 4 days  turmeric: 1 cap(s) orally once a day, last dose 1/17/2021  Vitamin C: 1 tab(s) orally once a day  vitamin d: 1 tab(s) orally once a day  Zinc: 1 tab(s) orally once a day

## 2021-01-28 PROBLEM — C50.919 MALIGNANT NEOPLASM OF UNSPECIFIED SITE OF UNSPECIFIED FEMALE BREAST: Chronic | Status: ACTIVE | Noted: 2020-07-22

## 2021-01-28 PROBLEM — E66.01 MORBID (SEVERE) OBESITY DUE TO EXCESS CALORIES: Chronic | Status: ACTIVE | Noted: 2021-01-13

## 2021-01-28 LAB — SURGICAL PATHOLOGY STUDY: SIGNIFICANT CHANGE UP

## 2021-01-29 ENCOUNTER — NON-APPOINTMENT (OUTPATIENT)
Age: 61
End: 2021-01-29

## 2021-02-03 ENCOUNTER — APPOINTMENT (OUTPATIENT)
Dept: COLORECTAL SURGERY | Facility: CLINIC | Age: 61
End: 2021-02-03
Payer: COMMERCIAL

## 2021-02-03 VITALS
DIASTOLIC BLOOD PRESSURE: 85 MMHG | WEIGHT: 240 LBS | BODY MASS INDEX: 44.16 KG/M2 | SYSTOLIC BLOOD PRESSURE: 158 MMHG | HEART RATE: 79 BPM | HEIGHT: 62 IN

## 2021-02-03 VITALS — TEMPERATURE: 96.8 F

## 2021-02-03 PROCEDURE — 99024 POSTOP FOLLOW-UP VISIT: CPT

## 2021-02-03 NOTE — ASSESSMENT
[FreeTextEntry1] : s/p ileostomy reversal\par -LRD\par -increase physical activity\par -f/u in 1 week for staple removal

## 2021-02-03 NOTE — HISTORY OF PRESENT ILLNESS
[FreeTextEntry1] : s/p ileostomy reversal.  pt progressing well.  +BM/Flatus.  no fevers or chills, tolerating diet

## 2021-02-10 ENCOUNTER — APPOINTMENT (OUTPATIENT)
Dept: COLORECTAL SURGERY | Facility: CLINIC | Age: 61
End: 2021-02-10
Payer: COMMERCIAL

## 2021-02-10 VITALS
SYSTOLIC BLOOD PRESSURE: 169 MMHG | HEIGHT: 62 IN | WEIGHT: 240 LBS | BODY MASS INDEX: 44.16 KG/M2 | DIASTOLIC BLOOD PRESSURE: 84 MMHG | HEART RATE: 68 BPM | TEMPERATURE: 96.7 F | OXYGEN SATURATION: 98 %

## 2021-02-10 PROCEDURE — 99024 POSTOP FOLLOW-UP VISIT: CPT

## 2021-02-10 NOTE — ASSESSMENT
[FreeTextEntry1] : s/p ileostomy reversal\par -LRD for 2 more weeks followed by High fiber diet\par -f/u in 4 weeks for wound check

## 2021-02-10 NOTE — HISTORY OF PRESENT ILLNESS
[FreeTextEntry1] : s/p ileostomy reversal.  pt progressing well.  decreased pain.  tolerating diet.  no f/c.  inconsistent BMs

## 2021-03-09 ENCOUNTER — APPOINTMENT (OUTPATIENT)
Dept: COLORECTAL SURGERY | Facility: CLINIC | Age: 61
End: 2021-03-09
Payer: COMMERCIAL

## 2021-03-09 VITALS — TEMPERATURE: 98 F

## 2021-03-09 PROCEDURE — 99024 POSTOP FOLLOW-UP VISIT: CPT

## 2021-03-09 NOTE — HISTORY OF PRESENT ILLNESS
[FreeTextEntry1] : Status post ileostomy reversal patient progressing well. Tolerating diet. Denies pain. Denies blood per rectum normal bowel movements no fevers or chills no nausea or vomiting

## 2021-03-09 NOTE — ASSESSMENT
[FreeTextEntry1] : Status post ileostomy reversal\par -Patient progressing well\par -High fiber diet\par -Follow up as needed

## 2021-05-17 ENCOUNTER — NON-APPOINTMENT (OUTPATIENT)
Age: 61
End: 2021-05-17

## 2021-05-17 ENCOUNTER — APPOINTMENT (OUTPATIENT)
Dept: CARDIOLOGY | Facility: CLINIC | Age: 61
End: 2021-05-17
Payer: COMMERCIAL

## 2021-05-17 VITALS
HEART RATE: 66 BPM | SYSTOLIC BLOOD PRESSURE: 130 MMHG | TEMPERATURE: 97.4 F | RESPIRATION RATE: 17 BRPM | DIASTOLIC BLOOD PRESSURE: 76 MMHG | WEIGHT: 240 LBS | HEIGHT: 62 IN | BODY MASS INDEX: 44.16 KG/M2 | OXYGEN SATURATION: 99 %

## 2021-05-17 DIAGNOSIS — Z13.6 ENCOUNTER FOR SCREENING FOR CARDIOVASCULAR DISORDERS: ICD-10-CM

## 2021-05-17 DIAGNOSIS — R94.31 ABNORMAL ELECTROCARDIOGRAM [ECG] [EKG]: ICD-10-CM

## 2021-05-17 PROCEDURE — 93000 ELECTROCARDIOGRAM COMPLETE: CPT

## 2021-05-17 PROCEDURE — 99205 OFFICE O/P NEW HI 60 MIN: CPT

## 2021-05-17 PROCEDURE — 99072 ADDL SUPL MATRL&STAF TM PHE: CPT

## 2021-05-23 PROBLEM — Z13.6 SCREENING FOR CARDIOVASCULAR CONDITION: Status: ACTIVE | Noted: 2021-05-23

## 2021-06-09 ENCOUNTER — OUTPATIENT (OUTPATIENT)
Dept: OUTPATIENT SERVICES | Facility: HOSPITAL | Age: 61
LOS: 1 days | End: 2021-06-09
Payer: COMMERCIAL

## 2021-06-09 DIAGNOSIS — Z93.3 COLOSTOMY STATUS: Chronic | ICD-10-CM

## 2021-06-09 DIAGNOSIS — Z90.710 ACQUIRED ABSENCE OF BOTH CERVIX AND UTERUS: Chronic | ICD-10-CM

## 2021-06-09 DIAGNOSIS — Z98.890 OTHER SPECIFIED POSTPROCEDURAL STATES: Chronic | ICD-10-CM

## 2021-06-09 DIAGNOSIS — R94.31 ABNORMAL ELECTROCARDIOGRAM [ECG] [EKG]: ICD-10-CM

## 2021-06-09 DIAGNOSIS — Z90.49 ACQUIRED ABSENCE OF OTHER SPECIFIED PARTS OF DIGESTIVE TRACT: Chronic | ICD-10-CM

## 2021-06-09 DIAGNOSIS — Z90.89 ACQUIRED ABSENCE OF OTHER ORGANS: Chronic | ICD-10-CM

## 2021-06-09 PROCEDURE — 93306 TTE W/DOPPLER COMPLETE: CPT | Mod: 26

## 2021-06-09 PROCEDURE — 93306 TTE W/DOPPLER COMPLETE: CPT

## 2021-11-24 NOTE — ASU PREOP CHECKLIST - AS TEMP SITE
oral Posterior Auricular Interpolation Flap Text: A decision was made to reconstruct the defect utilizing an interpolation axial flap and a staged reconstruction.  A telfa template was made of the defect.  This telfa template was then used to outline the posterior auricular interpolation flap.  The donor area for the pedicle flap was then injected with anesthesia.  The flap was excised through the skin and subcutaneous tissue down to the layer of the underlying musculature.  The pedicle flap was carefully excised within this deep plane to maintain its blood supply.  The edges of the donor site were undermined.   The donor site was closed in a primary fashion.  The pedicle was then rotated into position and sutured.  Once the tube was sutured into place, adequate blood supply was confirmed with blanching and refill.  The pedicle was then wrapped with xeroform gauze and dressed appropriately with a telfa and gauze bandage to ensure continued blood supply and protect the attached pedicle.

## 2022-02-02 ENCOUNTER — APPOINTMENT (OUTPATIENT)
Dept: COLORECTAL SURGERY | Facility: CLINIC | Age: 62
End: 2022-02-02
Payer: COMMERCIAL

## 2022-02-02 VITALS
BODY MASS INDEX: 45.64 KG/M2 | WEIGHT: 248 LBS | HEIGHT: 62 IN | DIASTOLIC BLOOD PRESSURE: 94 MMHG | HEART RATE: 65 BPM | SYSTOLIC BLOOD PRESSURE: 186 MMHG

## 2022-02-02 VITALS — TEMPERATURE: 96.8 F

## 2022-02-02 PROCEDURE — 99213 OFFICE O/P EST LOW 20 MIN: CPT

## 2022-02-02 NOTE — HISTORY OF PRESENT ILLNESS
[FreeTextEntry1] : s/p colon resection, hernia repair and ileostomy reversal.  Pt progressing well but notes some swelling at top of previous incision.  No abd pain.  no nausea/emesis.  constipation to diarrhea.  no change in bowel habits no fevers or chills

## 2022-02-02 NOTE — ASSESSMENT
[FreeTextEntry1] : incisional hernia\par -Hernia noted just proximal to previous incision.  Self reducing. No other hernia appreciated\par -Will obtain CT abd/pelvis to evaluate remaining abdominal wall for hernia.  Cannot fully evaluate due to pt's body habitus\par -After imaging, will determine best repair approach and extent of procedure

## 2022-02-11 ENCOUNTER — APPOINTMENT (OUTPATIENT)
Dept: CT IMAGING | Facility: IMAGING CENTER | Age: 62
End: 2022-02-11
Payer: COMMERCIAL

## 2022-02-11 ENCOUNTER — OUTPATIENT (OUTPATIENT)
Dept: OUTPATIENT SERVICES | Facility: HOSPITAL | Age: 62
LOS: 1 days | End: 2022-02-11
Payer: COMMERCIAL

## 2022-02-11 ENCOUNTER — RESULT REVIEW (OUTPATIENT)
Age: 62
End: 2022-02-11

## 2022-02-11 DIAGNOSIS — Z98.890 OTHER SPECIFIED POSTPROCEDURAL STATES: Chronic | ICD-10-CM

## 2022-02-11 DIAGNOSIS — Z90.89 ACQUIRED ABSENCE OF OTHER ORGANS: Chronic | ICD-10-CM

## 2022-02-11 DIAGNOSIS — Z90.49 ACQUIRED ABSENCE OF OTHER SPECIFIED PARTS OF DIGESTIVE TRACT: Chronic | ICD-10-CM

## 2022-02-11 DIAGNOSIS — K43.2 INCISIONAL HERNIA WITHOUT OBSTRUCTION OR GANGRENE: ICD-10-CM

## 2022-02-11 DIAGNOSIS — Z93.3 COLOSTOMY STATUS: Chronic | ICD-10-CM

## 2022-02-11 DIAGNOSIS — Z90.710 ACQUIRED ABSENCE OF BOTH CERVIX AND UTERUS: Chronic | ICD-10-CM

## 2022-02-11 PROCEDURE — 74176 CT ABD & PELVIS W/O CONTRAST: CPT | Mod: 26

## 2022-02-11 PROCEDURE — 74176 CT ABD & PELVIS W/O CONTRAST: CPT

## 2022-03-16 ENCOUNTER — APPOINTMENT (OUTPATIENT)
Dept: COLORECTAL SURGERY | Facility: CLINIC | Age: 62
End: 2022-03-16
Payer: COMMERCIAL

## 2022-03-16 VITALS
DIASTOLIC BLOOD PRESSURE: 77 MMHG | SYSTOLIC BLOOD PRESSURE: 159 MMHG | HEIGHT: 62 IN | HEART RATE: 67 BPM | BODY MASS INDEX: 45.64 KG/M2 | OXYGEN SATURATION: 98 % | WEIGHT: 248 LBS

## 2022-03-16 VITALS — TEMPERATURE: 97.2 F

## 2022-03-16 PROCEDURE — 99213 OFFICE O/P EST LOW 20 MIN: CPT

## 2022-03-16 NOTE — ASSESSMENT
[FreeTextEntry1] : Incisional hernia\par -Ct scan images and report were reveiwed, pt with midline incisional hernia and ileostomy site hernia\par -We discussed treatment options at length.  Given pt's morbid obesity, ideally she would lose weight before hernia repair.  Pt with multifocal incisional hernia.  She would require open repair with mesh implantation and abdominal wall reconstruction with b/l component separation.  Risks and benefits were reviewed at length including, infection, bleeding, need for longterm drain placement and recurrence rates\par -I recommended proceeding with repair if hernia become persistently symptomatic, otherwise attempt at weight loss would be optimal\par -Pt will monitor discomfort and call if she wishes to proceed

## 2022-03-16 NOTE — HISTORY OF PRESENT ILLNESS
[FreeTextEntry1] : s/p colon resection, hernia repair and ileostomy reversal.  Pt progressing well but notes some swelling at top of previous incision.  No abd pain.  no nausea/emesis.  constipation to diarrhea.  no change in bowel habits no fevers or chills.  Occasional discomfort at hernia.  No aggravating factors

## 2022-09-07 NOTE — PATIENT PROFILE ADULT - NSASFALLATTEMPTOOB_GEN_A_NUR
Spoke with patient and advised of provider recommendations.    Medication sent to the pharmacy.     Verbalized understanding and all questions answered.    no

## 2022-09-13 ENCOUNTER — INPATIENT (INPATIENT)
Facility: HOSPITAL | Age: 62
LOS: 20 days | Discharge: HOME HEALTH | DRG: 329 | End: 2022-10-04
Attending: SURGERY | Admitting: SURGERY
Payer: COMMERCIAL

## 2022-09-13 VITALS
HEIGHT: 59 IN | SYSTOLIC BLOOD PRESSURE: 175 MMHG | RESPIRATION RATE: 18 BRPM | WEIGHT: 229.94 LBS | OXYGEN SATURATION: 97 % | HEART RATE: 69 BPM | DIASTOLIC BLOOD PRESSURE: 63 MMHG

## 2022-09-13 DIAGNOSIS — Z90.49 ACQUIRED ABSENCE OF OTHER SPECIFIED PARTS OF DIGESTIVE TRACT: Chronic | ICD-10-CM

## 2022-09-13 DIAGNOSIS — Z90.89 ACQUIRED ABSENCE OF OTHER ORGANS: Chronic | ICD-10-CM

## 2022-09-13 DIAGNOSIS — Z98.890 OTHER SPECIFIED POSTPROCEDURAL STATES: Chronic | ICD-10-CM

## 2022-09-13 DIAGNOSIS — Z90.710 ACQUIRED ABSENCE OF BOTH CERVIX AND UTERUS: Chronic | ICD-10-CM

## 2022-09-13 DIAGNOSIS — Z93.3 COLOSTOMY STATUS: Chronic | ICD-10-CM

## 2022-09-13 LAB
ALBUMIN SERPL ELPH-MCNC: 4 G/DL — SIGNIFICANT CHANGE UP (ref 3.5–5)
ALP SERPL-CCNC: 124 U/L — HIGH (ref 40–120)
ALT FLD-CCNC: 29 U/L DA — SIGNIFICANT CHANGE UP (ref 10–60)
ANION GAP SERPL CALC-SCNC: 9 MMOL/L — SIGNIFICANT CHANGE UP (ref 5–17)
AST SERPL-CCNC: 23 U/L — SIGNIFICANT CHANGE UP (ref 10–40)
BASOPHILS # BLD AUTO: 0.05 K/UL — SIGNIFICANT CHANGE UP (ref 0–0.2)
BASOPHILS NFR BLD AUTO: 0.3 % — SIGNIFICANT CHANGE UP (ref 0–2)
BILIRUB SERPL-MCNC: 0.5 MG/DL — SIGNIFICANT CHANGE UP (ref 0.2–1.2)
BUN SERPL-MCNC: 11 MG/DL — SIGNIFICANT CHANGE UP (ref 7–18)
CALCIUM SERPL-MCNC: 9.1 MG/DL — SIGNIFICANT CHANGE UP (ref 8.4–10.5)
CHLORIDE SERPL-SCNC: 106 MMOL/L — SIGNIFICANT CHANGE UP (ref 96–108)
CO2 SERPL-SCNC: 21 MMOL/L — LOW (ref 22–31)
CREAT SERPL-MCNC: 0.79 MG/DL — SIGNIFICANT CHANGE UP (ref 0.5–1.3)
EGFR: 83 ML/MIN/1.73M2 — SIGNIFICANT CHANGE UP
EOSINOPHIL # BLD AUTO: 0 K/UL — SIGNIFICANT CHANGE UP (ref 0–0.5)
EOSINOPHIL NFR BLD AUTO: 0 % — SIGNIFICANT CHANGE UP (ref 0–6)
GLUCOSE SERPL-MCNC: 132 MG/DL — HIGH (ref 70–99)
HCT VFR BLD CALC: 45.4 % — HIGH (ref 34.5–45)
HGB BLD-MCNC: 14.5 G/DL — SIGNIFICANT CHANGE UP (ref 11.5–15.5)
HIV 1 & 2 AB SERPL IA.RAPID: SIGNIFICANT CHANGE UP
IMM GRANULOCYTES NFR BLD AUTO: 0.6 % — SIGNIFICANT CHANGE UP (ref 0–1.5)
LIDOCAIN IGE QN: 79 U/L — SIGNIFICANT CHANGE UP (ref 73–393)
LYMPHOCYTES # BLD AUTO: 0.98 K/UL — LOW (ref 1–3.3)
LYMPHOCYTES # BLD AUTO: 6.9 % — LOW (ref 13–44)
MAGNESIUM SERPL-MCNC: 2.1 MG/DL — SIGNIFICANT CHANGE UP (ref 1.6–2.6)
MCHC RBC-ENTMCNC: 29.9 PG — SIGNIFICANT CHANGE UP (ref 27–34)
MCHC RBC-ENTMCNC: 31.9 GM/DL — LOW (ref 32–36)
MCV RBC AUTO: 93.6 FL — SIGNIFICANT CHANGE UP (ref 80–100)
MONOCYTES # BLD AUTO: 0.45 K/UL — SIGNIFICANT CHANGE UP (ref 0–0.9)
MONOCYTES NFR BLD AUTO: 3.1 % — SIGNIFICANT CHANGE UP (ref 2–14)
NEUTROPHILS # BLD AUTO: 12.74 K/UL — HIGH (ref 1.8–7.4)
NEUTROPHILS NFR BLD AUTO: 89.1 % — HIGH (ref 43–77)
NRBC # BLD: 0 /100 WBCS — SIGNIFICANT CHANGE UP (ref 0–0)
PLATELET # BLD AUTO: 245 K/UL — SIGNIFICANT CHANGE UP (ref 150–400)
POTASSIUM SERPL-MCNC: 4.8 MMOL/L — SIGNIFICANT CHANGE UP (ref 3.5–5.3)
POTASSIUM SERPL-SCNC: 4.8 MMOL/L — SIGNIFICANT CHANGE UP (ref 3.5–5.3)
PROT SERPL-MCNC: 8.6 G/DL — HIGH (ref 6–8.3)
RBC # BLD: 4.85 M/UL — SIGNIFICANT CHANGE UP (ref 3.8–5.2)
RBC # FLD: 13.1 % — SIGNIFICANT CHANGE UP (ref 10.3–14.5)
SODIUM SERPL-SCNC: 136 MMOL/L — SIGNIFICANT CHANGE UP (ref 135–145)
TROPONIN I, HIGH SENSITIVITY RESULT: 4.9 NG/L — SIGNIFICANT CHANGE UP
WBC # BLD: 14.3 K/UL — HIGH (ref 3.8–10.5)
WBC # FLD AUTO: 14.3 K/UL — HIGH (ref 3.8–10.5)

## 2022-09-13 PROCEDURE — 99285 EMERGENCY DEPT VISIT HI MDM: CPT

## 2022-09-13 PROCEDURE — 71045 X-RAY EXAM CHEST 1 VIEW: CPT | Mod: 26

## 2022-09-13 PROCEDURE — 74177 CT ABD & PELVIS W/CONTRAST: CPT | Mod: 26,MA

## 2022-09-13 RX ORDER — HYDROMORPHONE HYDROCHLORIDE 2 MG/ML
0.5 INJECTION INTRAMUSCULAR; INTRAVENOUS; SUBCUTANEOUS ONCE
Refills: 0 | Status: DISCONTINUED | OUTPATIENT
Start: 2022-09-13 | End: 2022-09-13

## 2022-09-13 RX ORDER — DIATRIZOATE MEGLUMINE 180 MG/ML
30 INJECTION, SOLUTION INTRAVESICAL ONCE
Refills: 0 | Status: COMPLETED | OUTPATIENT
Start: 2022-09-13 | End: 2022-09-13

## 2022-09-13 RX ORDER — MORPHINE SULFATE 50 MG/1
4 CAPSULE, EXTENDED RELEASE ORAL ONCE
Refills: 0 | Status: DISCONTINUED | OUTPATIENT
Start: 2022-09-13 | End: 2022-09-13

## 2022-09-13 RX ORDER — ONDANSETRON 8 MG/1
4 TABLET, FILM COATED ORAL ONCE
Refills: 0 | Status: COMPLETED | OUTPATIENT
Start: 2022-09-13 | End: 2022-09-13

## 2022-09-13 RX ORDER — SODIUM CHLORIDE 9 MG/ML
1000 INJECTION INTRAMUSCULAR; INTRAVENOUS; SUBCUTANEOUS
Refills: 0 | Status: DISCONTINUED | OUTPATIENT
Start: 2022-09-13 | End: 2022-09-14

## 2022-09-13 RX ORDER — FAMOTIDINE 10 MG/ML
20 INJECTION INTRAVENOUS ONCE
Refills: 0 | Status: COMPLETED | OUTPATIENT
Start: 2022-09-13 | End: 2022-09-13

## 2022-09-13 RX ADMIN — HYDROMORPHONE HYDROCHLORIDE 0.5 MILLIGRAM(S): 2 INJECTION INTRAMUSCULAR; INTRAVENOUS; SUBCUTANEOUS at 20:48

## 2022-09-13 RX ADMIN — HYDROMORPHONE HYDROCHLORIDE 0.5 MILLIGRAM(S): 2 INJECTION INTRAMUSCULAR; INTRAVENOUS; SUBCUTANEOUS at 19:11

## 2022-09-13 RX ADMIN — DIATRIZOATE MEGLUMINE 30 MILLILITER(S): 180 INJECTION, SOLUTION INTRAVESICAL at 18:10

## 2022-09-13 RX ADMIN — HYDROMORPHONE HYDROCHLORIDE 0.5 MILLIGRAM(S): 2 INJECTION INTRAMUSCULAR; INTRAVENOUS; SUBCUTANEOUS at 19:34

## 2022-09-13 RX ADMIN — ONDANSETRON 4 MILLIGRAM(S): 8 TABLET, FILM COATED ORAL at 18:07

## 2022-09-13 RX ADMIN — FAMOTIDINE 20 MILLIGRAM(S): 10 INJECTION INTRAVENOUS at 18:07

## 2022-09-13 RX ADMIN — HYDROMORPHONE HYDROCHLORIDE 0.5 MILLIGRAM(S): 2 INJECTION INTRAMUSCULAR; INTRAVENOUS; SUBCUTANEOUS at 20:04

## 2022-09-13 RX ADMIN — HYDROMORPHONE HYDROCHLORIDE 0.5 MILLIGRAM(S): 2 INJECTION INTRAMUSCULAR; INTRAVENOUS; SUBCUTANEOUS at 18:41

## 2022-09-13 RX ADMIN — SODIUM CHLORIDE 150 MILLILITER(S): 9 INJECTION INTRAMUSCULAR; INTRAVENOUS; SUBCUTANEOUS at 18:07

## 2022-09-13 RX ADMIN — MORPHINE SULFATE 4 MILLIGRAM(S): 50 CAPSULE, EXTENDED RELEASE ORAL at 18:08

## 2022-09-13 RX ADMIN — HYDROMORPHONE HYDROCHLORIDE 0.5 MILLIGRAM(S): 2 INJECTION INTRAMUSCULAR; INTRAVENOUS; SUBCUTANEOUS at 20:18

## 2022-09-13 RX ADMIN — MORPHINE SULFATE 4 MILLIGRAM(S): 50 CAPSULE, EXTENDED RELEASE ORAL at 18:38

## 2022-09-13 NOTE — ED ADULT NURSE NOTE - NSICDXPASTMEDICALHX_GEN_ALL_CORE_FT
PAST MEDICAL HISTORY:  Abdominal hernia     Breast cancer Bilateral, s/p RT.    Diverticulitis     Diverticulosis     Morbid obesity

## 2022-09-13 NOTE — ED ADULT NURSE NOTE - OBJECTIVE STATEMENT
Pt AOx4, ambulatory, c/o abdominal pain, n/v since this morning. Pt denies CP, SOB, hematuria, dysuria.

## 2022-09-13 NOTE — ED PROVIDER NOTE - PROGRESS NOTE DETAILS
pt with partial SBO, case d/w surgery, will see pt.  Pt received Dilaudid 1.5mg, also MS 4mg, pt fell asleep. seen by surgery, will admit

## 2022-09-14 DIAGNOSIS — K43.6 OTHER AND UNSPECIFIED VENTRAL HERNIA WITH OBSTRUCTION, WITHOUT GANGRENE: ICD-10-CM

## 2022-09-14 LAB
ANION GAP SERPL CALC-SCNC: 8 MMOL/L — SIGNIFICANT CHANGE UP (ref 5–17)
APTT BLD: 27.7 SEC — SIGNIFICANT CHANGE UP (ref 27.5–35.5)
BASOPHILS # BLD AUTO: 0.03 K/UL — SIGNIFICANT CHANGE UP (ref 0–0.2)
BASOPHILS NFR BLD AUTO: 0.2 % — SIGNIFICANT CHANGE UP (ref 0–2)
BLD GP AB SCN SERPL QL: SIGNIFICANT CHANGE UP
BUN SERPL-MCNC: 8 MG/DL — SIGNIFICANT CHANGE UP (ref 7–18)
CALCIUM SERPL-MCNC: 8.6 MG/DL — SIGNIFICANT CHANGE UP (ref 8.4–10.5)
CHLORIDE SERPL-SCNC: 103 MMOL/L — SIGNIFICANT CHANGE UP (ref 96–108)
CO2 SERPL-SCNC: 27 MMOL/L — SIGNIFICANT CHANGE UP (ref 22–31)
CREAT SERPL-MCNC: 0.64 MG/DL — SIGNIFICANT CHANGE UP (ref 0.5–1.3)
EGFR: 99 ML/MIN/1.73M2 — SIGNIFICANT CHANGE UP
EOSINOPHIL # BLD AUTO: 0 K/UL — SIGNIFICANT CHANGE UP (ref 0–0.5)
EOSINOPHIL NFR BLD AUTO: 0 % — SIGNIFICANT CHANGE UP (ref 0–6)
GLUCOSE SERPL-MCNC: 137 MG/DL — HIGH (ref 70–99)
HCT VFR BLD CALC: 42.7 % — SIGNIFICANT CHANGE UP (ref 34.5–45)
HGB BLD-MCNC: 13.8 G/DL — SIGNIFICANT CHANGE UP (ref 11.5–15.5)
IMM GRANULOCYTES NFR BLD AUTO: 0.4 % — SIGNIFICANT CHANGE UP (ref 0–1.5)
INR BLD: 0.94 RATIO — SIGNIFICANT CHANGE UP (ref 0.88–1.16)
LACTATE SERPL-SCNC: 1.6 MMOL/L — SIGNIFICANT CHANGE UP (ref 0.7–2)
LYMPHOCYTES # BLD AUTO: 1 K/UL — SIGNIFICANT CHANGE UP (ref 1–3.3)
LYMPHOCYTES # BLD AUTO: 7.3 % — LOW (ref 13–44)
MAGNESIUM SERPL-MCNC: 2.2 MG/DL — SIGNIFICANT CHANGE UP (ref 1.6–2.6)
MCHC RBC-ENTMCNC: 30.4 PG — SIGNIFICANT CHANGE UP (ref 27–34)
MCHC RBC-ENTMCNC: 32.3 GM/DL — SIGNIFICANT CHANGE UP (ref 32–36)
MCV RBC AUTO: 94.1 FL — SIGNIFICANT CHANGE UP (ref 80–100)
MONOCYTES # BLD AUTO: 0.51 K/UL — SIGNIFICANT CHANGE UP (ref 0–0.9)
MONOCYTES NFR BLD AUTO: 3.7 % — SIGNIFICANT CHANGE UP (ref 2–14)
NEUTROPHILS # BLD AUTO: 12.05 K/UL — HIGH (ref 1.8–7.4)
NEUTROPHILS NFR BLD AUTO: 88.4 % — HIGH (ref 43–77)
NRBC # BLD: 0 /100 WBCS — SIGNIFICANT CHANGE UP (ref 0–0)
PHOSPHATE SERPL-MCNC: 3.6 MG/DL — SIGNIFICANT CHANGE UP (ref 2.5–4.5)
PLATELET # BLD AUTO: 254 K/UL — SIGNIFICANT CHANGE UP (ref 150–400)
POTASSIUM SERPL-MCNC: 4.1 MMOL/L — SIGNIFICANT CHANGE UP (ref 3.5–5.3)
POTASSIUM SERPL-SCNC: 4.1 MMOL/L — SIGNIFICANT CHANGE UP (ref 3.5–5.3)
PROTHROM AB SERPL-ACNC: 11.2 SEC — SIGNIFICANT CHANGE UP (ref 10.5–13.4)
RBC # BLD: 4.54 M/UL — SIGNIFICANT CHANGE UP (ref 3.8–5.2)
RBC # FLD: 13.1 % — SIGNIFICANT CHANGE UP (ref 10.3–14.5)
SARS-COV-2 RNA SPEC QL NAA+PROBE: SIGNIFICANT CHANGE UP
SODIUM SERPL-SCNC: 138 MMOL/L — SIGNIFICANT CHANGE UP (ref 135–145)
WBC # BLD: 13.65 K/UL — HIGH (ref 3.8–10.5)
WBC # FLD AUTO: 13.65 K/UL — HIGH (ref 3.8–10.5)

## 2022-09-14 PROCEDURE — 99222 1ST HOSP IP/OBS MODERATE 55: CPT

## 2022-09-14 RX ORDER — ACETAMINOPHEN 500 MG
1000 TABLET ORAL ONCE
Refills: 0 | Status: COMPLETED | OUTPATIENT
Start: 2022-09-14 | End: 2022-09-15

## 2022-09-14 RX ORDER — ENOXAPARIN SODIUM 100 MG/ML
40 INJECTION SUBCUTANEOUS EVERY 24 HOURS
Refills: 0 | Status: DISCONTINUED | OUTPATIENT
Start: 2022-09-14 | End: 2022-10-04

## 2022-09-14 RX ORDER — PANTOPRAZOLE SODIUM 20 MG/1
40 TABLET, DELAYED RELEASE ORAL DAILY
Refills: 0 | Status: DISCONTINUED | OUTPATIENT
Start: 2022-09-14 | End: 2022-09-18

## 2022-09-14 RX ORDER — KETOROLAC TROMETHAMINE 30 MG/ML
30 SYRINGE (ML) INJECTION ONCE
Refills: 0 | Status: DISCONTINUED | OUTPATIENT
Start: 2022-09-14 | End: 2022-09-14

## 2022-09-14 RX ORDER — HYDROMORPHONE HYDROCHLORIDE 2 MG/ML
0.5 INJECTION INTRAMUSCULAR; INTRAVENOUS; SUBCUTANEOUS EVERY 4 HOURS
Refills: 0 | Status: DISCONTINUED | OUTPATIENT
Start: 2022-09-14 | End: 2022-09-16

## 2022-09-14 RX ORDER — ONDANSETRON 8 MG/1
4 TABLET, FILM COATED ORAL EVERY 6 HOURS
Refills: 0 | Status: DISCONTINUED | OUTPATIENT
Start: 2022-09-14 | End: 2022-10-04

## 2022-09-14 RX ORDER — SODIUM CHLORIDE 9 MG/ML
1000 INJECTION, SOLUTION INTRAVENOUS
Refills: 0 | Status: DISCONTINUED | OUTPATIENT
Start: 2022-09-14 | End: 2022-09-17

## 2022-09-14 RX ADMIN — HYDROMORPHONE HYDROCHLORIDE 0.5 MILLIGRAM(S): 2 INJECTION INTRAMUSCULAR; INTRAVENOUS; SUBCUTANEOUS at 06:00

## 2022-09-14 RX ADMIN — SODIUM CHLORIDE 130 MILLILITER(S): 9 INJECTION, SOLUTION INTRAVENOUS at 07:30

## 2022-09-14 RX ADMIN — SODIUM CHLORIDE 130 MILLILITER(S): 9 INJECTION, SOLUTION INTRAVENOUS at 13:58

## 2022-09-14 RX ADMIN — ONDANSETRON 4 MILLIGRAM(S): 8 TABLET, FILM COATED ORAL at 01:21

## 2022-09-14 RX ADMIN — ONDANSETRON 4 MILLIGRAM(S): 8 TABLET, FILM COATED ORAL at 07:10

## 2022-09-14 RX ADMIN — SODIUM CHLORIDE 130 MILLILITER(S): 9 INJECTION, SOLUTION INTRAVENOUS at 03:21

## 2022-09-14 RX ADMIN — PANTOPRAZOLE SODIUM 40 MILLIGRAM(S): 20 TABLET, DELAYED RELEASE ORAL at 12:25

## 2022-09-14 RX ADMIN — ENOXAPARIN SODIUM 40 MILLIGRAM(S): 100 INJECTION SUBCUTANEOUS at 17:58

## 2022-09-14 RX ADMIN — HYDROMORPHONE HYDROCHLORIDE 0.5 MILLIGRAM(S): 2 INJECTION INTRAMUSCULAR; INTRAVENOUS; SUBCUTANEOUS at 05:37

## 2022-09-14 RX ADMIN — HYDROMORPHONE HYDROCHLORIDE 0.5 MILLIGRAM(S): 2 INJECTION INTRAMUSCULAR; INTRAVENOUS; SUBCUTANEOUS at 01:22

## 2022-09-14 RX ADMIN — Medication 30 MILLIGRAM(S): at 00:45

## 2022-09-14 NOTE — PATIENT PROFILE ADULT - FUNCTIONAL ASSESSMENT - DAILY ACTIVITY 2.
[facility]  [address] [place] [address],[address]  Phone: [phone]  Fax: [phone]    Joey Mesa MD        November 6, 2018       Patient: Vivian Pina   MR Number: [id number]   YOB: 1956   Date of Visit: 11/6/2018       Dear Dr. Johnathon Alcocer:    Vivian Pina was seen in follow-up today. Below are the relevant portions of my assessment and plan of care. If you have questions, please do not hesitate to call me. I look forward to following Alisson Chatterjee along with you. Sincerely,        Leti Burciaga MD    CC providers:  Verónica Ramon MD  [address] [place] [address].   [address] [place] [address]
4 = No assist / stand by assistance

## 2022-09-14 NOTE — H&P ADULT - ASSESSMENT
61y/o f with PMHx of  obesity, diverticulitis, ventral hernia , Breast Ca s/p b/l lumpectomy with adj. radiation, PSHx of Ernestine's procedure and reversal, Ex lap SBR, ileostomy and reversal ? , lap cholecystectomy and lap appendectomy  presents to the ED with Abd bulge/pain x 1 day c/w chronically incarcerated rt lower wall ventral wall Hernia with partial obstruction no evidence of strangulation . Lactate is wnl . Pt is admitted for Intractable abd pain /observation .     Admit to Dr Uriostegui   NPO with IVF  Serial Abd Exams   NGT prn (if vomiting continues )  Pain/nausea control prn   COVID surveillance   DVT PPx

## 2022-09-14 NOTE — H&P ADULT - NSHPADDITIONALINFOADULT_GEN_ALL_CORE
pt was seen  Had multiple abdominal surgeries in [place]  Has a large RLQ abdominal hernia where the ileostomy was closed as well as upper abdominal incisional hernia  Pt feeling better with NPO  NG inserted

## 2022-09-14 NOTE — CHART NOTE - NSCHARTNOTEFT_GEN_A_CORE
Pt c/o abd pain. Last pain med administered ~5AM. Pt also endorses nausea but no vomiting. Recommended NGT placement and pt agreed. NGT placed by student at bedside under direct observation. Placement confirmed by auscultation of air insufflation. ~200mL light bilious drained. NGT to LWS. Will assess pt for resolution of pain. Poss OR if no relief of symptoms. Pt made aware of plan.

## 2022-09-14 NOTE — PATIENT PROFILE ADULT - FALL HARM RISK - RISK INTERVENTIONS

## 2022-09-14 NOTE — PATIENT PROFILE ADULT - ARE SIGNIFICANT INDICATORS COMPLETE.
No Yes Chonodrocutaneous Helical Advancement Flap Text: The defect edges were debeveled with a #15 scalpel blade.  Given the location of the defect and the proximity to free margins a chondrocutaneous helical advancement flap was deemed most appropriate.  Using a sterile surgical marker, the appropriate advancement flap was drawn incorporating the defect and placing the expected incisions within the relaxed skin tension lines where possible.    The area thus outlined was incised deep to adipose tissue with a #15 scalpel blade.  The skin margins were undermined to an appropriate distance in all directions utilizing iris scissors.

## 2022-09-14 NOTE — H&P ADULT - NSHPLABSRESULTS_GEN_ALL_CORE
13.8   13.65 )-----------( 254      ( 14 Sep 2022 01:48 )             42.7   09-14    138  |  103  |  8   ----------------------------<  137<H>  4.1   |  27  |  0.64    Ca    8.6      14 Sep 2022 01:48  Phos  3.6     09-14  Mg     2.2     09-14    TPro  8.6<H>  /  Alb  4.0  /  TBili  0.5  /  DBili  x   /  AST  23  /  ALT  29  /  AlkPhos  124<H>  09-13    < from: CT Abdomen and Pelvis w/ Oral Cont and w/ IV Cont (09.13.22 @ 20:02) >    FINDINGS:  LOWER CHEST: Linear atelectasis versus scarring is seen within the   lingula. Dependent changes are seen posteriorly..    LIVER: Peripheral wedge-shaped hypodensities are appreciated of the right   hepatic lobe. There is some suggestion of capsular retraction, and these   likely represent represent areas of scarring. This is unchanged compared   to previous exam. Additional subcentimeter hypodensity of the left   hepatic lobe is stable compared to previous exam and of indeterminate   nature.  BILE DUCTS: Normal caliber.  GALLBLADDER: Cholecystectomy.  SPLEEN: Within normal limits.  PANCREAS: Within normal limits.  ADRENALS: Within normal limits.  KIDNEYS/URETERS: Subcentimeter left renal hypodensity is likely small   cysts, though are too small to characterize. The kidneys otherwise   enhance symmetrically..    BLADDER: Within normal limits.  REPRODUCTIVE ORGANS: Hysterectomy.    BOWEL: Partial colon resection changes are appreciated, with a   rectosigmoid anastomosis is appreciated. An additional anastomosis is   seen within the left upper abdomen. There is a small bowel containing   midline ventral hernia without associated obstruction. An additional   right lower abdominal ventral hernia containing what appears to be a   dilatedsmall bowel loop in association with a small bowel anastomosis.   Proximal to this hernia small bowel loops are mildly dilated measuring up   to 2.9 cm. There is associated thecal is bowel contents suggestive of   steatosis. Distal to this point small bowel loops appear relatively   decompressed.  PERITONEUM: No ascites.  VESSELS: Atherosclerotic changes.  RETROPERITONEUM/LYMPH NODES: No lymphadenopathy.  ABDOMINAL WALL: Anterior abdominal wall defects as above associated   herniating bowel. Postoperative changes of the intra-abdominal wall are   also appreciated..  BONES: Within normal limits.    IMPRESSION:    Right lower abdominal wall ventral hernia which contains small bowel   loops. Amongst these bowel loops is a patulous/dilated appearing small   bowel loop associated with surgical sutures. Patulousness of   postoperative bowel can be a usual finding, however there also appears to   be partially obstructive changes associated with this hernia. Proximal   small bowel loops are mildly dilated and demonstrate evidence of stasis.    Additional findings as above.      < end of copied text > No

## 2022-09-14 NOTE — H&P ADULT - NSHPPHYSICALEXAM_GEN_ALL_CORE
Vital Signs Last 24 Hrs  T(C): 36.7 (14 Sep 2022 05:44), Max: 36.7 (14 Sep 2022 01:16)  T(F): 98.1 (14 Sep 2022 05:44), Max: 98.1 (14 Sep 2022 05:44)  HR: 75 (14 Sep 2022 05:44) (69 - 84)  BP: 146/60 (14 Sep 2022 05:44) (143/66 - 177/95)  BP(mean): --  RR: 18 (14 Sep 2022 05:44) (18 - 18)  SpO2: 95% (14 Sep 2022 05:44) (95% - 97%)    Parameters below as of 14 Sep 2022 05:44  Patient On (Oxygen Delivery Method): room air        General:  A&Ox3,Appears stated age, No acute distress,  Head: NC/AT  EENT: PERRLA. EOMI. Conjunctiva and sclera clear. Pharynx clear.  Neck: Supple. No JVD  Lungs: CTA B/l. Nonlabored Respirations  CV: +S1S2, RRR  Abdomen: Soft, obese ,Nondistended,  + RLQ abdominal bulge , +non specific generalized tenderness, no guarding, no rebound  Extremities: Warm and well perfused. 2+ peripheral pulses b/l. Calf soft, nontender b/l. No pedal edema.

## 2022-09-14 NOTE — H&P ADULT - HISTORY OF PRESENT ILLNESS
FULL NOTE TO COME    62 y.o. female claims she had Chest CT yest., showed 2 hernias, 1 in epigastric, RLQ.  Pt c/o constant epigastric pain radiated to lower abd since 1pm, n/vx few, last BM ~1pm, no BRBPR, not passing gas after 1pm, belching with some relief, no dysuria, fever, not feeling hungry.  Pt had similar pain in the past. 61y/o f with PMHx of  obesity, diverticulitis, ventral hernia , Breast Ca s/p b/l lumpectomy with adj. radiation,  PSHx of Ernestine's procedure and reversal, Ex lap SBR, ileostomy and reversal ? , lap cholecystectomy and lap appendectomy  presents to the ED with Abd bulge/pain x 1 day . Located in the epigastric area and RLQ , often described as constant  radiating to lower abd with associated nausea , > 3 NBNB vomiting(last vomiting >6hrs ago) . Last BM < 12hrs ago and having regular flatus. Denies  BRBPR, dysuria, fever, chills, SOB, lightheadedness or any other complaints at this time. Of note, pt had similar pain in the past.

## 2022-09-15 ENCOUNTER — TRANSCRIPTION ENCOUNTER (OUTPATIENT)
Age: 62
End: 2022-09-15

## 2022-09-15 LAB
ANION GAP SERPL CALC-SCNC: 10 MMOL/L — SIGNIFICANT CHANGE UP (ref 5–17)
BASOPHILS # BLD AUTO: 0.03 K/UL — SIGNIFICANT CHANGE UP (ref 0–0.2)
BASOPHILS NFR BLD AUTO: 0.2 % — SIGNIFICANT CHANGE UP (ref 0–2)
BUN SERPL-MCNC: 9 MG/DL — SIGNIFICANT CHANGE UP (ref 7–18)
CALCIUM SERPL-MCNC: 8.8 MG/DL — SIGNIFICANT CHANGE UP (ref 8.4–10.5)
CHLORIDE SERPL-SCNC: 101 MMOL/L — SIGNIFICANT CHANGE UP (ref 96–108)
CO2 SERPL-SCNC: 27 MMOL/L — SIGNIFICANT CHANGE UP (ref 22–31)
CREAT SERPL-MCNC: 0.73 MG/DL — SIGNIFICANT CHANGE UP (ref 0.5–1.3)
EGFR: 92 ML/MIN/1.73M2 — SIGNIFICANT CHANGE UP
EOSINOPHIL # BLD AUTO: 0 K/UL — SIGNIFICANT CHANGE UP (ref 0–0.5)
EOSINOPHIL NFR BLD AUTO: 0 % — SIGNIFICANT CHANGE UP (ref 0–6)
GLUCOSE SERPL-MCNC: 147 MG/DL — HIGH (ref 70–99)
HCT VFR BLD CALC: 44.7 % — SIGNIFICANT CHANGE UP (ref 34.5–45)
HGB BLD-MCNC: 14.5 G/DL — SIGNIFICANT CHANGE UP (ref 11.5–15.5)
IMM GRANULOCYTES NFR BLD AUTO: 0.8 % — SIGNIFICANT CHANGE UP (ref 0–1.5)
LYMPHOCYTES # BLD AUTO: 1.23 K/UL — SIGNIFICANT CHANGE UP (ref 1–3.3)
LYMPHOCYTES # BLD AUTO: 6.9 % — LOW (ref 13–44)
MCHC RBC-ENTMCNC: 30.3 PG — SIGNIFICANT CHANGE UP (ref 27–34)
MCHC RBC-ENTMCNC: 32.4 GM/DL — SIGNIFICANT CHANGE UP (ref 32–36)
MCV RBC AUTO: 93.3 FL — SIGNIFICANT CHANGE UP (ref 80–100)
MONOCYTES # BLD AUTO: 1.09 K/UL — HIGH (ref 0–0.9)
MONOCYTES NFR BLD AUTO: 6.1 % — SIGNIFICANT CHANGE UP (ref 2–14)
NEUTROPHILS # BLD AUTO: 15.3 K/UL — HIGH (ref 1.8–7.4)
NEUTROPHILS NFR BLD AUTO: 86 % — HIGH (ref 43–77)
NRBC # BLD: 0 /100 WBCS — SIGNIFICANT CHANGE UP (ref 0–0)
PLATELET # BLD AUTO: 253 K/UL — SIGNIFICANT CHANGE UP (ref 150–400)
POTASSIUM SERPL-MCNC: 3.6 MMOL/L — SIGNIFICANT CHANGE UP (ref 3.5–5.3)
POTASSIUM SERPL-SCNC: 3.6 MMOL/L — SIGNIFICANT CHANGE UP (ref 3.5–5.3)
RBC # BLD: 4.79 M/UL — SIGNIFICANT CHANGE UP (ref 3.8–5.2)
RBC # FLD: 13.2 % — SIGNIFICANT CHANGE UP (ref 10.3–14.5)
SODIUM SERPL-SCNC: 138 MMOL/L — SIGNIFICANT CHANGE UP (ref 135–145)
WBC # BLD: 17.79 K/UL — HIGH (ref 3.8–10.5)
WBC # FLD AUTO: 17.79 K/UL — HIGH (ref 3.8–10.5)

## 2022-09-15 PROCEDURE — 74019 RADEX ABDOMEN 2 VIEWS: CPT | Mod: 26

## 2022-09-15 PROCEDURE — 99232 SBSQ HOSP IP/OBS MODERATE 35: CPT | Mod: 57

## 2022-09-15 RX ORDER — METOCLOPRAMIDE HCL 10 MG
10 TABLET ORAL EVERY 6 HOURS
Refills: 0 | Status: DISCONTINUED | OUTPATIENT
Start: 2022-09-15 | End: 2022-09-17

## 2022-09-15 RX ORDER — ACETAMINOPHEN 500 MG
1000 TABLET ORAL ONCE
Refills: 0 | Status: COMPLETED | OUTPATIENT
Start: 2022-09-15 | End: 2022-09-15

## 2022-09-15 RX ORDER — ACETAMINOPHEN 500 MG
1000 TABLET ORAL EVERY 6 HOURS
Refills: 0 | Status: COMPLETED | OUTPATIENT
Start: 2022-09-15 | End: 2022-09-16

## 2022-09-15 RX ADMIN — ENOXAPARIN SODIUM 40 MILLIGRAM(S): 100 INJECTION SUBCUTANEOUS at 17:15

## 2022-09-15 RX ADMIN — Medication 10 MILLIGRAM(S): at 17:52

## 2022-09-15 RX ADMIN — HYDROMORPHONE HYDROCHLORIDE 0.5 MILLIGRAM(S): 2 INJECTION INTRAMUSCULAR; INTRAVENOUS; SUBCUTANEOUS at 20:31

## 2022-09-15 RX ADMIN — ONDANSETRON 4 MILLIGRAM(S): 8 TABLET, FILM COATED ORAL at 15:41

## 2022-09-15 RX ADMIN — Medication 400 MILLIGRAM(S): at 19:07

## 2022-09-15 RX ADMIN — Medication 400 MILLIGRAM(S): at 00:15

## 2022-09-15 RX ADMIN — HYDROMORPHONE HYDROCHLORIDE 0.5 MILLIGRAM(S): 2 INJECTION INTRAMUSCULAR; INTRAVENOUS; SUBCUTANEOUS at 15:41

## 2022-09-15 RX ADMIN — Medication 400 MILLIGRAM(S): at 23:20

## 2022-09-15 RX ADMIN — HYDROMORPHONE HYDROCHLORIDE 0.5 MILLIGRAM(S): 2 INJECTION INTRAMUSCULAR; INTRAVENOUS; SUBCUTANEOUS at 07:53

## 2022-09-15 RX ADMIN — Medication 1000 MILLIGRAM(S): at 23:56

## 2022-09-15 RX ADMIN — ONDANSETRON 4 MILLIGRAM(S): 8 TABLET, FILM COATED ORAL at 23:22

## 2022-09-15 RX ADMIN — HYDROMORPHONE HYDROCHLORIDE 0.5 MILLIGRAM(S): 2 INJECTION INTRAMUSCULAR; INTRAVENOUS; SUBCUTANEOUS at 19:54

## 2022-09-15 RX ADMIN — PANTOPRAZOLE SODIUM 40 MILLIGRAM(S): 20 TABLET, DELAYED RELEASE ORAL at 11:34

## 2022-09-15 RX ADMIN — Medication 400 MILLIGRAM(S): at 11:34

## 2022-09-15 NOTE — PROGRESS NOTE ADULT - ASSESSMENT
61 y/o Female w/ SBO secondary to incisional hernia     -Urgent AXR  -NGT to LWS  -NPO  -IVF   -Antiemetics PRN   -Close observation  -D/w Dr Uriostegui and aware  63 y/o Female w/ SBO secondary to incisional hernia   Leukocytosis; Tmax 100.5    -Urgent AXR  -NGT to LWS  -NPO  -IVF   -Antiemetics PRN   -Close observation  -High threshold for surgical intervention    -D/w Dr Uriostegui and aware

## 2022-09-15 NOTE — PROGRESS NOTE ADULT - SUBJECTIVE AND OBJECTIVE BOX
INTERVAL HPI/OVERNIGHT EVENTS:    Pt seen and examined at bedside. Admits to right sided abdominal pain, pain colicky. Admits to nausea and vomiting. Pt is NPO. NO f/bm.     Vital Signs Last 24 Hrs  T(C): 36.8 (15 Sep 2022 05:05), Max: 38.1 (14 Sep 2022 21:25)  T(F): 98.2 (15 Sep 2022 05:05), Max: 100.5 (14 Sep 2022 21:25)  HR: 75 (15 Sep 2022 05:05) (74 - 80)  BP: 140/75 (15 Sep 2022 05:05) (140/75 - 151/74)  BP(mean): 99 (14 Sep 2022 21:25) (99 - 99)  RR: 18 (15 Sep 2022 05:05) (18 - 18)  SpO2: 94% (15 Sep 2022 05:05) (94% - 95%)    Parameters below as of 15 Sep 2022 05:05  Patient On (Oxygen Delivery Method): room air      I&O's Detail    14 Sep 2022 07:01  -  15 Sep 2022 07:00  --------------------------------------------------------  IN:    dextrose 5% + sodium chloride 0.45%: 1430 mL  Total IN: 1430 mL    OUT:    Nasogastric/Oral tube (mL): 200 mL  Total OUT: 200 mL    Total NET: 1230 mL        pantoprazole  Injectable 40 milliGRAM(s) IV Push daily      Physical Exam  General: AAOx3, No acute distress  Skin: No jaundice, no icterus  Abdomen: obese, soft, nondistended, right sided incisional hernia, irreducible, TTP, no skin changes, midline incisional hernia, reducible, no TTP, no rebound tenderness, no guarding  Extremities: non edematous, no calf pain bilaterally      Labs:                        14.5   17.79 )-----------( 253      ( 15 Sep 2022 05:53 )             44.7     09-15    138  |  101  |  9   ----------------------------<  147<H>  3.6   |  27  |  0.73    Ca    8.8      15 Sep 2022 05:53  Phos  3.6     09-14  Mg     2.2     09-14    TPro  8.6<H>  /  Alb  4.0  /  TBili  0.5  /  DBili  x   /  AST  23  /  ALT  29  /  AlkPhos  124<H>  09-13    PT/INR - ( 14 Sep 2022 01:48 )   PT: 11.2 sec;   INR: 0.94 ratio         PTT - ( 14 Sep 2022 01:48 )  PTT:27.7 sec

## 2022-09-16 ENCOUNTER — TRANSCRIPTION ENCOUNTER (OUTPATIENT)
Age: 62
End: 2022-09-16

## 2022-09-16 ENCOUNTER — RESULT REVIEW (OUTPATIENT)
Age: 62
End: 2022-09-16

## 2022-09-16 LAB
ANION GAP SERPL CALC-SCNC: 10 MMOL/L — SIGNIFICANT CHANGE UP (ref 5–17)
BUN SERPL-MCNC: 10 MG/DL — SIGNIFICANT CHANGE UP (ref 7–18)
CALCIUM SERPL-MCNC: 8.6 MG/DL — SIGNIFICANT CHANGE UP (ref 8.4–10.5)
CHLORIDE SERPL-SCNC: 98 MMOL/L — SIGNIFICANT CHANGE UP (ref 96–108)
CO2 SERPL-SCNC: 27 MMOL/L — SIGNIFICANT CHANGE UP (ref 22–31)
CREAT SERPL-MCNC: 0.67 MG/DL — SIGNIFICANT CHANGE UP (ref 0.5–1.3)
EGFR: 98 ML/MIN/1.73M2 — SIGNIFICANT CHANGE UP
GLUCOSE SERPL-MCNC: 163 MG/DL — HIGH (ref 70–99)
HCT VFR BLD CALC: 45.5 % — HIGH (ref 34.5–45)
HCT VFR BLD CALC: 53 % — HIGH (ref 34.5–45)
HGB BLD-MCNC: 14.5 G/DL — SIGNIFICANT CHANGE UP (ref 11.5–15.5)
HGB BLD-MCNC: 16.8 G/DL — HIGH (ref 11.5–15.5)
MAGNESIUM SERPL-MCNC: 2.4 MG/DL — SIGNIFICANT CHANGE UP (ref 1.6–2.6)
MCHC RBC-ENTMCNC: 29.7 PG — SIGNIFICANT CHANGE UP (ref 27–34)
MCHC RBC-ENTMCNC: 30.4 PG — SIGNIFICANT CHANGE UP (ref 27–34)
MCHC RBC-ENTMCNC: 31.7 GM/DL — LOW (ref 32–36)
MCHC RBC-ENTMCNC: 31.9 GM/DL — LOW (ref 32–36)
MCV RBC AUTO: 93.2 FL — SIGNIFICANT CHANGE UP (ref 80–100)
MCV RBC AUTO: 95.8 FL — SIGNIFICANT CHANGE UP (ref 80–100)
NRBC # BLD: 0 /100 WBCS — SIGNIFICANT CHANGE UP (ref 0–0)
PHOSPHATE SERPL-MCNC: 2.9 MG/DL — SIGNIFICANT CHANGE UP (ref 2.5–4.5)
PLATELET # BLD AUTO: 261 K/UL — SIGNIFICANT CHANGE UP (ref 150–400)
PLATELET # BLD AUTO: 314 K/UL — SIGNIFICANT CHANGE UP (ref 150–400)
POTASSIUM SERPL-MCNC: 3.6 MMOL/L — SIGNIFICANT CHANGE UP (ref 3.5–5.3)
POTASSIUM SERPL-SCNC: 3.6 MMOL/L — SIGNIFICANT CHANGE UP (ref 3.5–5.3)
RBC # BLD: 4.88 M/UL — SIGNIFICANT CHANGE UP (ref 3.8–5.2)
RBC # BLD: 5.53 M/UL — HIGH (ref 3.8–5.2)
RBC # FLD: 12.9 % — SIGNIFICANT CHANGE UP (ref 10.3–14.5)
RBC # FLD: 13 % — SIGNIFICANT CHANGE UP (ref 10.3–14.5)
SODIUM SERPL-SCNC: 135 MMOL/L — SIGNIFICANT CHANGE UP (ref 135–145)
WBC # BLD: 19.44 K/UL — HIGH (ref 3.8–10.5)
WBC # BLD: 9.9 K/UL — SIGNIFICANT CHANGE UP (ref 3.8–10.5)
WBC # FLD AUTO: 19.44 K/UL — HIGH (ref 3.8–10.5)
WBC # FLD AUTO: 9.9 K/UL — SIGNIFICANT CHANGE UP (ref 3.8–10.5)

## 2022-09-16 PROCEDURE — 44120 REMOVAL OF SMALL INTESTINE: CPT

## 2022-09-16 PROCEDURE — 71045 X-RAY EXAM CHEST 1 VIEW: CPT | Mod: 26

## 2022-09-16 PROCEDURE — 49566: CPT

## 2022-09-16 PROCEDURE — 88307 TISSUE EXAM BY PATHOLOGIST: CPT | Mod: 26

## 2022-09-16 DEVICE — IMPLANTABLE DEVICE: Type: IMPLANTABLE DEVICE | Status: FUNCTIONAL

## 2022-09-16 DEVICE — STAPLER COVIDIEN TA 60 GREEN: Type: IMPLANTABLE DEVICE | Status: FUNCTIONAL

## 2022-09-16 DEVICE — STAPLER COVIDIEN TRI-STAPLE CURVED 60MM PURPLE RELOAD: Type: IMPLANTABLE DEVICE | Status: FUNCTIONAL

## 2022-09-16 RX ORDER — PIPERACILLIN AND TAZOBACTAM 4; .5 G/20ML; G/20ML
3.38 INJECTION, POWDER, LYOPHILIZED, FOR SOLUTION INTRAVENOUS ONCE
Refills: 0 | Status: COMPLETED | OUTPATIENT
Start: 2022-09-16 | End: 2022-09-16

## 2022-09-16 RX ORDER — FENTANYL CITRATE 50 UG/ML
0.5 INJECTION INTRAVENOUS
Qty: 2500 | Refills: 0 | Status: DISCONTINUED | OUTPATIENT
Start: 2022-09-16 | End: 2022-09-17

## 2022-09-16 RX ORDER — CHLORHEXIDINE GLUCONATE 213 G/1000ML
1 SOLUTION TOPICAL
Refills: 0 | Status: DISCONTINUED | OUTPATIENT
Start: 2022-09-16 | End: 2022-09-22

## 2022-09-16 RX ORDER — PIPERACILLIN AND TAZOBACTAM 4; .5 G/20ML; G/20ML
3.38 INJECTION, POWDER, LYOPHILIZED, FOR SOLUTION INTRAVENOUS ONCE
Refills: 0 | Status: DISCONTINUED | OUTPATIENT
Start: 2022-09-17 | End: 2022-09-17

## 2022-09-16 RX ORDER — PROPOFOL 10 MG/ML
20 INJECTION, EMULSION INTRAVENOUS
Qty: 1000 | Refills: 0 | Status: DISCONTINUED | OUTPATIENT
Start: 2022-09-16 | End: 2022-09-16

## 2022-09-16 RX ORDER — CHLORHEXIDINE GLUCONATE 213 G/1000ML
15 SOLUTION TOPICAL EVERY 12 HOURS
Refills: 0 | Status: DISCONTINUED | OUTPATIENT
Start: 2022-09-16 | End: 2022-09-17

## 2022-09-16 RX ORDER — DEXMEDETOMIDINE HYDROCHLORIDE IN 0.9% SODIUM CHLORIDE 4 UG/ML
0.5 INJECTION INTRAVENOUS
Qty: 400 | Refills: 0 | Status: DISCONTINUED | OUTPATIENT
Start: 2022-09-16 | End: 2022-09-17

## 2022-09-16 RX ORDER — SODIUM CHLORIDE 9 MG/ML
1000 INJECTION INTRAMUSCULAR; INTRAVENOUS; SUBCUTANEOUS ONCE
Refills: 0 | Status: DISCONTINUED | OUTPATIENT
Start: 2022-09-16 | End: 2022-09-17

## 2022-09-16 RX ORDER — NOREPINEPHRINE BITARTRATE/D5W 8 MG/250ML
0.05 PLASTIC BAG, INJECTION (ML) INTRAVENOUS
Qty: 8 | Refills: 0 | Status: DISCONTINUED | OUTPATIENT
Start: 2022-09-16 | End: 2022-09-17

## 2022-09-16 RX ORDER — PIPERACILLIN AND TAZOBACTAM 4; .5 G/20ML; G/20ML
3.38 INJECTION, POWDER, LYOPHILIZED, FOR SOLUTION INTRAVENOUS EVERY 8 HOURS
Refills: 0 | Status: DISCONTINUED | OUTPATIENT
Start: 2022-09-17 | End: 2022-09-17

## 2022-09-16 RX ADMIN — HYDROMORPHONE HYDROCHLORIDE 0.5 MILLIGRAM(S): 2 INJECTION INTRAMUSCULAR; INTRAVENOUS; SUBCUTANEOUS at 00:42

## 2022-09-16 RX ADMIN — HYDROMORPHONE HYDROCHLORIDE 0.5 MILLIGRAM(S): 2 INJECTION INTRAMUSCULAR; INTRAVENOUS; SUBCUTANEOUS at 00:07

## 2022-09-16 RX ADMIN — Medication 400 MILLIGRAM(S): at 05:09

## 2022-09-16 RX ADMIN — ENOXAPARIN SODIUM 40 MILLIGRAM(S): 100 INJECTION SUBCUTANEOUS at 17:05

## 2022-09-16 RX ADMIN — HYDROMORPHONE HYDROCHLORIDE 0.5 MILLIGRAM(S): 2 INJECTION INTRAMUSCULAR; INTRAVENOUS; SUBCUTANEOUS at 10:27

## 2022-09-16 RX ADMIN — HYDROMORPHONE HYDROCHLORIDE 0.5 MILLIGRAM(S): 2 INJECTION INTRAMUSCULAR; INTRAVENOUS; SUBCUTANEOUS at 06:54

## 2022-09-16 RX ADMIN — Medication 1000 MILLIGRAM(S): at 17:19

## 2022-09-16 RX ADMIN — Medication 10 MILLIGRAM(S): at 10:27

## 2022-09-16 RX ADMIN — SODIUM CHLORIDE 130 MILLILITER(S): 9 INJECTION, SOLUTION INTRAVENOUS at 10:30

## 2022-09-16 RX ADMIN — Medication 10 MILLIGRAM(S): at 17:23

## 2022-09-16 RX ADMIN — HYDROMORPHONE HYDROCHLORIDE 0.5 MILLIGRAM(S): 2 INJECTION INTRAMUSCULAR; INTRAVENOUS; SUBCUTANEOUS at 17:06

## 2022-09-16 RX ADMIN — Medication 10 MILLIGRAM(S): at 04:07

## 2022-09-16 RX ADMIN — Medication 400 MILLIGRAM(S): at 11:27

## 2022-09-16 RX ADMIN — HYDROMORPHONE HYDROCHLORIDE 0.5 MILLIGRAM(S): 2 INJECTION INTRAMUSCULAR; INTRAVENOUS; SUBCUTANEOUS at 06:22

## 2022-09-16 RX ADMIN — HYDROMORPHONE HYDROCHLORIDE 0.5 MILLIGRAM(S): 2 INJECTION INTRAMUSCULAR; INTRAVENOUS; SUBCUTANEOUS at 14:21

## 2022-09-16 RX ADMIN — Medication 1000 MILLIGRAM(S): at 05:44

## 2022-09-16 RX ADMIN — PANTOPRAZOLE SODIUM 40 MILLIGRAM(S): 20 TABLET, DELAYED RELEASE ORAL at 11:27

## 2022-09-16 NOTE — CONSULT NOTE ADULT - ASSESSMENT
#NEURO    #PULMONARY    #CARDIOVASCULAR    #GASTROINTESTINAL    #RENAL    #INFECTIOUS DISEASE    #ENDOCRINE    #HEME    #  PROPHYLAXIS  -DVT  -GI    DISPO  CODE STATUS ASSESSMENT:  61y/o f with PMHx of  obesity, diverticulitis, ventral hernia , Breast Ca s/p b/l lumpectomy with adj. radiation,  PSHx of Ernestine's procedure and reversal, Ex lap SBR, ileostomy and reversal ? , lap cholecystectomy and lap appendectomy  presents to the ED with Abd bulge/pain x 1 day. CT Abdomen/ pelvis showed Right lower abdominal wall ventral hernia which contains dilated small bowel loops. Was admitted to surgery for   Patient was taken to OR for incarcerated incisional hernia. She underwent explorative laparotomy with small bowel resection with anastomosis and hernia repair. Post Intra op pt was intubated. She was given 4mg Dilaudid, and 10mg of vecuronium. EBL was 100cc. Was given 2L LR bolus. Was brought to the ICU for post op monitoring.     #NEURO  - baseline AAOx3  - Sedated with propofol  - Fentanyl for pain control    #PULMONARY  #Mechanical ventilation  - Was intubated     #COPD    #CARDIOVASCULAR    #GASTROINTESTINAL    #RENAL    #INFECTIOUS DISEASE    #ENDOCRINE    #HEME    #  PROPHYLAXIS  -DVT  -GI    DISPO  CODE STATUS ASSESSMENT:  63y/o f with PMHx of  obesity, diverticulitis, ventral hernia , Breast Ca s/p b/l lumpectomy with adj. radiation,  PSHx of Ernestine's procedure and reversal, Ex lap SBR, ileostomy and reversal ? , lap cholecystectomy and lap appendectomy  presents to the ED with Abd bulge/pain x 1 day. CT Abdomen/ pelvis showed Right lower abdominal wall ventral hernia which contains dilated small bowel loops. Was admitted to surgery for   Patient was taken to OR for incarcerated incisional hernia. She underwent explorative laparotomy with small bowel resection with anastomosis and hernia repair. Post Intra op pt was intubated. She was given 4mg Dilaudid, and 10mg of vecuronium. EBL was 200cc. Was given 5L LR bolus. Was brought to the ICU for post op monitoring.     #NEURO  - baseline AAOx3  - Sedated with propofol  - Fentanyl for pain control    #PULMONARY  #Mechanical ventilation  - Was intubated intra op  - Vent settings 500/12/100/5  - follow up post ABG and chest xray  - Will SAT and SBT in the morning    #CARDIOVASCULAR  #Hypotension  - secondary to propofol   - switched to precedex  - monitor BPs    #GASTROINTESTINAL    #RENAL    #INFECTIOUS DISEASE  #    #ENDOCRINE    #HEME    #PROPHYLAXIS  -DVT  -GI    DISPO  CODE STATUS ASSESSMENT:  63y/o f with PMHx of  obesity, diverticulitis, ventral hernia , Breast Ca s/p b/l lumpectomy with adj. radiation,  PSHx of Ernestine's procedure and reversal, Ex lap SBR, ileostomy and reversal ? , lap cholecystectomy and lap appendectomy  presents to the ED with Abd bulge/pain x 1 day. CT Abdomen/ pelvis showed Right lower abdominal wall ventral hernia which contains dilated small bowel loops. Was admitted to surgery for incarcerated hernia.  Patient was taken to OR for strangulated and incarcerated incisional hernia. She underwent explorative laparotomy with small bowel resection with anastomosis and hernia repair. Post Intra op pt was intubated. She was given 4mg Dilaudid, and 10mg of vecuronium. EBL was 200cc. Was given 5L LR bolus. Was brought to the ICU for post op monitoring.     #NEURO  - baseline AAOx3  - Sedated with versed  - Fentanyl for pain control    #PULMONARY  #Mechanical ventilation  - Was intubated intra op  - Vent settings 500/12/100/5  - follow up post ABG and chest xray  - Will SAT and SBT in the morning    #CARDIOVASCULAR  #Shock  - secondary to medications propofol   - switched to versed  - started on peripheral levophed  - monitor BPs    #GASTROINTESTINAL  #Incarcerated incisional hernia  - Has extensive abdominal history of  Ernestine's procedure and reversal, Ex lap SBR, ileostomy and reversal ? , lap cholecystectomy and lap appendectomy   - CT Abdomen/ pelvis showed Right lower abdominal wall ventral hernia which contains dilated small bowel loops.   - Underwent explorative laparotomy with small bowel resection with anastomosis and hernia repair on 9/16  - Intra op EBL 200cc, received 5L LR bolus  - NPO now, Has NGT on Low intermittent suction  - on IVF D5, 0.45 NS @130cc  - started on empirical antibiotics of Zosyn  - On pain control with fentanyl drip  - Surgery team to follow up    #RENAL  #Urinary post op monitoring  - Fragoso catheter placed intraop  - monitor I/Os    #INFECTIOUS DISEASE  - management as above    #ENDOCRINE  - no active issues    #HEME  #Leukocytosis  - secondary to necrosis vs reactive  - monitor CBC    #PROPHYLAXIS  -DVT lovenox sq  -GI Protonix     DISPO  TRANSFER TO ICU  CODE STATUS   FULL CODE

## 2022-09-16 NOTE — CONSULT NOTE ADULT - ATTENDING COMMENTS
63 y/o with multiple med problem , multiple previous surgeries  admitted to ICU post op. s/p ex lap for incarcerated incisional hernia. Pt arrived to ICU intubated.  hypotensive on Propofol, given bolus and started on Levophed for BP support     A/P Ex lap s/p repair incarcerated incisional hernia  Morbid obesity   Respiratory failure  Shock    MICU  Cont MV  Taper FIO2 as tolerated   serial CXR and ABG   Hydration   Pressors for BP support   F/U C&S   Empiric antibiotics  ID eval   Surgery follow up   DVT/GI prophylaxis  Pain control  Monitro renal function, urinary output

## 2022-09-16 NOTE — CONSULT NOTE ADULT - SUBJECTIVE AND OBJECTIVE BOX
Patient is a 62y old  Female who presents with a chief complaint of     HPI:  61y/o f with PMHx of  obesity, diverticulitis, ventral hernia , Breast Ca s/p b/l lumpectomy with adj. radiation,  PSHx of Ernestine's procedure and reversal, Ex lap SBR, ileostomy and reversal ? , lap cholecystectomy and lap appendectomy  presents to the ED with Abd bulge/pain x 1 day . Located in the epigastric area and RLQ , often described as constant  radiating to lower abd with associated nausea , > 3 NBNB vomiting(last vomiting >6hrs ago) . Last BM < 12hrs ago and having regular flatus. Denies  BRBPR, dysuria, fever, chills, SOB, lightheadedness or any other complaints at this time. Of note, pt had similar pain in the past. (14 Sep 2022 00:40)      PAST MEDICAL & SURGICAL HISTORY:  Diverticulosis      Abdominal hernia      Breast cancer  Bilateral, s/p RT.      Diverticulitis      Morbid obesity      History of appendectomy      S/P MARIELLA (total abdominal hysterectomy)  2000, due to &quot;infection&quot;      S/P laparoscopic cholecystectomy  1999      History of tonsillectomy      History of lumpectomy of both breasts  3/2020      Colostomy present      History of partial colectomy  7/2020      H/O hernia repair  10/2020          SOCIAL HX:   Smoking                         ETOH                            Other    FAMILY HISTORY:  FH: hypertension    :  No known cardiovacular family hisotry     ROS:  See HPI     Allergies    No Known Drug Allergies  peanuts (Anaphylaxis)    Intolerances          PHYSICAL EXAM    ICU Vital Signs Last 24 Hrs  T(C): 36.8 (16 Sep 2022 13:05), Max: 36.8 (16 Sep 2022 05:08)  T(F): 98.3 (16 Sep 2022 13:05), Max: 98.3 (16 Sep 2022 13:05)  HR: 76 (16 Sep 2022 13:05) (76 - 84)  BP: 138/59 (16 Sep 2022 13:05) (138/59 - 155/89)  BP(mean): --  ABP: --  ABP(mean): --  RR: 18 (16 Sep 2022 13:05) (18 - 18)  SpO2: 95% (16 Sep 2022 13:05) (95% - 95%)    O2 Parameters below as of 16 Sep 2022 13:05  Patient On (Oxygen Delivery Method): room air            General: Not in distress  HEENT:  JUAN              Lymphatic system: No LN  Lungs: Bilateral BS  Cardiovascular: Regular  Gastrointestinal: Soft, Positive BS  Musculoskeletal: No clubbing.  Moves all extremities.    Skin: Warm.  Intact  Neurological: No motor or sensory deficit       09-15-22 @ 07:01  -  09-16-22 @ 07:00  --------------------------------------------------------  IN:  Total IN: 0 mL    OUT:    Nasogastric/Oral tube (mL): 200 mL  Total OUT: 200 mL    Total NET: -200 mL      09-16-22 @ 07:01  -  09-16-22 @ 22:36  --------------------------------------------------------  IN:  Total IN: 0 mL    OUT:    Nasogastric/Oral tube (mL): 1100 mL  Total OUT: 1100 mL    Total NET: -1100 mL          LABS:                          14.5   19.44 )-----------( 261      ( 16 Sep 2022 06:13 )             45.5                                               09-16    135  |  98  |  10  ----------------------------<  163<H>  3.6   |  27  |  0.67    Ca    8.6      16 Sep 2022 06:13  Phos  2.9     09-16  Mg     2.4     09-16    MEDICATIONS  (STANDING):  chlorhexidine 0.12% Liquid 15 milliLiter(s) Oral Mucosa every 12 hours  chlorhexidine 2% Cloths 1 Application(s) Topical <User Schedule>  dextrose 5% + sodium chloride 0.45%. 1000 milliLiter(s) (130 mL/Hr) IV Continuous <Continuous>  enoxaparin Injectable 40 milliGRAM(s) SubCutaneous every 24 hours  fentaNYL   Infusion 0.5 MICROgram(s)/kG/Hr (5.22 mL/Hr) IV Continuous <Continuous>  pantoprazole  Injectable 40 milliGRAM(s) IV Push daily  piperacillin/tazobactam IVPB. 3.375 Gram(s) IV Intermittent once    MEDICATIONS  (PRN):  metoclopramide Injectable 10 milliGRAM(s) IV Push every 6 hours PRN nausea  ondansetron Injectable 4 milliGRAM(s) IV Push every 6 hours PRN Nausea Patient is a 62y old  Female who presents with a chief complaint of     HPI:  63y/o f with PMHx of  obesity, diverticulitis, ventral hernia , Breast Ca s/p b/l lumpectomy with adj. radiation,  PSHx of Ernestine's procedure and reversal, Ex lap SBR, ileostomy and reversal ? , lap cholecystectomy and lap appendectomy  presents to the ED with Abd bulge/pain x 1 day . Located in the epigastric area and RLQ , often described as constant  radiating to lower abd with associated nausea , > 3 NBNB vomiting(last vomiting >6hrs ago) . Last BM < 12hrs ago and having regular flatus. Denies BRBPR, dysuria, fever, chills, SOB, lightheadedness or any other complaints at this time.       PAST MEDICAL & SURGICAL HISTORY:  Diverticulosis      Abdominal hernia      Breast cancer  Bilateral, s/p RT.      Diverticulitis      Morbid obesity      History of appendectomy      S/P MARIELLA (total abdominal hysterectomy)  2000, due to &quot;infection&quot;      S/P laparoscopic cholecystectomy  1999      History of tonsillectomy      History of lumpectomy of both breasts  3/2020      Colostomy present      History of partial colectomy  7/2020      H/O hernia repair  10/2020          SOCIAL HX:   Smoking                         ETOH                            Other    FAMILY HISTORY:  FH: hypertension    :  No known cardiovacular family hisotry     ROS:  See HPI     Allergies    No Known Drug Allergies  peanuts (Anaphylaxis)    Intolerances          PHYSICAL EXAM    ICU Vital Signs Last 24 Hrs  T(C): 36.8 (16 Sep 2022 13:05), Max: 36.8 (16 Sep 2022 05:08)  T(F): 98.3 (16 Sep 2022 13:05), Max: 98.3 (16 Sep 2022 13:05)  HR: 76 (16 Sep 2022 13:05) (76 - 84)  BP: 138/59 (16 Sep 2022 13:05) (138/59 - 155/89)  BP(mean): --  ABP: --  ABP(mean): --  RR: 18 (16 Sep 2022 13:05) (18 - 18)  SpO2: 95% (16 Sep 2022 13:05) (95% - 95%)    O2 Parameters below as of 16 Sep 2022 13:05  Patient On (Oxygen Delivery Method): room air            General: Not in distress  HEENT:  JUAN              Lymphatic system: No LN  Lungs: Bilateral BS  Cardiovascular: Regular  Gastrointestinal: Soft, Positive BS  Musculoskeletal: No clubbing.  Moves all extremities.    Skin: Warm.  Intact  Neurological: No motor or sensory deficit       09-15-22 @ 07:01  -  09-16-22 @ 07:00  --------------------------------------------------------  IN:  Total IN: 0 mL    OUT:    Nasogastric/Oral tube (mL): 200 mL  Total OUT: 200 mL    Total NET: -200 mL      09-16-22 @ 07:01  -  09-16-22 @ 22:36  --------------------------------------------------------  IN:  Total IN: 0 mL    OUT:    Nasogastric/Oral tube (mL): 1100 mL  Total OUT: 1100 mL    Total NET: -1100 mL          LABS:                          14.5   19.44 )-----------( 261      ( 16 Sep 2022 06:13 )             45.5                                               09-16    135  |  98  |  10  ----------------------------<  163<H>  3.6   |  27  |  0.67    Ca    8.6      16 Sep 2022 06:13  Phos  2.9     09-16  Mg     2.4     09-16    MEDICATIONS  (STANDING):  chlorhexidine 0.12% Liquid 15 milliLiter(s) Oral Mucosa every 12 hours  chlorhexidine 2% Cloths 1 Application(s) Topical <User Schedule>  dextrose 5% + sodium chloride 0.45%. 1000 milliLiter(s) (130 mL/Hr) IV Continuous <Continuous>  enoxaparin Injectable 40 milliGRAM(s) SubCutaneous every 24 hours  fentaNYL   Infusion 0.5 MICROgram(s)/kG/Hr (5.22 mL/Hr) IV Continuous <Continuous>  pantoprazole  Injectable 40 milliGRAM(s) IV Push daily  piperacillin/tazobactam IVPB. 3.375 Gram(s) IV Intermittent once    MEDICATIONS  (PRN):  metoclopramide Injectable 10 milliGRAM(s) IV Push every 6 hours PRN nausea  ondansetron Injectable 4 milliGRAM(s) IV Push every 6 hours PRN Nausea Patient is a 62y old  Female who presents with a chief complaint of     HPI:  61y/o f with PMHx of  obesity, diverticulitis, ventral hernia , Breast Ca s/p b/l lumpectomy with adj. radiation,  PSHx of Ernestine's procedure and reversal, Ex lap SBR, ileostomy and reversal ? , lap cholecystectomy and lap appendectomy  presents to the ED with Abd bulge/pain x 1 day . Located in the epigastric area and RLQ , often described as constant  radiating to lower abd with associated nausea , > 3 NBNB vomiting(last vomiting >6hrs ago) . Last BM < 12hrs ago and having regular flatus. Denies BRBPR, dysuria, fever, chills, SOB, lightheadedness or any other complaints at this time.       PAST MEDICAL & SURGICAL HISTORY:  Diverticulosis      Abdominal hernia      Breast cancer  Bilateral, s/p RT.      Diverticulitis      Morbid obesity      History of appendectomy      S/P MARIELLA (total abdominal hysterectomy)  2000, due to &quot;infection&quot;      S/P laparoscopic cholecystectomy  1999      History of tonsillectomy      History of lumpectomy of both breasts  3/2020      Colostomy present      History of partial colectomy  7/2020      H/O hernia repair  10/2020          SOCIAL HX:   Smoking                         ETOH                            Other    FAMILY HISTORY:  FH: hypertension    :  No known cardiovacular family hisotry     ROS:  See HPI     Allergies    No Known Drug Allergies  peanuts (Anaphylaxis)    Intolerances          PHYSICAL EXAM    ICU Vital Signs Last 24 Hrs  T(C): 36.8 (16 Sep 2022 13:05), Max: 36.8 (16 Sep 2022 05:08)  T(F): 98.3 (16 Sep 2022 13:05), Max: 98.3 (16 Sep 2022 13:05)  HR: 76 (16 Sep 2022 13:05) (76 - 84)  BP: 138/59 (16 Sep 2022 13:05) (138/59 - 155/89)  RR: 18 (16 Sep 2022 13:05) (18 - 18)  SpO2: 95% (16 Sep 2022 13:05) (95% - 95%)    O2 Parameters below as of 16 Sep 2022 13:05  Patient On (Oxygen Delivery Method): room air    General: Intubated  HEENT:  JUAN,, NG tube              Lymphatic system: No LN  Lungs: Bilateral BS  Cardiovascular: Regular  Gastrointestinal: has a large mid abdominal scar, G tube drains, soft abdomen, BS heard  Musculoskeletal: No clubbing.  Moves all extremities.    Skin: Warm. Intact  Neurological: No motor or sensory deficit      09-15-22 @ 07:01  -  09-16-22 @ 07:00  --------------------------------------------------------  IN:  Total IN: 0 mL    OUT:    Nasogastric/Oral tube (mL): 200 mL  Total OUT: 200 mL    Total NET: -200 mL      09-16-22 @ 07:01  -  09-16-22 @ 22:36  --------------------------------------------------------  IN:  Total IN: 0 mL    OUT:    Nasogastric/Oral tube (mL): 1100 mL  Total OUT: 1100 mL    Total NET: -1100 mL      LABS:                          14.5   19.44 )-----------( 261      ( 16 Sep 2022 06:13 )             45.5                                               09-16    135  |  98  |  10  ----------------------------<  163<H>  3.6   |  27  |  0.67    Ca    8.6      16 Sep 2022 06:13  Phos  2.9     09-16  Mg     2.4     09-16    MEDICATIONS  (STANDING):  chlorhexidine 0.12% Liquid 15 milliLiter(s) Oral Mucosa every 12 hours  chlorhexidine 2% Cloths 1 Application(s) Topical <User Schedule>  dextrose 5% + sodium chloride 0.45%. 1000 milliLiter(s) (130 mL/Hr) IV Continuous <Continuous>  enoxaparin Injectable 40 milliGRAM(s) SubCutaneous every 24 hours  fentaNYL   Infusion 0.5 MICROgram(s)/kG/Hr (5.22 mL/Hr) IV Continuous <Continuous>  pantoprazole  Injectable 40 milliGRAM(s) IV Push daily  piperacillin/tazobactam IVPB. 3.375 Gram(s) IV Intermittent once    MEDICATIONS  (PRN):  metoclopramide Injectable 10 milliGRAM(s) IV Push every 6 hours PRN nausea  ondansetron Injectable 4 milliGRAM(s) IV Push every 6 hours PRN Nausea Patient is a 62y old  Female who presents with a chief complaint of     HPI:  61y/o f with PMHx of  obesity, diverticulitis, ventral hernia , Breast Ca s/p b/l lumpectomy with adj. radiation,  PSHx of Ernestine's procedure and reversal, Ex lap SBR, ileostomy and reversal ? , lap cholecystectomy and lap appendectomy  presents to the ED with Abd bulge/pain x 1 day . Located in the epigastric area and RLQ , often described as constant  radiating to lower abd with associated nausea , > 3 NBNB vomiting(last vomiting >6hrs ago) . Last BM < 12hrs ago and having regular flatus. Denies BRBPR, dysuria, fever, chills, SOB, lightheadedness or any other complaints at this time.       PAST MEDICAL & SURGICAL HISTORY:  Diverticulosis      Abdominal hernia      Breast cancer  Bilateral, s/p RT.      Diverticulitis      Morbid obesity      History of appendectomy      S/P MARIELLA (total abdominal hysterectomy)  2000, due to &quot;infection&quot;      S/P laparoscopic cholecystectomy  1999      History of tonsillectomy      History of lumpectomy of both breasts  3/2020      Colostomy present      History of partial colectomy  7/2020      H/O hernia repair  10/2020          SOCIAL HX:   Smoking                         ETOH                            Other    FAMILY HISTORY:  FH: hypertension    :  No known cardiovacular family hisotry     ROS:  See HPI     Allergies    No Known Drug Allergies  peanuts (Anaphylaxis)    Intolerances          PHYSICAL EXAM    ICU Vital Signs Last 24 Hrs  T(C): 36.8 (16 Sep 2022 13:05), Max: 36.8 (16 Sep 2022 05:08)  T(F): 98.3 (16 Sep 2022 13:05), Max: 98.3 (16 Sep 2022 13:05)  HR: 76 (16 Sep 2022 13:05) (76 - 84)  BP: 138/59 (16 Sep 2022 13:05) (138/59 - 155/89)  RR: 18 (16 Sep 2022 13:05) (18 - 18)  SpO2: 95% (16 Sep 2022 13:05) (95% - 95%)    O2 Parameters below as of 16 Sep 2022 13:05  Patient On (Oxygen Delivery Method): room air    General: Intubated  HEENT:  JUAN,, NG tube              Lymphatic system: No LN  Lungs: Bilateral BS  Cardiovascular: Regular  Gastrointestinal: has a large mid abdominal scar, J tube drains, soft abdomen, BS heard  Musculoskeletal: No clubbing.  Moves all extremities.    Skin: Warm. Intact  Neurological: No motor or sensory deficit      09-15-22 @ 07:01  -  09-16-22 @ 07:00  --------------------------------------------------------  IN:  Total IN: 0 mL    OUT:    Nasogastric/Oral tube (mL): 200 mL  Total OUT: 200 mL    Total NET: -200 mL      09-16-22 @ 07:01  -  09-16-22 @ 22:36  --------------------------------------------------------  IN:  Total IN: 0 mL    OUT:    Nasogastric/Oral tube (mL): 1100 mL  Total OUT: 1100 mL    Total NET: -1100 mL      LABS:                          14.5   19.44 )-----------( 261      ( 16 Sep 2022 06:13 )             45.5                                               09-16    135  |  98  |  10  ----------------------------<  163<H>  3.6   |  27  |  0.67    Ca    8.6      16 Sep 2022 06:13  Phos  2.9     09-16  Mg     2.4     09-16    MEDICATIONS  (STANDING):  chlorhexidine 0.12% Liquid 15 milliLiter(s) Oral Mucosa every 12 hours  chlorhexidine 2% Cloths 1 Application(s) Topical <User Schedule>  dextrose 5% + sodium chloride 0.45%. 1000 milliLiter(s) (130 mL/Hr) IV Continuous <Continuous>  enoxaparin Injectable 40 milliGRAM(s) SubCutaneous every 24 hours  fentaNYL   Infusion 0.5 MICROgram(s)/kG/Hr (5.22 mL/Hr) IV Continuous <Continuous>  pantoprazole  Injectable 40 milliGRAM(s) IV Push daily  piperacillin/tazobactam IVPB. 3.375 Gram(s) IV Intermittent once    MEDICATIONS  (PRN):  metoclopramide Injectable 10 milliGRAM(s) IV Push every 6 hours PRN nausea  ondansetron Injectable 4 milliGRAM(s) IV Push every 6 hours PRN Nausea

## 2022-09-16 NOTE — PROGRESS NOTE ADULT - ASSESSMENT
63 y/o Female w/ SBO secondary to incisional hernia   Leukocytosis trending up; Tmax 100    -OR today, Exploratory Laparotomy, Incisional Hernia Repair, possible bowel resection   -Pre Op labs   -NGT to LWS  -NPO  -IVF   -Antiemetics PRN

## 2022-09-16 NOTE — PROGRESS NOTE ADULT - SUBJECTIVE AND OBJECTIVE BOX
INTERVAL HPI/OVERNIGHT EVENTS:    Pt seen and examined at bedside. Admits to right sided abdominal pain, denies flatus or BM.   Pt is NPO, NGT lws     Vital Signs Last 24 Hrs  T(C): 36.8 (16 Sep 2022 05:08), Max: 37.1 (15 Sep 2022 20:15)  T(F): 98.2 (16 Sep 2022 05:08), Max: 98.8 (15 Sep 2022 20:15)  HR: 84 (16 Sep 2022 05:08) (70 - 84)  BP: 155/89 (16 Sep 2022 05:08) (129/60 - 155/89)  BP(mean): --  RR: 18 (16 Sep 2022 05:08) (16 - 18)  SpO2: 95% (16 Sep 2022 05:08) (95% - 98%)    Parameters below as of 16 Sep 2022 05:08  Patient On (Oxygen Delivery Method): room air      I&O's Detail    15 Sep 2022 07:01  -  16 Sep 2022 07:00  --------------------------------------------------------  IN:  Total IN: 0 mL    OUT:    Nasogastric/Oral tube (mL): 200 mL  Total OUT: 200 mL    Total NET: -200 mL        pantoprazole  Injectable 40 milliGRAM(s) IV Push daily      Physical Exam  General: AAOx3, No acute distress  Skin: No jaundice, no icterus  Abdomen: obese, soft, nondistended, right sided incisional hernia, irreducible, TTP, no skin changes, midline incisional hernia, reducible, no TTP, no rebound tenderness, no guarding  Extremities: non edematous, no calf pain bilaterally      Labs:                        14.5   19.44 )-----------( 261      ( 16 Sep 2022 06:13 )             45.5     09-16    135  |  98  |  10  ----------------------------<  163<H>  3.6   |  27  |  0.67    Ca    8.6      16 Sep 2022 06:13  Phos  2.9     09-16  Mg     2.4     09-16

## 2022-09-16 NOTE — BRIEF OPERATIVE NOTE - NSICDXBRIEFPROCEDURE_GEN_ALL_CORE_FT
PROCEDURES:  Exploratory laparotomy with lysis of adhesions 16-Sep-2022 22:27:06  Tristen Rodriguez  Small bowel resection 16-Sep-2022 22:27:20  Tristen Rodriguez  Open repair of strangulated incisional hernia 16-Sep-2022 22:27:31  Tristen Rodriguez

## 2022-09-17 LAB
ALBUMIN SERPL ELPH-MCNC: 1.6 G/DL — LOW (ref 3.5–5)
ALBUMIN SERPL ELPH-MCNC: 2 G/DL — LOW (ref 3.5–5)
ALP SERPL-CCNC: 102 U/L — SIGNIFICANT CHANGE UP (ref 40–120)
ALP SERPL-CCNC: 86 U/L — SIGNIFICANT CHANGE UP (ref 40–120)
ALT FLD-CCNC: 28 U/L DA — SIGNIFICANT CHANGE UP (ref 10–60)
ALT FLD-CCNC: 32 U/L DA — SIGNIFICANT CHANGE UP (ref 10–60)
ANION GAP SERPL CALC-SCNC: 10 MMOL/L — SIGNIFICANT CHANGE UP (ref 5–17)
ANION GAP SERPL CALC-SCNC: 11 MMOL/L — SIGNIFICANT CHANGE UP (ref 5–17)
ANION GAP SERPL CALC-SCNC: 12 MMOL/L — SIGNIFICANT CHANGE UP (ref 5–17)
APPEARANCE UR: ABNORMAL
AST SERPL-CCNC: 26 U/L — SIGNIFICANT CHANGE UP (ref 10–40)
AST SERPL-CCNC: 42 U/L — HIGH (ref 10–40)
BACTERIA # UR AUTO: ABNORMAL /HPF
BASE EXCESS BLDA CALC-SCNC: -5.9 MMOL/L — LOW (ref -2–3)
BASE EXCESS BLDA CALC-SCNC: -6.6 MMOL/L — LOW (ref -2–3)
BASE EXCESS BLDV CALC-SCNC: -5.2 MMOL/L — SIGNIFICANT CHANGE UP
BASOPHILS # BLD AUTO: 0 K/UL — SIGNIFICANT CHANGE UP (ref 0–0.2)
BASOPHILS # BLD AUTO: 0 K/UL — SIGNIFICANT CHANGE UP (ref 0–0.2)
BASOPHILS # BLD AUTO: 0.02 K/UL — SIGNIFICANT CHANGE UP (ref 0–0.2)
BASOPHILS NFR BLD AUTO: 0 % — SIGNIFICANT CHANGE UP (ref 0–2)
BASOPHILS NFR BLD AUTO: 0 % — SIGNIFICANT CHANGE UP (ref 0–2)
BASOPHILS NFR BLD AUTO: 0.2 % — SIGNIFICANT CHANGE UP (ref 0–2)
BILIRUB SERPL-MCNC: 3.4 MG/DL — HIGH (ref 0.2–1.2)
BILIRUB SERPL-MCNC: 4.3 MG/DL — HIGH (ref 0.2–1.2)
BILIRUB UR-MCNC: ABNORMAL
BLOOD GAS COMMENTS ARTERIAL: SIGNIFICANT CHANGE UP
BLOOD GAS COMMENTS ARTERIAL: SIGNIFICANT CHANGE UP
BLOOD GAS COMMENTS, VENOUS: SIGNIFICANT CHANGE UP
BUN SERPL-MCNC: 11 MG/DL — SIGNIFICANT CHANGE UP (ref 7–18)
BUN SERPL-MCNC: 12 MG/DL — SIGNIFICANT CHANGE UP (ref 7–18)
BUN SERPL-MCNC: 16 MG/DL — SIGNIFICANT CHANGE UP (ref 7–18)
CA-I SERPL-SCNC: SIGNIFICANT CHANGE UP MMOL/L (ref 1.15–1.33)
CALCIUM SERPL-MCNC: 6.9 MG/DL — LOW (ref 8.4–10.5)
CALCIUM SERPL-MCNC: 7.2 MG/DL — LOW (ref 8.4–10.5)
CALCIUM SERPL-MCNC: 7.5 MG/DL — LOW (ref 8.4–10.5)
CHLORIDE SERPL-SCNC: 104 MMOL/L — SIGNIFICANT CHANGE UP (ref 96–108)
CHLORIDE SERPL-SCNC: 107 MMOL/L — SIGNIFICANT CHANGE UP (ref 96–108)
CHLORIDE SERPL-SCNC: 107 MMOL/L — SIGNIFICANT CHANGE UP (ref 96–108)
CO2 SERPL-SCNC: 18 MMOL/L — LOW (ref 22–31)
CO2 SERPL-SCNC: 19 MMOL/L — LOW (ref 22–31)
CO2 SERPL-SCNC: 21 MMOL/L — LOW (ref 22–31)
COLOR SPEC: ABNORMAL
CREAT ?TM UR-MCNC: 291 MG/DL — SIGNIFICANT CHANGE UP
CREAT SERPL-MCNC: 0.72 MG/DL — SIGNIFICANT CHANGE UP (ref 0.5–1.3)
CREAT SERPL-MCNC: 1.14 MG/DL — SIGNIFICANT CHANGE UP (ref 0.5–1.3)
CREAT SERPL-MCNC: 1.44 MG/DL — HIGH (ref 0.5–1.3)
DIFF PNL FLD: ABNORMAL
EGFR: 41 ML/MIN/1.73M2 — LOW
EGFR: 54 ML/MIN/1.73M2 — LOW
EGFR: 93 ML/MIN/1.73M2 — SIGNIFICANT CHANGE UP
EOSINOPHIL # BLD AUTO: 0 K/UL — SIGNIFICANT CHANGE UP (ref 0–0.5)
EOSINOPHIL NFR BLD AUTO: 0 % — SIGNIFICANT CHANGE UP (ref 0–6)
EPI CELLS # UR: SIGNIFICANT CHANGE UP /HPF
GAS PNL BLDV: 129 MMOL/L — LOW (ref 136–145)
GAS PNL BLDV: SIGNIFICANT CHANGE UP
GLUCOSE BLDC GLUCOMTR-MCNC: 109 MG/DL — HIGH (ref 70–99)
GLUCOSE BLDC GLUCOMTR-MCNC: 126 MG/DL — HIGH (ref 70–99)
GLUCOSE BLDC GLUCOMTR-MCNC: 165 MG/DL — HIGH (ref 70–99)
GLUCOSE SERPL-MCNC: 121 MG/DL — HIGH (ref 70–99)
GLUCOSE SERPL-MCNC: 169 MG/DL — HIGH (ref 70–99)
GLUCOSE SERPL-MCNC: 197 MG/DL — HIGH (ref 70–99)
GLUCOSE UR QL: NEGATIVE — SIGNIFICANT CHANGE UP
GRAN CASTS # UR COMP ASSIST: ABNORMAL /LPF
HCO3 BLDA-SCNC: 19 MMOL/L — LOW (ref 21–28)
HCO3 BLDA-SCNC: 20 MMOL/L — LOW (ref 21–28)
HCO3 BLDV-SCNC: 22 MMOL/L — SIGNIFICANT CHANGE UP (ref 22–29)
HCT VFR BLD CALC: 49 % — HIGH (ref 34.5–45)
HCT VFR BLD CALC: 49 % — HIGH (ref 34.5–45)
HGB BLD-MCNC: 15.5 G/DL — SIGNIFICANT CHANGE UP (ref 11.5–15.5)
HGB BLD-MCNC: 15.5 G/DL — SIGNIFICANT CHANGE UP (ref 11.5–15.5)
HOROWITZ INDEX BLDA+IHG-RTO: 100 — SIGNIFICANT CHANGE UP
HOROWITZ INDEX BLDA+IHG-RTO: 60 — SIGNIFICANT CHANGE UP
HOROWITZ INDEX BLDV+IHG-RTO: 100 — SIGNIFICANT CHANGE UP
IMM GRANULOCYTES NFR BLD AUTO: 2 % — HIGH (ref 0–0.9)
KETONES UR-MCNC: ABNORMAL
LACTATE BLDV-MCNC: 2.1 MMOL/L — HIGH (ref 0.5–2)
LACTATE SERPL-SCNC: 2.2 MMOL/L — HIGH (ref 0.7–2)
LACTATE SERPL-SCNC: 2.9 MMOL/L — HIGH (ref 0.7–2)
LEUKOCYTE ESTERASE UR-ACNC: ABNORMAL
LYMPHOCYTES # BLD AUTO: 0.89 K/UL — LOW (ref 1–3.3)
LYMPHOCYTES # BLD AUTO: 1.19 K/UL — SIGNIFICANT CHANGE UP (ref 1–3.3)
LYMPHOCYTES # BLD AUTO: 1.88 K/UL — SIGNIFICANT CHANGE UP (ref 1–3.3)
LYMPHOCYTES # BLD AUTO: 11 % — LOW (ref 13–44)
LYMPHOCYTES # BLD AUTO: 9 % — LOW (ref 13–44)
LYMPHOCYTES # BLD AUTO: 9.3 % — LOW (ref 13–44)
MAGNESIUM SERPL-MCNC: 1.6 MG/DL — SIGNIFICANT CHANGE UP (ref 1.6–2.6)
MAGNESIUM SERPL-MCNC: 1.9 MG/DL — SIGNIFICANT CHANGE UP (ref 1.6–2.6)
MANUAL SMEAR VERIFICATION: SIGNIFICANT CHANGE UP
MANUAL SMEAR VERIFICATION: SIGNIFICANT CHANGE UP
MCHC RBC-ENTMCNC: 29.9 PG — SIGNIFICANT CHANGE UP (ref 27–34)
MCHC RBC-ENTMCNC: 30 PG — SIGNIFICANT CHANGE UP (ref 27–34)
MCHC RBC-ENTMCNC: 31.6 GM/DL — LOW (ref 32–36)
MCHC RBC-ENTMCNC: 31.6 GM/DL — LOW (ref 32–36)
MCV RBC AUTO: 94.6 FL — SIGNIFICANT CHANGE UP (ref 80–100)
MCV RBC AUTO: 95 FL — SIGNIFICANT CHANGE UP (ref 80–100)
METAMYELOCYTES # FLD: 1 % — HIGH (ref 0–0)
METAMYELOCYTES # FLD: 1 % — HIGH (ref 0–0)
MONOCYTES # BLD AUTO: 0.68 K/UL — SIGNIFICANT CHANGE UP (ref 0–0.9)
MONOCYTES # BLD AUTO: 0.99 K/UL — HIGH (ref 0–0.9)
MONOCYTES # BLD AUTO: 1.16 K/UL — HIGH (ref 0–0.9)
MONOCYTES NFR BLD AUTO: 10 % — SIGNIFICANT CHANGE UP (ref 2–14)
MONOCYTES NFR BLD AUTO: 4 % — SIGNIFICANT CHANGE UP (ref 2–14)
MONOCYTES NFR BLD AUTO: 9 % — SIGNIFICANT CHANGE UP (ref 2–14)
MRSA PCR RESULT.: SIGNIFICANT CHANGE UP
MYELOCYTES NFR BLD: 1 % — HIGH (ref 0–0)
NEUTROPHILS # BLD AUTO: 10.23 K/UL — HIGH (ref 1.8–7.4)
NEUTROPHILS # BLD AUTO: 14.32 K/UL — HIGH (ref 1.8–7.4)
NEUTROPHILS # BLD AUTO: 7.82 K/UL — HIGH (ref 1.8–7.4)
NEUTROPHILS NFR BLD AUTO: 62 % — SIGNIFICANT CHANGE UP (ref 43–77)
NEUTROPHILS NFR BLD AUTO: 79.5 % — HIGH (ref 43–77)
NEUTROPHILS NFR BLD AUTO: 81 % — HIGH (ref 43–77)
NEUTS BAND # BLD: 17 % — HIGH (ref 0–8)
NEUTS BAND # BLD: 3 % — SIGNIFICANT CHANGE UP (ref 0–8)
NITRITE UR-MCNC: POSITIVE
NRBC # BLD: 0 /100 WBCS — SIGNIFICANT CHANGE UP (ref 0–0)
NRBC # BLD: 0 /100 — SIGNIFICANT CHANGE UP (ref 0–0)
NRBC # BLD: 0 /100 — SIGNIFICANT CHANGE UP (ref 0–0)
PCO2 BLDA: 38 MMHG — HIGH (ref 32–35)
PCO2 BLDA: 41 MMHG — HIGH (ref 32–35)
PCO2 BLDV: 46 MMHG — HIGH (ref 39–42)
PH BLDA: 7.3 — LOW (ref 7.35–7.45)
PH BLDA: 7.31 — LOW (ref 7.35–7.45)
PH BLDV: 7.28 — LOW (ref 7.32–7.43)
PH UR: 5 — SIGNIFICANT CHANGE UP (ref 5–8)
PHOSPHATE SERPL-MCNC: 2.9 MG/DL — SIGNIFICANT CHANGE UP (ref 2.5–4.5)
PHOSPHATE SERPL-MCNC: 3.7 MG/DL — SIGNIFICANT CHANGE UP (ref 2.5–4.5)
PLAT MORPH BLD: NORMAL — SIGNIFICANT CHANGE UP
PLAT MORPH BLD: NORMAL — SIGNIFICANT CHANGE UP
PLATELET # BLD AUTO: 288 K/UL — SIGNIFICANT CHANGE UP (ref 150–400)
PLATELET # BLD AUTO: 359 K/UL — SIGNIFICANT CHANGE UP (ref 150–400)
PLATELET COUNT - ESTIMATE: NORMAL — SIGNIFICANT CHANGE UP
PO2 BLDA: 162 MMHG — HIGH (ref 83–108)
PO2 BLDA: 196 MMHG — HIGH (ref 83–108)
PO2 BLDV: 69 MMHG — SIGNIFICANT CHANGE UP
POTASSIUM BLDV-SCNC: 4 MMOL/L — SIGNIFICANT CHANGE UP (ref 3.5–5.1)
POTASSIUM SERPL-MCNC: 3.6 MMOL/L — SIGNIFICANT CHANGE UP (ref 3.5–5.3)
POTASSIUM SERPL-MCNC: 4.3 MMOL/L — SIGNIFICANT CHANGE UP (ref 3.5–5.3)
POTASSIUM SERPL-MCNC: 4.3 MMOL/L — SIGNIFICANT CHANGE UP (ref 3.5–5.3)
POTASSIUM SERPL-SCNC: 3.6 MMOL/L — SIGNIFICANT CHANGE UP (ref 3.5–5.3)
POTASSIUM SERPL-SCNC: 4.3 MMOL/L — SIGNIFICANT CHANGE UP (ref 3.5–5.3)
POTASSIUM SERPL-SCNC: 4.3 MMOL/L — SIGNIFICANT CHANGE UP (ref 3.5–5.3)
PROT SERPL-MCNC: 5.2 G/DL — LOW (ref 6–8.3)
PROT SERPL-MCNC: 5.8 G/DL — LOW (ref 6–8.3)
PROT UR-MCNC: 100
RBC # BLD: 5.16 M/UL — SIGNIFICANT CHANGE UP (ref 3.8–5.2)
RBC # BLD: 5.18 M/UL — SIGNIFICANT CHANGE UP (ref 3.8–5.2)
RBC # FLD: 12.8 % — SIGNIFICANT CHANGE UP (ref 10.3–14.5)
RBC # FLD: 12.8 % — SIGNIFICANT CHANGE UP (ref 10.3–14.5)
RBC BLD AUTO: NORMAL — SIGNIFICANT CHANGE UP
RBC BLD AUTO: NORMAL — SIGNIFICANT CHANGE UP
RBC CASTS # UR COMP ASSIST: >50 /HPF (ref 0–2)
S AUREUS DNA NOSE QL NAA+PROBE: SIGNIFICANT CHANGE UP
SAO2 % BLDA: 100 % — SIGNIFICANT CHANGE UP
SAO2 % BLDA: 100 % — SIGNIFICANT CHANGE UP
SAO2 % BLDV: 93.5 % — SIGNIFICANT CHANGE UP
SODIUM SERPL-SCNC: 135 MMOL/L — SIGNIFICANT CHANGE UP (ref 135–145)
SODIUM SERPL-SCNC: 135 MMOL/L — SIGNIFICANT CHANGE UP (ref 135–145)
SODIUM SERPL-SCNC: 139 MMOL/L — SIGNIFICANT CHANGE UP (ref 135–145)
SODIUM UR-SCNC: <5 MMOL/L — SIGNIFICANT CHANGE UP
SP GR SPEC: 1.01 — SIGNIFICANT CHANGE UP (ref 1.01–1.02)
UROBILINOGEN FLD QL: 8
WBC # BLD: 12.86 K/UL — HIGH (ref 3.8–10.5)
WBC # BLD: 17.05 K/UL — HIGH (ref 3.8–10.5)
WBC # FLD AUTO: 12.86 K/UL — HIGH (ref 3.8–10.5)
WBC # FLD AUTO: 17.05 K/UL — HIGH (ref 3.8–10.5)
WBC UR QL: ABNORMAL /HPF (ref 0–5)

## 2022-09-17 PROCEDURE — 99291 CRITICAL CARE FIRST HOUR: CPT | Mod: GC

## 2022-09-17 PROCEDURE — 71045 X-RAY EXAM CHEST 1 VIEW: CPT | Mod: 26

## 2022-09-17 PROCEDURE — 71045 X-RAY EXAM CHEST 1 VIEW: CPT | Mod: 26,77

## 2022-09-17 RX ORDER — SODIUM CHLORIDE 9 MG/ML
500 INJECTION, SOLUTION INTRAVENOUS ONCE
Refills: 0 | Status: COMPLETED | OUTPATIENT
Start: 2022-09-17 | End: 2022-09-17

## 2022-09-17 RX ORDER — VASOPRESSIN 20 [USP'U]/ML
0.04 INJECTION INTRAVENOUS
Qty: 50 | Refills: 0 | Status: DISCONTINUED | OUTPATIENT
Start: 2022-09-17 | End: 2022-09-17

## 2022-09-17 RX ORDER — INSULIN LISPRO 100/ML
VIAL (ML) SUBCUTANEOUS EVERY 6 HOURS
Refills: 0 | Status: DISCONTINUED | OUTPATIENT
Start: 2022-09-17 | End: 2022-09-19

## 2022-09-17 RX ORDER — SODIUM CHLORIDE 9 MG/ML
1000 INJECTION, SOLUTION INTRAVENOUS ONCE
Refills: 0 | Status: COMPLETED | OUTPATIENT
Start: 2022-09-17 | End: 2022-09-17

## 2022-09-17 RX ORDER — DEXMEDETOMIDINE HYDROCHLORIDE IN 0.9% SODIUM CHLORIDE 4 UG/ML
0.3 INJECTION INTRAVENOUS
Qty: 400 | Refills: 0 | Status: DISCONTINUED | OUTPATIENT
Start: 2022-09-17 | End: 2022-09-17

## 2022-09-17 RX ORDER — ALBUMIN HUMAN 25 %
50 VIAL (ML) INTRAVENOUS EVERY 8 HOURS
Refills: 0 | Status: DISCONTINUED | OUTPATIENT
Start: 2022-09-17 | End: 2022-09-18

## 2022-09-17 RX ORDER — SODIUM CHLORIDE 9 MG/ML
1000 INJECTION, SOLUTION INTRAVENOUS
Refills: 0 | Status: DISCONTINUED | OUTPATIENT
Start: 2022-09-17 | End: 2022-09-17

## 2022-09-17 RX ORDER — FENTANYL CITRATE 50 UG/ML
25 INJECTION INTRAVENOUS EVERY 4 HOURS
Refills: 0 | Status: DISCONTINUED | OUTPATIENT
Start: 2022-09-17 | End: 2022-09-18

## 2022-09-17 RX ORDER — SODIUM CHLORIDE 9 MG/ML
1000 INJECTION, SOLUTION INTRAVENOUS
Refills: 0 | Status: DISCONTINUED | OUTPATIENT
Start: 2022-09-17 | End: 2022-09-18

## 2022-09-17 RX ORDER — MIDAZOLAM HYDROCHLORIDE 1 MG/ML
0.02 INJECTION, SOLUTION INTRAMUSCULAR; INTRAVENOUS
Qty: 100 | Refills: 0 | Status: DISCONTINUED | OUTPATIENT
Start: 2022-09-17 | End: 2022-09-17

## 2022-09-17 RX ORDER — NOREPINEPHRINE BITARTRATE/D5W 8 MG/250ML
0.2 PLASTIC BAG, INJECTION (ML) INTRAVENOUS
Qty: 16 | Refills: 0 | Status: DISCONTINUED | OUTPATIENT
Start: 2022-09-17 | End: 2022-09-18

## 2022-09-17 RX ORDER — PROPOFOL 10 MG/ML
10 INJECTION, EMULSION INTRAVENOUS
Qty: 1000 | Refills: 0 | Status: DISCONTINUED | OUTPATIENT
Start: 2022-09-17 | End: 2022-09-17

## 2022-09-17 RX ORDER — PIPERACILLIN AND TAZOBACTAM 4; .5 G/20ML; G/20ML
3.38 INJECTION, POWDER, LYOPHILIZED, FOR SOLUTION INTRAVENOUS EVERY 8 HOURS
Refills: 0 | Status: COMPLETED | OUTPATIENT
Start: 2022-09-17 | End: 2022-09-24

## 2022-09-17 RX ORDER — PHENYLEPHRINE HYDROCHLORIDE 10 MG/ML
0.1 INJECTION INTRAVENOUS
Qty: 40 | Refills: 0 | Status: DISCONTINUED | OUTPATIENT
Start: 2022-09-17 | End: 2022-09-17

## 2022-09-17 RX ADMIN — MIDAZOLAM HYDROCHLORIDE 2.09 MG/KG/HR: 1 INJECTION, SOLUTION INTRAMUSCULAR; INTRAVENOUS at 00:30

## 2022-09-17 RX ADMIN — Medication 4.89 MICROGRAM(S)/KG/MIN: at 04:15

## 2022-09-17 RX ADMIN — FENTANYL CITRATE 25 MICROGRAM(S): 50 INJECTION INTRAVENOUS at 19:15

## 2022-09-17 RX ADMIN — PANTOPRAZOLE SODIUM 40 MILLIGRAM(S): 20 TABLET, DELAYED RELEASE ORAL at 11:55

## 2022-09-17 RX ADMIN — FENTANYL CITRATE 25 MICROGRAM(S): 50 INJECTION INTRAVENOUS at 20:00

## 2022-09-17 RX ADMIN — PIPERACILLIN AND TAZOBACTAM 200 GRAM(S): 4; .5 INJECTION, POWDER, LYOPHILIZED, FOR SOLUTION INTRAVENOUS at 00:17

## 2022-09-17 RX ADMIN — Medication 4.89 MICROGRAM(S)/KG/MIN: at 08:44

## 2022-09-17 RX ADMIN — FENTANYL CITRATE 5.22 MICROGRAM(S)/KG/HR: 50 INJECTION INTRAVENOUS at 04:14

## 2022-09-17 RX ADMIN — PIPERACILLIN AND TAZOBACTAM 25 GRAM(S): 4; .5 INJECTION, POWDER, LYOPHILIZED, FOR SOLUTION INTRAVENOUS at 15:48

## 2022-09-17 RX ADMIN — FENTANYL CITRATE 25 MICROGRAM(S): 50 INJECTION INTRAVENOUS at 23:22

## 2022-09-17 RX ADMIN — SODIUM CHLORIDE 500 MILLILITER(S): 9 INJECTION, SOLUTION INTRAVENOUS at 05:46

## 2022-09-17 RX ADMIN — Medication 4.89 MICROGRAM(S)/KG/MIN: at 05:45

## 2022-09-17 RX ADMIN — SODIUM CHLORIDE 1000 MILLILITER(S): 9 INJECTION, SOLUTION INTRAVENOUS at 02:45

## 2022-09-17 RX ADMIN — Medication 9.78 MICROGRAM(S)/KG/MIN: at 01:00

## 2022-09-17 RX ADMIN — SODIUM CHLORIDE 120 MILLILITER(S): 9 INJECTION, SOLUTION INTRAVENOUS at 04:00

## 2022-09-17 RX ADMIN — Medication 50 MILLILITER(S): at 21:52

## 2022-09-17 RX ADMIN — PIPERACILLIN AND TAZOBACTAM 25 GRAM(S): 4; .5 INJECTION, POWDER, LYOPHILIZED, FOR SOLUTION INTRAVENOUS at 08:40

## 2022-09-17 RX ADMIN — CHLORHEXIDINE GLUCONATE 15 MILLILITER(S): 213 SOLUTION TOPICAL at 05:10

## 2022-09-17 RX ADMIN — PIPERACILLIN AND TAZOBACTAM 25 GRAM(S): 4; .5 INJECTION, POWDER, LYOPHILIZED, FOR SOLUTION INTRAVENOUS at 23:22

## 2022-09-17 RX ADMIN — FENTANYL CITRATE 25 MICROGRAM(S): 50 INJECTION INTRAVENOUS at 23:55

## 2022-09-17 RX ADMIN — CHLORHEXIDINE GLUCONATE 1 APPLICATION(S): 213 SOLUTION TOPICAL at 05:10

## 2022-09-17 NOTE — CONSULT NOTE ADULT - ASSESSMENT
Focal Peritonitis ?  Septic Shock  Incarcerated Hernia  Leukocytosis - improving   Fever - low grade      Plan -  Cont Zosyn 3.375 gms iv q8hrs  Await culture results.

## 2022-09-17 NOTE — PROGRESS NOTE ADULT - SUBJECTIVE AND OBJECTIVE BOX
INTERVAL HPI/OVERNIGHT EVENTS: pt became hypotensive, required placement of central line and was started on levophed.     PRESSORS: [x ] YES [ ] NO  WHICH: levophed    Antimicrobial:  piperacillin/tazobactam IVPB. 3.375 Gram(s) IV Intermittent once  piperacillin/tazobactam IVPB.- 3.375 Gram(s) IV Intermittent once  piperacillin/tazobactam IVPB.- 3.375 Gram(s) IV Intermittent once  piperacillin/tazobactam IVPB.. 3.375 Gram(s) IV Intermittent every 8 hours    Cardiovascular:  norepinephrine Infusion 0.05 MICROgram(s)/kG/Min IV Continuous <Continuous>    Pulmonary:    Hematalogic:  enoxaparin Injectable 40 milliGRAM(s) SubCutaneous every 24 hours    Other:  chlorhexidine 0.12% Liquid 15 milliLiter(s) Oral Mucosa every 12 hours  chlorhexidine 2% Cloths 1 Application(s) Topical <User Schedule>  dextrose 5% + sodium chloride 0.45%. 1000 milliLiter(s) IV Continuous <Continuous>  fentaNYL   Infusion 0.5 MICROgram(s)/kG/Hr IV Continuous <Continuous>  metoclopramide Injectable 10 milliGRAM(s) IV Push every 6 hours PRN  midazolam Infusion 0.02 mG/kG/Hr IV Continuous <Continuous>  ondansetron Injectable 4 milliGRAM(s) IV Push every 6 hours PRN  pantoprazole  Injectable 40 milliGRAM(s) IV Push daily    chlorhexidine 0.12% Liquid 15 milliLiter(s) Oral Mucosa every 12 hours  chlorhexidine 2% Cloths 1 Application(s) Topical <User Schedule>  dextrose 5% + sodium chloride 0.45%. 1000 milliLiter(s) IV Continuous <Continuous>  enoxaparin Injectable 40 milliGRAM(s) SubCutaneous every 24 hours  fentaNYL   Infusion 0.5 MICROgram(s)/kG/Hr IV Continuous <Continuous>  metoclopramide Injectable 10 milliGRAM(s) IV Push every 6 hours PRN  midazolam Infusion 0.02 mG/kG/Hr IV Continuous <Continuous>  norepinephrine Infusion 0.05 MICROgram(s)/kG/Min IV Continuous <Continuous>  ondansetron Injectable 4 milliGRAM(s) IV Push every 6 hours PRN  pantoprazole  Injectable 40 milliGRAM(s) IV Push daily  piperacillin/tazobactam IVPB. 3.375 Gram(s) IV Intermittent once  piperacillin/tazobactam IVPB.- 3.375 Gram(s) IV Intermittent once  piperacillin/tazobactam IVPB.- 3.375 Gram(s) IV Intermittent once  piperacillin/tazobactam IVPB.. 3.375 Gram(s) IV Intermittent every 8 hours    Drug Dosing Weight  Height (cm): 149.9 (13 Sep 2022 16:08)  Weight (kg): 104.3 (13 Sep 2022 16:08)  BMI (kg/m2): 46.4 (13 Sep 2022 16:08)  BSA (m2): 1.96 (13 Sep 2022 16:08)    CENTRAL LINE: [x ] YES [ ] NO  LOCATION: [facility]   DATE INSERTED: 9/17  REMOVE: [ ] YES [ ] NO  EXPLAIN:    HER: [x ] YES [ ] NO    DATE INSERTED:  REMOVE:  [ ] YES [ ] NO  EXPLAIN:    A-LINE:  [ ] YES [ x] NO  LOCATION:   DATE INSERTED:  REMOVE:  [ ] YES [ ] NO  EXPLAIN:    PMH -reviewed admission note, no change since admission  PAST MEDICAL & SURGICAL HISTORY:  Diverticulosis      Abdominal hernia      Breast cancer  Bilateral, s/p RT.      Diverticulitis      Morbid obesity      History of appendectomy      S/P MARIELLA (total abdominal hysterectomy)  2000, due to &quot;infection&quot;      S/P laparoscopic cholecystectomy  1999      History of tonsillectomy      History of lumpectomy of both breasts  3/2020      Colostomy present      History of partial colectomy  7/2020      H/O hernia repair  10/2020          ICU Vital Signs Last 24 Hrs  T(C): 37.5 (17 Sep 2022 00:28), Max: 37.5 (17 Sep 2022 00:28)  T(F): 99.5 (17 Sep 2022 00:28), Max: 99.5 (17 Sep 2022 00:28)  HR: 94 (17 Sep 2022 01:00) (76 - 99)  BP: 119/93 (17 Sep 2022 01:00) (119/93 - 155/89)  BP(mean): 98 (17 Sep 2022 01:00) (98 - 98)  ABP: --  ABP(mean): --  RR: 23 (17 Sep 2022 01:00) (18 - 23)  SpO2: 98% (17 Sep 2022 01:00) (95% - 100%)    O2 Parameters below as of 16 Sep 2022 13:05  Patient On (Oxygen Delivery Method): room air            ABG - ( 16 Sep 2022 22:27 )  pH, Arterial: 7.30  pH, Blood: x     /  pCO2: 41    /  pO2: 196   / HCO3: 20    / Base Excess: -5.9  /  SaO2: 100                   09-15 @ 07:01  -  09-16 @ 07:00  --------------------------------------------------------  IN: 0 mL / OUT: 200 mL / NET: -200 mL        Mode: AC/ CMV (Assist Control/ Continuous Mandatory Ventilation)  RR (machine): 12  TV (machine): 500  FiO2: 60  PEEP: 5  ITime: 0.9  MAP: 11  PIP: 37      PHYSICAL EXAM:    GENERAL: obese  HEAD:  Atraumatic, Normocephalic  EYES: PERRLA, conjunctiva and sclera clear  ENMT: ETT in place   NECK: Supple, normal appearance, No JVD; Trachea midline  NERVOUS SYSTEM:  intubated and sedated   CHEST/LUNG: No chest deformity; Normal percussion bilaterally; No rales, rhonchi, wheezing   HEART: Regular rate and rhythm; No murmurs, rubs, or gallops  ABDOMEN: increased abdominal girth, MIL drain in place.  Nondistended; Bowel sounds present  EXTREMITIES:  2+ Peripheral Pulses, No clubbing, cyanosis, or edema  : her catheter   SKIN: No rashes or lesions;  Good capillary refill      LABS:  CBC Full  -  ( 16 Sep 2022 23:20 )  WBC Count : 9.90 K/uL  RBC Count : 5.53 M/uL  Hemoglobin : 16.8 g/dL  Hematocrit : 53.0 %  Platelet Count - Automated : 314 K/uL  Mean Cell Volume : 95.8 fl  Mean Cell Hemoglobin : 30.4 pg  Mean Cell Hemoglobin Concentration : 31.7 gm/dL  Auto Neutrophil # : 7.82 K/uL  Auto Lymphocyte # : 0.89 K/uL  Auto Monocyte # : 0.99 K/uL  Auto Eosinophil # : 0.00 K/uL  Auto Basophil # : 0.00 K/uL  Auto Neutrophil % : 62.0 %  Auto Lymphocyte % : 9.0 %  Auto Monocyte % : 10.0 %  Auto Eosinophil % : 0.0 %  Auto Basophil % : 0.0 %    09-16    135  |  104  |  11  ----------------------------<  121<H>  4.3   |  19<L>  |  0.72    Ca    7.5<L>      16 Sep 2022 23:20  Phos  3.7     09-16  Mg     1.9     09-16    TPro  5.8<L>  /  Alb  2.0<L>  /  TBili  3.4<H>  /  DBili  x   /  AST  26  /  ALT  32  /  AlkPhos  102  09-16            RADIOLOGY & ADDITIONAL STUDIES REVIEWED:  ***    [ ]GOALS OF CARE DISCUSSION WITH PATIENT/FAMILY/PROXY:    CRITICAL CARE TIME SPENT: 35 minutes INTERVAL HPI/OVERNIGHT EVENTS: pt became hypotensive, required placement of central line and was started on levophed. A-line placed in AM showing high bp. Pt able to nod yes and shake her head no when asked about pain. Will be for SBT during the day.    PRESSORS: [x ] YES [ ] NO  WHICH: levophed    Antimicrobial:  piperacillin/tazobactam IVPB. 3.375 Gram(s) IV Intermittent once  piperacillin/tazobactam IVPB.- 3.375 Gram(s) IV Intermittent once  piperacillin/tazobactam IVPB.- 3.375 Gram(s) IV Intermittent once  piperacillin/tazobactam IVPB.. 3.375 Gram(s) IV Intermittent every 8 hours    Cardiovascular:  norepinephrine Infusion 0.05 MICROgram(s)/kG/Min IV Continuous <Continuous>    Pulmonary:    Hematalogic:  enoxaparin Injectable 40 milliGRAM(s) SubCutaneous every 24 hours    Other:  chlorhexidine 0.12% Liquid 15 milliLiter(s) Oral Mucosa every 12 hours  chlorhexidine 2% Cloths 1 Application(s) Topical <User Schedule>  dextrose 5% + sodium chloride 0.45%. 1000 milliLiter(s) IV Continuous <Continuous>  fentaNYL   Infusion 0.5 MICROgram(s)/kG/Hr IV Continuous <Continuous>  metoclopramide Injectable 10 milliGRAM(s) IV Push every 6 hours PRN  midazolam Infusion 0.02 mG/kG/Hr IV Continuous <Continuous>  ondansetron Injectable 4 milliGRAM(s) IV Push every 6 hours PRN  pantoprazole  Injectable 40 milliGRAM(s) IV Push daily    chlorhexidine 0.12% Liquid 15 milliLiter(s) Oral Mucosa every 12 hours  chlorhexidine 2% Cloths 1 Application(s) Topical <User Schedule>  dextrose 5% + sodium chloride 0.45%. 1000 milliLiter(s) IV Continuous <Continuous>  enoxaparin Injectable 40 milliGRAM(s) SubCutaneous every 24 hours  fentaNYL   Infusion 0.5 MICROgram(s)/kG/Hr IV Continuous <Continuous>  metoclopramide Injectable 10 milliGRAM(s) IV Push every 6 hours PRN  midazolam Infusion 0.02 mG/kG/Hr IV Continuous <Continuous>  norepinephrine Infusion 0.05 MICROgram(s)/kG/Min IV Continuous <Continuous>  ondansetron Injectable 4 milliGRAM(s) IV Push every 6 hours PRN  pantoprazole  Injectable 40 milliGRAM(s) IV Push daily  piperacillin/tazobactam IVPB. 3.375 Gram(s) IV Intermittent once  piperacillin/tazobactam IVPB.- 3.375 Gram(s) IV Intermittent once  piperacillin/tazobactam IVPB.- 3.375 Gram(s) IV Intermittent once  piperacillin/tazobactam IVPB.. 3.375 Gram(s) IV Intermittent every 8 hours    Drug Dosing Weight  Height (cm): 149.9 (13 Sep 2022 16:08)  Weight (kg): 104.3 (13 Sep 2022 16:08)  BMI (kg/m2): 46.4 (13 Sep 2022 16:08)  BSA (m2): 1.96 (13 Sep 2022 16:08)    CENTRAL LINE: [x ] YES [ ] NO  LOCATION: [facility]   DATE INSERTED: 9/17  REMOVE: [ ] YES [ ] NO  EXPLAIN:    HER: [x ] YES [ ] NO    DATE INSERTED:  REMOVE:  [ ] YES [ ] NO  EXPLAIN:    A-LINE:  [ ] YES [ x] NO  LOCATION:   DATE INSERTED:  REMOVE:  [ ] YES [ ] NO  EXPLAIN:    PMH -reviewed admission note, no change since admission  PAST MEDICAL & SURGICAL HISTORY:  Diverticulosis      Abdominal hernia      Breast cancer  Bilateral, s/p RT.      Diverticulitis      Morbid obesity      History of appendectomy      S/P MARIELLA (total abdominal hysterectomy)  2000, due to &quot;infection&quot;      S/P laparoscopic cholecystectomy  1999      History of tonsillectomy      History of lumpectomy of both breasts  3/2020      Colostomy present      History of partial colectomy  7/2020      H/O hernia repair  10/2020          ICU Vital Signs Last 24 Hrs  T(C): 37.5 (17 Sep 2022 00:28), Max: 37.5 (17 Sep 2022 00:28)  T(F): 99.5 (17 Sep 2022 00:28), Max: 99.5 (17 Sep 2022 00:28)  HR: 94 (17 Sep 2022 01:00) (76 - 99)  BP: 119/93 (17 Sep 2022 01:00) (119/93 - 155/89)  BP(mean): 98 (17 Sep 2022 01:00) (98 - 98)  ABP: --  ABP(mean): --  RR: 23 (17 Sep 2022 01:00) (18 - 23)  SpO2: 98% (17 Sep 2022 01:00) (95% - 100%)    O2 Parameters below as of 16 Sep 2022 13:05  Patient On (Oxygen Delivery Method): room air            ABG - ( 16 Sep 2022 22:27 )  pH, Arterial: 7.30  pH, Blood: x     /  pCO2: 41    /  pO2: 196   / HCO3: 20    / Base Excess: -5.9  /  SaO2: 100                   09-15 @ 07:01  -  09-16 @ 07:00  --------------------------------------------------------  IN: 0 mL / OUT: 200 mL / NET: -200 mL        Mode: AC/ CMV (Assist Control/ Continuous Mandatory Ventilation)  RR (machine): 12  TV (machine): 500  FiO2: 60  PEEP: 5  ITime: 0.9  MAP: 11  PIP: 37      PHYSICAL EXAM:    GENERAL: obese  HEAD:  Atraumatic, Normocephalic  EYES: PERRLA, conjunctiva and sclera clear  ENMT: ETT in place   NECK: Supple, normal appearance, No JVD; Trachea midline  NERVOUS SYSTEM:  intubated and sedated   CHEST/LUNG: No chest deformity; Normal percussion bilaterally; No rales, rhonchi, wheezing   HEART: Regular rate and rhythm; No murmurs, rubs, or gallops  ABDOMEN: increased abdominal girth, MIL drain in place.  Nondistended; Bowel sounds present  EXTREMITIES:  2+ Peripheral Pulses, No clubbing, cyanosis, or edema  : her catheter   SKIN: No rashes or lesions;  Good capillary refill      LABS:  CBC Full  -  ( 16 Sep 2022 23:20 )  WBC Count : 9.90 K/uL  RBC Count : 5.53 M/uL  Hemoglobin : 16.8 g/dL  Hematocrit : 53.0 %  Platelet Count - Automated : 314 K/uL  Mean Cell Volume : 95.8 fl  Mean Cell Hemoglobin : 30.4 pg  Mean Cell Hemoglobin Concentration : 31.7 gm/dL  Auto Neutrophil # : 7.82 K/uL  Auto Lymphocyte # : 0.89 K/uL  Auto Monocyte # : 0.99 K/uL  Auto Eosinophil # : 0.00 K/uL  Auto Basophil # : 0.00 K/uL  Auto Neutrophil % : 62.0 %  Auto Lymphocyte % : 9.0 %  Auto Monocyte % : 10.0 %  Auto Eosinophil % : 0.0 %  Auto Basophil % : 0.0 %    09-16    135  |  104  |  11  ----------------------------<  121<H>  4.3   |  19<L>  |  0.72    Ca    7.5<L>      16 Sep 2022 23:20  Phos  3.7     09-16  Mg     1.9     09-16    TPro  5.8<L>  /  Alb  2.0<L>  /  TBili  3.4<H>  /  DBili  x   /  AST  26  /  ALT  32  /  AlkPhos  102  09-16            RADIOLOGY & ADDITIONAL STUDIES REVIEWED:  ***    [ ]GOALS OF CARE DISCUSSION WITH PATIENT/FAMILY/PROXY:    CRITICAL CARE TIME SPENT: 35 minutes

## 2022-09-17 NOTE — PROGRESS NOTE ADULT - ASSESSMENT
POD 1 Ex lap, NATALY, partial sb resection,, repair of strangulated incisional hernia  post op low urine output, requires pressure support, intubated in icu.  wound: appears well dressed s- signs of bleed or purulence  minimal ngt output    iv abx  ID consult Dr Calderon  local wound care, wet to dry gauze, between retention sutures.  i&O's  critical care as per ICU team

## 2022-09-17 NOTE — CONSULT NOTE ADULT - SUBJECTIVE AND OBJECTIVE BOX
HPI:  61y/o f with PMHx of  obesity, diverticulitis, ventral hernia , Breast Ca s/p b/l lumpectomy with adj. radiation,  PSHx of Ernestine's procedure and reversal, Ex lap SBR, ileostomy and reversal ? , lap cholecystectomy and lap appendectomy  presents to the ED with Abd bulge/pain x 1 day . Located in the epigastric area and RLQ , often described as constant  radiating to lower abd with associated nausea , > 3 NBNB vomiting(last vomiting >6hrs ago) . Last BM < 12hrs ago and having regular flatus. Denies  BRBPR, dysuria, fever, chills, SOB, lightheadedness or any other complaints at this time. Of note, pt had similar pain in the past. (14 Sep 2022 00:40)      PAST MEDICAL & SURGICAL HISTORY:  Diverticulosis      Abdominal hernia      Breast cancer  Bilateral, s/p RT.      Diverticulitis      Morbid obesity      History of appendectomy      S/P MARIELLA (total abdominal hysterectomy)  2000, due to &quot;infection&quot;      S/P laparoscopic cholecystectomy  1999      History of tonsillectomy      History of lumpectomy of both breasts  3/2020      Colostomy present      History of partial colectomy  7/2020      H/O hernia repair  10/2020          No Known Drug Allergies  peanuts (Anaphylaxis)      Meds:  chlorhexidine 2% Cloths 1 Application(s) Topical <User Schedule>  dextrose 5%. 1000 milliLiter(s) IV Continuous <Continuous>  enoxaparin Injectable 40 milliGRAM(s) SubCutaneous every 24 hours  insulin lispro (ADMELOG) corrective regimen sliding scale   SubCutaneous every 6 hours  norepinephrine Infusion 0.2 MICROgram(s)/kG/Min IV Continuous <Continuous>  ondansetron Injectable 4 milliGRAM(s) IV Push every 6 hours PRN  pantoprazole  Injectable 40 milliGRAM(s) IV Push daily  piperacillin/tazobactam IVPB.. 3.375 Gram(s) IV Intermittent every 8 hours      SOCIAL HISTORY:  Smoker:  YES / NO        PACK YEARS:                         WHEN QUIT?  ETOH use:  YES / NO               FREQUENCY / QUANTITY:  Ilicit Drug use:  YES / NO  Occupation:  Assisted device use (Cane / Walker):  Live with:    FAMILY HISTORY:  FH: hypertension        VITALS:  Vital Signs Last 24 Hrs  T(C): 37.8 (17 Sep 2022 09:15), Max: 38.1 (17 Sep 2022 05:58)  T(F): 100.1 (17 Sep 2022 09:15), Max: 100.5 (17 Sep 2022 05:58)  HR: 87 (17 Sep 2022 15:30) (77 - 111)  BP: 124/46 (17 Sep 2022 08:30) (70/48 - 192/53)  BP(mean): 62 (17 Sep 2022 08:30) (37 - 100)  RR: 20 (17 Sep 2022 15:30) (11 - 24)  SpO2: 98% (17 Sep 2022 15:30) (94% - 100%)    Parameters below as of 17 Sep 2022 12:12    O2 Flow (L/min): 4      LABS/DIAGNOSTIC TESTS:                          15.5   12.86 )-----------( 288      ( 17 Sep 2022 09:55 )             49.0     WBC Count: 12.86 K/uL (09-17 @ 09:55)  WBC Count: 17.05 K/uL (09-17 @ 04:02)  WBC Count: 9.90 K/uL (09-16 @ 23:20)  WBC Count: 19.44 K/uL (09-16 @ 06:13)  WBC Count: 17.79 K/uL (09-15 @ 05:53)      09-17    139  |  107  |  16  ----------------------------<  197<H>  3.6   |  21<L>  |  1.44<H>    Ca    7.2<L>      17 Sep 2022 09:55  Phos  2.9     09-17  Mg     1.6     09-17    TPro  5.2<L>  /  Alb  1.6<L>  /  TBili  4.3<H>  /  DBili  x   /  AST  42<H>  /  ALT  28  /  AlkPhos  86  09-17          LIVER FUNCTIONS - ( 17 Sep 2022 04:02 )  Alb: 1.6 g/dL / Pro: 5.2 g/dL / ALK PHOS: 86 U/L / ALT: 28 U/L DA / AST: 42 U/L / GGT: x                 LACTATE:Lactate, Blood: 2.2 mmol/L (09-17 @ 09:55)  Lactate, Blood: 2.9 mmol/L (09-17 @ 04:02)      ABG - ABG - ( 17 Sep 2022 03:56 )  pH, Arterial: 7.31  pH, Blood: x     /  pCO2: 38    /  pO2: 162   / HCO3: 19    / Base Excess: -6.6  /  SaO2: 100                 CULTURES:       RADIOLOGY:< from: CT Abdomen and Pelvis w/ Oral Cont and w/ IV Cont (09.13.22 @ 20:02) >  ACC: [id number] EXAM:  CT ABDOMEN AND PELVIS OC IC                          PROCEDURE DATE:  09/13/2022          INTERPRETATION:  CLINICAL INFORMATION: Abdominal pain and vomiting    COMPARISON: 2/11/2022.    CONTRAST/COMPLICATIONS:  IV Contrast: Omnipaque 350  90 cc administered   10 cc discarded  Oral Contrast: Gastroview  Complications: None reported at time of study completion    PROCEDURE:  CT of the Abdomen and Pelvis was performed.  Sagittal and coronal reformats were performed.    FINDINGS:  LOWER CHEST: Linear atelectasis versus scarring is seen within the   lingula. Dependent changes are seen posteriorly..    LIVER: Peripheral wedge-shaped hypodensities are appreciated of the right   hepatic lobe. There is some suggestion of capsular retraction, and these   likely represent represent areas of scarring. This is unchanged compared   to previous exam. Additional subcentimeter hypodensity of the left   hepatic lobe is stable compared to previous exam and of indeterminate   nature.  BILE DUCTS: Normal caliber.  GALLBLADDER: Cholecystectomy.  SPLEEN: Within normal limits.  PANCREAS: Within normal limits.  ADRENALS: Within normal limits.  KIDNEYS/URETERS: Subcentimeter left renal hypodensity is likely small   cysts, though are too small to characterize. The kidneys otherwise   enhance symmetrically..    BLADDER: Within normal limits.  REPRODUCTIVE ORGANS: Hysterectomy.    BOWEL: Partial colon resection changes are appreciated, with a   rectosigmoid anastomosis is appreciated. An additional anastomosis is   seen within the left upper abdomen. There is a small bowel containing   midline ventral hernia without associated obstruction. An additional   right lower abdominal ventral hernia containing what appears to be a   dilatedsmall bowel loop in association with a small bowel anastomosis.   Proximal to this hernia small bowel loops are mildly dilated measuring up   to 2.9 cm. There is associated thecal is bowel contents suggestive of   steatosis. Distal to this point small bowel loops appear relatively   decompressed.  PERITONEUM: No ascites.  VESSELS: Atherosclerotic changes.  RETROPERITONEUM/LYMPH NODES: No lymphadenopathy.  ABDOMINAL WALL: Anterior abdominal wall defects as above associated   herniating bowel. Postoperative changes of the intra-abdominal wall are   also appreciated..  BONES: Within normal limits.    IMPRESSION:    Right lower abdominal wall ventral hernia which contains small bowel   loops. Amongst these bowel loops is a patulous/dilated appearing small   bowel loop associated with surgical sutures. Patulousness of   postoperative bowel can be a usual finding, however there also appears to   be partially obstructive changes associated with this hernia. Proximal   small bowel loops are mildly dilated and demonstrate evidence of stasis.    Additional findings as above.          --- End of Report ---             KANCHAN HERNANDEZ M.D., Attending Radiologist  This document has been electronically signed. Sep 13 2022  8:47PM    < end of copied text >  ------------------------------------------------------------------------------------------------------------------------------------------------------------------------------------------------------------------------------  ACC: [id number] EXAM:  XR CHEST PORTABLE URGENT 1V                        ACC: [id number] EXAM:  XR ABDOMEN 2V                        ACC: [id number] EXAM:  XR CHEST PORTABLE IMMED 1V                          PROCEDURE DATE:  09/17/2022          INTERPRETATION:  INDICATIONS: 62-year-old female, confirm central line   placement.    Prior examination for comparison: 9/13/2022    Findings:    Chest radiograph 9/16/2022, 10:38 PM  Endotracheal tube with tip in the midthoracic trachea. Nasogastric tube   is unchanged, across the diaphragm tip is off the margin of film.   Bibasilar streaky opacities consistent with vascular crowding. There are   no pleural effusions. The cardiomediastinal silhouette is normal. Bones   are grossly normal.    Chest radiograph 9/17/2022, 12:42 AM  Interval placement of right central venous catheter with tip in the right   atrium. Otherwise, no significant change in the findings.    KUB, 2022  Nasogastric tube is identified, tip is projecting over the left upper   quadrant at the expected location of the stomach. Numerous surgical   staples. No suggest free air. Bones are grossly within normal limits.   Scattered air-fluid levels.      IMPRESSION: Serial chest radiographs and KUB as above    --- End of Report ---            BUBBA ROA MD; Attending Interventional Radiologist  This document has been electronically signed. Sep 17 2022 12:08PM    < end of copied text >        ROS  [  ] UNABLE TO ELICIT               HPI:  61y/o f with PMHx of  obesity, diverticulitis, ventral hernia , Breast Ca s/p b/l lumpectomy with adj. radiation,  PSHx of Ernsetine's procedure and reversal, Ex lap SBR, ileostomy and reversal ? , lap cholecystectomy and lap appendectomy  presents to the ED with Abd bulge/pain x 1 day . Located in the epigastric area and RLQ , often described as constant  radiating to lower abd with associated nausea , > 3 NBNB vomiting(last vomiting >6hrs ago) . Last BM < 12hrs ago and having regular flatus. Denies  BRBPR, dysuria, fever, chills, SOB, lightheadedness or any other complaints at this time. Of note, pt had similar pain in the past. (14 Sep 2022 00:40)        History as above, asked to see this patient who presented to the hospital with abdominal pain , nausea and vomiting and was found to have an incarcerated hernia and had NATALY and a partial small bowel resection and repair of hernia. She was hypotensive requiring a pressor and was intubated and so is in the ICU, she has a low low fever and leukocytosis. She self extubated herself. She is feeling well currently and is denying having any Abdominal pain, no nausea or vomiting , she has not passed flatus yet, she has a slight cough but no SOB, no chest pain , no other complaints. She is on 1 pressor currently.        PAST MEDICAL & SURGICAL HISTORY:  Diverticulosis      Abdominal hernia      Breast cancer  Bilateral, s/p RT.      Diverticulitis      Morbid obesity      History of appendectomy      S/P MARIELLA (total abdominal hysterectomy)  2000, due to &quot;infection&quot;      S/P laparoscopic cholecystectomy  1999      History of tonsillectomy      History of lumpectomy of both breasts  3/2020      Colostomy present      History of partial colectomy  7/2020      H/O hernia repair  10/2020          No Known Drug Allergies  peanuts (Anaphylaxis)      Meds:  chlorhexidine 2% Cloths 1 Application(s) Topical <User Schedule>  dextrose 5%. 1000 milliLiter(s) IV Continuous <Continuous>  enoxaparin Injectable 40 milliGRAM(s) SubCutaneous every 24 hours  insulin lispro (ADMELOG) corrective regimen sliding scale   SubCutaneous every 6 hours  norepinephrine Infusion 0.2 MICROgram(s)/kG/Min IV Continuous <Continuous>  ondansetron Injectable 4 milliGRAM(s) IV Push every 6 hours PRN  pantoprazole  Injectable 40 milliGRAM(s) IV Push daily  piperacillin/tazobactam IVPB.. 3.375 Gram(s) IV Intermittent every 8 hours      SOCIAL HISTORY:  Smoker:  no  ETOH use:  socially  Ilicit Drug use:  NO      FAMILY HISTORY:  FH: hypertension        VITALS:  Vital Signs Last 24 Hrs  T(C): 37.8 (17 Sep 2022 09:15), Max: 38.1 (17 Sep 2022 05:58)  T(F): 100.1 (17 Sep 2022 09:15), Max: 100.5 (17 Sep 2022 05:58)  HR: 87 (17 Sep 2022 15:30) (77 - 111)  BP: 124/46 (17 Sep 2022 08:30) (70/48 - 192/53)  BP(mean): 62 (17 Sep 2022 08:30) (37 - 100)  RR: 20 (17 Sep 2022 15:30) (11 - 24)  SpO2: 98% (17 Sep 2022 15:30) (94% - 100%)    Parameters below as of 17 Sep 2022 12:12    O2 Flow (L/min): 4      LABS/DIAGNOSTIC TESTS:                          15.5   12.86 )-----------( 288      ( 17 Sep 2022 09:55 )             49.0     WBC Count: 12.86 K/uL (09-17 @ 09:55)  WBC Count: 17.05 K/uL (09-17 @ 04:02)  WBC Count: 9.90 K/uL (09-16 @ 23:20)  WBC Count: 19.44 K/uL (09-16 @ 06:13)  WBC Count: 17.79 K/uL (09-15 @ 05:53)      09-17    139  |  107  |  16  ----------------------------<  197<H>  3.6   |  21<L>  |  1.44<H>    Ca    7.2<L>      17 Sep 2022 09:55  Phos  2.9     09-17  Mg     1.6     09-17    TPro  5.2<L>  /  Alb  1.6<L>  /  TBili  4.3<H>  /  DBili  x   /  AST  42<H>  /  ALT  28  /  AlkPhos  86  09-17          LIVER FUNCTIONS - ( 17 Sep 2022 04:02 )  Alb: 1.6 g/dL / Pro: 5.2 g/dL / ALK PHOS: 86 U/L / ALT: 28 U/L DA / AST: 42 U/L / GGT: x                 LACTATE:Lactate, Blood: 2.2 mmol/L (09-17 @ 09:55)  Lactate, Blood: 2.9 mmol/L (09-17 @ 04:02)      ABG - ABG - ( 17 Sep 2022 03:56 )  pH, Arterial: 7.31  pH, Blood: x     /  pCO2: 38    /  pO2: 162   / HCO3: 19    / Base Excess: -6.6  /  SaO2: 100                 CULTURES:       RADIOLOGY:< from: CT Abdomen and Pelvis w/ Oral Cont and w/ IV Cont (09.13.22 @ 20:02) >  ACC: [id number] EXAM:  CT ABDOMEN AND PELVIS OC IC                          PROCEDURE DATE:  09/13/2022          INTERPRETATION:  CLINICAL INFORMATION: Abdominal pain and vomiting    COMPARISON: 2/11/2022.    CONTRAST/COMPLICATIONS:  IV Contrast: Omnipaque 350  90 cc administered   10 cc discarded  Oral Contrast: Gastroview  Complications: None reported at time of study completion    PROCEDURE:  CT of the Abdomen and Pelvis was performed.  Sagittal and coronal reformats were performed.    FINDINGS:  LOWER CHEST: Linear atelectasis versus scarring is seen within the   lingula. Dependent changes are seen posteriorly..    LIVER: Peripheral wedge-shaped hypodensities are appreciated of the right   hepatic lobe. There is some suggestion of capsular retraction, and these   likely represent represent areas of scarring. This is unchanged compared   to previous exam. Additional subcentimeter hypodensity of the left   hepatic lobe is stable compared to previous exam and of indeterminate   nature.  BILE DUCTS: Normal caliber.  GALLBLADDER: Cholecystectomy.  SPLEEN: Within normal limits.  PANCREAS: Within normal limits.  ADRENALS: Within normal limits.  KIDNEYS/URETERS: Subcentimeter left renal hypodensity is likely small   cysts, though are too small to characterize. The kidneys otherwise   enhance symmetrically..    BLADDER: Within normal limits.  REPRODUCTIVE ORGANS: Hysterectomy.    BOWEL: Partial colon resection changes are appreciated, with a   rectosigmoid anastomosis is appreciated. An additional anastomosis is   seen within the left upper abdomen. There is a small bowel containing   midline ventral hernia without associated obstruction. An additional   right lower abdominal ventral hernia containing what appears to be a   dilatedsmall bowel loop in association with a small bowel anastomosis.   Proximal to this hernia small bowel loops are mildly dilated measuring up   to 2.9 cm. There is associated thecal is bowel contents suggestive of   steatosis. Distal to this point small bowel loops appear relatively   decompressed.  PERITONEUM: No ascites.  VESSELS: Atherosclerotic changes.  RETROPERITONEUM/LYMPH NODES: No lymphadenopathy.  ABDOMINAL WALL: Anterior abdominal wall defects as above associated   herniating bowel. Postoperative changes of the intra-abdominal wall are   also appreciated..  BONES: Within normal limits.    IMPRESSION:    Right lower abdominal wall ventral hernia which contains small bowel   loops. Amongst these bowel loops is a patulous/dilated appearing small   bowel loop associated with surgical sutures. Patulousness of   postoperative bowel can be a usual finding, however there also appears to   be partially obstructive changes associated with this hernia. Proximal   small bowel loops are mildly dilated and demonstrate evidence of stasis.    Additional findings as above.          --- End of Report ---             KANCHAN HERNANDEZ M.D., Attending Radiologist  This document has been electronically signed. Sep 13 2022  8:47PM    < end of copied text >  ------------------------------------------------------------------------------------------------------------------------------------------------------------------------------------------------------------------------------  ACC: [id number] EXAM:  XR CHEST PORTABLE URGENT 1V                        ACC: [id number] EXAM:  XR ABDOMEN 2V                        ACC: [id number] EXAM:  XR CHEST PORTABLE IMMED 1V                          PROCEDURE DATE:  09/17/2022          INTERPRETATION:  INDICATIONS: 62-year-old female, confirm central line   placement.    Prior examination for comparison: 9/13/2022    Findings:    Chest radiograph 9/16/2022, 10:38 PM  Endotracheal tube with tip in the midthoracic trachea. Nasogastric tube   is unchanged, across the diaphragm tip is off the margin of film.   Bibasilar streaky opacities consistent with vascular crowding. There are   no pleural effusions. The cardiomediastinal silhouette is normal. Bones   are grossly normal.    Chest radiograph 9/17/2022, 12:42 AM  Interval placement of right central venous catheter with tip in the right   atrium. Otherwise, no significant change in the findings.    KUB, 2022  Nasogastric tube is identified, tip is projecting over the left upper   quadrant at the expected location of the stomach. Numerous surgical   staples. No suggest free air. Bones are grossly within normal limits.   Scattered air-fluid levels.      IMPRESSION: Serial chest radiographs and KUB as above    --- End of Report ---            BUBBA ROA MD; Attending Interventional Radiologist  This document has been electronically signed. Sep 17 2022 12:08PM    < end of copied text >        ROS  [  ] UNABLE TO ELICIT

## 2022-09-17 NOTE — CHART NOTE - NSCHARTNOTEFT_GEN_A_CORE
: Zander Harmon and Michael (NP-student)    INDICATION: Hypotension, oliguric renal failure    PROCEDURE:  [X] LIMITED ECHO  [ ] LIMITED CHEST  [ ] LIMITED RETROPERITONEAL  [X] LIMITED ABDOMINAL  [ ] LIMITED DVT  [ ] NEEDLE GUIDANCE VASCULAR  [ ] NEEDLE GUIDANCE THORACENTESIS  [ ] NEEDLE GUIDANCE PARACENTESIS  [ ] NEEDLE GUIDANCE PERICARDIOCENTESIS  [ ] OTHER    FINDINGS:  Limited ECHO: Highly limited views due to body habitus and recent abdominal surgery. Grossly preserved LV function. IVC 1.76cm without respiratory variation.   Limited Abdomen: Bladder decompressed around Fragoso catheter balloon.     INTERPRETATION:  Limited ECHO: Grossly preserved LV function. IVC does not appear consistent with hypovolemia.   Limited Abdomen: Empty bladder.

## 2022-09-17 NOTE — CHART NOTE - NSCHARTNOTEFT_GEN_A_CORE
Pt POD 0 s/p Exploratory laparotomy with lysis of adhesions   Small bowel resection   Open repair of strangulated incisional hernia     intubated/sedated in ICU  on levophed  UO min  7 L ivf    ICU Vital Signs Last 24 Hrs  T(C): 37.5 (17 Sep 2022 00:28), Max: 37.5 (17 Sep 2022 00:28)  T(F): 99.5 (17 Sep 2022 00:28), Max: 99.5 (17 Sep 2022 00:28)  HR: 76 (16 Sep 2022 13:05) (76 - 84)  BP: 138/59 (16 Sep 2022 13:05) (138/59 - 155/89)  BP(mean): --  ABP: --  ABP(mean): --  RR: 18 (16 Sep 2022 13:05) (18 - 18)  SpO2: 95% (16 Sep 2022 13:05) (95% - 95%)    O2 Parameters below as of 16 Sep 2022 13:05  Patient On (Oxygen Delivery Method): room air        abd dressing CDI  MIL ss min    critical post-op  on pressor support  intubated, sedated  central access placed by ICU  abx  cont ivf support/wean pressors per ICU  IOs  vent per ICU  NPO, NGT

## 2022-09-17 NOTE — PROGRESS NOTE ADULT - ATTENDING COMMENTS
61yo woman w/ extensive PSH and PMH sig for ex lap and SBR, ileostomy and subsequent reversal, Ernestine's procedure and reversal, and diverticulitis who presented w/ abd pain, found to have SBO 2/2 strangulated vs. incarcerated abd wall hernia now s/p OR 9/16 for ex lap and small bowel resection (ileostomy) w/ anastomosis and repair. Significant amount of ischemic/necrotic bowel noted intra-op w/ sepsis and patient required ICU admission post-op for vasopressor support in septic shock, s/p 8.5L total fluid resuscitation between OR and overnight ICU.     ASSESSMENT:  #S/P Ex-lap, small bowel resection w/ significant bowel necrosis noted intra-op  #Septic shock  #Acute hypoxemic respiratory failure  #Morbid obesity  #Acute renal failure / ATN  #Lactic acidosis    Plan:  - planned for SAT/SBT for possible extubation today - patient self-extubated during AM rounds and will monitor off ventilatory support  - cont vasopressor support to maintain MAP=>65  - hold off additional IVF - patient received 8.5L already  - cont broad spec abx   - f/u culture data, OR specimens  - tight FSG monitoring post-op, cont SILAS  - NPO  - maintain NGT to LIS per surgery  - arterial line for close BP monitoring due to high level of vasopressor requirement post-op  - strict I/O  - urine lytes for acute renal failure  - wound care  - dvt ppx  - will need PT eval once cleared by surgery

## 2022-09-17 NOTE — PROGRESS NOTE ADULT - SUBJECTIVE AND OBJECTIVE BOX
Pt intubated, sedated, on pressor,  Vital Signs Last 24 Hrs  T(C): 37.8 (09-17-22 @ 09:15), Max: 38.1 (09-17-22 @ 05:58)  T(F): 100.1 (09-17-22 @ 09:15), Max: 100.5 (09-17-22 @ 05:58)  HR: 100 (09-17-22 @ 10:00) (76 - 107)  BP: 124/46 (09-17-22 @ 08:30) (70/48 - 192/53)  BP(mean): 62 (09-17-22 @ 08:30) (37 - 100)  RR: 19 (09-17-22 @ 10:00) (15 - 24)  SpO2: 98% (09-17-22 @ 10:00) (95% - 100%)  Abd: soft, distended, wound: no bleed, no purulence, granulation tissue noted. wet to dry dressing placed  MIL: serosanguinous drainage noted.  no calf tenderness or palpable cords                          15.5   x     )-----------( 288      ( 17 Sep 2022 09:55 )             49.0   09-17    135  |  107  |  12  ----------------------------<  169<H>  4.3   |  18<L>  |  1.14    Ca    6.9<L>      17 Sep 2022 04:02  Phos  2.9     09-17  Mg     1.6     09-17    TPro  5.2<L>  /  Alb  1.6<L>  /  TBili  4.3<H>  /  DBili  x   /  AST  42<H>  /  ALT  28  /  AlkPhos  86  09-17    I&O's Detail    16 Sep 2022 07:01  -  17 Sep 2022 07:00  --------------------------------------------------------  IN:    FentaNYL: 312.8 mL    IV PiggyBack: 100 mL    Lactated Ringers: 360 mL    Lactated Ringers Bolus: 2500 mL    Midazolam: 27.3 mL    Norepinephrine: 342.4 mL  Total IN: 3642.5 mL    OUT:    Indwelling Catheter - Urethral (mL): 85 mL    Nasogastric/Oral tube (mL): 1100 mL  Total OUT: 1185 mL    Total NET: 2457.5 mL

## 2022-09-17 NOTE — CHART NOTE - NSCHARTNOTEFT_GEN_A_CORE
Was on sedation hold and SBT, awake, alert, and following commands, notified by RN patient self extubation and removed NG tube. Team re-evaluated at bedside, patent airway, no acute signs of dyspnea, NC applied at 2 LPM NC. Was on sedation hold and SBT, awake, alert, and following commands, notified by RN patient self extubation and removed NG tube. Team re-evaluated at bedside, patent airway, no acute signs of dyspnea, NC applied at 2 LPM NC. Surgery SONALI Gomez notified.

## 2022-09-17 NOTE — PROGRESS NOTE ADULT - ASSESSMENT
61y/o f with PMHx of  obesity, diverticulitis, ventral hernia , Breast Ca s/p b/l lumpectomy with adj. radiation,  PSHx of Ernestine's procedure and reversal, Ex lap SBR, ileostomy and reversal ? , lap cholecystectomy and lap appendectomy  presents to the ED with Abd bulge/pain x 1 day. CT Abdomen/ pelvis showed Right lower abdominal wall ventral hernia which contains dilated small bowel loops. Was admitted to surgery for incarcerated hernia. Patient was taken to OR for strangulated and incarcerated incisional hernia. She underwent explorative laparotomy with small bowel resection with anastomosis and hernia repair. Post Intra op pt was intubated. Brought to the ICU for post op monitoring.     #NEURO  - baseline AAOx3  - Sedated with versed  - Fentanyl for pain control    #PULMONARY  #Mechanical ventilation  - Was intubated intra op  - Vent settings *****  - ABG ****  - CXR *****  - SAT and SBT in the morning    #CARDIOVASCULAR  #Shock  - secondary to sepsis from intra-abdominal infection (peritonitis)  - started on levophed throught central venous catheter   - goal MAP >65  - monitor BPs    #GASTROINTESTINAL  #Incarcerated incisional hernia  - Has extensive abdominal history of  Ernestine's procedure and reversal, Ex lap SBR, ileostomy and reversal ? , lap cholecystectomy and lap appendectomy   - CT Abdomen/ pelvis showed Right lower abdominal wall ventral hernia which contains dilated small bowel loops.   - Post op D1  explorative laparotomy with small bowel resection with anastomosis and hernia repair  - Intra op EBL 200cc, received 5L LR bolus  - NPO, Has NGT on Low intermittent suction  - c/w Zosyn  - On pain control with fentanyl drip  - Surgery team to follow up    #RENAL  #Urinary post op monitoring  - Her catheter placed intraop, as per Qx very hard placement of her   - monitor I/Os    #INFECTIOUS DISEASE  - management as above    #ENDOCRINE  - no active issues    #HEME  #Leukocytosis  - secondary to necrosis vs reactive  - monitor CBC    #PROPHYLAXIS  -DVT lovenox sq  -GI Protonix     DISPO  CONTINUE IN THE ICU  CODE STATUS   FULL CODE   61y/o f with PMHx of  obesity, diverticulitis, ventral hernia , Breast Ca s/p b/l lumpectomy with adj. radiation,  PSHx of Ernestine's procedure and reversal, Ex lap SBR, ileostomy and reversal ? , lap cholecystectomy and lap appendectomy  presents to the ED with Abd bulge/pain x 1 day. CT Abdomen/ pelvis showed Right lower abdominal wall ventral hernia which contains dilated small bowel loops. Was admitted to surgery for incarcerated hernia. Patient was taken to OR for strangulated and incarcerated incisional hernia. She underwent explorative laparotomy with small bowel resection with anastomosis and hernia repair. Post Intra op pt was intubated. Brought to the ICU for post op monitoring.     Assessment:  #Septic Shock  #Incarcerated inguinal hernia s/p exlap small bowel resection w/ anastamosis and hernia repair  #JEAN    #NEURO  - baseline AAOx3  - Intubated and sedated. Was on versed and fentanyl  - will try precedex if pt is restless during SBT.     #PULMONARY  #Mechanical ventilation  - Was intubated intra op  - SAT and SBT in the morning      #CARDIOVASCULAR  #Septic Shock  - secondary to sepsis from intra-abdominal infection (peritonitis)  - started on levophed through central venous catheter, taper down  - consider stress dose steroids  - goal MAP >65  - monitor BPs    #GASTROINTESTINAL  #Incarcerated incisional hernia  - Has extensive abdominal history of  Ernestine's procedure and reversal, Ex lap SBR, ileostomy and reversal ? , lap cholecystectomy and lap appendectomy   - CT Abdomen/ pelvis showed Right lower abdominal wall ventral hernia which contains dilated small bowel loops.   - Post op D1  explorative laparotomy with small bowel resection with anastomosis and hernia repair  - Intra op EBL 200cc, received 5L LR bolus  - NPO, Has NGT on Low intermittent suction  - c/w Zosyn  - On pain control with fentanyl drip  - Surgery team to follow up    #RENAL  #Urinary post op monitoring  - Her catheter placed intraop, as per Qx very hard placement of her   - monitor I/Os    #JEAN  - SCr 0.7 baseline -> SCr 1.14  - ATN, oliguria despite adequate hydration (8.5L bolus)  - strict I&Os    #INFECTIOUS DISEASE  - management as above    #ENDOCRINE  - no active issues    #HEME  #Leukocytosis  - secondary to necrosis vs reactive  - monitor CBC    #PROPHYLAXIS  -DVT lovenox sq  -GI Protonix     DISPO  CONTINUE IN THE ICU  CODE STATUS   FULL CODE

## 2022-09-18 LAB
ALBUMIN SERPL ELPH-MCNC: 1.8 G/DL — LOW (ref 3.5–5)
ALP SERPL-CCNC: 74 U/L — SIGNIFICANT CHANGE UP (ref 40–120)
ALT FLD-CCNC: 17 U/L DA — SIGNIFICANT CHANGE UP (ref 10–60)
ANION GAP SERPL CALC-SCNC: 10 MMOL/L — SIGNIFICANT CHANGE UP (ref 5–17)
ANISOCYTOSIS BLD QL: SLIGHT — SIGNIFICANT CHANGE UP
AST SERPL-CCNC: 24 U/L — SIGNIFICANT CHANGE UP (ref 10–40)
BASOPHILS # BLD AUTO: 0 K/UL — SIGNIFICANT CHANGE UP (ref 0–0.2)
BASOPHILS NFR BLD AUTO: 0 % — SIGNIFICANT CHANGE UP (ref 0–2)
BILIRUB SERPL-MCNC: 1 MG/DL — SIGNIFICANT CHANGE UP (ref 0.2–1.2)
BUN SERPL-MCNC: 26 MG/DL — HIGH (ref 7–18)
CALCIUM SERPL-MCNC: 7.1 MG/DL — LOW (ref 8.4–10.5)
CHLORIDE SERPL-SCNC: 106 MMOL/L — SIGNIFICANT CHANGE UP (ref 96–108)
CO2 SERPL-SCNC: 23 MMOL/L — SIGNIFICANT CHANGE UP (ref 22–31)
CREAT SERPL-MCNC: 1.41 MG/DL — HIGH (ref 0.5–1.3)
EGFR: 42 ML/MIN/1.73M2 — LOW
EOSINOPHIL # BLD AUTO: 0 K/UL — SIGNIFICANT CHANGE UP (ref 0–0.5)
EOSINOPHIL NFR BLD AUTO: 0 % — SIGNIFICANT CHANGE UP (ref 0–6)
GLUCOSE BLDC GLUCOMTR-MCNC: 101 MG/DL — HIGH (ref 70–99)
GLUCOSE BLDC GLUCOMTR-MCNC: 113 MG/DL — HIGH (ref 70–99)
GLUCOSE BLDC GLUCOMTR-MCNC: 90 MG/DL — SIGNIFICANT CHANGE UP (ref 70–99)
GLUCOSE SERPL-MCNC: 121 MG/DL — HIGH (ref 70–99)
HCT VFR BLD CALC: 35.8 % — SIGNIFICANT CHANGE UP (ref 34.5–45)
HGB BLD-MCNC: 11.7 G/DL — SIGNIFICANT CHANGE UP (ref 11.5–15.5)
HYPOCHROMIA BLD QL: SLIGHT — SIGNIFICANT CHANGE UP
LG PLATELETS BLD QL AUTO: SLIGHT — SIGNIFICANT CHANGE UP
LYMPHOCYTES # BLD AUTO: 1.13 K/UL — SIGNIFICANT CHANGE UP (ref 1–3.3)
LYMPHOCYTES # BLD AUTO: 10 % — LOW (ref 13–44)
MACROCYTES BLD QL: SLIGHT — SIGNIFICANT CHANGE UP
MAGNESIUM SERPL-MCNC: 1.9 MG/DL — SIGNIFICANT CHANGE UP (ref 1.6–2.6)
MANUAL SMEAR VERIFICATION: SIGNIFICANT CHANGE UP
MCHC RBC-ENTMCNC: 30.5 PG — SIGNIFICANT CHANGE UP (ref 27–34)
MCHC RBC-ENTMCNC: 32.7 GM/DL — SIGNIFICANT CHANGE UP (ref 32–36)
MCV RBC AUTO: 93.5 FL — SIGNIFICANT CHANGE UP (ref 80–100)
MONOCYTES # BLD AUTO: 1.47 K/UL — HIGH (ref 0–0.9)
MONOCYTES NFR BLD AUTO: 13 % — SIGNIFICANT CHANGE UP (ref 2–14)
NEUTROPHILS # BLD AUTO: 8.69 K/UL — HIGH (ref 1.8–7.4)
NEUTROPHILS NFR BLD AUTO: 77 % — SIGNIFICANT CHANGE UP (ref 43–77)
NEUTS VAC BLD QL SMEAR: SLIGHT — SIGNIFICANT CHANGE UP
NRBC # BLD: 0 /100 — SIGNIFICANT CHANGE UP (ref 0–0)
OVALOCYTES BLD QL SMEAR: SLIGHT — SIGNIFICANT CHANGE UP
PHOSPHATE SERPL-MCNC: 3.6 MG/DL — SIGNIFICANT CHANGE UP (ref 2.5–4.5)
PLAT MORPH BLD: NORMAL — SIGNIFICANT CHANGE UP
PLATELET # BLD AUTO: 240 K/UL — SIGNIFICANT CHANGE UP (ref 150–400)
PLATELET COUNT - ESTIMATE: NORMAL — SIGNIFICANT CHANGE UP
POIKILOCYTOSIS BLD QL AUTO: SLIGHT — SIGNIFICANT CHANGE UP
POLYCHROMASIA BLD QL SMEAR: SLIGHT — SIGNIFICANT CHANGE UP
POTASSIUM SERPL-MCNC: 3.9 MMOL/L — SIGNIFICANT CHANGE UP (ref 3.5–5.3)
POTASSIUM SERPL-SCNC: 3.9 MMOL/L — SIGNIFICANT CHANGE UP (ref 3.5–5.3)
PROT SERPL-MCNC: 5.5 G/DL — LOW (ref 6–8.3)
RBC # BLD: 3.83 M/UL — SIGNIFICANT CHANGE UP (ref 3.8–5.2)
RBC # FLD: 13.1 % — SIGNIFICANT CHANGE UP (ref 10.3–14.5)
RBC BLD AUTO: ABNORMAL
SMUDGE CELLS # BLD: PRESENT — SIGNIFICANT CHANGE UP
SODIUM SERPL-SCNC: 139 MMOL/L — SIGNIFICANT CHANGE UP (ref 135–145)
TOXIC GRANULES BLD QL SMEAR: PRESENT — SIGNIFICANT CHANGE UP
WBC # BLD: 11.29 K/UL — HIGH (ref 3.8–10.5)
WBC # FLD AUTO: 11.29 K/UL — HIGH (ref 3.8–10.5)

## 2022-09-18 PROCEDURE — 99233 SBSQ HOSP IP/OBS HIGH 50: CPT | Mod: GC

## 2022-09-18 RX ORDER — ACETAMINOPHEN 500 MG
1000 TABLET ORAL ONCE
Refills: 0 | Status: COMPLETED | OUTPATIENT
Start: 2022-09-18 | End: 2022-09-18

## 2022-09-18 RX ORDER — HYDROMORPHONE HYDROCHLORIDE 2 MG/ML
0.5 INJECTION INTRAMUSCULAR; INTRAVENOUS; SUBCUTANEOUS EVERY 4 HOURS
Refills: 0 | Status: DISCONTINUED | OUTPATIENT
Start: 2022-09-18 | End: 2022-09-21

## 2022-09-18 RX ORDER — HYDROMORPHONE HYDROCHLORIDE 2 MG/ML
0.5 INJECTION INTRAMUSCULAR; INTRAVENOUS; SUBCUTANEOUS EVERY 4 HOURS
Refills: 0 | Status: DISCONTINUED | OUTPATIENT
Start: 2022-09-18 | End: 2022-09-18

## 2022-09-18 RX ORDER — DIPHENHYDRAMINE HCL 50 MG
25 CAPSULE ORAL ONCE
Refills: 0 | Status: COMPLETED | OUTPATIENT
Start: 2022-09-18 | End: 2022-09-18

## 2022-09-18 RX ADMIN — ENOXAPARIN SODIUM 40 MILLIGRAM(S): 100 INJECTION SUBCUTANEOUS at 18:34

## 2022-09-18 RX ADMIN — CHLORHEXIDINE GLUCONATE 1 APPLICATION(S): 213 SOLUTION TOPICAL at 05:22

## 2022-09-18 RX ADMIN — Medication 1000 MILLIGRAM(S): at 20:06

## 2022-09-18 RX ADMIN — PIPERACILLIN AND TAZOBACTAM 25 GRAM(S): 4; .5 INJECTION, POWDER, LYOPHILIZED, FOR SOLUTION INTRAVENOUS at 08:38

## 2022-09-18 RX ADMIN — FENTANYL CITRATE 25 MICROGRAM(S): 50 INJECTION INTRAVENOUS at 05:13

## 2022-09-18 RX ADMIN — Medication 1000 MILLIGRAM(S): at 09:09

## 2022-09-18 RX ADMIN — Medication 25 MILLIGRAM(S): at 21:44

## 2022-09-18 RX ADMIN — Medication 50 MILLILITER(S): at 05:21

## 2022-09-18 RX ADMIN — PIPERACILLIN AND TAZOBACTAM 25 GRAM(S): 4; .5 INJECTION, POWDER, LYOPHILIZED, FOR SOLUTION INTRAVENOUS at 18:34

## 2022-09-18 RX ADMIN — FENTANYL CITRATE 25 MICROGRAM(S): 50 INJECTION INTRAVENOUS at 05:35

## 2022-09-18 RX ADMIN — Medication 400 MILLIGRAM(S): at 08:39

## 2022-09-18 RX ADMIN — Medication 400 MILLIGRAM(S): at 18:35

## 2022-09-18 NOTE — PROGRESS NOTE ADULT - SUBJECTIVE AND OBJECTIVE BOX
ICU visit:  62y Female is under our care for septic shock, incarcerated hernia (s/p sb resection repair of strangulated incisional hernia) and ? Focal Peritonitis. Pt was found to be sitting on recliner,  is at bedside. Patient is doing slightly better but c/o expected bloating and incisional abdominal pain as well as fatigue and nausea. No fevers for past 24 hours and wbc count has improved.      MEDS:  piperacillin/tazobactam IVPB.. 3.375 Gram(s) IV Intermittent every 8 hours    ALLERGIES: Allergies    No Known Drug Allergies  peanuts (Anaphylaxis)    Intolerances    REVIEW OF SYSTEMS:  [  ] Not able to illicit  General: no fevers no malaise +fatigue  Chest: no cough no sob  GI: no vd +abd pain +nausea  : no urinary sxs   Skin: no rashes  Musculoskeletal: no trauma no LBP  Neuro: no ha's no dizziness     VITALS:  ICU Vital Signs Last 24 Hrs  T(C): 36.8 (18 Sep 2022 04:00), Max: 36.8 (18 Sep 2022 04:00)  T(F): 98.2 (18 Sep 2022 04:00), Max: 98.2 (18 Sep 2022 04:00)  HR: 83 (18 Sep 2022 11:00) (77 - 98)  BP: 149/65 (18 Sep 2022 11:00) (120/56 - 149/65)  BP(mean): 87 (18 Sep 2022 11:00) (59 - 87)  ABP: 107/97 (18 Sep 2022 11:00) (74/68 - 145/64)  ABP(mean): 101 (18 Sep 2022 11:00) (69 - 101)  RR: 19 (18 Sep 2022 11:00) (12 - 24)  SpO2: 97% (18 Sep 2022 11:00) (94% - 100%)    O2 Parameters below as of 18 Sep 2022 10:45  Patient On (Oxygen Delivery Method): room air    PHYSICAL EXAM:  HEENT: n/a  Neck: supple no LN's, right neck CVP  Respiratory: bilateral diminshed breath sounds no rales  Cardiovascular: S1 S2 reg no murmurs  Gastrointestinal: +BS with soft, mod distension; incisional tenderness +akash with bilious fluid  : +her  Extremities: no edema  Skin: no rashes  Ortho: n/a  Neuro: AAO x 3      LABS/DIAGNOSTIC TESTS:                        11.7   11.29 )-----------( 240      ( 18 Sep 2022 04:24 )             35.8     WBC Count: 11.29 K/uL (09-18 @ 04:24)  WBC Count: 12.86 K/uL (09-17 @ 09:55)  WBC Count: 17.05 K/uL (09-17 @ 04:02)  WBC Count: 9.90 K/uL (09-16 @ 23:20)  WBC Count: 19.44 K/uL (09-16 @ 06:13)    09-18    139  |  106  |  26<H>  ----------------------------<  121<H>  3.9   |  23  |  1.41<H> 1.44 < 1.14    Ca    7.1<L>      18 Sep 2022 04:24  Phos  3.6     09-18  Mg     1.9     09-18    TPro  5.5<L>  /  Alb  1.8<L>  /  TBili  1.0  /  DBili  x   /  AST  24  /  ALT  17  /  AlkPhos  74  09-18    ABG - ( 17 Sep 2022 03:56 )  pH: 7.31  /  pCO2: 38    /  pO2: 162   / HCO3: 19    / Base Excess: -6.6  /  SaO2: 100         CULTURES:   9/17 BC - pending   9/17 UC - pending       RADIOLOGY:  no new studies

## 2022-09-18 NOTE — PROGRESS NOTE ADULT - ASSESSMENT
s/p sb resection repair of strangulated incisional hernia  flatus, uncertain if reliable hx  minimal ngt output  wound healing well  off pressors, good bp    wet to dry gauze to wound  ice chips, consider possible adv to dlear liquid diet. will discuss with surg attending  oob to chair  care per icu team  on iv abx, ID consult appreciated  observation

## 2022-09-18 NOTE — CHART NOTE - NSCHARTNOTEFT_GEN_A_CORE
Patient seen and examined at bedside for removal of central line & arterial line. Chart and labs reviewed prior to removal, no contraindication to procedure. Area cleaned with Alcohol swabs and suture removal kit used to remove sutures holding central line in place using. Patient then placed in reverse Trendelenburg position, asked to hold breath/hum, and then central line removed. Pressure applied for 5-10 minutes with gauze. No signs of active bleeding noted. No hematoma formation noted. Tegaderm and gauze/tape used to create pressure dressing to maintain pressure on central line site. Patient tolerated well without complications.   Discussed procedure with RN who will monitor and notify the provider for bleeding, hematoma formation, or other procedural complications.

## 2022-09-18 NOTE — PHYSICAL THERAPY INITIAL EVALUATION ADULT - PERTINENT HX OF CURRENT PROBLEM, REHAB EVAL
Pt admitted for abdominal buldge and pain, PMHx multiple abdominal surgeries. Pt s/p exlap, lysis adhesions, small bowel resection, repair of strangulated hernia, POD #2. Dressing on adbomen c/d/i with drain in place

## 2022-09-18 NOTE — PHYSICAL THERAPY INITIAL EVALUATION ADULT - LEVEL OF INDEPENDENCE, REHAB EVAL
Obstetrics Triage Note      SUBJECTIVE     Nayely Dueñas is a 19 year old White  , 23w4d, estimated date of delivery 10/27/2022, by Ultrasound, who presents to obstetrics triage with complaint of vaginal leakage of fluid since yesterday at 5p.  At that time, pt had liquid come out on to her underwear.  For the rest of the night, she was fine but today when she was at work she felt constant wetness.  She admits to \"braxtron fabian\" contractions, denies bleeding.    Pt also c/o pelvic and vaginal pressure, this is constant.  She feels like she needs to have a bowel movement but would need to strain so she has not done so.    There is fetal movement, no problems with urination, no fever, no nausea, no vomiting.  Last intercourse 2-3 weeks ago.    REVIEW OF SYSTEMS:    As above    Past Medical History:   Diagnosis Date   • Anxiety     anxiety and depression   • Bipolar disorder (CMS/[facility])    • History of chlamydia infection 10/07/2021    2021   • History of marijuana use    • Infectious mononucleosis 2019   • Injury    • Meningitis     unsure why this is in her chart   • Mental disorder     Bipolar   • Postpartum depression      Past Surgical History:   Procedure Laterality Date   • Dilation and curettage of uterus       OB History    Para Term  AB Living   4 1 1 0 2 1   SAB IAB Ectopic Molar Multiple Live Births   1 0 0 0 0 1      # Outcome Date GA Lbr Cristian/2nd Weight Sex Delivery Anes PTL Lv   4 Current            3 Term 20 38w2d 05:33 / 00:19 2820 g F Vag-Spont EPI N TASIA      Name: Gianna      Apgar1: 7  Apgar5: 9   2 SAB 2020 7w0d    SPONTANEOUS      1 AB 16     ELECTIVE ABO         Birth Comments: D+C       SOCIAL HISTORY:   Social History     Tobacco Use   • Smoking status: Former Smoker     Packs/day: 1.00     Start date: 2019     Quit date: 2022     Years since quittin.4   • Smokeless tobacco: Never Used   • Tobacco comment: was vaping, quit   Substance Use  Topics   • Alcohol use: Not Currently     Alcohol/week: 0.0 standard drinks       Sexually Active: Yes             Partners with: Male       Birth Control/Protection: Condom, None      Drug Use:    Not Current*       Comment: not in pregnancy    Review of patient's social economics indicates:   Student                 OBJECTIVE     PHYSICAL EXAM  Visit Vitals  /69 (BP Location: LUE - Left upper extremity, Patient Position: Semi-Falk's)   Pulse 80   Temp 98.1 °F (36.7 °C) (Oral)   Resp 16   Ht 5' 7\" (1.702 m)   Wt 47.6 kg   LMP 01/09/2022   SpO2 96%   BMI 16.45 kg/m²       General:   Well-developed, well-nourished female in no acute distress.   Psychiatric:  Alert and oriented x3, appropriate mood and affect.   Skin:   Warm and dry, intact, no rashes or lesions.   Lungs:   Respirations unlabored.   Heart:   Regular rate and rhythm.   Abdomen:  Gravid, soft, nontender, no masses.   Extremities No edema.   Fundal Height:     Sterile Speculum Exam: Genitourinary:  Vulva - External genitalia reveals normal mons pubis, labia minora and labia majora.  Normal-appearing clitoris, urethral meatus and Varn[place]'s glands.    Bladder - No evidence of urethral or bladder tenderness.    Vagina - Vaginal mucosa pink and moist.  Bartholin, urethral, and Varn[place]'s glands without mass or exudate.   Cervix - Cervix pink, smooth, no lesions.  Perineum - No lesions.   Sterile Vaginal Exam: No pool, small amount of white discharge.  Cervix appears closed and thick     Bedside US: single IUP in left transverse lie, positive fetal movement, GAURAV 14.3    Fetal Surveillance:    Fetal Heart Rate  Baseline: 120's  Variability: ave, no decels    Uterine Activity  Frequency:  none      LAB:    Results for orders placed or performed during the hospital encounter of 07/04/22   WET MOUNT   Result Value    CLUE CELLS, WET MOUNT Present (A)    TRICHOMONAS, WET MOUNT None Seen    YEAST, WET MOUNT None Seen   Urinalysis With Microscopy & Culture If  Indicated   Result Value    COLOR, URINALYSIS Yellow    APPEARANCE, URINALYSIS Hazy    GLUCOSE, URINALYSIS Negative    BILIRUBIN, URINALYSIS Negative    KETONES, URINALYSIS Negative    SPECIFIC GRAVITY, URINALYSIS 1.008    OCCULT BLOOD, URINALYSIS Negative    PH, URINALYSIS 6.0    PROTEIN, URINALYSIS Negative    UROBILINOGEN, URINALYSIS 0.2    NITRITE, URINALYSIS Negative    LEUKOCYTE ESTERASE, URINALYSIS Negative    SQUAMOUS EPITHELIAL, URINALYSIS 1 to 5    ERYTHROCYTES, URINALYSIS 1 to 2    LEUKOCYTES, URINALYSIS 1 to 5    BACTERIA, URINALYSIS Few (A)    HYALINE CASTS, URINALYSIS None Seen    MUCUS Present     amnisure negative    ASSESSMENT     23w4d intrauterine pregnancy, with c/o leaking fluid  No evidence of SROM or  labor  Positive Bacterial vaginosis    PLAN   Rx Flagyl 500 mg pop bid for 7 days  Discharge to home  Keep appointment scheduled for  at [facility]    Jennyfer Manuel MD     minimum assist (75% patients effort)

## 2022-09-18 NOTE — CHART NOTE - NSCHARTNOTEFT_GEN_A_CORE
63y/o f with PMHx of  obesity, diverticulitis, ventral hernia , Breast Ca s/p b/l lumpectomy with adj. radiation,  PSHx of Ernestine's procedure and reversal, Ex lap SBR, ileostomy and reversal ? , lap cholecystectomy and lap appendectomy  presents to the ED with Abd bulge/pain x 1 day. CT Abdomen/ pelvis showed Right lower abdominal wall ventral hernia which contains dilated small bowel loops. Was admitted to surgery for incarcerated hernia. Patient was taken to OR for strangulated and incarcerated incisional hernia. She underwent explorative laparotomy with small bowel resection with anastomosis and hernia repair. Intra-op had 5L IVF given with an additional 3.5L upon arrival in ICU for oliguria. Post Intra op pt was intubated. Brought to the ICU for post op monitoring, self-extuabted on 9/17 and post extubation was able to come off vasopressor therapy. Oliguria resolving. NG tube, RIJ TLC, and Joanne removed. Tolerating ice chips. To be transferred to surgery for continued care.     For Accepting Team Follow Up:  - Fragoso remaining in place. Eventual trial of void when urine output has improved.   - Closely monitor urine output. Appears euvolemic at this time.   - Currently tolerating ice chips. Reporting that she is having flatus.   - Post operative pain management.   - Zosyn course from 9/17 - 24. Dr. Calderon ID following.   - Re-start home medications when tolerating PO.     Above discussed with covering surgery PA and MD Ramon and Dr. Tapia as well as attending Dr. Uriostegui.

## 2022-09-18 NOTE — PROGRESS NOTE ADULT - ASSESSMENT
63y/o f with PMHx of  obesity, diverticulitis, ventral hernia , Breast Ca s/p b/l lumpectomy with adj. radiation,  PSHx of Ernestine's procedure and reversal, Ex lap SBR, ileostomy and reversal ? , lap cholecystectomy and lap appendectomy  presents to the ED with Abd bulge/pain x 1 day. CT Abdomen/ pelvis showed Right lower abdominal wall ventral hernia which contains dilated small bowel loops. Was admitted to surgery for incarcerated hernia. Patient was taken to OR for strangulated and incarcerated incisional hernia. She underwent explorative laparotomy with small bowel resection with anastomosis and hernia repair. Post Intra op pt was intubated. Brought to the ICU for post op monitoring.     Assessment:  #Septic Shock  #Incarcerated inguinal hernia s/p exlap small bowel resection w/ anastamosis and hernia repair  #JEAN    #NEURO  - baseline AAOx3  - Intubated and sedated. Was on versed and fentanyl  - will try precedex if pt is restless during SBT.     #PULMONARY  #Mechanical ventilation  - Was intubated intra op  - SAT and SBT in the morning      #CARDIOVASCULAR  #Septic Shock  - secondary to sepsis from intra-abdominal infection (peritonitis)  - started on levophed through central venous catheter, taper down  - consider stress dose steroids  - goal MAP >65  - monitor BPs    #GASTROINTESTINAL  #Incarcerated incisional hernia  - Has extensive abdominal history of  Ernestine's procedure and reversal, Ex lap SBR, ileostomy and reversal ? , lap cholecystectomy and lap appendectomy   - CT Abdomen/ pelvis showed Right lower abdominal wall ventral hernia which contains dilated small bowel loops.   - Post op D1  explorative laparotomy with small bowel resection with anastomosis and hernia repair  - Intra op EBL 200cc, received 5L LR bolus  - NPO, Has NGT on Low intermittent suction  - c/w Zosyn  - On pain control with fentanyl drip  - Surgery team to follow up    #RENAL  #Urinary post op monitoring  - Her catheter placed intraop, as per Qx very hard placement of her   - monitor I/Os    #JEAN  - SCr 0.7 baseline -> SCr 1.14  - ATN, oliguria despite adequate hydration (8.5L bolus)  - strict I&Os    #INFECTIOUS DISEASE  - management as above    #ENDOCRINE  - no active issues    #HEME  #Leukocytosis  - secondary to necrosis vs reactive  - monitor CBC    #PROPHYLAXIS  -DVT lovenox sq  -GI Protonix     DISPO  CONTINUE IN THE ICU  CODE STATUS   FULL CODE

## 2022-09-18 NOTE — PROGRESS NOTE ADULT - SUBJECTIVE AND OBJECTIVE BOX
Pt self extubated overnight. Off pressors,  Pt emphatically admits flatus, no bm  ICU Vital Signs Last 24 Hrs  T(C): 36.8 (18 Sep 2022 04:00), Max: 37.8 (17 Sep 2022 09:15)  T(F): 98.2 (18 Sep 2022 04:00), Max: 100.1 (17 Sep 2022 09:15)  HR: 81 (18 Sep 2022 07:00) (77 - 111)  BP: 127/53 (18 Sep 2022 07:00) (120/56 - 171/29)  BP(mean): 69 (18 Sep 2022 07:00) (37 - 89)  ABP: 92/87 (18 Sep 2022 07:00) (89/71 - 151/83)  ABP(mean): 89 (18 Sep 2022 07:00) (69 - 323)  RR: 12 (18 Sep 2022 07:00) (11 - 24)  SpO2: 94% (18 Sep 2022 07:00) (94% - 100%)    O2 Parameters below as of 18 Sep 2022 07:00  Patient On (Oxygen Delivery Method): room air        Alert nad  Abd: soft NT nd  dressing: retention sutures in place> no purulence, no bleed, granulation tissue,  hypoactive bowel sounds  MIL: 395 serosanguinous fluid in bulb.  ngt: no output  No calf swelling or erythema      I&O's Detail    17 Sep 2022 07:01  -  18 Sep 2022 07:00  --------------------------------------------------------  IN:      Total IN: 779 mL    OUT:    Bulb (mL): 395 mL    Indwelling Catheter - Urethral (mL): 400 mL    Nasogastric/Oral tube (mL): 0 mL  Total OUT: 795 mL    Total NET: -16 mL                          11.7   11.29 )-----------( 240      ( 18 Sep 2022 04:24 )             35.8   09-18    139  |  106  |  26<H>  ----------------------------<  121<H>  3.9   |  23  |  1.41<H>    Ca    7.1<L>      18 Sep 2022 04:24  Phos  3.6     09-18  Mg     1.9     09-18    TPro  5.5<L>  /  Alb  1.8<L>  /  TBili  1.0  /  DBili  x   /  AST  24  /  ALT  17  /  AlkPhos  74  09-18

## 2022-09-18 NOTE — PHYSICAL THERAPY INITIAL EVALUATION ADULT - ADDITIONAL COMMENTS
Pt reports independence with all mobility, no use of AD prior to this hospitalization, however pt reports with the increased pain it was becoming more difficult to move around before she came into the hospital.

## 2022-09-18 NOTE — PROGRESS NOTE ADULT - SUBJECTIVE AND OBJECTIVE BOX
INTERVAL HPI/OVERNIGHT EVENTS:   No overnight events. Patient seen at bedside. NGT set to suction.     PRESSORS: [ ] YES [ ] NO  WHICH:    ANTIBIOTICS:                  DATE STARTED:  ANTIBIOTICS:                  DATE STARTED:  ANTIBIOTICS:                  DATE STARTED:    Antimicrobial:  piperacillin/tazobactam IVPB.. 3.375 Gram(s) IV Intermittent every 8 hours    Cardiovascular:  norepinephrine Infusion 0.2 MICROgram(s)/kG/Min IV Continuous <Continuous>    Pulmonary:    Hematalogic:  enoxaparin Injectable 40 milliGRAM(s) SubCutaneous every 24 hours    Other:  albumin human 25% IVPB 50 milliLiter(s) IV Intermittent every 8 hours  chlorhexidine 2% Cloths 1 Application(s) Topical <User Schedule>  dextrose 5%. 1000 milliLiter(s) IV Continuous <Continuous>  fentaNYL    Injectable 25 MICROGram(s) IV Push every 4 hours PRN  insulin lispro (ADMELOG) corrective regimen sliding scale   SubCutaneous every 6 hours  ondansetron Injectable 4 milliGRAM(s) IV Push every 6 hours PRN  pantoprazole  Injectable 40 milliGRAM(s) IV Push daily    albumin human 25% IVPB 50 milliLiter(s) IV Intermittent every 8 hours  chlorhexidine 2% Cloths 1 Application(s) Topical <User Schedule>  dextrose 5%. 1000 milliLiter(s) IV Continuous <Continuous>  enoxaparin Injectable 40 milliGRAM(s) SubCutaneous every 24 hours  fentaNYL    Injectable 25 MICROGram(s) IV Push every 4 hours PRN  insulin lispro (ADMELOG) corrective regimen sliding scale   SubCutaneous every 6 hours  norepinephrine Infusion 0.2 MICROgram(s)/kG/Min IV Continuous <Continuous>  ondansetron Injectable 4 milliGRAM(s) IV Push every 6 hours PRN  pantoprazole  Injectable 40 milliGRAM(s) IV Push daily  piperacillin/tazobactam IVPB.. 3.375 Gram(s) IV Intermittent every 8 hours    Drug Dosing Weight  Height (cm): 149.9 (13 Sep 2022 16:08)  Weight (kg): 130.4 (16 Sep 2022 23:00)  BMI (kg/m2): 58 (16 Sep 2022 23:00)  BSA (m2): 2.15 (16 Sep 2022 23:00)    CENTRAL LINE: [ ] YES [ ] NO  LOCATION:         RILEY: [ ] YES [ ] NO          A-LINE:  [ ] YES [ ] NO  LOCATION:             ICU Vital Signs Last 24 Hrs  T(C): 36.6 (17 Sep 2022 23:00), Max: 38.1 (17 Sep 2022 05:58)  T(F): 97.9 (17 Sep 2022 23:00), Max: 100.5 (17 Sep 2022 05:58)  HR: 83 (18 Sep 2022 00:00) (77 - 111)  BP: 124/46 (17 Sep 2022 08:30) (70/48 - 192/53)  BP(mean): 62 (17 Sep 2022 08:30) (37 - 100)  ABP: 117/52 (18 Sep 2022 00:00) (99/55 - 151/83)  ABP(mean): 72 (18 Sep 2022 00:00) (69 - 323)  RR: 12 (18 Sep 2022 00:00) (11 - 24)  SpO2: 98% (18 Sep 2022 00:00) (94% - 100%)    O2 Parameters below as of 18 Sep 2022 00:00  Patient On (Oxygen Delivery Method): nasal cannula  O2 Flow (L/min): 2          ABG - ( 17 Sep 2022 03:56 )  pH, Arterial: 7.31  pH, Blood: x     /  pCO2: 38    /  pO2: 162   / HCO3: 19    / Base Excess: -6.6  /  SaO2: 100                   -16 @ 07:01  -  -17 @ 07:00  --------------------------------------------------------  IN: 3891.1 mL / OUT: 1185 mL / NET: 2706.1 mL        Mode: standby        PHYSICAL EXAM:    GENERAL: NAD  EYES: EOMI, PERRLA  NECK: Supple, No JVD; Normal thyroid; Trachea midline: No LAD   NERVOUS SYSTEM:  Alert & Oriented X3,  Motor Strength 5/5 B/L upper and lower extremities; DTRs 2+ intact and symmetric  CHEST/LUNG: No rales, rhonchi, wheezing, breath sounds present bilaterally  HEART: Regular rate and rhythm; No murmurs, no gallops  ABDOMEN: Soft, Nontender, Nondistended; Bowel sounds present, no pain or masses on palpation  : voiding well, Riley in place  EXTREMITIES:  2+ Peripheral Pulses, No clubbing, cyanosis, or edema  SKIN: warm, intact, no lesions         LABS:  CBC Full  -  ( 17 Sep 2022 09:55 )  WBC Count : 12.86 K/uL  RBC Count : 5.18 M/uL  Hemoglobin : 15.5 g/dL  Hematocrit : 49.0 %  Platelet Count - Automated : 288 K/uL  Mean Cell Volume : 94.6 fl  Mean Cell Hemoglobin : 29.9 pg  Mean Cell Hemoglobin Concentration : 31.6 gm/dL  Auto Neutrophil # : 10.23 K/uL  Auto Lymphocyte # : 1.19 K/uL  Auto Monocyte # : 1.16 K/uL  Auto Eosinophil # : 0.00 K/uL  Auto Basophil # : 0.02 K/uL  Auto Neutrophil % : 79.5 %  Auto Lymphocyte % : 9.3 %  Auto Monocyte % : 9.0 %  Auto Eosinophil % : 0.0 %  Auto Basophil % : 0.2 %        139  |  107  |  16  ----------------------------<  197<H>  3.6   |  21<L>  |  1.44<H>    Ca    7.2<L>      17 Sep 2022 09:55  Phos  2.9       Mg     1.6         TPro  5.2<L>  /  Alb  1.6<L>  /  TBili  4.3<H>  /  DBili  x   /  AST  42<H>  /  ALT  28  /  AlkPhos  86        Urinalysis Basic - ( 17 Sep 2022 21:12 )    Color: Latoya / Appearance: very cloudy / S.015 / pH: x  Gluc: x / Ketone: Trace  / Bili: Moderate / Urobili: 8   Blood: x / Protein: 100 / Nitrite: Positive   Leuk Esterase: Small / RBC: >50 /HPF / WBC 11-25 /HPF   Sq Epi: x / Non Sq Epi: Few /HPF / Bacteria: Moderate /HPF          RADIOLOGY & ADDITIONAL STUDIES REVIEWED:  ***    [ ]GOALS OF CARE DISCUSSION WITH PATIENT/FAMILY/PROXY:    CRITICAL CARE TIME SPENT: 35 minutes   INTERVAL HPI/OVERNIGHT EVENTS:   No overnight events. Patient seen at bedside. NGT set to suction.     PRESSORS: [X] YES [ ] NO  WHICH: Levo    ANTIBIOTICS:                  DATE STARTED:  ANTIBIOTICS:                  DATE STARTED:  ANTIBIOTICS:                  DATE STARTED:    Antimicrobial:  piperacillin/tazobactam IVPB.. 3.375 Gram(s) IV Intermittent every 8 hours    Cardiovascular:  norepinephrine Infusion 0.2 MICROgram(s)/kG/Min IV Continuous <Continuous>    Pulmonary:    Hematalogic:  enoxaparin Injectable 40 milliGRAM(s) SubCutaneous every 24 hours    Other:  albumin human 25% IVPB 50 milliLiter(s) IV Intermittent every 8 hours  chlorhexidine 2% Cloths 1 Application(s) Topical <User Schedule>  dextrose 5%. 1000 milliLiter(s) IV Continuous <Continuous>  fentaNYL    Injectable 25 MICROGram(s) IV Push every 4 hours PRN  insulin lispro (ADMELOG) corrective regimen sliding scale   SubCutaneous every 6 hours  ondansetron Injectable 4 milliGRAM(s) IV Push every 6 hours PRN  pantoprazole  Injectable 40 milliGRAM(s) IV Push daily    albumin human 25% IVPB 50 milliLiter(s) IV Intermittent every 8 hours  chlorhexidine 2% Cloths 1 Application(s) Topical <User Schedule>  dextrose 5%. 1000 milliLiter(s) IV Continuous <Continuous>  enoxaparin Injectable 40 milliGRAM(s) SubCutaneous every 24 hours  fentaNYL    Injectable 25 MICROGram(s) IV Push every 4 hours PRN  insulin lispro (ADMELOG) corrective regimen sliding scale   SubCutaneous every 6 hours  norepinephrine Infusion 0.2 MICROgram(s)/kG/Min IV Continuous <Continuous>  ondansetron Injectable 4 milliGRAM(s) IV Push every 6 hours PRN  pantoprazole  Injectable 40 milliGRAM(s) IV Push daily  piperacillin/tazobactam IVPB.. 3.375 Gram(s) IV Intermittent every 8 hours    Drug Dosing Weight  Height (cm): 149.9 (13 Sep 2022 16:08)  Weight (kg): 130.4 (16 Sep 2022 23:00)  BMI (kg/m2): 58 (16 Sep 2022 23:00)  BSA (m2): 2.15 (16 Sep 2022 23:00)    CENTRAL LINE: [ ] YES [ ] NO  LOCATION:         RILEY: [ ] YES [ ] NO          A-LINE:  [ ] YES [ ] NO  LOCATION:             ICU Vital Signs Last 24 Hrs  T(C): 36.6 (17 Sep 2022 23:00), Max: 38.1 (17 Sep 2022 05:58)  T(F): 97.9 (17 Sep 2022 23:00), Max: 100.5 (17 Sep 2022 05:58)  HR: 83 (18 Sep 2022 00:00) (77 - 111)  BP: 124/46 (17 Sep 2022 08:30) (70/48 - 192/53)  BP(mean): 62 (17 Sep 2022 08:30) (37 - 100)  ABP: 117/52 (18 Sep 2022 00:00) (99/55 - 151/83)  ABP(mean): 72 (18 Sep 2022 00:00) (69 - 323)  RR: 12 (18 Sep 2022 00:00) (11 - 24)  SpO2: 98% (18 Sep 2022 00:00) (94% - 100%)    O2 Parameters below as of 18 Sep 2022 00:00  Patient On (Oxygen Delivery Method): nasal cannula  O2 Flow (L/min): 2          ABG - ( 17 Sep 2022 03:56 )  pH, Arterial: 7.31  pH, Blood: x     /  pCO2: 38    /  pO2: 162   / HCO3: 19    / Base Excess: -6.6  /  SaO2: 100                   -16 @ 07:01  -  09-17 @ 07:00  --------------------------------------------------------  IN: 3891.1 mL / OUT: 1185 mL / NET: 2706.1 mL        Mode: standby        PHYSICAL EXAM:    GENERAL: NAD  EYES: EOMI, PERRLA  NECK: Supple, No JVD; Normal thyroid; Trachea midline: No LAD   NERVOUS SYSTEM:  Alert & Oriented X3,  Motor Strength 5/5 B/L upper and lower extremities; DTRs 2+ intact and symmetric  CHEST/LUNG: No rales, rhonchi, wheezing, breath sounds present bilaterally  HEART: Regular rate and rhythm; No murmurs, no gallops  ABDOMEN: Soft, Nontender, Nondistended; Bowel sounds present, no pain or masses on palpation  : voiding well, Riley in place  EXTREMITIES:  2+ Peripheral Pulses, No clubbing, cyanosis, or edema  SKIN: warm, intact, no lesions         LABS:  CBC Full  -  ( 17 Sep 2022 09:55 )  WBC Count : 12.86 K/uL  RBC Count : 5.18 M/uL  Hemoglobin : 15.5 g/dL  Hematocrit : 49.0 %  Platelet Count - Automated : 288 K/uL  Mean Cell Volume : 94.6 fl  Mean Cell Hemoglobin : 29.9 pg  Mean Cell Hemoglobin Concentration : 31.6 gm/dL  Auto Neutrophil # : 10.23 K/uL  Auto Lymphocyte # : 1.19 K/uL  Auto Monocyte # : 1.16 K/uL  Auto Eosinophil # : 0.00 K/uL  Auto Basophil # : 0.02 K/uL  Auto Neutrophil % : 79.5 %  Auto Lymphocyte % : 9.3 %  Auto Monocyte % : 9.0 %  Auto Eosinophil % : 0.0 %  Auto Basophil % : 0.2 %        139  |  107  |  16  ----------------------------<  197<H>  3.6   |  21<L>  |  1.44<H>    Ca    7.2<L>      17 Sep 2022 09:55  Phos  2.9       Mg     1.6         TPro  5.2<L>  /  Alb  1.6<L>  /  TBili  4.3<H>  /  DBili  x   /  AST  42<H>  /  ALT  28  /  AlkPhos  86        Urinalysis Basic - ( 17 Sep 2022 21:12 )    Color: Latoya / Appearance: very cloudy / S.015 / pH: x  Gluc: x / Ketone: Trace  / Bili: Moderate / Urobili: 8   Blood: x / Protein: 100 / Nitrite: Positive   Leuk Esterase: Small / RBC: >50 /HPF / WBC 11-25 /HPF   Sq Epi: x / Non Sq Epi: Few /HPF / Bacteria: Moderate /HPF          RADIOLOGY & ADDITIONAL STUDIES REVIEWED:  ***    [ ]GOALS OF CARE DISCUSSION WITH PATIENT/FAMILY/PROXY:    CRITICAL CARE TIME SPENT: 35 minutes   INTERVAL HPI/OVERNIGHT EVENTS:   No overnight events. Patient seen at bedside. NGT set to suction.     PRESSORS: [X] YES [ ] NO  WHICH: Levo      Antimicrobial:  piperacillin/tazobactam IVPB.. 3.375 Gram(s) IV Intermittent every 8 hours    Cardiovascular:  norepinephrine Infusion 0.2 MICROgram(s)/kG/Min IV Continuous <Continuous>    Pulmonary:    Hematalogic:  enoxaparin Injectable 40 milliGRAM(s) SubCutaneous every 24 hours    Other:  albumin human 25% IVPB 50 milliLiter(s) IV Intermittent every 8 hours  chlorhexidine 2% Cloths 1 Application(s) Topical <User Schedule>  dextrose 5%. 1000 milliLiter(s) IV Continuous <Continuous>  fentaNYL    Injectable 25 MICROGram(s) IV Push every 4 hours PRN  insulin lispro (ADMELOG) corrective regimen sliding scale   SubCutaneous every 6 hours  ondansetron Injectable 4 milliGRAM(s) IV Push every 6 hours PRN  pantoprazole  Injectable 40 milliGRAM(s) IV Push daily    albumin human 25% IVPB 50 milliLiter(s) IV Intermittent every 8 hours  chlorhexidine 2% Cloths 1 Application(s) Topical <User Schedule>  dextrose 5%. 1000 milliLiter(s) IV Continuous <Continuous>  enoxaparin Injectable 40 milliGRAM(s) SubCutaneous every 24 hours  fentaNYL    Injectable 25 MICROGram(s) IV Push every 4 hours PRN  insulin lispro (ADMELOG) corrective regimen sliding scale   SubCutaneous every 6 hours  norepinephrine Infusion 0.2 MICROgram(s)/kG/Min IV Continuous <Continuous>  ondansetron Injectable 4 milliGRAM(s) IV Push every 6 hours PRN  pantoprazole  Injectable 40 milliGRAM(s) IV Push daily  piperacillin/tazobactam IVPB.. 3.375 Gram(s) IV Intermittent every 8 hours    Drug Dosing Weight  Height (cm): 149.9 (13 Sep 2022 16:08)  Weight (kg): 130.4 (16 Sep 2022 23:00)  BMI (kg/m2): 58 (16 Sep 2022 23:00)  BSA (m2): 2.15 (16 Sep 2022 23:00)    CENTRAL LINE: [X] YES [ ] NO  LOCATION: [facility]      RILEY: [X] YES [ ] NO          A-LINE:  [X] YES [ ] NO  LOCATION: [facility]          ICU Vital Signs Last 24 Hrs  T(C): 36.6 (17 Sep 2022 23:00), Max: 38.1 (17 Sep 2022 05:58)  T(F): 97.9 (17 Sep 2022 23:00), Max: 100.5 (17 Sep 2022 05:58)  HR: 83 (18 Sep 2022 00:00) (77 - 111)  BP: 124/46 (17 Sep 2022 08:30) (70/48 - 192/53)  BP(mean): 62 (17 Sep 2022 08:30) (37 - 100)  ABP: 117/52 (18 Sep 2022 00:00) (99/55 - 151/83)  ABP(mean): 72 (18 Sep 2022 00:00) (69 - 323)  RR: 12 (18 Sep 2022 00:00) (11 - 24)  SpO2: 98% (18 Sep 2022 00:00) (94% - 100%)    O2 Parameters below as of 18 Sep 2022 00:00  Patient On (Oxygen Delivery Method): nasal cannula  O2 Flow (L/min): 2          ABG - ( 17 Sep 2022 03:56 )  pH, Arterial: 7.31  pH, Blood: x     /  pCO2: 38    /  pO2: 162   / HCO3: 19    / Base Excess: -6.6  /  SaO2: 100                   09-16 @ 07:01  -  09-17 @ 07:00  --------------------------------------------------------  IN: 3891.1 mL / OUT: 1185 mL / NET: 2706.1 mL        Mode: standby        PHYSICAL EXAM:    GENERAL: NAD   EYES: EOMI, PERRLA  NECK: Supple, No JVD; Normal thyroid; Trachea midline: No LAD   NERVOUS SYSTEM:  Alert & Oriented X3,  Motor Strength 5/5 B/L upper and lower extremities; DTRs 2+ intact and symmetric  CHEST/LUNG: No rales, rhonchi, wheezing, breath sounds present bilaterally  HEART: Regular rate and rhythm; No murmurs, no gallops  ABDOMEN: Soft, Nontender, Nondistended; Bowel sounds present, no pain or masses on palpation  : voiding well, Riley in place  EXTREMITIES:  2+ Peripheral Pulses, No clubbing, cyanosis, or edema  SKIN: warm, intact, no lesions         LABS:  CBC Full  -  ( 17 Sep 2022 09:55 )  WBC Count : 12.86 K/uL  RBC Count : 5.18 M/uL  Hemoglobin : 15.5 g/dL  Hematocrit : 49.0 %  Platelet Count - Automated : 288 K/uL  Mean Cell Volume : 94.6 fl  Mean Cell Hemoglobin : 29.9 pg  Mean Cell Hemoglobin Concentration : 31.6 gm/dL  Auto Neutrophil # : 10.23 K/uL  Auto Lymphocyte # : 1.19 K/uL  Auto Monocyte # : 1.16 K/uL  Auto Eosinophil # : 0.00 K/uL  Auto Basophil # : 0.02 K/uL  Auto Neutrophil % : 79.5 %  Auto Lymphocyte % : 9.3 %  Auto Monocyte % : 9.0 %  Auto Eosinophil % : 0.0 %  Auto Basophil % : 0.2 %        139  |  107  |  16  ----------------------------<  197<H>  3.6   |  21<L>  |  1.44<H>    Ca    7.2<L>      17 Sep 2022 09:55  Phos  2.9       Mg     1.6         TPro  5.2<L>  /  Alb  1.6<L>  /  TBili  4.3<H>  /  DBili  x   /  AST  42<H>  /  ALT  28  /  AlkPhos  86        Urinalysis Basic - ( 17 Sep 2022 21:12 )    Color: Latoya / Appearance: very cloudy / S.015 / pH: x  Gluc: x / Ketone: Trace  / Bili: Moderate / Urobili: 8   Blood: x / Protein: 100 / Nitrite: Positive   Leuk Esterase: Small / RBC: >50 /HPF / WBC 11-25 /HPF   Sq Epi: x / Non Sq Epi: Few /HPF / Bacteria: Moderate /HPF          RADIOLOGY & ADDITIONAL STUDIES REVIEWED:  ***    [ ]GOALS OF CARE DISCUSSION WITH PATIENT/FAMILY/PROXY:    CRITICAL CARE TIME SPENT: 35 minutes

## 2022-09-18 NOTE — PROGRESS NOTE ADULT - ASSESSMENT
Incarcerated Hernia - s/p sb resection repair of strangulated incisional hernia  ? Focal Peritonitis   S/p septic shock  Leukocytosis - improved  S/p low grade fever    Plan:  ·	cont Zosyn 3.375 gms iv q8hrs D2  ·	awaiting culture results    Time spent - 33 mins

## 2022-09-18 NOTE — PROGRESS NOTE ADULT - ATTENDING COMMENTS
I personally reviewed patient's labs, flowsheets, pertinent imaging, and consultant notes. MDM of high complexity.    61yo woman w/ extensive PSH and PMH sig for ex lap and SBR, ileostomy and subsequent reversal, Ernestine's procedure and reversal, and diverticulitis who presented w/ abd pain, found to have SBO 2/2 strangulated vs. incarcerated abd wall hernia now s/p OR 9/16 for ex lap and small bowel resection (ileostomy) w/ anastomosis and repair. Significant amount of ischemic/necrotic bowel noted intra-op w/ sepsis and patient required ICU admission post-op for vasopressor support in septic shock, s/p 8.5L total fluid resuscitation between OR and overnight ICU.     ASSESSMENT:  #S/P Ex-lap, small bowel resection w/ significant bowel necrosis noted intra-op  #Septic shock  #Acute hypoxemic respiratory failure  #Morbid obesity  #Acute renal failure / ATN  #Lactic acidosis    Plan:  - s/p extubation  - levophed weaned off and monitoring hemodynamics   - hold off additional IVF s/p 8.5L between OR and admission to ICU   - cont broad spec abx, f/u ID recs  - f/u culture data, OR specimens  - tight FSG monitoring post-op, cont SILAS  - NPO w/ ice chips per surgery  - NGT removed   - d/c arterial line  - strict I/O  - trend creatinine  - wound care  - dvt ppx  - PT eval    Transfer to surgery

## 2022-09-19 LAB
CULTURE RESULTS: NO GROWTH — SIGNIFICANT CHANGE UP
GLUCOSE BLDC GLUCOMTR-MCNC: 112 MG/DL — HIGH (ref 70–99)
GLUCOSE BLDC GLUCOMTR-MCNC: 124 MG/DL — HIGH (ref 70–99)
GLUCOSE BLDC GLUCOMTR-MCNC: 141 MG/DL — HIGH (ref 70–99)
GLUCOSE BLDC GLUCOMTR-MCNC: 79 MG/DL — SIGNIFICANT CHANGE UP (ref 70–99)
SPECIMEN SOURCE: SIGNIFICANT CHANGE UP

## 2022-09-19 RX ORDER — INSULIN LISPRO 100/ML
VIAL (ML) SUBCUTANEOUS
Refills: 0 | Status: DISCONTINUED | OUTPATIENT
Start: 2022-09-19 | End: 2022-10-04

## 2022-09-19 RX ADMIN — ENOXAPARIN SODIUM 40 MILLIGRAM(S): 100 INJECTION SUBCUTANEOUS at 17:44

## 2022-09-19 RX ADMIN — HYDROMORPHONE HYDROCHLORIDE 0.5 MILLIGRAM(S): 2 INJECTION INTRAMUSCULAR; INTRAVENOUS; SUBCUTANEOUS at 20:11

## 2022-09-19 RX ADMIN — CHLORHEXIDINE GLUCONATE 1 APPLICATION(S): 213 SOLUTION TOPICAL at 05:01

## 2022-09-19 RX ADMIN — HYDROMORPHONE HYDROCHLORIDE 0.5 MILLIGRAM(S): 2 INJECTION INTRAMUSCULAR; INTRAVENOUS; SUBCUTANEOUS at 20:26

## 2022-09-19 RX ADMIN — PIPERACILLIN AND TAZOBACTAM 25 GRAM(S): 4; .5 INJECTION, POWDER, LYOPHILIZED, FOR SOLUTION INTRAVENOUS at 15:44

## 2022-09-19 RX ADMIN — PIPERACILLIN AND TAZOBACTAM 25 GRAM(S): 4; .5 INJECTION, POWDER, LYOPHILIZED, FOR SOLUTION INTRAVENOUS at 23:50

## 2022-09-19 RX ADMIN — PIPERACILLIN AND TAZOBACTAM 25 GRAM(S): 4; .5 INJECTION, POWDER, LYOPHILIZED, FOR SOLUTION INTRAVENOUS at 08:11

## 2022-09-19 RX ADMIN — PIPERACILLIN AND TAZOBACTAM 25 GRAM(S): 4; .5 INJECTION, POWDER, LYOPHILIZED, FOR SOLUTION INTRAVENOUS at 00:08

## 2022-09-19 NOTE — PROGRESS NOTE ADULT - GASTROINTESTINAL
details… soft/normal active bowel sounds/no guarding/no rigidity/no masses palpable/tender/distended

## 2022-09-19 NOTE — DIETITIAN INITIAL EVALUATION ADULT - PERTINENT LABORATORY DATA
09-18    139  |  106  |  26<H>  ----------------------------<  121<H>  3.9   |  23  |  1.41<H>    Ca    7.1<L>      18 Sep 2022 04:24  Phos  3.6     09-18  Mg     1.9     09-18    TPro  5.5<L>  /  Alb  1.8<L>  /  TBili  1.0  /  DBili  x   /  AST  24  /  ALT  17  /  AlkPhos  74  09-18  POCT Blood Glucose.: 79 mg/dL (09-19-22 @ 05:22)

## 2022-09-19 NOTE — PROGRESS NOTE ADULT - SUBJECTIVE AND OBJECTIVE BOX
62y Female    Meds:  piperacillin/tazobactam IVPB.. 3.375 Gram(s) IV Intermittent every 8 hours    Allergies    No Known Drug Allergies  peanuts (Anaphylaxis)    Intolerances        VITALS:  Vital Signs Last 24 Hrs  T(C): 36.4 (19 Sep 2022 14:06), Max: 36.8 (18 Sep 2022 21:36)  T(F): 97.6 (19 Sep 2022 14:06), Max: 98.2 (18 Sep 2022 21:36)  HR: 73 (19 Sep 2022 14:06) (73 - 93)  BP: 118/63 (19 Sep 2022 14:06) (118/63 - 147/81)  BP(mean): 72 (18 Sep 2022 21:36) (72 - 72)  RR: 18 (19 Sep 2022 14:06) (18 - 18)  SpO2: 96% (19 Sep 2022 14:06) (95% - 97%)    Parameters below as of 19 Sep 2022 14:06  Patient On (Oxygen Delivery Method): room air        LABS/DIAGNOSTIC TESTS:                          11.7   11.29 )-----------( 240      ( 18 Sep 2022 04:24 )             35.8         09-18    139  |  106  |  26<H>  ----------------------------<  121<H>  3.9   |  23  |  1.41<H>    Ca    7.1<L>      18 Sep 2022 04:24  Phos  3.6     09-18  Mg     1.9     09-18    TPro  5.5<L>  /  Alb  1.8<L>  /  TBili  1.0  /  DBili  x   /  AST  24  /  ALT  17  /  AlkPhos  74  09-18      LIVER FUNCTIONS - ( 18 Sep 2022 04:24 )  Alb: 1.8 g/dL / Pro: 5.5 g/dL / ALK PHOS: 74 U/L / ALT: 17 U/L DA / AST: 24 U/L / GGT: x             CULTURES: Catheterized Catheterized  09-18 @ 11:06   No growth  --  --      .Blood Blood-Venous  09-17 @ 09:55   No growth to date.  --  --            RADIOLOGY:      ROS:  [  ] UNABLE TO ELICIT 62y Female who is doing much better clinically, she has some abdominal pain but mainly at the Incision site, she has been passing flatus and even had a couple of BMs , she has no nausea , vomiting , fevers or chills. She has a Fragoso in place with clear urine in it. She is on a liquid diet and is tolerating well    Meds:  piperacillin/tazobactam IVPB.. 3.375 Gram(s) IV Intermittent every 8 hours    Allergies    No Known Drug Allergies  peanuts (Anaphylaxis)    Intolerances        VITALS:  Vital Signs Last 24 Hrs  T(C): 36.4 (19 Sep 2022 14:06), Max: 36.8 (18 Sep 2022 21:36)  T(F): 97.6 (19 Sep 2022 14:06), Max: 98.2 (18 Sep 2022 21:36)  HR: 73 (19 Sep 2022 14:06) (73 - 93)  BP: 118/63 (19 Sep 2022 14:06) (118/63 - 147/81)  BP(mean): 72 (18 Sep 2022 21:36) (72 - 72)  RR: 18 (19 Sep 2022 14:06) (18 - 18)  SpO2: 96% (19 Sep 2022 14:06) (95% - 97%)    Parameters below as of 19 Sep 2022 14:06  Patient On (Oxygen Delivery Method): room air        LABS/DIAGNOSTIC TESTS:                          11.7   11.29 )-----------( 240      ( 18 Sep 2022 04:24 )             35.8         09-18    139  |  106  |  26<H>  ----------------------------<  121<H>  3.9   |  23  |  1.41<H>    Ca    7.1<L>      18 Sep 2022 04:24  Phos  3.6     09-18  Mg     1.9     09-18    TPro  5.5<L>  /  Alb  1.8<L>  /  TBili  1.0  /  DBili  x   /  AST  24  /  ALT  17  /  AlkPhos  74  09-18      LIVER FUNCTIONS - ( 18 Sep 2022 04:24 )  Alb: 1.8 g/dL / Pro: 5.5 g/dL / ALK PHOS: 74 U/L / ALT: 17 U/L DA / AST: 24 U/L / GGT: x             CULTURES: Catheterized Catheterized  09-18 @ 11:06   No growth  --  --      .Blood Blood-Venous  09-17 @ 09:55   No growth to date.  --  --            RADIOLOGY:      ROS:  [  ] UNABLE TO ELICIT

## 2022-09-19 NOTE — DIETITIAN INITIAL EVALUATION ADULT - PERTINENT MEDS FT
MEDICATIONS  (STANDING):  chlorhexidine 2% Cloths 1 Application(s) Topical <User Schedule>  enoxaparin Injectable 40 milliGRAM(s) SubCutaneous every 24 hours  insulin lispro (ADMELOG) corrective regimen sliding scale   SubCutaneous three times a day with meals  piperacillin/tazobactam IVPB.. 3.375 Gram(s) IV Intermittent every 8 hours    MEDICATIONS  (PRN):  HYDROmorphone  Injectable 0.5 milliGRAM(s) IV Push every 4 hours PRN Moderate Pain (4 - 6)  ondansetron Injectable 4 milliGRAM(s) IV Push every 6 hours PRN Nausea

## 2022-09-19 NOTE — PROGRESS NOTE ADULT - SUBJECTIVE AND OBJECTIVE BOX
INTERVAL HPI/OVERNIGHT EVENTS:  Pt resting comfortably. No acute complaints.   Downgraded from ICU yesterday.  Self extubated NGT yesterday.  +Flatus.  Feeling hungry  OOB ambulated for short period yesterday.  Abd pain well controlled.  Denies N/V    MEDICATIONS  (STANDING):  chlorhexidine 2% Cloths 1 Application(s) Topical <User Schedule>  enoxaparin Injectable 40 milliGRAM(s) SubCutaneous every 24 hours  insulin lispro (ADMELOG) corrective regimen sliding scale   SubCutaneous every 6 hours  piperacillin/tazobactam IVPB.. 3.375 Gram(s) IV Intermittent every 8 hours    MEDICATIONS  (PRN):  HYDROmorphone  Injectable 0.5 milliGRAM(s) IV Push every 4 hours PRN Moderate Pain (4 - 6)  ondansetron Injectable 4 milliGRAM(s) IV Push every 6 hours PRN Nausea    Vital Signs Last 24 Hrs  T(C): 36.4 (19 Sep 2022 04:55), Max: 36.8 (18 Sep 2022 21:36)  T(F): 97.5 (19 Sep 2022 04:55), Max: 98.2 (18 Sep 2022 21:36)  HR: 93 (19 Sep 2022 04:55) (78 - 93)  BP: 147/81 (19 Sep 2022 04:55) (128/57 - 149/65)  BP(mean): 72 (18 Sep 2022 21:36) (62 - 87)  RR: 18 (19 Sep 2022 04:55) (14 - 20)  SpO2: 95% (19 Sep 2022 04:55) (94% - 97%)    Physical:  General: A&Ox3. NAD.  Abdomen: Soft midline wound open, clean, dry. Granulation tissue noted. Retention suture intact.    I&O's Detail    18 Sep 2022 07:01  -  19 Sep 2022 07:00  --------------------------------------------------------  IN:  Total IN: 0 mL    OUT:    Bulb (mL): 150 mL viscous seropurulent    Indwelling Catheter - Urethral (mL): 1000 mL  Total OUT: 1150 mL    Total NET: -1150 mL    LABS:                        11.7   11.29 )-----------( 240      ( 18 Sep 2022 04:24 )             35.8             09-18    139  |  106  |  26<H>  ----------------------------<  121<H>  3.9   |  23  |  1.41<H>    Ca    7.1<L>      18 Sep 2022 04:24  Phos  3.6     09-18  Mg     1.9     09-18    TPro  5.5<L>  /  Alb  1.8<L>  /  TBili  1.0  /  DBili  x   /  AST  24  /  ALT  17  /  AlkPhos  74  09-18

## 2022-09-19 NOTE — PROGRESS NOTE ADULT - ASSESSMENT
Incarcerated Hernia - s/p sb resection repair of strangulated incisional hernia  ? Focal Peritonitis   S/p septic shock  Leukocytosis - improved  S/p low grade fever    Plan:  ·	cont Zosyn 3.375 gms iv q8hrs D3

## 2022-09-19 NOTE — DIETITIAN INITIAL EVALUATION ADULT - OTHER INFO
Pt s/p exploratory laparotomy with lysis of adhesions, Small bowel resection, Open repair of strangulated incisional hernia 9/16. Pt visited,  present. Pt reports weight gain PTA (?amount/ time), reports taking/ tolerating clear liquids

## 2022-09-19 NOTE — PROGRESS NOTE ADULT - ASSESSMENT
62y.o. Female s/p ex-lap, small bowel resection of strangulated incisional hernia, primary repair POD#3    -Start clear liquid diet  -OOB ambulate  -Daily packing changes with wet kerlex  -Pain control prn    JEAN, downtrending  -PO/IV repletion  -Trend renal function    DM  -ISS

## 2022-09-20 LAB
ANION GAP SERPL CALC-SCNC: 7 MMOL/L — SIGNIFICANT CHANGE UP (ref 5–17)
ANISOCYTOSIS BLD QL: SLIGHT — SIGNIFICANT CHANGE UP
BASOPHILS # BLD AUTO: 0 K/UL — SIGNIFICANT CHANGE UP (ref 0–0.2)
BASOPHILS NFR BLD AUTO: 0 % — SIGNIFICANT CHANGE UP (ref 0–2)
BUN SERPL-MCNC: 13 MG/DL — SIGNIFICANT CHANGE UP (ref 7–18)
CALCIUM SERPL-MCNC: 7.7 MG/DL — LOW (ref 8.4–10.5)
CHLORIDE SERPL-SCNC: 107 MMOL/L — SIGNIFICANT CHANGE UP (ref 96–108)
CO2 SERPL-SCNC: 27 MMOL/L — SIGNIFICANT CHANGE UP (ref 22–31)
CREAT SERPL-MCNC: 0.78 MG/DL — SIGNIFICANT CHANGE UP (ref 0.5–1.3)
EGFR: 85 ML/MIN/1.73M2 — SIGNIFICANT CHANGE UP
EOSINOPHIL # BLD AUTO: 0 K/UL — SIGNIFICANT CHANGE UP (ref 0–0.5)
EOSINOPHIL NFR BLD AUTO: 0 % — SIGNIFICANT CHANGE UP (ref 0–6)
GLUCOSE BLDC GLUCOMTR-MCNC: 102 MG/DL — HIGH (ref 70–99)
GLUCOSE BLDC GLUCOMTR-MCNC: 103 MG/DL — HIGH (ref 70–99)
GLUCOSE BLDC GLUCOMTR-MCNC: 122 MG/DL — HIGH (ref 70–99)
GLUCOSE BLDC GLUCOMTR-MCNC: 91 MG/DL — SIGNIFICANT CHANGE UP (ref 70–99)
GLUCOSE SERPL-MCNC: 106 MG/DL — HIGH (ref 70–99)
HCT VFR BLD CALC: 34.5 % — SIGNIFICANT CHANGE UP (ref 34.5–45)
HGB BLD-MCNC: 11 G/DL — LOW (ref 11.5–15.5)
LYMPHOCYTES # BLD AUTO: 0.31 K/UL — LOW (ref 1–3.3)
LYMPHOCYTES # BLD AUTO: 3 % — LOW (ref 13–44)
MANUAL SMEAR VERIFICATION: SIGNIFICANT CHANGE UP
MCHC RBC-ENTMCNC: 30.1 PG — SIGNIFICANT CHANGE UP (ref 27–34)
MCHC RBC-ENTMCNC: 31.9 GM/DL — LOW (ref 32–36)
MCV RBC AUTO: 94.5 FL — SIGNIFICANT CHANGE UP (ref 80–100)
MONOCYTES # BLD AUTO: 0.63 K/UL — SIGNIFICANT CHANGE UP (ref 0–0.9)
MONOCYTES NFR BLD AUTO: 6 % — SIGNIFICANT CHANGE UP (ref 2–14)
MYELOCYTES NFR BLD: 1 % — HIGH (ref 0–0)
NEUTROPHILS # BLD AUTO: 9.41 K/UL — HIGH (ref 1.8–7.4)
NEUTROPHILS NFR BLD AUTO: 86 % — HIGH (ref 43–77)
NEUTS BAND # BLD: 4 % — SIGNIFICANT CHANGE UP (ref 0–8)
NRBC # BLD: 0 /100 — SIGNIFICANT CHANGE UP (ref 0–0)
PLAT MORPH BLD: NORMAL — SIGNIFICANT CHANGE UP
PLATELET # BLD AUTO: 243 K/UL — SIGNIFICANT CHANGE UP (ref 150–400)
PLATELET COUNT - ESTIMATE: NORMAL — SIGNIFICANT CHANGE UP
POIKILOCYTOSIS BLD QL AUTO: SLIGHT — SIGNIFICANT CHANGE UP
POTASSIUM SERPL-MCNC: 3 MMOL/L — LOW (ref 3.5–5.3)
POTASSIUM SERPL-SCNC: 3 MMOL/L — LOW (ref 3.5–5.3)
RBC # BLD: 3.65 M/UL — LOW (ref 3.8–5.2)
RBC # FLD: 13.4 % — SIGNIFICANT CHANGE UP (ref 10.3–14.5)
RBC BLD AUTO: ABNORMAL
SARS-COV-2 RNA SPEC QL NAA+PROBE: SIGNIFICANT CHANGE UP
SODIUM SERPL-SCNC: 141 MMOL/L — SIGNIFICANT CHANGE UP (ref 135–145)
WBC # BLD: 10.46 K/UL — SIGNIFICANT CHANGE UP (ref 3.8–10.5)
WBC # FLD AUTO: 10.46 K/UL — SIGNIFICANT CHANGE UP (ref 3.8–10.5)

## 2022-09-20 PROCEDURE — 74176 CT ABD & PELVIS W/O CONTRAST: CPT | Mod: 26

## 2022-09-20 RX ORDER — SODIUM CHLORIDE 9 MG/ML
1000 INJECTION, SOLUTION INTRAVENOUS
Refills: 0 | Status: DISCONTINUED | OUTPATIENT
Start: 2022-09-20 | End: 2022-09-21

## 2022-09-20 RX ORDER — SODIUM CHLORIDE 9 MG/ML
1000 INJECTION, SOLUTION INTRAVENOUS
Refills: 0 | Status: DISCONTINUED | OUTPATIENT
Start: 2022-09-20 | End: 2022-09-20

## 2022-09-20 RX ORDER — PANTOPRAZOLE SODIUM 20 MG/1
40 TABLET, DELAYED RELEASE ORAL DAILY
Refills: 0 | Status: DISCONTINUED | OUTPATIENT
Start: 2022-09-20 | End: 2022-10-04

## 2022-09-20 RX ORDER — DIATRIZOATE MEGLUMINE 180 MG/ML
20 INJECTION, SOLUTION INTRAVESICAL ONCE
Refills: 0 | Status: COMPLETED | OUTPATIENT
Start: 2022-09-20 | End: 2022-09-20

## 2022-09-20 RX ORDER — POTASSIUM CHLORIDE 20 MEQ
10 PACKET (EA) ORAL
Refills: 0 | Status: COMPLETED | OUTPATIENT
Start: 2022-09-20 | End: 2022-09-20

## 2022-09-20 RX ADMIN — PIPERACILLIN AND TAZOBACTAM 25 GRAM(S): 4; .5 INJECTION, POWDER, LYOPHILIZED, FOR SOLUTION INTRAVENOUS at 19:17

## 2022-09-20 RX ADMIN — PANTOPRAZOLE SODIUM 40 MILLIGRAM(S): 20 TABLET, DELAYED RELEASE ORAL at 11:30

## 2022-09-20 RX ADMIN — Medication 100 MILLIEQUIVALENT(S): at 14:04

## 2022-09-20 RX ADMIN — SODIUM CHLORIDE 75 MILLILITER(S): 9 INJECTION, SOLUTION INTRAVENOUS at 11:34

## 2022-09-20 RX ADMIN — HYDROMORPHONE HYDROCHLORIDE 0.5 MILLIGRAM(S): 2 INJECTION INTRAMUSCULAR; INTRAVENOUS; SUBCUTANEOUS at 21:07

## 2022-09-20 RX ADMIN — HYDROMORPHONE HYDROCHLORIDE 0.5 MILLIGRAM(S): 2 INJECTION INTRAMUSCULAR; INTRAVENOUS; SUBCUTANEOUS at 21:55

## 2022-09-20 RX ADMIN — DIATRIZOATE MEGLUMINE 20 MILLILITER(S): 180 INJECTION, SOLUTION INTRAVESICAL at 16:00

## 2022-09-20 RX ADMIN — ENOXAPARIN SODIUM 40 MILLIGRAM(S): 100 INJECTION SUBCUTANEOUS at 19:20

## 2022-09-20 RX ADMIN — Medication 100 MILLIEQUIVALENT(S): at 11:31

## 2022-09-20 RX ADMIN — CHLORHEXIDINE GLUCONATE 1 APPLICATION(S): 213 SOLUTION TOPICAL at 05:14

## 2022-09-20 RX ADMIN — Medication 100 MILLIEQUIVALENT(S): at 19:33

## 2022-09-20 RX ADMIN — PIPERACILLIN AND TAZOBACTAM 25 GRAM(S): 4; .5 INJECTION, POWDER, LYOPHILIZED, FOR SOLUTION INTRAVENOUS at 09:06

## 2022-09-20 RX ADMIN — SODIUM CHLORIDE 150 MILLILITER(S): 9 INJECTION, SOLUTION INTRAVENOUS at 20:17

## 2022-09-20 NOTE — PROGRESS NOTE ADULT - ASSESSMENT
62 y.o. Female s/p ex-lap, small bowel resection of strangulated incisional hernia, primary repair POD#4    afeb, vss  hypokalemia 3, repleted  JEAN resolved  hyperglycemia mng with SSI  MIL output increased 520cc total for past 24hrs, ?feculent    - NPO, IVF support  - serial abd exams  - daily packing changes with wet kerlex  - dvt/gi ppx, pain control  - oob/ambulate, PT

## 2022-09-20 NOTE — PROGRESS NOTE ADULT - ASSESSMENT
Incarcerated Hernia - s/p sb resection repair of strangulated incisional hernia  ? Focal Peritonitis   Leukocytosis - normalized    Plan:  ·	cont Zosyn 3.375 gms iv q8hrs D4  ·	Surgery has concerns of increased MIL output over past 24 hours, CT A/P was already ordered

## 2022-09-20 NOTE — PROGRESS NOTE ADULT - SUBJECTIVE AND OBJECTIVE BOX
INTERVAL HPI/OVERNIGHT EVENTS:    No acute events overnight.   Pt resting comfortably. No acute complaints.   Tolerating liquid diet.   flatus/BM.   Denies N/V no fever or chills  voiding          MEDICATIONS  (STANDING):  chlorhexidine 2% Cloths 1 Application(s) Topical <User Schedule>  dextrose 5% + sodium chloride 0.45% 1000 milliLiter(s) (75 mL/Hr) IV Continuous <Continuous>  enoxaparin Injectable 40 milliGRAM(s) SubCutaneous every 24 hours  insulin lispro (ADMELOG) corrective regimen sliding scale   SubCutaneous three times a day with meals  pantoprazole  Injectable 40 milliGRAM(s) IV Push daily  piperacillin/tazobactam IVPB.. 3.375 Gram(s) IV Intermittent every 8 hours  potassium chloride  10 mEq/100 mL IVPB 10 milliEquivalent(s) IV Intermittent every 1 hour    MEDICATIONS  (PRN):  HYDROmorphone  Injectable 0.5 milliGRAM(s) IV Push every 4 hours PRN Moderate Pain (4 - 6)  ondansetron Injectable 4 milliGRAM(s) IV Push every 6 hours PRN Nausea      Vital Signs Last 24 Hrs  T(C): 36.9 (20 Sep 2022 06:34), Max: 36.9 (20 Sep 2022 06:34)  T(F): 98.4 (20 Sep 2022 06:34), Max: 98.4 (20 Sep 2022 06:34)  HR: 67 (20 Sep 2022 06:34) (67 - 77)  BP: 132/52 (20 Sep 2022 06:34) (118/63 - 137/54)  BP(mean): 71 (20 Sep 2022 06:34) (71 - 74)  RR: 18 (20 Sep 2022 06:34) (18 - 18)  SpO2: 96% (20 Sep 2022 06:34) (96% - 97%)    Parameters below as of 20 Sep 2022 06:34  Patient On (Oxygen Delivery Method): nasal cannula  O2 Flow (L/min): 2        PHYSICAL EXAM  General: Alert and oriented, not in acute distress  Resp: Breathing unlabored  Abdomen: Soft, nondistended, nontender, midline wound well granulating, R MIL drain with suspected feculent output  : her in place  Extremities: No pedal edema    I&O's Detail    19 Sep 2022 07:01  -  20 Sep 2022 07:00  --------------------------------------------------------  IN:  Total IN: 0 mL    OUT:    Bulb (mL): 520 mL    Indwelling Catheter - Urethral (mL): 1100 mL  Total OUT: 1620 mL    Total NET: -1620 mL          LABS:                        11.0   10.46 )-----------( 243      ( 20 Sep 2022 05:56 )             34.5             09-20    141  |  107  |  13  ----------------------------<  106<H>  3.0<L>   |  27  |  0.78    Ca    7.7<L>      20 Sep 2022 05:56

## 2022-09-20 NOTE — PROGRESS NOTE ADULT - SUBJECTIVE AND OBJECTIVE BOX
62y Female is under our care for incarcerated hernia (s/p sb resection repair of strangulated incisional hernia) and ? Focal Peritonitis. Pt is doing better today and was found to be sitting on chair,  is at bedside. She has no new complaints, but admits abdominal pain worsens with exertion which is expected. Surgery has concerns over increased AKASH output, they ordered CT A/P. BC and UC remain negative. No fevers and wbc count has normalized.       MEDS:  piperacillin/tazobactam IVPB.. 3.375 Gram(s) IV Intermittent every 8 hours    ALLERGIES: Allergies    No Known Drug Allergies  peanuts (Anaphylaxis)    Intolerances    REVIEW OF SYSTEMS:  [  ] Not able to illicit  General: no fevers no malaise   Chest: no cough no sob  GI: no nvd +abd pain with exertion  : no urinary sxs   Skin: no rashes  Musculoskeletal: no trauma no LBP  Neuro: no ha's no dizziness     VITALS:  Vital Signs Last 24 Hrs  T(C): 37.5 (09-20-22 @ 13:14), Max: 37.5 (09-20-22 @ 13:14)  T(F): 99.5 (09-20-22 @ 13:14), Max: 99.5 (09-20-22 @ 13:14)  HR: 73 (09-20-22 @ 13:14) (67 - 77)  BP: 150/69 (09-20-22 @ 13:14) (118/63 - 150/69)  BP(mean): 71 (09-20-22 @ 06:34) (71 - 74)  RR: 16 (09-20-22 @ 13:14) (16 - 18)  SpO2: 98% (09-20-22 @ 13:14) (96% - 98%)    PHYSICAL EXAM:  HEENT: n/a  Neck: supple no LN's  Respiratory: lungs mostly clear no rales  Cardiovascular: S1 S2 reg no murmurs  Gastrointestinal: +BS with soft, large abdominal pannus; mild incisional tenderness +akash with ? bilious fluid  Extremities: no edema  Skin: no rashes  Ortho: n/a  Neuro: awake and alert      LABS/DIAGNOSTIC TESTS:                          11.0   10.46 )-----------( 243      ( 20 Sep 2022 05:56 )             34.5   WBC Count: 10.46 K/uL (09-20 @ 05:56)  WBC Count: 11.29 K/uL (09-18 @ 04:24)  WBC Count: 12.86 K/uL (09-17 @ 09:55)  WBC Count: 17.05 K/uL (09-17 @ 04:02)  WBC Count: 9.90 K/uL (09-16 @ 23:20)    09-20    141  |  107  |  13  ----------------------------<  106<H>  3.0<L>   |  27  |  0.78    Ca    7.7<L>      20 Sep 2022 05:56           CULTURES:   Catheterized Catheterized  09-18 @ 11:06   No growth  --  --      .Blood Blood-Venous  09-17 @ 09:55   No growth to date.  --  --        RADIOLOGY:  no new studies

## 2022-09-21 LAB
ANION GAP SERPL CALC-SCNC: 7 MMOL/L — SIGNIFICANT CHANGE UP (ref 5–17)
APTT BLD: 24.7 SEC — LOW (ref 27.5–35.5)
BUN SERPL-MCNC: 10 MG/DL — SIGNIFICANT CHANGE UP (ref 7–18)
CALCIUM SERPL-MCNC: 8.1 MG/DL — LOW (ref 8.4–10.5)
CHLORIDE SERPL-SCNC: 105 MMOL/L — SIGNIFICANT CHANGE UP (ref 96–108)
CO2 SERPL-SCNC: 29 MMOL/L — SIGNIFICANT CHANGE UP (ref 22–31)
CREAT SERPL-MCNC: 0.81 MG/DL — SIGNIFICANT CHANGE UP (ref 0.5–1.3)
EGFR: 81 ML/MIN/1.73M2 — SIGNIFICANT CHANGE UP
GLUCOSE BLDC GLUCOMTR-MCNC: 106 MG/DL — HIGH (ref 70–99)
GLUCOSE BLDC GLUCOMTR-MCNC: 127 MG/DL — HIGH (ref 70–99)
GLUCOSE BLDC GLUCOMTR-MCNC: 97 MG/DL — SIGNIFICANT CHANGE UP (ref 70–99)
GLUCOSE SERPL-MCNC: 113 MG/DL — HIGH (ref 70–99)
HCT VFR BLD CALC: 37.4 % — SIGNIFICANT CHANGE UP (ref 34.5–45)
HGB BLD-MCNC: 11.9 G/DL — SIGNIFICANT CHANGE UP (ref 11.5–15.5)
INR BLD: 1.28 RATIO — HIGH (ref 0.88–1.16)
MAGNESIUM SERPL-MCNC: 2.7 MG/DL — HIGH (ref 1.6–2.6)
MCHC RBC-ENTMCNC: 30.1 PG — SIGNIFICANT CHANGE UP (ref 27–34)
MCHC RBC-ENTMCNC: 31.8 GM/DL — LOW (ref 32–36)
MCV RBC AUTO: 94.7 FL — SIGNIFICANT CHANGE UP (ref 80–100)
NRBC # BLD: 0 /100 WBCS — SIGNIFICANT CHANGE UP (ref 0–0)
PHOSPHATE SERPL-MCNC: 2.7 MG/DL — SIGNIFICANT CHANGE UP (ref 2.5–4.5)
PLATELET # BLD AUTO: 305 K/UL — SIGNIFICANT CHANGE UP (ref 150–400)
POTASSIUM SERPL-MCNC: 3.2 MMOL/L — LOW (ref 3.5–5.3)
POTASSIUM SERPL-SCNC: 3.2 MMOL/L — LOW (ref 3.5–5.3)
PROTHROM AB SERPL-ACNC: 15.3 SEC — HIGH (ref 10.5–13.4)
RBC # BLD: 3.95 M/UL — SIGNIFICANT CHANGE UP (ref 3.8–5.2)
RBC # FLD: 13.4 % — SIGNIFICANT CHANGE UP (ref 10.3–14.5)
SODIUM SERPL-SCNC: 141 MMOL/L — SIGNIFICANT CHANGE UP (ref 135–145)
WBC # BLD: 15.34 K/UL — HIGH (ref 3.8–10.5)
WBC # FLD AUTO: 15.34 K/UL — HIGH (ref 3.8–10.5)

## 2022-09-21 RX ORDER — POTASSIUM CHLORIDE 20 MEQ
20 PACKET (EA) ORAL
Refills: 0 | Status: COMPLETED | OUTPATIENT
Start: 2022-09-21 | End: 2022-09-21

## 2022-09-21 RX ORDER — POTASSIUM CHLORIDE 20 MEQ
10 PACKET (EA) ORAL
Refills: 0 | Status: DISCONTINUED | OUTPATIENT
Start: 2022-09-21 | End: 2022-09-21

## 2022-09-21 RX ORDER — SODIUM CHLORIDE 9 MG/ML
1000 INJECTION, SOLUTION INTRAVENOUS
Refills: 0 | Status: DISCONTINUED | OUTPATIENT
Start: 2022-09-21 | End: 2022-09-24

## 2022-09-21 RX ADMIN — PIPERACILLIN AND TAZOBACTAM 25 GRAM(S): 4; .5 INJECTION, POWDER, LYOPHILIZED, FOR SOLUTION INTRAVENOUS at 18:37

## 2022-09-21 RX ADMIN — Medication 20 MILLIEQUIVALENT(S): at 11:49

## 2022-09-21 RX ADMIN — PIPERACILLIN AND TAZOBACTAM 25 GRAM(S): 4; .5 INJECTION, POWDER, LYOPHILIZED, FOR SOLUTION INTRAVENOUS at 03:49

## 2022-09-21 RX ADMIN — Medication 20 MILLIEQUIVALENT(S): at 10:23

## 2022-09-21 RX ADMIN — SODIUM CHLORIDE 150 MILLILITER(S): 9 INJECTION, SOLUTION INTRAVENOUS at 10:25

## 2022-09-21 RX ADMIN — HYDROMORPHONE HYDROCHLORIDE 0.5 MILLIGRAM(S): 2 INJECTION INTRAMUSCULAR; INTRAVENOUS; SUBCUTANEOUS at 20:10

## 2022-09-21 RX ADMIN — HYDROMORPHONE HYDROCHLORIDE 0.5 MILLIGRAM(S): 2 INJECTION INTRAMUSCULAR; INTRAVENOUS; SUBCUTANEOUS at 19:50

## 2022-09-21 RX ADMIN — SODIUM CHLORIDE 150 MILLILITER(S): 9 INJECTION, SOLUTION INTRAVENOUS at 17:41

## 2022-09-21 RX ADMIN — PIPERACILLIN AND TAZOBACTAM 25 GRAM(S): 4; .5 INJECTION, POWDER, LYOPHILIZED, FOR SOLUTION INTRAVENOUS at 11:49

## 2022-09-21 RX ADMIN — ENOXAPARIN SODIUM 40 MILLIGRAM(S): 100 INJECTION SUBCUTANEOUS at 17:41

## 2022-09-21 RX ADMIN — PANTOPRAZOLE SODIUM 40 MILLIGRAM(S): 20 TABLET, DELAYED RELEASE ORAL at 11:49

## 2022-09-21 RX ADMIN — CHLORHEXIDINE GLUCONATE 1 APPLICATION(S): 213 SOLUTION TOPICAL at 05:57

## 2022-09-21 NOTE — PROGRESS NOTE ADULT - SUBJECTIVE AND OBJECTIVE BOX
Patient seen and examined at bedside  Admits to flatus/BM.   Denies nausea/ vomiting.     Vital Signs Last 24 Hrs  T(F): 98 (09-21-22 @ 05:18), Max: 99.5 (09-20-22 @ 13:14)  HR: 67 (09-21-22 @ 05:18)  BP: 132/73 (09-21-22 @ 05:18)  RR: 18 (09-21-22 @ 05:18)  SpO2: 96% (09-21-22 @ 05:18)  POCT Blood Glucose.: 97 mg/dL (21 Sep 2022 08:17)    GENERAL: Alert, NAD  CHEST/LUNG: respirations nonlabored  ABDOMEN: MIL with feculent output; 985ml/24hours; soft, appropriate tenderness to palpation; midline dressing taken down, retention sutures in place, wet to dry packing placed. Dressing around MIL changed  EXTREMITIES:  no calf tenderness, No edema    I&O's Detail    20 Sep 2022 07:01  -  21 Sep 2022 07:00  --------------------------------------------------------  IN:    Oral Fluid: 300 mL  Total IN: 300 mL    OUT:    Bulb (mL): 985 mL    Indwelling Catheter - Urethral (mL): 250 mL  Total OUT: 1235 mL    Total NET: -935 mL    21 Sep 2022 07:01  -  21 Sep 2022 08:45  --------------------------------------------------------  IN:  Total IN: 0 mL    OUT:    Bulb (mL): 120 mL  Total OUT: 120 mL    Total NET: -120 mL    LABS:                        11.9   15.34 )-----------( 305      ( 21 Sep 2022 06:02 )             37.4     09-21    141  |  105  |  10  ----------------------------<  113<H>  3.2<L>   |  29  |  0.81    Ca    8.1<L>      21 Sep 2022 05:38  Phos  2.7     09-21  Mg     2.7     09-21    RADIOLOGY:   < from: CT Abdomen and Pelvis w/ Oral Cont (09.20.22 @ 18:36) >  IMPRESSION:  1.  A 9 cm feculent-appearing collection of the right lower quadrant   abdominal wall is suspected to be due to small bowel leak or fistula.  2.  Multiple wall-thickened loops of small bowel consistent with   enteritis. No bowel obstruction.  3.  Small volume free fluid.    --- End of Report ---     JONAS VEGA DO; Attending Radiologist  This document has been electronically signed. Sep 20 2022  8:51PM    < end of copied text >

## 2022-09-21 NOTE — PROGRESS NOTE ADULT - ASSESSMENT
62 year old female s/p exploratory laparotomy, Small Bowel Resection, hernia repair POD#5;   Large volume Feculent drainage from MIL and CT with possible leak  Leukocytosis 15.34. Hypokalemia.     - continue NPO   - possible surgical intervention vs. bowel rest  - replete potassium   - continue antibiotics  - continue MIL and monitor output  - IVF D5NS at 150  - strict I&Os  - will discuss with Dr. Uriostegui

## 2022-09-21 NOTE — PROGRESS NOTE ADULT - SUBJECTIVE AND OBJECTIVE BOX
62y Female    Meds:  piperacillin/tazobactam IVPB.. 3.375 Gram(s) IV Intermittent every 8 hours    Allergies    No Known Drug Allergies  peanuts (Anaphylaxis)    Intolerances        VITALS:  Vital Signs Last 24 Hrs  T(C): 36.7 (21 Sep 2022 05:18), Max: 37.5 (20 Sep 2022 13:14)  T(F): 98 (21 Sep 2022 05:18), Max: 99.5 (20 Sep 2022 13:14)  HR: 67 (21 Sep 2022 05:18) (67 - 74)  BP: 132/73 (21 Sep 2022 05:18) (132/73 - 150/69)  BP(mean): 76 (20 Sep 2022 20:45) (76 - 76)  RR: 18 (21 Sep 2022 05:18) (16 - 18)  SpO2: 96% (21 Sep 2022 05:18) (96% - 98%)    Parameters below as of 21 Sep 2022 05:18  Patient On (Oxygen Delivery Method): room air        LABS/DIAGNOSTIC TESTS:                          11.9   15.34 )-----------( 305      ( 21 Sep 2022 06:02 )             37.4         09-21    141  |  105  |  10  ----------------------------<  113<H>  3.2<L>   |  29  |  0.81    Ca    8.1<L>      21 Sep 2022 05:38  Phos  2.7     09-21  Mg     2.7     09-21            CULTURES: Catheterized Catheterized  09-18 @ 11:06   No growth  --  --      .Blood Blood-Venous  09-17 @ 09:55   No growth to date.  --  --            RADIOLOGY:< from: CT Abdomen and Pelvis w/ Oral Cont (09.20.22 @ 18:36) >  ACC: [id number] EXAM:  CT ABDOMEN AND PELVIS OC                          PROCEDURE DATE:  09/20/2022          INTERPRETATION:  CLINICAL INFORMATION: Evaluate for leak.    COMPARISON: CT 9/13/2022    CONTRAST/COMPLICATIONS:  IV Contrast: NONE  Oral Contrast: Gastroview  Complications: None reported at time of study completion    PROCEDURE:  CT of the Abdomen and Pelvis was performed.  Sagittal and coronal reformats were performed.    FINDINGS:  LOWER CHEST: Subsegmental atelectasis at the lung bases.    LIVER: Within normal limits.  BILE DUCTS: Normal caliber.  GALLBLADDER: Cholecystectomy.  SPLEEN: Within normal limits.  PANCREAS: Within normal limits.  ADRENALS: Within normal limits.  KIDNEYS/URETERS: Within normal limits.    BLADDER: Withinnormal limits.  REPRODUCTIVE ORGANS: Hysterectomy.    BOWEL/ABDOMINAL WALL: Evaluation of bowel is limited without IV contrast.   Small hiatal hernia. No bowel obstruction. Multiple wall-thickened   segments of small bowel. There is a feculent appearing collection of the   right lower quadrant abdominal wall at site of previously seen hernia,   measuring 9 x 5 cm. There is a drainage catheter within this collection.   Foci of gas appear to extend from small bowel towards the collection   (coronalseries 4, images 44-46). The collection is mixed density,   however sam spillage of oral contrast into the collection is not   definitively visualized. There is a midline ventral abdominal wall wound   with packing material.    PERITONEUM: Small volume ascites.  VESSELS: Within normal limits.  RETROPERITONEUM/LYMPH NODES: No lymphadenopathy.  BONES: Degenerative changes of the spine.    IMPRESSION:  1.  A 9 cm feculent-appearing collection of the right lower quadrant   abdominal wall is suspected to be due to small bowel leak or fistula.  2.  Multiple wall-thickened loops of small bowel consistent with   enteritis. No bowel obstruction.  3.  Small volume free fluid.      --- End of Report ---             JONAS VEGA DO; Attending Radiologist    < end of copied text >        ROS:  [  ] UNABLE TO ELICIT 62y Female who has been having high output from her MIL drain and fecaloid smelling, Her CT abdomen shows a 9 x 5 cm collection which is likely secondary to an anastomotic leak versus fistula. She has no fevers or abdominal pain but her WBC count went to 15k. She has no new complaints. Dr Uriostegui and I have discussed the situation with her and her  and feel the best initial course of action to take at this time will be to make     Meds:  piperacillin/tazobactam IVPB.. 3.375 Gram(s) IV Intermittent every 8 hours    Allergies    No Known Drug Allergies  peanuts (Anaphylaxis)    Intolerances        VITALS:  Vital Signs Last 24 Hrs  T(C): 36.7 (21 Sep 2022 05:18), Max: 37.5 (20 Sep 2022 13:14)  T(F): 98 (21 Sep 2022 05:18), Max: 99.5 (20 Sep 2022 13:14)  HR: 67 (21 Sep 2022 05:18) (67 - 74)  BP: 132/73 (21 Sep 2022 05:18) (132/73 - 150/69)  BP(mean): 76 (20 Sep 2022 20:45) (76 - 76)  RR: 18 (21 Sep 2022 05:18) (16 - 18)  SpO2: 96% (21 Sep 2022 05:18) (96% - 98%)    Parameters below as of 21 Sep 2022 05:18  Patient On (Oxygen Delivery Method): room air        LABS/DIAGNOSTIC TESTS:                          11.9   15.34 )-----------( 305      ( 21 Sep 2022 06:02 )             37.4         09-21    141  |  105  |  10  ----------------------------<  113<H>  3.2<L>   |  29  |  0.81    Ca    8.1<L>      21 Sep 2022 05:38  Phos  2.7     09-21  Mg     2.7     09-21            CULTURES: Catheterized Catheterized  09-18 @ 11:06   No growth  --  --      .Blood Blood-Venous  09-17 @ 09:55   No growth to date.  --  --            RADIOLOGY:< from: CT Abdomen and Pelvis w/ Oral Cont (09.20.22 @ 18:36) >  ACC: [id number] EXAM:  CT ABDOMEN AND PELVIS OC                          PROCEDURE DATE:  09/20/2022          INTERPRETATION:  CLINICAL INFORMATION: Evaluate for leak.    COMPARISON: CT 9/13/2022    CONTRAST/COMPLICATIONS:  IV Contrast: NONE  Oral Contrast: Gastroview  Complications: None reported at time of study completion    PROCEDURE:  CT of the Abdomen and Pelvis was performed.  Sagittal and coronal reformats were performed.    FINDINGS:  LOWER CHEST: Subsegmental atelectasis at the lung bases.    LIVER: Within normal limits.  BILE DUCTS: Normal caliber.  GALLBLADDER: Cholecystectomy.  SPLEEN: Within normal limits.  PANCREAS: Within normal limits.  ADRENALS: Within normal limits.  KIDNEYS/URETERS: Within normal limits.    BLADDER: Withinnormal limits.  REPRODUCTIVE ORGANS: Hysterectomy.    BOWEL/ABDOMINAL WALL: Evaluation of bowel is limited without IV contrast.   Small hiatal hernia. No bowel obstruction. Multiple wall-thickened   segments of small bowel. There is a feculent appearing collection of the   right lower quadrant abdominal wall at site of previously seen hernia,   measuring 9 x 5 cm. There is a drainage catheter within this collection.   Foci of gas appear to extend from small bowel towards the collection   (coronalseries 4, images 44-46). The collection is mixed density,   however sam spillage of oral contrast into the collection is not   definitively visualized. There is a midline ventral abdominal wall wound   with packing material.    PERITONEUM: Small volume ascites.  VESSELS: Within normal limits.  RETROPERITONEUM/LYMPH NODES: No lymphadenopathy.  BONES: Degenerative changes of the spine.    IMPRESSION:  1.  A 9 cm feculent-appearing collection of the right lower quadrant   abdominal wall is suspected to be due to small bowel leak or fistula.  2.  Multiple wall-thickened loops of small bowel consistent with   enteritis. No bowel obstruction.  3.  Small volume free fluid.      --- End of Report ---             JONAS VEGA DO; Attending Radiologist    < end of copied text >        ROS:  [  ] UNABLE TO ELICIT 62y Female who has been having high output from her MIL drain and fecaloid smelling, Her CT abdomen shows a 9 x 5 cm collection which is likely secondary to an anastomotic leak versus fistula. She has no fevers or abdominal pain but her WBC count went to 15k. She has no new complaints. Dr Uriostegui and I have discussed the situation with her and her  and feel the best initial course of action to take at this time will be to make her NPO and put her on TPN and hopefully her leak will close up.    Meds:  piperacillin/tazobactam IVPB.. 3.375 Gram(s) IV Intermittent every 8 hours    Allergies    No Known Drug Allergies  peanuts (Anaphylaxis)    Intolerances        VITALS:  Vital Signs Last 24 Hrs  T(C): 36.7 (21 Sep 2022 05:18), Max: 37.5 (20 Sep 2022 13:14)  T(F): 98 (21 Sep 2022 05:18), Max: 99.5 (20 Sep 2022 13:14)  HR: 67 (21 Sep 2022 05:18) (67 - 74)  BP: 132/73 (21 Sep 2022 05:18) (132/73 - 150/69)  BP(mean): 76 (20 Sep 2022 20:45) (76 - 76)  RR: 18 (21 Sep 2022 05:18) (16 - 18)  SpO2: 96% (21 Sep 2022 05:18) (96% - 98%)    Parameters below as of 21 Sep 2022 05:18  Patient On (Oxygen Delivery Method): room air        LABS/DIAGNOSTIC TESTS:                          11.9   15.34 )-----------( 305      ( 21 Sep 2022 06:02 )             37.4         09-21    141  |  105  |  10  ----------------------------<  113<H>  3.2<L>   |  29  |  0.81    Ca    8.1<L>      21 Sep 2022 05:38  Phos  2.7     09-21  Mg     2.7     09-21            CULTURES: Catheterized Catheterized  09-18 @ 11:06   No growth  --  --      .Blood Blood-Venous  09-17 @ 09:55   No growth to date.  --  --            RADIOLOGY:< from: CT Abdomen and Pelvis w/ Oral Cont (09.20.22 @ 18:36) >  ACC: [id number] EXAM:  CT ABDOMEN AND PELVIS OC                          PROCEDURE DATE:  09/20/2022          INTERPRETATION:  CLINICAL INFORMATION: Evaluate for leak.    COMPARISON: CT 9/13/2022    CONTRAST/COMPLICATIONS:  IV Contrast: NONE  Oral Contrast: Gastroview  Complications: None reported at time of study completion    PROCEDURE:  CT of the Abdomen and Pelvis was performed.  Sagittal and coronal reformats were performed.    FINDINGS:  LOWER CHEST: Subsegmental atelectasis at the lung bases.    LIVER: Within normal limits.  BILE DUCTS: Normal caliber.  GALLBLADDER: Cholecystectomy.  SPLEEN: Within normal limits.  PANCREAS: Within normal limits.  ADRENALS: Within normal limits.  KIDNEYS/URETERS: Within normal limits.    BLADDER: Withinnormal limits.  REPRODUCTIVE ORGANS: Hysterectomy.    BOWEL/ABDOMINAL WALL: Evaluation of bowel is limited without IV contrast.   Small hiatal hernia. No bowel obstruction. Multiple wall-thickened   segments of small bowel. There is a feculent appearing collection of the   right lower quadrant abdominal wall at site of previously seen hernia,   measuring 9 x 5 cm. There is a drainage catheter within this collection.   Foci of gas appear to extend from small bowel towards the collection   (coronalseries 4, images 44-46). The collection is mixed density,   however sam spillage of oral contrast into the collection is not   definitively visualized. There is a midline ventral abdominal wall wound   with packing material.    PERITONEUM: Small volume ascites.  VESSELS: Within normal limits.  RETROPERITONEUM/LYMPH NODES: No lymphadenopathy.  BONES: Degenerative changes of the spine.    IMPRESSION:  1.  A 9 cm feculent-appearing collection of the right lower quadrant   abdominal wall is suspected to be due to small bowel leak or fistula.  2.  Multiple wall-thickened loops of small bowel consistent with   enteritis. No bowel obstruction.  3.  Small volume free fluid.      --- End of Report ---             JONAS VEGA DO; Attending Radiologist    < end of copied text >        ROS:  [  ] UNABLE TO ELICIT

## 2022-09-21 NOTE — PROGRESS NOTE ADULT - GASTROINTESTINAL
slightly distended/soft/nontender/normal active bowel sounds/no guarding/no rigidity/distended details…

## 2022-09-21 NOTE — PROGRESS NOTE ADULT - ASSESSMENT
Incarcerated Hernia - s/p sb resection repair of strangulated incisional hernia  Focal Peritonitis / intraabdominal collection - secondary to an anastomotic leak  Leukocytosis     Plan:  ·	cont Zosyn 3.375 gms iv q8hrs D5  ·	get a  PICC line for TPN  ·	NPO  ·	Patient is cleared from ID to get a PICC line given her WBC count id [id number] and to start TPN.

## 2022-09-21 NOTE — CHART NOTE - NSCHARTNOTEFT_GEN_A_CORE
Assessment:   Patient is a 62y old  Female who presents with a chief complaint strangulated incisional  hernia. Pt s/p exploratory laparotomy with lysis of adhesions, Small bowel resection, Open repair of strangulated incisional hernia .Pt NPO (w/ clears) Day #8. Pt discussed on AM rounds. To remain NPO for possible further sx vs NPO/ bowel rest (w/ possible TPN)        Factors impacting intake: [ ] none [ ] nausea  [ ] vomiting [ ] diarrhea [ ] constipation  [ ]chewing problems [ ] swallowing issues  [x ] other: altered GI fx/ structure    Diet Prescription: Diet, NPO:   Except Medications  With Ice Chips/Sips of Water (22 @ 09:36)        Daily     Daily Weight in k.7 (17 Sep 2022 07:00)    % Weight Change: 1+ B/L feet edema    Pertinent Medications: MEDICATIONS  (STANDING):  chlorhexidine 2% Cloths 1 Application(s) Topical <User Schedule>  dextrose 5% + sodium chloride 0.9%. 1000 milliLiter(s) (150 mL/Hr) IV Continuous <Continuous>  enoxaparin Injectable 40 milliGRAM(s) SubCutaneous every 24 hours  insulin lispro (ADMELOG) corrective regimen sliding scale   SubCutaneous three times a day with meals  pantoprazole  Injectable 40 milliGRAM(s) IV Push daily  piperacillin/tazobactam IVPB.. 3.375 Gram(s) IV Intermittent every 8 hours    MEDICATIONS  (PRN):  HYDROmorphone  Injectable 0.5 milliGRAM(s) IV Push every 4 hours PRN Moderate Pain (4 - 6)  ondansetron Injectable 4 milliGRAM(s) IV Push every 6 hours PRN Nausea    Pertinent Labs:  Na141 mmol/L Glu 113 mg/dL<H> K+ 3.2 mmol/L<L> Cr  0.81 mg/dL BUN 10 mg/dL  Phos 2.7 mg/dL  Alb 1.8 g/dL<L>     CAPILLARY BLOOD GLUCOSE      POCT Blood Glucose.: 106 mg/dL (21 Sep 2022 11:23)  POCT Blood Glucose.: 97 mg/dL (21 Sep 2022 08:17)  POCT Blood Glucose.: 103 mg/dL (20 Sep 2022 21:12)  POCT Blood Glucose.: 91 mg/dL (20 Sep 2022 16:39)        Estimated Needs:   [x ] no change since previous assessment  [ ] recalculated:       Previous Nutrition Diagnosis:   [ ] Altered GI function  [x ]Inadequate Oral Intake [ ] Swallowing Difficulty   [ ] Altered nutrition related labs [ ] Increased Nutrient Needs [ ] Overweight/Obesity   [ ] Unintended Weight Loss [ ] Food & Nutrition Related Knowledge Deficit [ ] Malnutrition   [ ] Other:     Nutrition Diagnosis is [x ] ongoing  [ ] resolved [ ] not applicable          Interventions:   Recommend  [ ] Change Diet To:  [ ] Nutrition Supplement  [x ] Nutrition Support: Consider TPN if unable to progress past clear liquids or expected.   [x ] Other: Diet advancement per Sx. MD to monitor. RD available.     Monitoring and Evaluation:    [ x ] Tolerance to diet prescription [ x ] weights [ x ] labs[ x ] follow up per protocol  [ ] other:

## 2022-09-22 LAB
CULTURE RESULTS: SIGNIFICANT CHANGE UP
CULTURE RESULTS: SIGNIFICANT CHANGE UP
GLUCOSE BLDC GLUCOMTR-MCNC: 100 MG/DL — HIGH (ref 70–99)
GLUCOSE BLDC GLUCOMTR-MCNC: 118 MG/DL — HIGH (ref 70–99)
GLUCOSE BLDC GLUCOMTR-MCNC: 143 MG/DL — HIGH (ref 70–99)
GLUCOSE BLDC GLUCOMTR-MCNC: 76 MG/DL — SIGNIFICANT CHANGE UP (ref 70–99)
SPECIMEN SOURCE: SIGNIFICANT CHANGE UP
SPECIMEN SOURCE: SIGNIFICANT CHANGE UP

## 2022-09-22 PROCEDURE — 36573 INSJ PICC RS&I 5 YR+: CPT

## 2022-09-22 RX ORDER — KETOROLAC TROMETHAMINE 30 MG/ML
30 SYRINGE (ML) INJECTION EVERY 6 HOURS
Refills: 0 | Status: DISCONTINUED | OUTPATIENT
Start: 2022-09-22 | End: 2022-09-22

## 2022-09-22 RX ORDER — CHLORHEXIDINE GLUCONATE 213 G/1000ML
1 SOLUTION TOPICAL DAILY
Refills: 0 | Status: DISCONTINUED | OUTPATIENT
Start: 2022-09-23 | End: 2022-10-04

## 2022-09-22 RX ORDER — SODIUM CHLORIDE 9 MG/ML
10 INJECTION INTRAMUSCULAR; INTRAVENOUS; SUBCUTANEOUS
Refills: 0 | Status: DISCONTINUED | OUTPATIENT
Start: 2022-09-22 | End: 2022-10-04

## 2022-09-22 RX ORDER — SODIUM CHLORIDE 9 MG/ML
1000 INJECTION INTRAMUSCULAR; INTRAVENOUS; SUBCUTANEOUS ONCE
Refills: 0 | Status: DISCONTINUED | OUTPATIENT
Start: 2022-09-22 | End: 2022-09-23

## 2022-09-22 RX ORDER — ELECTROLYTE SOLUTION,INJ
1 VIAL (ML) INTRAVENOUS
Refills: 0 | Status: DISCONTINUED | OUTPATIENT
Start: 2022-09-22 | End: 2022-09-22

## 2022-09-22 RX ORDER — I.V. FAT EMULSION 20 G/100ML
0.19 EMULSION INTRAVENOUS
Qty: 25.04 | Refills: 0 | Status: DISCONTINUED | OUTPATIENT
Start: 2022-09-22 | End: 2022-09-22

## 2022-09-22 RX ADMIN — I.V. FAT EMULSION 10.4 GM/KG/DAY: 20 EMULSION INTRAVENOUS at 22:33

## 2022-09-22 RX ADMIN — Medication 1 EACH: at 22:32

## 2022-09-22 RX ADMIN — PANTOPRAZOLE SODIUM 40 MILLIGRAM(S): 20 TABLET, DELAYED RELEASE ORAL at 18:27

## 2022-09-22 RX ADMIN — SODIUM CHLORIDE 150 MILLILITER(S): 9 INJECTION, SOLUTION INTRAVENOUS at 22:34

## 2022-09-22 RX ADMIN — PIPERACILLIN AND TAZOBACTAM 25 GRAM(S): 4; .5 INJECTION, POWDER, LYOPHILIZED, FOR SOLUTION INTRAVENOUS at 18:28

## 2022-09-22 RX ADMIN — CHLORHEXIDINE GLUCONATE 1 APPLICATION(S): 213 SOLUTION TOPICAL at 06:36

## 2022-09-22 RX ADMIN — PIPERACILLIN AND TAZOBACTAM 25 GRAM(S): 4; .5 INJECTION, POWDER, LYOPHILIZED, FOR SOLUTION INTRAVENOUS at 03:10

## 2022-09-22 RX ADMIN — PIPERACILLIN AND TAZOBACTAM 25 GRAM(S): 4; .5 INJECTION, POWDER, LYOPHILIZED, FOR SOLUTION INTRAVENOUS at 12:55

## 2022-09-22 RX ADMIN — ENOXAPARIN SODIUM 40 MILLIGRAM(S): 100 INJECTION SUBCUTANEOUS at 18:28

## 2022-09-22 NOTE — CONSULT NOTE ADULT - ASSESSMENT
1. Anastomotic leak with focal peritonitis resulting in prolonged NPO with pt at risk for PCM.  Plan to begin TPN today.  Half dose will be given today, full dose tomorrow  Calculations below.  2. Dehydration- with contraction alkalosis. Agree with increasing rate of IVF today.  Would give IVF 1L  bolus as well.  Today TPN will consist of 1L IV fluids; starting tomorrow 10pm will increase to 2L/24h and consider increasing further.  For today, would continue IVF while TPN is added.  3. Hypokalemia- replete K+.  4. Focal Peritonitis- Not systemic.  Okay to give TPN.  Abx choice per ID.  F/u closely with ID.    TPN calculations:  Shade 1500  AA 100g --> 400 shade  Lipids 50g --> 500 shade  CHO 180g --> 600 shade  Discussed with RD as well.      [facility]PHR[facility]  Anibal Clifton M.D.  Dylan Vieyra D.O.  Debbi Suarez M.D.  Sharmila Villarreal, CHARLI, ANP-C    Telephone: [phone]  Facsimile: [phone]    [address] [place] [address]

## 2022-09-22 NOTE — CONSULT NOTE ADULT - SUBJECTIVE AND OBJECTIVE BOX
Full note to follow. [facility] NEPHROLOGY- METABOLIC SUPPORT SERVICE CONSULTATION NOTE    Patient is a 62y Female who presented to the hospital with abdominal pain and was found to have an incarcerated hernia.  She is s/p exploratory laparotomy, Small Bowel Resection, and hernia repair .  She had large volume feculent drainage from MIL and was found to have Focal Peritonitis / intraabdominal collection - secondary to an anastomotic leak.  She has been NPO since  (9 days) and is planned for further NPO.  Surgical plan is to give further time for Abx to work, then for further surgical intervention and possibly further NPO afterward.      PAST MEDICAL & SURGICAL HISTORY:  Diverticulosis      Abdominal hernia      Breast cancer  Bilateral, s/p RT.      Diverticulitis      Morbid obesity      History of appendectomy      S/P MARIELLA (total abdominal hysterectomy)  , due to &quot;infection&quot;      S/P laparoscopic cholecystectomy        History of tonsillectomy      History of lumpectomy of both breasts  3/2020      Colostomy present      History of partial colectomy  2020      H/O hernia repair  10/2020        No Known Drug Allergies  peanuts (Anaphylaxis)    Home Medications Reviewed  Hospital Medications:   MEDICATIONS  (STANDING):  dextrose 5% + sodium chloride 0.9%. 1000 milliLiter(s) (150 mL/Hr) IV Continuous <Continuous>  enoxaparin Injectable 40 milliGRAM(s) SubCutaneous every 24 hours  fat emulsion (Fish Oil and Plant Based) 20% Infusion 0.192 Gm/kG/Day (10.4 mL/Hr) IV Continuous <Continuous>  insulin lispro (ADMELOG) corrective regimen sliding scale   SubCutaneous three times a day with meals  pantoprazole  Injectable 40 milliGRAM(s) IV Push daily  Parenteral Nutrition - Adult 1 Each (42 mL/Hr) TPN Continuous <Continuous>  piperacillin/tazobactam IVPB.. 3.375 Gram(s) IV Intermittent every 8 hours  sodium chloride 0.9% Bolus 1000 milliLiter(s) IV Bolus once    SOCIAL HISTORY:  Denies ETOh,Smoking,   FAMILY HISTORY:  FH: hypertension      REVIEW OF SYSTEMS:  CONSTITUTIONAL: No weakness, fevers or chills  EYES/ENT: No visual changes;  No vertigo or throat pain   NECK: No pain or stiffness  RESPIRATORY: No cough, wheezing, hemoptysis; No shortness of breath  CARDIOVASCULAR: No chest pain or palpitations.  GASTROINTESTINAL: No abdominal or epigastric pain. No nausea, vomiting, or hematemesis; No diarrhea or constipation. No melena or hematochezia.  GENITOURINARY: No dysuria, frequency, foamy urine, urinary urgency, incontinence or hematuria  NEUROLOGICAL: No numbness or weakness  SKIN: No itching, burning, rashes, or lesions   VASCULAR: No bilateral lower extremity edema.   All other review of systems is negative unless indicated above.    VITALS:  T(F): 98 (22 @ 21:04), Max: 98.7 (22 @ 14:20)  HR: 69 (22 @ 21:04)  BP: 115/69 (22 @ 21:04)  RR: 16 (22 21:04)  SpO2: 97% (22:04)  Wt(kg): --  Height (cm): 149.9 ( 16:08)  Weight (kg): 130.4 ( 23:00)  BMI (kg/m2): 58 ( 23:00)  BSA (m2): 2.15 ( 23:00)     @ 07:01  -   @ 07:00  --------------------------------------------------------  IN: 0 mL / OUT: 755 mL / NET: -755 mL     @ 07:01  -   @ 23:52  --------------------------------------------------------  IN: 0 mL / OUT: 650 mL / NET: -650 mL      PHYSICAL EXAM:  Constitutional: obese, appears fairly well-nourished  HEENT:, anicteric sclera, oropharynx clear, MMM  Neck: No JVD  Respiratory: CTAB, no wheezes, rales or rhonchi  Cardiovascular: S1, S2, RRR  Gastrointestinal: Dressing c/d/i.  MIL with feculent output via tube and around tube. Ostomy bag placed over MIL tubing.    Extremities: No cyanosis or clubbing. No peripheral edema  Neurological: A/O x 3, no focal deficits  Psychiatric: Normal mood, normal affect  : No CVA tenderness. No her.   Skin: No rashes  Musculoskeletal: no joint tenderness  Vascular Access: PICC benign, lumen saved for TPN    LABS:      141  |  105  |  10  ----------------------------<  113<H>  3.2<L>   |  29  |  0.81    Ca    8.1<L>      21 Sep 2022 05:38  Phos  2.7       Mg     2.7           Creatinine Trend: 0.81 <--, 0.78 <--, 1.41 <--, 1.44 <--, 1.14 <--, 0.72 <--, 0.67 <--                        11.9   15.34 )-----------( 305      ( 21 Sep 2022 06:02 )             37.4     Urine Studies:  Urinalysis Basic - ( 17 Sep 2022 21:12 )    Color: Latoya / Appearance: very cloudy / S.015 / pH:   Gluc:  / Ketone: Trace  / Bili: Moderate / Urobili: 8   Blood:  / Protein: 100 / Nitrite: Positive   Leuk Esterase: Small / RBC: >50 /HPF / WBC 11-25 /HPF   Sq Epi:  / Non Sq Epi: Few /HPF / Bacteria: Moderate /HPF      Creatinine, Random Urine: 291 mg/dL ( @ 21:12)  Sodium, Random Urine: <5 mmol/L ( @ 21:12)    RADIOLOGY & ADDITIONAL STUDIES:      < from: CT Abdomen and Pelvis w/ Oral Cont (22 @ 18:36) >    ACC: [id number] EXAM:  CT ABDOMEN AND PELVIS OC                          PROCEDURE DATE:  2022          INTERPRETATION:  CLINICAL INFORMATION: Evaluate for leak.    COMPARISON: CT 2022    CONTRAST/COMPLICATIONS:  IV Contrast: NONE  Oral Contrast: Gastroview  Complications: None reported at time of study completion    PROCEDURE:  CT of the Abdomen and Pelvis was performed.  Sagittal and coronal reformats were performed.    FINDINGS:  LOWER CHEST: Subsegmental atelectasis at the lung bases.    LIVER: Within normal limits.  BILE DUCTS: Normal caliber.  GALLBLADDER: Cholecystectomy.  SPLEEN: Within normal limits.  PANCREAS: Within normal limits.  ADRENALS: Within normal limits.  KIDNEYS/URETERS: Within normal limits.    BLADDER: Withinnormal limits.  REPRODUCTIVE ORGANS: Hysterectomy.    BOWEL/ABDOMINAL WALL: Evaluation of bowel is limited without IV contrast.   Small hiatal hernia. No bowel obstruction. Multiple wall-thickened   segments of small bowel. There is a feculent appearing collection of the   right lower quadrant abdominal wall at site of previously seen hernia,   measuring 9 x 5 cm. There is a drainage catheter within this collection.   Foci of gas appear to extend from small bowel towards the collection   (coronalseries 4, images 44-46). The collection is mixed density,   however sam spillage of oral contrast into the collection is not   definitively visualized. There is a midline ventral abdominal wall wound   with packing material.    PERITONEUM: Small volume ascites.  VESSELS: Within normal limits.  RETROPERITONEUM/LYMPH NODES: No lymphadenopathy.  BONES: Degenerative changes of the spine.    IMPRESSION:  1.  A 9 cm feculent-appearing collection of the right lower quadrant   abdominal wall is suspected to be due to small bowel leak or fistula.  2.  Multiple wall-thickened loops of small bowel consistent with   enteritis. No bowel obstruction.  3.  Small volume free fluid.      --- End of Report ---             JONAS VEGA DO; Attending Radiologist  This document has been electronically signed. Sep 20 2022  8:51PM    < end of copied text >

## 2022-09-22 NOTE — PROCEDURE NOTE - ADDITIONAL PROCEDURE DETAILS
42 cm  4Fr  PICC inserted via the left basilic  vein.  Sterile dressing applied.  Tip location SVC/ RA Junction. 42 cm  4Fr  PICC inserted via the left basilic  vein.  Sterile dressing applied.  Tip location SVC/ RA Junction.  Single lumen PICC placed for TPN administration only.

## 2022-09-22 NOTE — PROGRESS NOTE ADULT - ASSESSMENT
62F s/p exploratory laparotomy, Small Bowel Resection, and hernia repair 9/16  Large volume Feculent drainage from MIL    pt refused AM labs    - continue NPO with sips and chips  - TPN recommended, however pt requesting no procedures be done today. Will hold off on PICC line placement as per patients request.   - possible surgical intervention vs. bowel rest, will continue to observe  - continue antibiotics  - continue MIL and monitor output  - IVF D5NS at 150  - strict I&Os

## 2022-09-22 NOTE — CHART NOTE - NSCHARTNOTEFT_GEN_A_CORE
Assessment: 62F s/p exploratory laparotomy, Small Bowel Resection, and hernia repair . large volume feculent drainage from MIL. Pt NPO (w/ clear liquids) Day #9. Pt s/p PICC line placement today with TPN/ lipids ordered 1/2 strength to start this PM.     Factors impacting intake: [ ] none [ ] nausea  [ ] vomiting [ ] diarrhea [ ] constipation  [ ]chewing problems [ ] swallowing issues  [x ] other: altered GI fx/ structure    Diet Prescription: Diet, NPO:   Except Medications  With Ice Chips/Sips of Water (22 @ 09:36)        Daily     Daily Weight in k.7 (17 Sep 2022 07:00)        Pertinent Medications: MEDICATIONS  (STANDING):  dextrose 5% + sodium chloride 0.9%. 1000 milliLiter(s) (150 mL/Hr) IV Continuous <Continuous>  enoxaparin Injectable 40 milliGRAM(s) SubCutaneous every 24 hours  fat emulsion (Fish Oil and Plant Based) 20% Infusion 0.192 Gm/kG/Day (10.4 mL/Hr) IV Continuous <Continuous>  insulin lispro (ADMELOG) corrective regimen sliding scale   SubCutaneous three times a day with meals  pantoprazole  Injectable 40 milliGRAM(s) IV Push daily  Parenteral Nutrition - Adult 1 Each (42 mL/Hr) TPN Continuous <Continuous>  piperacillin/tazobactam IVPB.. 3.375 Gram(s) IV Intermittent every 8 hours  sodium chloride 0.9% Bolus 1000 milliLiter(s) IV Bolus once    MEDICATIONS  (PRN):  HYDROmorphone  Injectable 0.5 milliGRAM(s) IV Push every 4 hours PRN Moderate Pain (4 - 6)  ondansetron Injectable 4 milliGRAM(s) IV Push every 6 hours PRN Nausea  sodium chloride 0.9% lock flush 10 milliLiter(s) IV Push every 1 hour PRN Pre/post blood products, medications, blood draw, and to maintain line patency    Pertinent Labs:  Na141 mmol/L Glu 113 mg/dL<H> K+ 3.2 mmol/L<L> Cr  0.81 mg/dL BUN 10 mg/dL  Phos 2.7 mg/dL  Alb 1.8 g/dL<L>    Of note: pt refused AM labs, noted.    POCT Blood Glucose.: 100 mg/dL (22 Sep 2022 13:54)  POCT Blood Glucose.: 118 mg/dL (22 Sep 2022 09:32)  POCT Blood Glucose.: 143 mg/dL (22 Sep 2022 00:01)  POCT Blood Glucose.: 127 mg/dL (21 Sep 2022 18:12)        Estimated Needs:   [ ] no change since previous assessment  [x ] recalculated: For TPN (initial goal): see orders ~ 1500 kcals, 100 gm protein.      Previous Nutrition Diagnosis:   [ ] Altered GI function  [x ]Inadequate Oral Intake [ ] Swallowing Difficulty   [ ] Altered nutrition related labs [ ] Increased Nutrient Needs [ ] Overweight/Obesity   [ ] Unintended Weight Loss [ ] Food & Nutrition Related Knowledge Deficit [ ] Malnutrition   [ ] Other:       Interventions:   Recommend  [ ] Change Diet To:  [ ] Nutrition Supplement  [x ] Nutrition Support: To start TPN/ lipids (1/2 dose) this PM. MD to monitor. RD available.   [x ] Other: Daily wts, I&Os, +triglyceride level monitoring    Monitoring and Evaluation:    [ x ] Tolerance to diet prescription [ x ] weights [ x ] labs[ x ] follow up per protocol  [ ] other:

## 2022-09-22 NOTE — PROGRESS NOTE ADULT - ASSESSMENT
Incarcerated Hernia - s/p sb resection repair of strangulated incisional hernia  Focal Peritonitis / intraabdominal collection - secondary to an anastomotic leak  Leukocytosis     Plan:  ·	cont Zosyn 3.375 gms iv q8hrs D6  ·	received PICC so will begin TPN

## 2022-09-22 NOTE — PROGRESS NOTE ADULT - SUBJECTIVE AND OBJECTIVE BOX
62y Female is under our care for incarcerated hernia (s/p sb resection repair of strangulated incisional hernia) with SB leak. Pt just underwent PICC line insertion for TPN use. She is doing well overall, but is disappointed of her complication. Also s.o fatigue.        MEDS:  piperacillin/tazobactam IVPB.. 3.375 Gram(s) IV Intermittent every 8 hours    ALLERGIES: Allergies    No Known Drug Allergies  peanuts (Anaphylaxis)    Intolerances    REVIEW OF SYSTEMS:  [  ] Not able to illicit  General: no fevers no malaise +fatigue  Chest: no cough no sob  GI: no nvd +abd pain with exertion  : no urinary sxs   Skin: no rashes  Musculoskeletal: no trauma no LBP  Neuro: no ha's no dizziness     VITALS:  Vital Signs Last 24 Hrs  T(C): 37.1 (09-22-22 @ 14:20), Max: 37.3 (09-21-22 @ 20:31)  T(F): 98.7 (09-22-22 @ 14:20), Max: 99.1 (09-21-22 @ 20:31)  HR: 66 (09-22-22 @ 14:20) (64 - 67)  BP: 114/66 (09-22-22 @ 14:20) (114/66 - 144/48)  BP(mean): 70 (09-21-22 @ 20:31) (70 - 70)  RR: 16 (09-22-22 @ 14:20) (16 - 18)  SpO2: 98% (09-22-22 @ 14:20) (96% - 98%)    PHYSICAL EXAM:  HEENT: n/a  Neck: supple no LN's  Respiratory: lungs mostly clear no rales  Cardiovascular: S1 S2 reg no murmurs  Gastrointestinal: +BS with soft, large abdominal pannus; mild incisional tenderness +RUQ wound with ostomy bag draining feculent fluid  Extremities: no edema, +left arm PICC line  Skin: no rashes  Ortho: n/a  Neuro: awake and alert      LABS/DIAGNOSTIC TESTS:    No new labs - patient refused                         11.9   15.34 )-----------( 305      ( 21 Sep 2022 06:02 )             37.4            CULTURES:   Catheterized Catheterized  09-18 @ 11:06   No growth  --  --      .Blood Blood-Venous  09-17 @ 09:55   No growth to date.  --  --        RADIOLOGY:  no new studies 62y Female is under our care for incarcerated hernia (s/p sb resection repair of strangulated incisional hernia) with SB leak. Pt just underwent PICC line insertion for TPN use. She is doing well overall, but is disappointed of her complication. Also c/o fatigue.        MEDS:  piperacillin/tazobactam IVPB.. 3.375 Gram(s) IV Intermittent every 8 hours    ALLERGIES: Allergies    No Known Drug Allergies  peanuts (Anaphylaxis)    Intolerances    REVIEW OF SYSTEMS:  [  ] Not able to illicit  General: no fevers no malaise +fatigue  Chest: no cough no sob  GI: no nvd +abd pain with exertion  : no urinary sxs   Skin: no rashes  Musculoskeletal: no trauma no LBP  Neuro: no ha's no dizziness     VITALS:  Vital Signs Last 24 Hrs  T(C): 37.1 (09-22-22 @ 14:20), Max: 37.3 (09-21-22 @ 20:31)  T(F): 98.7 (09-22-22 @ 14:20), Max: 99.1 (09-21-22 @ 20:31)  HR: 66 (09-22-22 @ 14:20) (64 - 67)  BP: 114/66 (09-22-22 @ 14:20) (114/66 - 144/48)  BP(mean): 70 (09-21-22 @ 20:31) (70 - 70)  RR: 16 (09-22-22 @ 14:20) (16 - 18)  SpO2: 98% (09-22-22 @ 14:20) (96% - 98%)    PHYSICAL EXAM:  HEENT: n/a  Neck: supple no LN's  Respiratory: lungs mostly clear no rales  Cardiovascular: S1 S2 reg no murmurs  Gastrointestinal: +BS with soft, large abdominal pannus; mild incisional tenderness +RUQ wound with ostomy bag draining feculent fluid  Extremities: no edema, +left arm PICC line  Skin: no rashes  Ortho: n/a  Neuro: awake and alert      LABS/DIAGNOSTIC TESTS:    No new labs - patient refused                         11.9   15.34 )-----------( 305      ( 21 Sep 2022 06:02 )             37.4            CULTURES:   Catheterized Catheterized  09-18 @ 11:06   No growth  --  --      .Blood Blood-Venous  09-17 @ 09:55   No growth to date.  --  --        RADIOLOGY:  no new studies

## 2022-09-22 NOTE — PROGRESS NOTE ADULT - SUBJECTIVE AND OBJECTIVE BOX
INTERVAL HPI/OVERNIGHT EVENTS:  Pt resting comfortably. No acute complaints.   MIL with high output   +flatus/BM  Denied nausea/vomiting    MEDICATIONS  (STANDING):  chlorhexidine 2% Cloths 1 Application(s) Topical <User Schedule>  dextrose 5% + sodium chloride 0.9%. 1000 milliLiter(s) (150 mL/Hr) IV Continuous <Continuous>  enoxaparin Injectable 40 milliGRAM(s) SubCutaneous every 24 hours  insulin lispro (ADMELOG) corrective regimen sliding scale   SubCutaneous three times a day with meals  pantoprazole  Injectable 40 milliGRAM(s) IV Push daily  piperacillin/tazobactam IVPB.. 3.375 Gram(s) IV Intermittent every 8 hours    MEDICATIONS  (PRN):  HYDROmorphone  Injectable 0.5 milliGRAM(s) IV Push every 4 hours PRN Moderate Pain (4 - 6)  ondansetron Injectable 4 milliGRAM(s) IV Push every 6 hours PRN Nausea    Vital Signs Last 24 Hrs  T(C): 36.4 (22 Sep 2022 05:15), Max: 37.3 (21 Sep 2022 20:31)  T(F): 97.5 (22 Sep 2022 05:15), Max: 99.1 (21 Sep 2022 20:31)  HR: 64 (22 Sep 2022 05:15) (64 - 69)  BP: 129/76 (22 Sep 2022 05:15) (129/76 - 144/48)  BP(mean): 70 (21 Sep 2022 20:31) (70 - 70)  RR: 18 (22 Sep 2022 05:15) (16 - 18)  SpO2: 96% (22 Sep 2022 05:15) (95% - 97%)    Parameters below as of 22 Sep 2022 05:15  Patient On (Oxygen Delivery Method): room air    Physical:  General: A&Ox3. NAD  Chest: respiration unlabored  Abdomen: Soft, nontender. Midline open wound with retention sutures in place. Wound base healthy, wet to dry dressing in place. MIL with feculent output via tube and around tube. Ostomy bag placed over MIL tubing.      I&O's Detail  21 Sep 2022 07:01  -  22 Sep 2022 07:00  --------------------------------------------------------  IN:  Total IN: 0 mL    OUT:    Bulb (mL): 755 mL  Total OUT: 755 mL    Total NET: -755 mL    LABS:                        11.9   15.34 )-----------( 305      ( 21 Sep 2022 06:02 )             37.4             09-21    141  |  105  |  10  ----------------------------<  113<H>  3.2<L>   |  29  |  0.81    Ca    8.1<L>      21 Sep 2022 05:38  Phos  2.7     09-21  Mg     2.7     09-21

## 2022-09-23 LAB
ALBUMIN SERPL ELPH-MCNC: 2 G/DL — LOW (ref 3.5–5)
ALP SERPL-CCNC: 105 U/L — SIGNIFICANT CHANGE UP (ref 40–120)
ALT FLD-CCNC: 206 U/L DA — HIGH (ref 10–60)
ANION GAP SERPL CALC-SCNC: 9 MMOL/L — SIGNIFICANT CHANGE UP (ref 5–17)
AST SERPL-CCNC: 128 U/L — HIGH (ref 10–40)
BASOPHILS # BLD AUTO: 0.04 K/UL — SIGNIFICANT CHANGE UP (ref 0–0.2)
BASOPHILS NFR BLD AUTO: 0.3 % — SIGNIFICANT CHANGE UP (ref 0–2)
BILIRUB SERPL-MCNC: 0.5 MG/DL — SIGNIFICANT CHANGE UP (ref 0.2–1.2)
BUN SERPL-MCNC: 7 MG/DL — SIGNIFICANT CHANGE UP (ref 7–18)
CALCIUM SERPL-MCNC: 8 MG/DL — LOW (ref 8.4–10.5)
CHLORIDE SERPL-SCNC: 108 MMOL/L — SIGNIFICANT CHANGE UP (ref 96–108)
CO2 SERPL-SCNC: 27 MMOL/L — SIGNIFICANT CHANGE UP (ref 22–31)
CREAT SERPL-MCNC: 0.76 MG/DL — SIGNIFICANT CHANGE UP (ref 0.5–1.3)
EGFR: 87 ML/MIN/1.73M2 — SIGNIFICANT CHANGE UP
EOSINOPHIL # BLD AUTO: 0.2 K/UL — SIGNIFICANT CHANGE UP (ref 0–0.5)
EOSINOPHIL NFR BLD AUTO: 1.7 % — SIGNIFICANT CHANGE UP (ref 0–6)
GLUCOSE BLDC GLUCOMTR-MCNC: 136 MG/DL — HIGH (ref 70–99)
GLUCOSE BLDC GLUCOMTR-MCNC: 85 MG/DL — SIGNIFICANT CHANGE UP (ref 70–99)
GLUCOSE SERPL-MCNC: 129 MG/DL — HIGH (ref 70–99)
HCT VFR BLD CALC: 34.7 % — SIGNIFICANT CHANGE UP (ref 34.5–45)
HGB BLD-MCNC: 11.2 G/DL — LOW (ref 11.5–15.5)
IMM GRANULOCYTES NFR BLD AUTO: 5.7 % — HIGH (ref 0–0.9)
LYMPHOCYTES # BLD AUTO: 1.2 K/UL — SIGNIFICANT CHANGE UP (ref 1–3.3)
LYMPHOCYTES # BLD AUTO: 10.3 % — LOW (ref 13–44)
MAGNESIUM SERPL-MCNC: 2.5 MG/DL — SIGNIFICANT CHANGE UP (ref 1.6–2.6)
MCHC RBC-ENTMCNC: 30.4 PG — SIGNIFICANT CHANGE UP (ref 27–34)
MCHC RBC-ENTMCNC: 32.3 GM/DL — SIGNIFICANT CHANGE UP (ref 32–36)
MCV RBC AUTO: 94 FL — SIGNIFICANT CHANGE UP (ref 80–100)
MONOCYTES # BLD AUTO: 0.7 K/UL — SIGNIFICANT CHANGE UP (ref 0–0.9)
MONOCYTES NFR BLD AUTO: 6 % — SIGNIFICANT CHANGE UP (ref 2–14)
NEUTROPHILS # BLD AUTO: 8.86 K/UL — HIGH (ref 1.8–7.4)
NEUTROPHILS NFR BLD AUTO: 76 % — SIGNIFICANT CHANGE UP (ref 43–77)
NRBC # BLD: 0 /100 WBCS — SIGNIFICANT CHANGE UP (ref 0–0)
PHOSPHATE SERPL-MCNC: 3.4 MG/DL — SIGNIFICANT CHANGE UP (ref 2.5–4.5)
PLATELET # BLD AUTO: 354 K/UL — SIGNIFICANT CHANGE UP (ref 150–400)
POTASSIUM SERPL-MCNC: 2.9 MMOL/L — CRITICAL LOW (ref 3.5–5.3)
POTASSIUM SERPL-SCNC: 2.9 MMOL/L — CRITICAL LOW (ref 3.5–5.3)
PREALB SERPL-MCNC: 12 MG/DL — LOW (ref 20–40)
PROT SERPL-MCNC: 6.4 G/DL — SIGNIFICANT CHANGE UP (ref 6–8.3)
RBC # BLD: 3.69 M/UL — LOW (ref 3.8–5.2)
RBC # FLD: 13.5 % — SIGNIFICANT CHANGE UP (ref 10.3–14.5)
SODIUM SERPL-SCNC: 144 MMOL/L — SIGNIFICANT CHANGE UP (ref 135–145)
TRIGL SERPL-MCNC: 199 MG/DL — HIGH
WBC # BLD: 11.66 K/UL — HIGH (ref 3.8–10.5)
WBC # FLD AUTO: 11.66 K/UL — HIGH (ref 3.8–10.5)

## 2022-09-23 RX ORDER — I.V. FAT EMULSION 20 G/100ML
0.19 EMULSION INTRAVENOUS
Qty: 25.04 | Refills: 0 | Status: DISCONTINUED | OUTPATIENT
Start: 2022-09-23 | End: 2022-09-23

## 2022-09-23 RX ORDER — OCTREOTIDE ACETATE 200 UG/ML
50 INJECTION, SOLUTION INTRAVENOUS; SUBCUTANEOUS
Refills: 0 | Status: COMPLETED | OUTPATIENT
Start: 2022-09-23 | End: 2022-09-26

## 2022-09-23 RX ORDER — ELECTROLYTE SOLUTION,INJ
1 VIAL (ML) INTRAVENOUS
Refills: 0 | Status: DISCONTINUED | OUTPATIENT
Start: 2022-09-23 | End: 2022-09-23

## 2022-09-23 RX ORDER — POTASSIUM CHLORIDE 20 MEQ
10 PACKET (EA) ORAL
Refills: 0 | Status: DISCONTINUED | OUTPATIENT
Start: 2022-09-23 | End: 2022-09-30

## 2022-09-23 RX ADMIN — OCTREOTIDE ACETATE 50 MICROGRAM(S): 200 INJECTION, SOLUTION INTRAVENOUS; SUBCUTANEOUS at 19:56

## 2022-09-23 RX ADMIN — PIPERACILLIN AND TAZOBACTAM 25 GRAM(S): 4; .5 INJECTION, POWDER, LYOPHILIZED, FOR SOLUTION INTRAVENOUS at 02:57

## 2022-09-23 RX ADMIN — SODIUM CHLORIDE 70 MILLILITER(S): 9 INJECTION, SOLUTION INTRAVENOUS at 18:45

## 2022-09-23 RX ADMIN — Medication 100 MILLIEQUIVALENT(S): at 18:35

## 2022-09-23 RX ADMIN — Medication 100 MILLIEQUIVALENT(S): at 09:56

## 2022-09-23 RX ADMIN — PIPERACILLIN AND TAZOBACTAM 25 GRAM(S): 4; .5 INJECTION, POWDER, LYOPHILIZED, FOR SOLUTION INTRAVENOUS at 14:19

## 2022-09-23 RX ADMIN — PIPERACILLIN AND TAZOBACTAM 25 GRAM(S): 4; .5 INJECTION, POWDER, LYOPHILIZED, FOR SOLUTION INTRAVENOUS at 21:28

## 2022-09-23 RX ADMIN — ENOXAPARIN SODIUM 40 MILLIGRAM(S): 100 INJECTION SUBCUTANEOUS at 17:58

## 2022-09-23 RX ADMIN — PANTOPRAZOLE SODIUM 40 MILLIGRAM(S): 20 TABLET, DELAYED RELEASE ORAL at 14:20

## 2022-09-23 RX ADMIN — Medication 1 EACH: at 22:01

## 2022-09-23 NOTE — DIETITIAN NUTRITION RISK NOTIFICATION - TREATMENT: THE FOLLOWING DIET HAS BEEN RECOMMENDED
Diet, Clear Liquid:   Consistent Carbohydrate {No Snacks} (09-19-22 @ 09:37) [Active]      
Diet, NPO:   Except Medications  With Ice Chips/Sips of Water (09-20-22 @ 09:36) [Active]      fat emulsion (Fish Oil and Plant Based) 20% Infusion 0.192 Gm/kG/Day (10.4 mL/Hr) IV Continuous <Continuous>, 09-23-22 @ 05:00, , Stop order after: 12 Hours  Parenteral Nutrition - Adult 1 Each (42 mL/Hr) TPN Continuous <Continuous>, 09-22-22 @ 17:00, 17:00, Stop order after: 1 Days  Parenteral Nutrition - Adult 1 Each (83 mL/Hr) TPN Continuous <Continuous>, 09-23-22 @ 22:00, 22:00, Stop order after: 1 Days

## 2022-09-23 NOTE — PROGRESS NOTE ADULT - SUBJECTIVE AND OBJECTIVE BOX
INTERVAL HPI/OVERNIGHT EVENTS:    No acute events overnight.   Pt resting comfortably  denies f/c/n/v  +BM        MEDICATIONS  (STANDING):  chlorhexidine 2% Cloths 1 Application(s) Topical daily  dextrose 5% + sodium chloride 0.9%. 1000 milliLiter(s) (150 mL/Hr) IV Continuous <Continuous>  enoxaparin Injectable 40 milliGRAM(s) SubCutaneous every 24 hours  fat emulsion (Fish Oil and Plant Based) 20% Infusion 0.192 Gm/kG/Day (10.4 mL/Hr) IV Continuous <Continuous>  insulin lispro (ADMELOG) corrective regimen sliding scale   SubCutaneous three times a day with meals  pantoprazole  Injectable 40 milliGRAM(s) IV Push daily  Parenteral Nutrition - Adult 1 Each (42 mL/Hr) TPN Continuous <Continuous>  piperacillin/tazobactam IVPB.. 3.375 Gram(s) IV Intermittent every 8 hours  sodium chloride 0.9% Bolus 1000 milliLiter(s) IV Bolus once    MEDICATIONS  (PRN):  HYDROmorphone  Injectable 0.5 milliGRAM(s) IV Push every 4 hours PRN Moderate Pain (4 - 6)  ketorolac   Injectable 30 milliGRAM(s) IV Push every 6 hours PRN Moderate Pain (4 - 6)  ondansetron Injectable 4 milliGRAM(s) IV Push every 6 hours PRN Nausea  sodium chloride 0.9% lock flush 10 milliLiter(s) IV Push every 1 hour PRN Pre/post blood products, medications, blood draw, and to maintain line patency      Vital Signs Last 24 Hrs  T(C): 36.4 (23 Sep 2022 05:15), Max: 37.1 (22 Sep 2022 14:20)  T(F): 97.5 (23 Sep 2022 05:15), Max: 98.7 (22 Sep 2022 14:20)  HR: 63 (23 Sep 2022 05:15) (63 - 69)  BP: 121/71 (23 Sep 2022 05:15) (114/66 - 121/71)  BP(mean): --  RR: 18 (23 Sep 2022 05:15) (16 - 18)  SpO2: 95% (23 Sep 2022 05:15) (95% - 98%)    Parameters below as of 22 Sep 2022 21:04  Patient On (Oxygen Delivery Method): room air          PHYSICAL EXAM  General: Alert and oriented, not in acute distress  Resp: Breathing unlabored  Abdomen: Soft, nondistended, nontender, MIL feculent w air in the bag, midline well granulating  : No her catheter, no dysuria or hematuria  Extremities: No pedal edema      I&O's Detail    22 Sep 2022 07:01  -  23 Sep 2022 07:00  --------------------------------------------------------  IN:    dextrose 5% + sodium chloride 0.9%: 1050 mL    Fat Emulsion (Fish Oil &amp; Plant Based) 20% Infusion: 104 mL    TPN (Total Parenteral Nutrition): 420 mL  Total IN: 1574 mL    OUT:    Bulb (mL): 800 mL  Total OUT: 800 mL    Total NET: 774 mL          LABS:                        11.2   11.66 )-----------( 354      ( 23 Sep 2022 07:10 )             34.7             09-23    144  |  108  |  7   ----------------------------<  129<H>  2.9<LL>   |  27  |  0.76    Ca    8.0<L>      23 Sep 2022 07:10  Phos  3.4     09-23  Mg     2.5     09-23    TPro  6.4  /  Alb  2.0<L>  /  TBili  0.5  /  DBili  x   /  AST  128<H>  /  ALT  206<H>  /  AlkPhos  105  09-23

## 2022-09-23 NOTE — PROGRESS NOTE ADULT - SUBJECTIVE AND OBJECTIVE BOX
62y Female    Meds:  piperacillin/tazobactam IVPB.. 3.375 Gram(s) IV Intermittent every 8 hours    Allergies    No Known Drug Allergies  peanuts (Anaphylaxis)    Intolerances        VITALS:  Vital Signs Last 24 Hrs  T(C): 36.7 (23 Sep 2022 14:39), Max: 36.7 (22 Sep 2022 21:04)  T(F): 98 (23 Sep 2022 14:39), Max: 98 (22 Sep 2022 21:04)  HR: 72 (23 Sep 2022 14:39) (63 - 72)  BP: 122/74 (23 Sep 2022 14:39) (115/69 - 122/74)  BP(mean): --  RR: 16 (23 Sep 2022 14:39) (16 - 18)  SpO2: 98% (23 Sep 2022 14:39) (95% - 98%)    Parameters below as of 23 Sep 2022 14:39  Patient On (Oxygen Delivery Method): room air        LABS/DIAGNOSTIC TESTS:                          11.2   11.66 )-----------( 354      ( 23 Sep 2022 07:10 )             34.7         09-23    144  |  108  |  7   ----------------------------<  129<H>  2.9<LL>   |  27  |  0.76    Ca    8.0<L>      23 Sep 2022 07:10  Phos  3.4     09-23  Mg     2.5     09-23    TPro  6.4  /  Alb  2.0<L>  /  TBili  0.5  /  DBili  x   /  AST  128<H>  /  ALT  206<H>  /  AlkPhos  105  09-23      LIVER FUNCTIONS - ( 23 Sep 2022 07:10 )  Alb: 2.0 g/dL / Pro: 6.4 g/dL / ALK PHOS: 105 U/L / ALT: 206 U/L DA / AST: 128 U/L / GGT: x             CULTURES: Catheterized Catheterized  09-18 @ 11:06   No growth  --  --      .Blood Blood-Venous  09-17 @ 09:55   No Growth Final  --  --            RADIOLOGY:      ROS:  [  ] UNABLE TO ELICIT 62y Female who is in better spirits today and has comes to terms with her current situation, she has been started on TPN and remains NPO, she has a colostomy bag over her drainage tube site and has feculent material draining. She has mild abdominal pain only, no nausea , vomiting , she is passing a little flatus. She has no fevers or chills. She understands that she has likely developed a fistula and will have to live with it for a few months before any other surgery can be attempted to correct the problem. Her WBC count has decreased.    Meds:  piperacillin/tazobactam IVPB.. 3.375 Gram(s) IV Intermittent every 8 hours    Allergies    No Known Drug Allergies  peanuts (Anaphylaxis)    Intolerances        VITALS:  Vital Signs Last 24 Hrs  T(C): 36.7 (23 Sep 2022 14:39), Max: 36.7 (22 Sep 2022 21:04)  T(F): 98 (23 Sep 2022 14:39), Max: 98 (22 Sep 2022 21:04)  HR: 72 (23 Sep 2022 14:39) (63 - 72)  BP: 122/74 (23 Sep 2022 14:39) (115/69 - 122/74)  BP(mean): --  RR: 16 (23 Sep 2022 14:39) (16 - 18)  SpO2: 98% (23 Sep 2022 14:39) (95% - 98%)    Parameters below as of 23 Sep 2022 14:39  Patient On (Oxygen Delivery Method): room air        LABS/DIAGNOSTIC TESTS:                          11.2   11.66 )-----------( 354      ( 23 Sep 2022 07:10 )             34.7         09-23    144  |  108  |  7   ----------------------------<  129<H>  2.9<LL>   |  27  |  0.76    Ca    8.0<L>      23 Sep 2022 07:10  Phos  3.4     09-23  Mg     2.5     09-23    TPro  6.4  /  Alb  2.0<L>  /  TBili  0.5  /  DBili  x   /  AST  128<H>  /  ALT  206<H>  /  AlkPhos  105  09-23      LIVER FUNCTIONS - ( 23 Sep 2022 07:10 )  Alb: 2.0 g/dL / Pro: 6.4 g/dL / ALK PHOS: 105 U/L / ALT: 206 U/L DA / AST: 128 U/L / GGT: x             CULTURES: Catheterized Catheterized  09-18 @ 11:06   No growth  --  --      .Blood Blood-Venous  09-17 @ 09:55   No Growth Final  --  --            RADIOLOGY:      ROS:  [  ] UNABLE TO ELICIT

## 2022-09-23 NOTE — CHART NOTE - NSCHARTNOTEFT_GEN_A_CORE
Assessment:  62F s/p exploratory laparotomy, Small Bowel Resection, and hernia repair . large volume feculent drainage from MIL. Pt NPO (w/ clear liquids) Day #10.. Pt s/p PICC line placement  with TPN/ lipids ordered 1/2 strength to start. Pt seen in AM, TPN/ lipids infusing per order. Pt declined NFPE. Discussed with Sloane LYON (AM). Discussed with ANDREY MD AM (TPN ordered "full dose" with lipids to continue !/2 dose for now (over 12 hrs, as ordered) as MD concern re: TG level.     Factors impacting intake: [ ] none [ ] nausea  [ ] vomiting [ ] diarrhea [ ] constipation  [ ]chewing problems [ ] swallowing issues  [x ] other: altered GI fx/ structure     Diet Prescription: Diet, NPO:   Except Medications  With Ice Chips/Sips of Water (22 @ 09:36)      Daily     Daily Weight in k.2 (23 Sep 2022 08:54)  Weight in k (22 Sep 2022 16:45)  Weight in k.7 (17 Sep 2022 07:00)    % Weight Change: 4% <1 week    Pertinent Medications: MEDICATIONS  (STANDING):  chlorhexidine 2% Cloths 1 Application(s) Topical daily  dextrose 5% + sodium chloride 0.9%. 1000 milliLiter(s) (70 mL/Hr) IV Continuous <Continuous>  enoxaparin Injectable 40 milliGRAM(s) SubCutaneous every 24 hours  fat emulsion (Fish Oil and Plant Based) 20% Infusion 0.192 Gm/kG/Day (10.4 mL/Hr) IV Continuous <Continuous>  insulin lispro (ADMELOG) corrective regimen sliding scale   SubCutaneous three times a day with meals  octreotide  Injectable 50 MICROGram(s) IV Push two times a day  pantoprazole  Injectable 40 milliGRAM(s) IV Push daily  Parenteral Nutrition - Adult 1 Each (42 mL/Hr) TPN Continuous <Continuous>  Parenteral Nutrition - Adult 1 Each (83 mL/Hr) TPN Continuous <Continuous>  piperacillin/tazobactam IVPB.. 3.375 Gram(s) IV Intermittent every 8 hours  potassium chloride  10 mEq/100 mL IVPB 10 milliEquivalent(s) IV Intermittent every 1 hour    MEDICATIONS  (PRN):  HYDROmorphone  Injectable 0.5 milliGRAM(s) IV Push every 4 hours PRN Moderate Pain (4 - 6)  ketorolac   Injectable 30 milliGRAM(s) IV Push every 6 hours PRN Moderate Pain (4 - 6)  ondansetron Injectable 4 milliGRAM(s) IV Push every 6 hours PRN Nausea  sodium chloride 0.9% lock flush 10 milliLiter(s) IV Push every 1 hour PRN Pre/post blood products, medications, blood draw, and to maintain line patency    Pertinent Labs:  Na144 mmol/L Glu 129 mg/dL<H> K+ 2.9 mmol/L<LL> Cr  0.76 mg/dL BUN 7 mg/dL  Phos 3.4 mg/dL  Alb 2.0 g/dL<L>  PAB 12 mg/dL<L>  Chol --    LDL --    HDL --    Trig 199 mg/dL<H>     CAPILLARY BLOOD GLUCOSE      POCT Blood Glucose.: 85 mg/dL (23 Sep 2022 16:39)  POCT Blood Glucose.: 136 mg/dL (23 Sep 2022 06:02)        Estimated Needs:   [ ] no change since previous assessment  [x ] recalculated: see note       Previous Nutrition Diagnosis:   [ ] Altered GI function  [x ]Inadequate Oral Intake [ ] Swallowing Difficulty   [ ] Altered nutrition related labs [ ] Increased Nutrient Needs [ ] Overweight/Obesity   [ ] Unintended Weight Loss [ ] Food & Nutrition Related Knowledge Deficit [ ] Malnutrition   [ ] Other:       New Nutrition Diagnosis: [x ] PCM (severe) related to altered GI fx/ structure as evidenced by <50% needs met> 5 days, >2%/ 1 week    Interventions:   Recommend  [ ] Change Diet To:  [ ] Nutrition Supplement  [x ] Nutrition Support: PN orders per ANDREY MD  [x ] Other: MD to monitor. RD available.     Monitoring and Evaluation:    [ x ] Tolerance to diet prescription [ x ] weights [ x ] labs[ x ] follow up per protocol  [ ] other:

## 2022-09-23 NOTE — PROGRESS NOTE ADULT - ASSESSMENT
62 yoF s/p exploratory laparotomy, small Bowel Resection, and hernia repair 9/16    afeb, vss  hypokalemia 2.9  MIL site reinforced with ostomy bag, feculent drainage with air  TPN via PICC      - continue NPO with sips and chips  - TPN   - replete K  - consider somatostatin  - possible surgical intervention vs. bowel rest, will continue to observe  - continue antibiotics  - continue MIL and monitor output  - strict I&Os  - dvt/gi ppx

## 2022-09-23 NOTE — PROGRESS NOTE ADULT - ASSESSMENT
Incarcerated Hernia - s/p sb resection repair of strangulated incisional hernia  Focal Peritonitis / intraabdominal collection - secondary to an anastomotic leak  Leukocytosis - decreased    Plan:  ·	cont Zosyn 3.375 gms iv q8hrs D7  ·	Cont  TPN

## 2022-09-23 NOTE — PROGRESS NOTE ADULT - GASTROINTESTINAL
soft/nondistended/normal active bowel sounds/no guarding/no organomegaly/no palpable andrade/tender details…

## 2022-09-23 NOTE — PROGRESS NOTE ADULT - ASSESSMENT
1. Anastomotic leak with focal peritonitis resulting in prolonged NPO with pt at risk for PCM.  TPN stated today, stopped as described above.  Will plan for full dose TPN starting tonight, though with Lipids only at 25g rather than 50g due to hypertriglyceridemia.  Calculations below.    2. Dehydration- with contraction alkalosis. Continue IVF for now, but will stop once TPN starts tonight.  Increase TPN volume to 2L/24h and consider increasing further.      3. Hypokalemia- replete K+ IV today.  A substantial amount of K+ will be added to TPN today as well.    4. Focal Peritonitis- Not systemic.  Okay to give TPN.  Abx choice per ID.  F/u closely with ID.    TPN calculations:  Shade 1500  AA 100g --> 400 shade  Lipids 50g --> 500 shade  CHO 180g --> 600 shade  Discussed with RD as well.      [facility] NEPHROLOGY  Anibal Clifton M.D.  Dylan Vieyra D.O.  Debbi Suarez M.D.  Sharmila Villarreal, CHARLI, ANP-C    Telephone: [phone]  Facsimile: [phone]    [address] [place] [address]

## 2022-09-23 NOTE — PROGRESS NOTE ADULT - SUBJECTIVE AND OBJECTIVE BOX
Full note to follow. [facility] NEPHROLOGY- METABOLIC SUPPORT SERVICE PROGRESS NOTE    Patient is a 62y Female who presented to the hospital with abdominal pain and was found to have an incarcerated hernia.  She is s/p exploratory laparotomy, Small Bowel Resection, and hernia repair .  She had large volume feculent drainage from MIL and was found to have Focal Peritonitis / intraabdominal collection - secondary to an anastomotic leak.  She has been NPO since  (9 days) and is planned for further NPO.  Surgical plan is to give further time for Abx to work, then for further surgical intervention and possibly further NPO afterward.    Pt initially refused IV K+ supplementation and therefore TPN had to be stopped to prevent severe hypokalemia. Pt then agreed and IV K+ was given, but TPN had already been discarded.        REVIEW OF SYSTEMS: no cp, sob, dysuria, swelling. + abdominal discomfort    VITALS:  T(F): 98.4 (22 @ 21:05), Max: 98.4 (22 @ 21:05)  HR: 67 (22 @ 21:05)  BP: 138/53 (22 @ 21:05)  RR: 17 (22 @ 21:05)  SpO2: 96% (22 @ 21:05)  Wt(kg): --     @ :  -   @ 07:00  --------------------------------------------------------  IN: 1574 mL / OUT: 800 mL / NET: 774 mL     @ 07:  -   @ 00:00  --------------------------------------------------------  IN: 0 mL / OUT: 800 mL / NET: -800 mL      PHYSICAL EXAM:  Constitutional: obese, appears fairly well-nourished  HEENT:, anicteric sclera, oropharynx clear, MMM  Neck: No JVD  Respiratory: CTAB, no wheezes, rales or rhonchi  Cardiovascular: S1, S2, RRR  Gastrointestinal: Dressing c/d/i.  MIL with feculent output via tube and around tube. Ostomy bag placed over MIL tubing.    Extremities: No cyanosis or clubbing.  Trace LE edema today.  Neurological: A/O x 3, no focal deficits  Psychiatric: Normal mood, normal affect  : No CVA tenderness. No her.   Skin: No rashes  Musculoskeletal: no joint tenderness  Vascular Access: PICC benign, lumen saved for TPN    LABS:      144  |  108  |  7   ----------------------------<  129<H>  2.9<LL>   |  27  |  0.76    Ca    8.0<L>      23 Sep 2022 07:10  Phos  3.4       Mg     2.5         TPro  6.4  /  Alb  2.0<L>  /  TBili  0.5  /  DBili      /  AST  128<H>  /  ALT  206<H>  /  AlkPhos  105      Creatinine Trend: 0.76 <--, 0.81 <--, 0.78 <--, 1.41 <--, 1.44 <--, 1.14 <--                        11.2   11.66 )-----------( 354      ( 23 Sep 2022 07:10 )             34.7     Urine Studies:  Urinalysis Basic - ( 17 Sep 2022 21:12 )    Color: Latoya / Appearance: very cloudy / S.015 / pH:   Gluc:  / Ketone: Trace  / Bili: Moderate / Urobili: 8   Blood:  / Protein: 100 / Nitrite: Positive   Leuk Esterase: Small / RBC: >50 /HPF / WBC 11-25 /HPF   Sq Epi:  / Non Sq Epi: Few /HPF / Bacteria: Moderate /HPF      Creatinine, Random Urine: 291 mg/dL ( @ 21:12)  Sodium, Random Urine: <5 mmol/L ( @ 21:12)

## 2022-09-24 LAB
ALBUMIN SERPL ELPH-MCNC: 2.1 G/DL — LOW (ref 3.5–5)
ALP SERPL-CCNC: 106 U/L — SIGNIFICANT CHANGE UP (ref 40–120)
ALT FLD-CCNC: 271 U/L DA — HIGH (ref 10–60)
ANION GAP SERPL CALC-SCNC: 9 MMOL/L — SIGNIFICANT CHANGE UP (ref 5–17)
AST SERPL-CCNC: 143 U/L — HIGH (ref 10–40)
BASOPHILS # BLD AUTO: 0.03 K/UL — SIGNIFICANT CHANGE UP (ref 0–0.2)
BASOPHILS NFR BLD AUTO: 0.3 % — SIGNIFICANT CHANGE UP (ref 0–2)
BILIRUB SERPL-MCNC: 0.5 MG/DL — SIGNIFICANT CHANGE UP (ref 0.2–1.2)
BUN SERPL-MCNC: 10 MG/DL — SIGNIFICANT CHANGE UP (ref 7–18)
CA-I BLD-SCNC: 4.5 MG/DL — SIGNIFICANT CHANGE UP (ref 4.5–5.6)
CALCIUM SERPL-MCNC: 8 MG/DL — LOW (ref 8.4–10.5)
CHLORIDE SERPL-SCNC: 108 MMOL/L — SIGNIFICANT CHANGE UP (ref 96–108)
CO2 SERPL-SCNC: 27 MMOL/L — SIGNIFICANT CHANGE UP (ref 22–31)
CREAT SERPL-MCNC: 0.62 MG/DL — SIGNIFICANT CHANGE UP (ref 0.5–1.3)
EGFR: 99 ML/MIN/1.73M2 — SIGNIFICANT CHANGE UP
EOSINOPHIL # BLD AUTO: 0.16 K/UL — SIGNIFICANT CHANGE UP (ref 0–0.5)
EOSINOPHIL NFR BLD AUTO: 1.4 % — SIGNIFICANT CHANGE UP (ref 0–6)
GLUCOSE BLDC GLUCOMTR-MCNC: 120 MG/DL — HIGH (ref 70–99)
GLUCOSE BLDC GLUCOMTR-MCNC: 133 MG/DL — HIGH (ref 70–99)
GLUCOSE BLDC GLUCOMTR-MCNC: 141 MG/DL — HIGH (ref 70–99)
GLUCOSE BLDC GLUCOMTR-MCNC: 147 MG/DL — HIGH (ref 70–99)
GLUCOSE BLDC GLUCOMTR-MCNC: 158 MG/DL — HIGH (ref 70–99)
GLUCOSE SERPL-MCNC: 138 MG/DL — HIGH (ref 70–99)
HCT VFR BLD CALC: 34.7 % — SIGNIFICANT CHANGE UP (ref 34.5–45)
HGB BLD-MCNC: 11 G/DL — LOW (ref 11.5–15.5)
IMM GRANULOCYTES NFR BLD AUTO: 2.6 % — HIGH (ref 0–0.9)
LYMPHOCYTES # BLD AUTO: 1.18 K/UL — SIGNIFICANT CHANGE UP (ref 1–3.3)
LYMPHOCYTES # BLD AUTO: 10.6 % — LOW (ref 13–44)
MAGNESIUM SERPL-MCNC: 2.3 MG/DL — SIGNIFICANT CHANGE UP (ref 1.6–2.6)
MCHC RBC-ENTMCNC: 30 PG — SIGNIFICANT CHANGE UP (ref 27–34)
MCHC RBC-ENTMCNC: 31.7 GM/DL — LOW (ref 32–36)
MCV RBC AUTO: 94.6 FL — SIGNIFICANT CHANGE UP (ref 80–100)
MONOCYTES # BLD AUTO: 0.79 K/UL — SIGNIFICANT CHANGE UP (ref 0–0.9)
MONOCYTES NFR BLD AUTO: 7.1 % — SIGNIFICANT CHANGE UP (ref 2–14)
NEUTROPHILS # BLD AUTO: 8.66 K/UL — HIGH (ref 1.8–7.4)
NEUTROPHILS NFR BLD AUTO: 78 % — HIGH (ref 43–77)
NRBC # BLD: 0 /100 WBCS — SIGNIFICANT CHANGE UP (ref 0–0)
PHOSPHATE SERPL-MCNC: 2.6 MG/DL — SIGNIFICANT CHANGE UP (ref 2.5–4.5)
PLATELET # BLD AUTO: 383 K/UL — SIGNIFICANT CHANGE UP (ref 150–400)
POTASSIUM SERPL-MCNC: 3.4 MMOL/L — LOW (ref 3.5–5.3)
POTASSIUM SERPL-SCNC: 3.4 MMOL/L — LOW (ref 3.5–5.3)
PROT SERPL-MCNC: 6.5 G/DL — SIGNIFICANT CHANGE UP (ref 6–8.3)
RBC # BLD: 3.67 M/UL — LOW (ref 3.8–5.2)
RBC # FLD: 13.3 % — SIGNIFICANT CHANGE UP (ref 10.3–14.5)
SODIUM SERPL-SCNC: 144 MMOL/L — SIGNIFICANT CHANGE UP (ref 135–145)
WBC # BLD: 11.11 K/UL — HIGH (ref 3.8–10.5)
WBC # FLD AUTO: 11.11 K/UL — HIGH (ref 3.8–10.5)

## 2022-09-24 RX ORDER — ELECTROLYTE SOLUTION,INJ
1 VIAL (ML) INTRAVENOUS
Refills: 0 | Status: DISCONTINUED | OUTPATIENT
Start: 2022-09-24 | End: 2022-09-25

## 2022-09-24 RX ORDER — MEROPENEM 1 G/30ML
1000 INJECTION INTRAVENOUS EVERY 8 HOURS
Refills: 0 | Status: DISCONTINUED | OUTPATIENT
Start: 2022-09-24 | End: 2022-09-25

## 2022-09-24 RX ORDER — I.V. FAT EMULSION 20 G/100ML
0.19 EMULSION INTRAVENOUS
Qty: 25.04 | Refills: 0 | Status: DISCONTINUED | OUTPATIENT
Start: 2022-09-24 | End: 2022-09-24

## 2022-09-24 RX ORDER — PIPERACILLIN AND TAZOBACTAM 4; .5 G/20ML; G/20ML
3.38 INJECTION, POWDER, LYOPHILIZED, FOR SOLUTION INTRAVENOUS EVERY 8 HOURS
Refills: 0 | Status: DISCONTINUED | OUTPATIENT
Start: 2022-09-24 | End: 2022-09-24

## 2022-09-24 RX ORDER — POTASSIUM CHLORIDE 20 MEQ
40 PACKET (EA) ORAL EVERY 4 HOURS
Refills: 0 | Status: COMPLETED | OUTPATIENT
Start: 2022-09-24 | End: 2022-09-24

## 2022-09-24 RX ADMIN — OCTREOTIDE ACETATE 50 MICROGRAM(S): 200 INJECTION, SOLUTION INTRAVENOUS; SUBCUTANEOUS at 05:31

## 2022-09-24 RX ADMIN — ENOXAPARIN SODIUM 40 MILLIGRAM(S): 100 INJECTION SUBCUTANEOUS at 18:51

## 2022-09-24 RX ADMIN — Medication 83 EACH: at 22:01

## 2022-09-24 RX ADMIN — OCTREOTIDE ACETATE 50 MICROGRAM(S): 200 INJECTION, SOLUTION INTRAVENOUS; SUBCUTANEOUS at 18:51

## 2022-09-24 RX ADMIN — Medication 40 MILLIEQUIVALENT(S): at 21:49

## 2022-09-24 RX ADMIN — I.V. FAT EMULSION 10.4 GM/KG/DAY: 20 EMULSION INTRAVENOUS at 21:57

## 2022-09-24 RX ADMIN — Medication 40 MILLIEQUIVALENT(S): at 18:26

## 2022-09-24 RX ADMIN — MEROPENEM 100 MILLIGRAM(S): 1 INJECTION INTRAVENOUS at 18:26

## 2022-09-24 RX ADMIN — PANTOPRAZOLE SODIUM 40 MILLIGRAM(S): 20 TABLET, DELAYED RELEASE ORAL at 12:00

## 2022-09-24 NOTE — PROGRESS NOTE ADULT - SUBJECTIVE AND OBJECTIVE BOX
62y Female    Meds:    Allergies    No Known Drug Allergies  peanuts (Anaphylaxis)    Intolerances        VITALS:  Vital Signs Last 24 Hrs  T(C): 36.6 (24 Sep 2022 14:19), Max: 36.9 (23 Sep 2022 21:05)  T(F): 97.9 (24 Sep 2022 14:19), Max: 98.4 (23 Sep 2022 21:05)  HR: 56 (24 Sep 2022 14:19) (56 - 72)  BP: 140/64 (24 Sep 2022 14:19) (122/74 - 142/76)  BP(mean): 98 (24 Sep 2022 05:46) (98 - 98)  RR: 19 (24 Sep 2022 14:19) (16 - 19)  SpO2: 95% (24 Sep 2022 14:19) (95% - 98%)    Parameters below as of 24 Sep 2022 14:19  Patient On (Oxygen Delivery Method): room air        LABS/DIAGNOSTIC TESTS:                          11.0   11.11 )-----------( 383      ( 24 Sep 2022 11:59 )             34.7         09-24    144  |  108  |  10  ----------------------------<  138<H>  3.4<L>   |  27  |  0.62    Ca    8.0<L>      24 Sep 2022 11:59  Phos  2.6     09-24  Mg     2.3     09-24    TPro  6.5  /  Alb  2.1<L>  /  TBili  0.5  /  DBili  x   /  AST  143<H>  /  ALT  271<H>  /  AlkPhos  106  09-24      LIVER FUNCTIONS - ( 24 Sep 2022 11:59 )  Alb: 2.1 g/dL / Pro: 6.5 g/dL / ALK PHOS: 106 U/L / ALT: 271 U/L DA / AST: 143 U/L / GGT: x             CULTURES: Catheterized Catheterized  09-18 @ 11:06   No growth  --  --      .Blood Blood-Venous  09-17 @ 09:55   No Growth Final  --  --            RADIOLOGY:      ROS:  [  ] UNABLE TO ELICIT 62y Female who is doing better clinically and mentally, she has minimal abdominal pain at her incision site and is having a lot less output from her drain. She has no fevers or chills, she is having IV access issues and so will switch zosyn to meropenem to preserve her IV site.      Meds:    Allergies    No Known Drug Allergies  peanuts (Anaphylaxis)    Intolerances        VITALS:  Vital Signs Last 24 Hrs  T(C): 36.6 (24 Sep 2022 14:19), Max: 36.9 (23 Sep 2022 21:05)  T(F): 97.9 (24 Sep 2022 14:19), Max: 98.4 (23 Sep 2022 21:05)  HR: 56 (24 Sep 2022 14:19) (56 - 72)  BP: 140/64 (24 Sep 2022 14:19) (122/74 - 142/76)  BP(mean): 98 (24 Sep 2022 05:46) (98 - 98)  RR: 19 (24 Sep 2022 14:19) (16 - 19)  SpO2: 95% (24 Sep 2022 14:19) (95% - 98%)    Parameters below as of 24 Sep 2022 14:19  Patient On (Oxygen Delivery Method): room air        LABS/DIAGNOSTIC TESTS:                          11.0   11.11 )-----------( 383      ( 24 Sep 2022 11:59 )             34.7         09-24    144  |  108  |  10  ----------------------------<  138<H>  3.4<L>   |  27  |  0.62    Ca    8.0<L>      24 Sep 2022 11:59  Phos  2.6     09-24  Mg     2.3     09-24    TPro  6.5  /  Alb  2.1<L>  /  TBili  0.5  /  DBili  x   /  AST  143<H>  /  ALT  271<H>  /  AlkPhos  106  09-24      LIVER FUNCTIONS - ( 24 Sep 2022 11:59 )  Alb: 2.1 g/dL / Pro: 6.5 g/dL / ALK PHOS: 106 U/L / ALT: 271 U/L DA / AST: 143 U/L / GGT: x             CULTURES: Catheterized Catheterized  09-18 @ 11:06   No growth  --  --      .Blood Blood-Venous  09-17 @ 09:55   No Growth Final  --  --            RADIOLOGY:      ROS:  [  ] UNABLE TO ELICIT

## 2022-09-24 NOTE — PROGRESS NOTE ADULT - ASSESSMENT
1. Anastomotic leak with focal peritonitis resulting in prolonged NPO with pt at risk for PCM.  TPN stated today, stopped as described above.  Will plan for full dose TPN starting tonight, though with Lipids only at 25g rather than 50g due to hypertriglyceridemia.  Calculations below.    2. Dehydration- with contraction alkalosis. Continue IVF for now, but will stop once TPN starts tonight.  Increase TPN volume to 2L/24h and consider increasing further.      3. Hypokalemia- Labs were not available at time TPN had to be ordered today.  Will keep same dose, but likely will need to replete K+ IV today.  A substantial amount of K+ was added to TPN yesterday as well- will continue that dose today.    4. Focal Peritonitis- Not systemic.  Okay to give TPN.  Abx choice per ID.  F/u closely with ID.    TPN calculations:  Due to hypertriglyceridemia, will not increase lipids (for now continue at 25g, but will increase amino acids slowly  Shade 1500  AA 100g --> 400 shade   --------->  AA 120g  ---->480 shade and will titrate upward tomorrow as well  Lipids 50g --> 500 shade  -------> Lipids 25g ---> 250 shade  CHO 180g --> 600 shade  --------> CHO 180g --> 600 shade   Discussed with RD as well.      [facility] NEPHROLOGY  Anibal Clifton M.D.  Dylan Vieyra D.O.  Debbi Suarez M.D.  Sharmila Villarreal, MSN, ANP-C    Telephone: [phone]  Facsimile: [phone]    [address] [place] [address]

## 2022-09-24 NOTE — PROGRESS NOTE ADULT - ASSESSMENT
62F admitted with sbo secondary to incarcerated incisional hernia  pt with extensive surgical hx, multiple abdominal hernias   s/p ex lap with SBR and repair of ventral hernia 9/16  now with enterocutaneous fistula    -Continue NPO, TPN has been initiated  -Daily dressing change  -Monitor drain output  -IV antibiotics  -May require surgical intervention if patient clinically worsens  -OOB/Ambulation

## 2022-09-24 NOTE — PROGRESS NOTE ADULT - SUBJECTIVE AND OBJECTIVE BOX
[facility] NEPHROLOGY- METABOLIC SUPPORT SERVICE PROGRESS NOTE    Patient is a 62y Female who presented to the hospital with abdominal pain and was found to have an incarcerated hernia.  She is s/p exploratory laparotomy, Small Bowel Resection, and hernia repair .  She had large volume feculent drainage from MIL and was found to have Focal Peritonitis / intraabdominal collection - secondary to an anastomotic leak.  She has been NPO since  (9 days) and is planned for further NPO.  Surgical plan is to give further time for Abx to work, then for further surgical intervention and possibly further NPO afterward.    Pt initially refused IV K+ supplementation and therefore TPN had to be stopped to prevent severe hypokalemia. Pt then agreed and IV K+ was given, but TPN had already been discarded.        REVIEW OF SYSTEMS: no cp, sob, dysuria, swelling. + abdominal discomfort    VITALS:  T(F): 98.1 (22 @ 05:46), Max: 98.4 (22 @ 21:05)  HR: 63 (22 @ 05:46)  BP: 142/76 (22 @ 05:46)  RR: 18 (22 @ 05:46)  SpO2: 98% (22 @ 05:46)  Wt(kg): --     @ 07:01  -   @ 07:00  --------------------------------------------------------  IN: 0 mL / OUT: 800 mL / NET: -800 mL        PHYSICAL EXAM:  Constitutional: obese, appears fairly well-nourished  HEENT:, anicteric sclera, oropharynx clear, MMM  Neck: No JVD  Respiratory: CTAB, no wheezes, rales or rhonchi  Cardiovascular: S1, S2, RRR  Gastrointestinal: Dressing c/d/i.  MIL with feculent output via tube and around tube. Ostomy bag placed over MIL tubing.    Extremities: No cyanosis or clubbing.  Trace LE edema today.  Neurological: A/O x 3, no focal deficits  Psychiatric: Normal mood, normal affect  : No CVA tenderness. No her.   Skin: No rashes  Musculoskeletal: no joint tenderness  Vascular Access: PICC benign, lumen saved for TPN      LABS:      144  |  108  |  10  ----------------------------<  138<H>  3.4<L>   |  27  |  x     Ca    8.0<L>      24 Sep 2022 11:59  Phos  3.4       Mg     2.3         TPro      /  Alb  2.1<L>  /  TBili      /  DBili      /  AST      /  ALT      /  AlkPhos          Creatinine Trend: 0.76 <--, 0.81 <--, 0.78 <--, 1.41 <--                        11.0   11.11 )-----------( 383      ( 24 Sep 2022 11:59 )             34.7     Urine Studies:  Urinalysis Basic - ( 17 Sep 2022 21:12 )    Color: Latoya / Appearance: very cloudy / S.015 / pH:   Gluc:  / Ketone: Trace  / Bili: Moderate / Urobili: 8   Blood:  / Protein: 100 / Nitrite: Positive   Leuk Esterase: Small / RBC: >50 /HPF / WBC 11-25 /HPF   Sq Epi:  / Non Sq Epi: Few /HPF / Bacteria: Moderate /HPF      Creatinine, Random Urine: 291 mg/dL ( @ 21:12)  Sodium, Random Urine: <5 mmol/L ( @ 21:12)

## 2022-09-24 NOTE — PROGRESS NOTE ADULT - SUBJECTIVE AND OBJECTIVE BOX
INTERVAL HPI/OVERNIGHT EVENTS:  pt seen and examined at bedside   complaining of nausea since starting sandostatin  admits to mild abdominal pain  +BM  Afebrile    MEDICATIONS  (STANDING):  chlorhexidine 2% Cloths 1 Application(s) Topical daily  enoxaparin Injectable 40 milliGRAM(s) SubCutaneous every 24 hours  insulin lispro (ADMELOG) corrective regimen sliding scale   SubCutaneous three times a day with meals  octreotide  Injectable 50 MICROGram(s) IV Push two times a day  pantoprazole  Injectable 40 milliGRAM(s) IV Push daily  Parenteral Nutrition - Adult 1 Each (83 mL/Hr) TPN Continuous <Continuous>  potassium chloride  10 mEq/100 mL IVPB 10 milliEquivalent(s) IV Intermittent every 1 hour    MEDICATIONS  (PRN):  HYDROmorphone  Injectable 0.5 milliGRAM(s) IV Push every 4 hours PRN Moderate Pain (4 - 6)  ketorolac   Injectable 30 milliGRAM(s) IV Push every 6 hours PRN Moderate Pain (4 - 6)  ondansetron Injectable 4 milliGRAM(s) IV Push every 6 hours PRN Nausea  sodium chloride 0.9% lock flush 10 milliLiter(s) IV Push every 1 hour PRN Pre/post blood products, medications, blood draw, and to maintain line patency    Vital Signs Last 24 Hrs  T(C): 36.7 (24 Sep 2022 05:46), Max: 36.9 (23 Sep 2022 21:05)  T(F): 98.1 (24 Sep 2022 05:46), Max: 98.4 (23 Sep 2022 21:05)  HR: 63 (24 Sep 2022 05:46) (63 - 72)  BP: 142/76 (24 Sep 2022 05:46) (122/74 - 142/76)  BP(mean): 98 (24 Sep 2022 05:46) (98 - 98)  RR: 18 (24 Sep 2022 05:46) (16 - 18)  SpO2: 98% (24 Sep 2022 05:46) (96% - 98%)    Parameters below as of 24 Sep 2022 05:46  Patient On (Oxygen Delivery Method): room air    Physical:  General: A&Ox3. NAD.  Chest: respiration unlabored  Abdomen: Soft nondistended, nontender. MIL tubing inside ostomy bag with feculent drainage and air in the bag, midline well granulating. retention sutures in place, wet to dry dressing.    I&O's Detail    23 Sep 2022 07:01  -  24 Sep 2022 07:00  --------------------------------------------------------  IN:  Total IN: 0 mL    OUT:    Bulb (mL): 600 mL    Indwelling Catheter - Urethral (mL): 200 mL  Total OUT: 800 mL    Total NET: -800 mL    LABS:                        11.2   11.66 )-----------( 354      ( 23 Sep 2022 07:10 )             34.7             09-23    144  |  108  |  7   ----------------------------<  129<H>  2.9<LL>   |  27  |  0.76    Ca    8.0<L>      23 Sep 2022 07:10  Phos  3.4     09-23  Mg     2.5     09-23    TPro  6.4  /  Alb  2.0<L>  /  TBili  0.5  /  DBili  x   /  AST  128<H>  /  ALT  206<H>  /  AlkPhos  105  09-23

## 2022-09-24 NOTE — PROGRESS NOTE ADULT - ASSESSMENT
Incarcerated Hernia - s/p sb resection repair of strangulated incisional hernia  Focal Peritonitis / intraabdominal collection - secondary to an anastomotic leak, appears to be improving  Leukocytosis - decreased    Plan:  ·	DC  Zosyn   ·	Start Meropenem 1 gm iv q8hrs  ·	Cont  TPN

## 2022-09-25 LAB
ALBUMIN SERPL ELPH-MCNC: 2.2 G/DL — LOW (ref 3.5–5)
ALP SERPL-CCNC: 108 U/L — SIGNIFICANT CHANGE UP (ref 40–120)
ALT FLD-CCNC: 268 U/L DA — HIGH (ref 10–60)
ANION GAP SERPL CALC-SCNC: 7 MMOL/L — SIGNIFICANT CHANGE UP (ref 5–17)
AST SERPL-CCNC: 119 U/L — HIGH (ref 10–40)
BASOPHILS # BLD AUTO: 0.05 K/UL — SIGNIFICANT CHANGE UP (ref 0–0.2)
BASOPHILS NFR BLD AUTO: 0.4 % — SIGNIFICANT CHANGE UP (ref 0–2)
BILIRUB SERPL-MCNC: 0.4 MG/DL — SIGNIFICANT CHANGE UP (ref 0.2–1.2)
BUN SERPL-MCNC: 13 MG/DL — SIGNIFICANT CHANGE UP (ref 7–18)
CALCIUM SERPL-MCNC: 7.9 MG/DL — LOW (ref 8.4–10.5)
CHLORIDE SERPL-SCNC: 111 MMOL/L — HIGH (ref 96–108)
CO2 SERPL-SCNC: 26 MMOL/L — SIGNIFICANT CHANGE UP (ref 22–31)
CREAT SERPL-MCNC: 0.63 MG/DL — SIGNIFICANT CHANGE UP (ref 0.5–1.3)
EGFR: 99 ML/MIN/1.73M2 — SIGNIFICANT CHANGE UP
EOSINOPHIL # BLD AUTO: 0.24 K/UL — SIGNIFICANT CHANGE UP (ref 0–0.5)
EOSINOPHIL NFR BLD AUTO: 2.1 % — SIGNIFICANT CHANGE UP (ref 0–6)
GLUCOSE BLDC GLUCOMTR-MCNC: 119 MG/DL — HIGH (ref 70–99)
GLUCOSE BLDC GLUCOMTR-MCNC: 123 MG/DL — HIGH (ref 70–99)
GLUCOSE BLDC GLUCOMTR-MCNC: 128 MG/DL — HIGH (ref 70–99)
GLUCOSE BLDC GLUCOMTR-MCNC: 131 MG/DL — HIGH (ref 70–99)
GLUCOSE BLDC GLUCOMTR-MCNC: 145 MG/DL — HIGH (ref 70–99)
GLUCOSE SERPL-MCNC: 137 MG/DL — HIGH (ref 70–99)
HCT VFR BLD CALC: 37.7 % — SIGNIFICANT CHANGE UP (ref 34.5–45)
HGB BLD-MCNC: 11.5 G/DL — SIGNIFICANT CHANGE UP (ref 11.5–15.5)
IMM GRANULOCYTES NFR BLD AUTO: 2.1 % — HIGH (ref 0–0.9)
LYMPHOCYTES # BLD AUTO: 1.36 K/UL — SIGNIFICANT CHANGE UP (ref 1–3.3)
LYMPHOCYTES # BLD AUTO: 11.7 % — LOW (ref 13–44)
MAGNESIUM SERPL-MCNC: 2.3 MG/DL — SIGNIFICANT CHANGE UP (ref 1.6–2.6)
MCHC RBC-ENTMCNC: 29.5 PG — SIGNIFICANT CHANGE UP (ref 27–34)
MCHC RBC-ENTMCNC: 30.5 GM/DL — LOW (ref 32–36)
MCV RBC AUTO: 96.7 FL — SIGNIFICANT CHANGE UP (ref 80–100)
MONOCYTES # BLD AUTO: 1.07 K/UL — HIGH (ref 0–0.9)
MONOCYTES NFR BLD AUTO: 9.2 % — SIGNIFICANT CHANGE UP (ref 2–14)
NEUTROPHILS # BLD AUTO: 8.7 K/UL — HIGH (ref 1.8–7.4)
NEUTROPHILS NFR BLD AUTO: 74.5 % — SIGNIFICANT CHANGE UP (ref 43–77)
NRBC # BLD: 0 /100 WBCS — SIGNIFICANT CHANGE UP (ref 0–0)
PHOSPHATE SERPL-MCNC: 2.7 MG/DL — SIGNIFICANT CHANGE UP (ref 2.5–4.5)
PLATELET # BLD AUTO: 420 K/UL — HIGH (ref 150–400)
POTASSIUM SERPL-MCNC: 4.2 MMOL/L — SIGNIFICANT CHANGE UP (ref 3.5–5.3)
POTASSIUM SERPL-SCNC: 4.2 MMOL/L — SIGNIFICANT CHANGE UP (ref 3.5–5.3)
PROT SERPL-MCNC: 6.6 G/DL — SIGNIFICANT CHANGE UP (ref 6–8.3)
RBC # BLD: 3.9 M/UL — SIGNIFICANT CHANGE UP (ref 3.8–5.2)
RBC # FLD: 13.4 % — SIGNIFICANT CHANGE UP (ref 10.3–14.5)
SODIUM SERPL-SCNC: 144 MMOL/L — SIGNIFICANT CHANGE UP (ref 135–145)
WBC # BLD: 11.66 K/UL — HIGH (ref 3.8–10.5)
WBC # FLD AUTO: 11.66 K/UL — HIGH (ref 3.8–10.5)

## 2022-09-25 RX ORDER — ELECTROLYTE SOLUTION,INJ
1 VIAL (ML) INTRAVENOUS
Refills: 0 | Status: DISCONTINUED | OUTPATIENT
Start: 2022-09-25 | End: 2022-09-25

## 2022-09-25 RX ORDER — I.V. FAT EMULSION 20 G/100ML
0.19 EMULSION INTRAVENOUS
Qty: 25.04 | Refills: 0 | Status: DISCONTINUED | OUTPATIENT
Start: 2022-09-25 | End: 2022-09-25

## 2022-09-25 RX ORDER — I.V. FAT EMULSION 20 G/100ML
0.19 EMULSION INTRAVENOUS
Qty: 25.04 | Refills: 0 | Status: COMPLETED | OUTPATIENT
Start: 2022-09-24 | End: 2022-09-24

## 2022-09-25 RX ORDER — ERTAPENEM SODIUM 1 G/1
1000 INJECTION, POWDER, LYOPHILIZED, FOR SOLUTION INTRAMUSCULAR; INTRAVENOUS EVERY 24 HOURS
Refills: 0 | Status: COMPLETED | OUTPATIENT
Start: 2022-09-25 | End: 2022-10-01

## 2022-09-25 RX ORDER — ELECTROLYTE SOLUTION,INJ
1 VIAL (ML) INTRAVENOUS
Refills: 0 | Status: DISCONTINUED | OUTPATIENT
Start: 2022-09-24 | End: 2022-09-24

## 2022-09-25 RX ADMIN — PANTOPRAZOLE SODIUM 40 MILLIGRAM(S): 20 TABLET, DELAYED RELEASE ORAL at 12:17

## 2022-09-25 RX ADMIN — I.V. FAT EMULSION 10.4 GM/KG/DAY: 20 EMULSION INTRAVENOUS at 22:52

## 2022-09-25 RX ADMIN — ENOXAPARIN SODIUM 40 MILLIGRAM(S): 100 INJECTION SUBCUTANEOUS at 17:25

## 2022-09-25 RX ADMIN — OCTREOTIDE ACETATE 50 MICROGRAM(S): 200 INJECTION, SOLUTION INTRAVENOUS; SUBCUTANEOUS at 17:25

## 2022-09-25 RX ADMIN — I.V. FAT EMULSION 10.4 GM/KG/DAY: 20 EMULSION INTRAVENOUS at 22:00

## 2022-09-25 RX ADMIN — Medication 1 EACH: at 22:47

## 2022-09-25 RX ADMIN — OCTREOTIDE ACETATE 50 MICROGRAM(S): 200 INJECTION, SOLUTION INTRAVENOUS; SUBCUTANEOUS at 06:43

## 2022-09-25 RX ADMIN — MEROPENEM 100 MILLIGRAM(S): 1 INJECTION INTRAVENOUS at 02:28

## 2022-09-25 RX ADMIN — ERTAPENEM SODIUM 120 MILLIGRAM(S): 1 INJECTION, POWDER, LYOPHILIZED, FOR SOLUTION INTRAMUSCULAR; INTRAVENOUS at 12:16

## 2022-09-25 RX ADMIN — Medication 1 EACH: at 22:10

## 2022-09-25 RX ADMIN — CHLORHEXIDINE GLUCONATE 1 APPLICATION(S): 213 SOLUTION TOPICAL at 12:17

## 2022-09-25 NOTE — PROGRESS NOTE ADULT - SUBJECTIVE AND OBJECTIVE BOX
s/p exploratory laparotomy, Small Bowel Resection, and hernia repair POD#9   Patient seen and examined at bedside  Denies pain, nausea/ vomiting.     Vital Signs Last 24 Hrs  T(F): 98.2 (09-25-22 @ 06:00), Max: 98.7 (09-24-22 @ 20:32)  HR: 57 (09-25-22 @ 06:00)  BP: 128/46 (09-25-22 @ 06:00)  RR: 18 (09-25-22 @ 06:00)  SpO2: 96% (09-25-22 @ 06:00)  POCT Blood Glucose.: 145 mg/dL (25 Sep 2022 09:01)    GENERAL: Alert, NAD  CHEST/LUNG: respirations nonlabored  ABDOMEN: Midline dressing with some staining; dressing removed, wound pink with good granulation tissue; Wound repacked with moist gauze, covered with DSD; soft, Nontender, Nondistended; right sided drain with ostomy bag over it: 500cc/24hours out  EXTREMITIES:  no calf tenderness, No edema    I&O's Detail    24 Sep 2022 07:01  -  25 Sep 2022 07:00  --------------------------------------------------------  IN:    Fat Emulsion (Fish Oil &amp; Plant Based) 20% Infusion: 41.6 mL    TPN (Total Parenteral Nutrition): 996 mL  Total IN: 1037.6 mL    OUT:    Bulb (mL): 500 mL  Total OUT: 500 mL    Total NET: 537.6 mL    LABS:                        11.5   11.66 )-----------( 420      ( 25 Sep 2022 07:16 )             37.7     09-25    144  |  111<H>  |  13  ----------------------------<  137<H>  4.2   |  26  |  0.63    Ca    7.9<L>      25 Sep 2022 07:16  Phos  2.7     09-25  Mg     2.3     09-25    TPro  6.6  /  Alb  2.2<L>  /  TBili  0.4  /  DBili  x   /  AST  119<H>  /  ALT  268<H>  /  AlkPhos  108  09-25

## 2022-09-25 NOTE — PROGRESS NOTE ADULT - SUBJECTIVE AND OBJECTIVE BOX
62y Female    Meds:  ertapenem  IVPB 1000 milliGRAM(s) IV Intermittent every 24 hours    Allergies    No Known Drug Allergies  peanuts (Anaphylaxis)    Intolerances        VITALS:  Vital Signs Last 24 Hrs  T(C): 36.8 (25 Sep 2022 06:00), Max: 37.1 (24 Sep 2022 20:32)  T(F): 98.2 (25 Sep 2022 06:00), Max: 98.7 (24 Sep 2022 20:32)  HR: 57 (25 Sep 2022 06:00) (56 - 67)  BP: 128/46 (25 Sep 2022 06:00) (128/46 - 140/64)  BP(mean): 65 (25 Sep 2022 06:00) (65 - 72)  RR: 18 (25 Sep 2022 06:00) (18 - 19)  SpO2: 96% (25 Sep 2022 06:00) (95% - 99%)    Parameters below as of 25 Sep 2022 06:00  Patient On (Oxygen Delivery Method): room air        LABS/DIAGNOSTIC TESTS:                          11.5   11.66 )-----------( 420      ( 25 Sep 2022 07:16 )             37.7         09-25    144  |  111<H>  |  13  ----------------------------<  137<H>  4.2   |  26  |  0.63    Ca    7.9<L>      25 Sep 2022 07:16  Phos  2.7     09-25  Mg     2.3     09-25    TPro  6.6  /  Alb  2.2<L>  /  TBili  0.4  /  DBili  x   /  AST  119<H>  /  ALT  268<H>  /  AlkPhos  108  09-25      LIVER FUNCTIONS - ( 25 Sep 2022 07:16 )  Alb: 2.2 g/dL / Pro: 6.6 g/dL / ALK PHOS: 108 U/L / ALT: 268 U/L DA / AST: 119 U/L / GGT: x             CULTURES: Catheterized Catheterized  09-18 @ 11:06   No growth  --  --      .Blood Blood-Venous  09-17 @ 09:55   No Growth Final  --  --            RADIOLOGY:      ROS:  [  ] UNABLE TO ELICIT 62y Female who is doing well , no new complaints     Meds:  ertapenem  IVPB 1000 milliGRAM(s) IV Intermittent every 24 hours    Allergies    No Known Drug Allergies  peanuts (Anaphylaxis)    Intolerances        VITALS:  Vital Signs Last 24 Hrs  T(C): 36.8 (25 Sep 2022 06:00), Max: 37.1 (24 Sep 2022 20:32)  T(F): 98.2 (25 Sep 2022 06:00), Max: 98.7 (24 Sep 2022 20:32)  HR: 57 (25 Sep 2022 06:00) (56 - 67)  BP: 128/46 (25 Sep 2022 06:00) (128/46 - 140/64)  BP(mean): 65 (25 Sep 2022 06:00) (65 - 72)  RR: 18 (25 Sep 2022 06:00) (18 - 19)  SpO2: 96% (25 Sep 2022 06:00) (95% - 99%)    Parameters below as of 25 Sep 2022 06:00  Patient On (Oxygen Delivery Method): room air        LABS/DIAGNOSTIC TESTS:                          11.5   11.66 )-----------( 420      ( 25 Sep 2022 07:16 )             37.7         09-25    144  |  111<H>  |  13  ----------------------------<  137<H>  4.2   |  26  |  0.63    Ca    7.9<L>      25 Sep 2022 07:16  Phos  2.7     09-25  Mg     2.3     09-25    TPro  6.6  /  Alb  2.2<L>  /  TBili  0.4  /  DBili  x   /  AST  119<H>  /  ALT  268<H>  /  AlkPhos  108  09-25      LIVER FUNCTIONS - ( 25 Sep 2022 07:16 )  Alb: 2.2 g/dL / Pro: 6.6 g/dL / ALK PHOS: 108 U/L / ALT: 268 U/L DA / AST: 119 U/L / GGT: x             CULTURES: Catheterized Catheterized  09-18 @ 11:06   No growth  --  --      .Blood Blood-Venous  09-17 @ 09:55   No Growth Final  --  --            RADIOLOGY:      ROS:  [  ] UNABLE TO ELICIT 62y Female who is doing well , no new complaints, she had about 350ml of drainage in the last 24hrs from her abdominal drain. She has a problem with IV access and so will switch her antibiotics to Invanz as it is only once a day, also Nephrology is going to decrease TPN to 20hrs / day and so there will be a 4 hr window for her to get her Invanz. She has no fevers or chills. Her LFTs are a little high, will monitior.    Meds:  ertapenem  IVPB 1000 milliGRAM(s) IV Intermittent every 24 hours    Allergies    No Known Drug Allergies  peanuts (Anaphylaxis)    Intolerances        VITALS:  Vital Signs Last 24 Hrs  T(C): 36.8 (25 Sep 2022 06:00), Max: 37.1 (24 Sep 2022 20:32)  T(F): 98.2 (25 Sep 2022 06:00), Max: 98.7 (24 Sep 2022 20:32)  HR: 57 (25 Sep 2022 06:00) (56 - 67)  BP: 128/46 (25 Sep 2022 06:00) (128/46 - 140/64)  BP(mean): 65 (25 Sep 2022 06:00) (65 - 72)  RR: 18 (25 Sep 2022 06:00) (18 - 19)  SpO2: 96% (25 Sep 2022 06:00) (95% - 99%)    Parameters below as of 25 Sep 2022 06:00  Patient On (Oxygen Delivery Method): room air        LABS/DIAGNOSTIC TESTS:                          11.5   11.66 )-----------( 420      ( 25 Sep 2022 07:16 )             37.7         09-25    144  |  111<H>  |  13  ----------------------------<  137<H>  4.2   |  26  |  0.63    Ca    7.9<L>      25 Sep 2022 07:16  Phos  2.7     09-25  Mg     2.3     09-25    TPro  6.6  /  Alb  2.2<L>  /  TBili  0.4  /  DBili  x   /  AST  119<H>  /  ALT  268<H>  /  AlkPhos  108  09-25      LIVER FUNCTIONS - ( 25 Sep 2022 07:16 )  Alb: 2.2 g/dL / Pro: 6.6 g/dL / ALK PHOS: 108 U/L / ALT: 268 U/L DA / AST: 119 U/L / GGT: x             CULTURES: Catheterized Catheterized  09-18 @ 11:06   No growth  --  --      .Blood Blood-Venous  09-17 @ 09:55   No Growth Final  --  --            RADIOLOGY:      ROS:  [  ] UNABLE TO ELICIT

## 2022-09-25 NOTE — PROGRESS NOTE ADULT - ASSESSMENT
1. Anastomotic leak with focal peritonitis resulting in prolonged NPO with pt at risk for PCM.  Pt tolerating TPN.  ? mild intermittent SOB?  Lungs clear.  Will check CXR in am as TPN is being cycled. Will plan to cycle TPN to 20h to allow for Abx to be used via PICC.  I would prefer pt have double lumen PICC placed to allow for one lumen to be saved for TPN, but pt does not hae access and does need abx.  Will allow use of Abx in single lumen PICC today, despite increased risks of infection.  Lipids only at 25g rather than 50g due to hypertriglyceridemia.  Calculations below.    2. Dehydration- with contraction alkalosis. No acetate in TPN for now. Continue TPN volume to 2L/24h and consider increasing further, though not yet as above.      3. Hypokalemia- Improved with repletion and TPN.    4. Focal Peritonitis- Not systemic.  Okay to give TPN.  Abx choice per ID.  F/u closely with ID.      TPN calculations:  Due to hypertriglyceridemia, will not increase lipids (for now continue at 25g, but will increase amino acids slowly  Shade 1500 goal (not quite there yet)  AA 100g --> 400 shade   --------->  AA 140g  ---->560 shade  Lipids 50g --> 500 shade  -------> Lipids 25g ---> 250 shade  CHO 180g --> 600 shade  --------> CHO 180g --> 600 shade   Discussed with RD as well.      [facility] NEPHROLOGY  Ainbal Clifton M.D.  Dylan Vieyra D.O.  Debbi Suarez M.D.  Sharmila Villarreal, CHARLI, ANP-C    Telephone: [phone]  Facsimile: [phone]    [address] [place] [address]

## 2022-09-25 NOTE — PROGRESS NOTE ADULT - ASSESSMENT
Incarcerated Hernia - s/p sb resection repair of strangulated incisional hernia  Focal Peritonitis / intraabdominal collection - secondary to an anastomotic leak, appears to be improving  Leukocytosis - decreased    Plan:  ·	Antibiotics switched to Invanz 1 gm iv q24hrs  ·	Cont  TPN

## 2022-09-25 NOTE — PROGRESS NOTE ADULT - SUBJECTIVE AND OBJECTIVE BOX
[facility] NEPHROLOGY- METABOLIC SUPPORT SERVICE PROGRESS NOTE    Patient is a 62y Female who presented to the hospital with abdominal pain and was found to have an incarcerated hernia.  She is s/p exploratory laparotomy, Small Bowel Resection, and hernia repair 9/16.  She had large volume feculent drainage from MIL and was found to have Focal Peritonitis / intraabdominal collection - secondary to an anastomotic leak.  She has been NPO since 9/14 (9 days) and is planned for further NPO.  Surgical plan is to give further time for Abx to work, then for further surgical intervention and possibly further NPO afterward.    Pt tolerating TPN.  ? mild intermittent SOB?  Lungs clear.  Will check CXR in am as TPN is being cycled.      REVIEW OF SYSTEMS: no cp, sob, dysuria, swelling. + abdominal discomfort    Vital Signs Last 24 Hrs  T(C): 36.8 (25 Sep 2022 06:00), Max: 37.1 (24 Sep 2022 20:32)  T(F): 98.2 (25 Sep 2022 06:00), Max: 98.7 (24 Sep 2022 20:32)  HR: 57 (25 Sep 2022 06:00) (56 - 67)  BP: 128/46 (25 Sep 2022 06:00) (128/46 - 140/64)  BP(mean): 65 (25 Sep 2022 06:00) (65 - 72)  RR: 18 (25 Sep 2022 06:00) (18 - 19)  SpO2: 96% (25 Sep 2022 06:00) (95% - 99%)    Parameters below as of 25 Sep 2022 06:00  Patient On (Oxygen Delivery Method): room air      PHYSICAL EXAM:  Constitutional: obese, appears fairly well-nourished  HEENT:, anicteric sclera, oropharynx clear, MMM  Neck: No JVD  Respiratory: CTAB, no wheezes, rales or rhonchi  Cardiovascular: S1, S2, RRR  Gastrointestinal: Dressing c/d/i.  MIL with feculent output via tube and around tube. Ostomy bag placed over MIL tubing.    Extremities: No cyanosis or clubbing.  Trace LE edema today.  Neurological: A/O x 3, no focal deficits  Psychiatric: Normal mood, normal affect  : No CVA tenderness. No her.   Skin: No rashes  Musculoskeletal: no joint tenderness  Vascular Access: PICC benign, lumen saved for TPN      LABS:  09-25    144  |  111<H>  |  13  ----------------------------<  137<H>  4.2   |  26  |  0.63    Ca    7.9<L>      25 Sep 2022 07:16  Phos  2.7     09-25  Mg     2.3     09-25    TPro  6.6  /  Alb  2.2<L>  /  TBili  0.4  /  DBili      /  AST  119<H>  /  ALT  268<H>  /  AlkPhos  108  09-25    Creatinine Trend: 0.63 <--, 0.62 <--, 0.76 <--, 0.81 <--, 0.78 <--                        11.5   11.66 )-----------( 420      ( 25 Sep 2022 07:16 )             37.7

## 2022-09-25 NOTE — PROGRESS NOTE ADULT - ASSESSMENT
62F admitted with sbo secondary to incarcerated incisional hernia  pt with extensive surgical hx, multiple abdominal hernias   s/p ex lap with SBR and repair of ventral hernia 9/16  now with enterocutaneous fistula    -Continue NPO, TPN has been initiated  -Daily dressing change  -Monitor drain output  -IV antibiotics  -May require surgical intervention if patient clinically worsens  -OOB/Ambulation   -discussed with ID and nephrology. Abx changed to invanz to give one dose via PICC. Will discuss with IR in AM to see if PICC can be exchanged for double lumen due to IV access issues.

## 2022-09-26 LAB
ALBUMIN SERPL ELPH-MCNC: 2.7 G/DL — LOW (ref 3.5–5)
ALP SERPL-CCNC: 105 U/L — SIGNIFICANT CHANGE UP (ref 40–120)
ALT FLD-CCNC: 209 U/L DA — HIGH (ref 10–60)
ANION GAP SERPL CALC-SCNC: 8 MMOL/L — SIGNIFICANT CHANGE UP (ref 5–17)
AST SERPL-CCNC: 66 U/L — HIGH (ref 10–40)
BILIRUB SERPL-MCNC: 0.4 MG/DL — SIGNIFICANT CHANGE UP (ref 0.2–1.2)
BUN SERPL-MCNC: 18 MG/DL — SIGNIFICANT CHANGE UP (ref 7–18)
CA-I BLD-SCNC: 4.7 MG/DL — SIGNIFICANT CHANGE UP (ref 4.5–5.6)
CA-I BLD-SCNC: 4.7 MG/DL — SIGNIFICANT CHANGE UP (ref 4.5–5.6)
CALCIUM SERPL-MCNC: 8 MG/DL — LOW (ref 8.4–10.5)
CHLORIDE SERPL-SCNC: 111 MMOL/L — HIGH (ref 96–108)
CO2 SERPL-SCNC: 22 MMOL/L — SIGNIFICANT CHANGE UP (ref 22–31)
CREAT SERPL-MCNC: 0.59 MG/DL — SIGNIFICANT CHANGE UP (ref 0.5–1.3)
EGFR: 101 ML/MIN/1.73M2 — SIGNIFICANT CHANGE UP
GLUCOSE BLDC GLUCOMTR-MCNC: 108 MG/DL — HIGH (ref 70–99)
GLUCOSE BLDC GLUCOMTR-MCNC: 110 MG/DL — HIGH (ref 70–99)
GLUCOSE BLDC GLUCOMTR-MCNC: 112 MG/DL — HIGH (ref 70–99)
GLUCOSE SERPL-MCNC: 122 MG/DL — HIGH (ref 70–99)
MAGNESIUM SERPL-MCNC: 2.3 MG/DL — SIGNIFICANT CHANGE UP (ref 1.6–2.6)
PHOSPHATE SERPL-MCNC: 2.6 MG/DL — SIGNIFICANT CHANGE UP (ref 2.5–4.5)
POTASSIUM SERPL-MCNC: 4.2 MMOL/L — SIGNIFICANT CHANGE UP (ref 3.5–5.3)
POTASSIUM SERPL-SCNC: 4.2 MMOL/L — SIGNIFICANT CHANGE UP (ref 3.5–5.3)
PROT SERPL-MCNC: 6.4 G/DL — SIGNIFICANT CHANGE UP (ref 6–8.3)
SODIUM SERPL-SCNC: 141 MMOL/L — SIGNIFICANT CHANGE UP (ref 135–145)

## 2022-09-26 RX ORDER — ELECTROLYTE SOLUTION,INJ
1 VIAL (ML) INTRAVENOUS
Refills: 0 | Status: DISCONTINUED | OUTPATIENT
Start: 2022-09-26 | End: 2022-09-26

## 2022-09-26 RX ORDER — I.V. FAT EMULSION 20 G/100ML
0.19 EMULSION INTRAVENOUS
Qty: 25.04 | Refills: 0 | Status: DISCONTINUED | OUTPATIENT
Start: 2022-09-26 | End: 2022-09-26

## 2022-09-26 RX ADMIN — PANTOPRAZOLE SODIUM 40 MILLIGRAM(S): 20 TABLET, DELAYED RELEASE ORAL at 12:16

## 2022-09-26 RX ADMIN — Medication 1 EACH: at 22:01

## 2022-09-26 RX ADMIN — I.V. FAT EMULSION 10.4 GM/KG/DAY: 20 EMULSION INTRAVENOUS at 22:01

## 2022-09-26 RX ADMIN — ERTAPENEM SODIUM 120 MILLIGRAM(S): 1 INJECTION, POWDER, LYOPHILIZED, FOR SOLUTION INTRAMUSCULAR; INTRAVENOUS at 12:16

## 2022-09-26 RX ADMIN — OCTREOTIDE ACETATE 50 MICROGRAM(S): 200 INJECTION, SOLUTION INTRAVENOUS; SUBCUTANEOUS at 05:43

## 2022-09-26 RX ADMIN — ENOXAPARIN SODIUM 40 MILLIGRAM(S): 100 INJECTION SUBCUTANEOUS at 17:59

## 2022-09-26 RX ADMIN — CHLORHEXIDINE GLUCONATE 1 APPLICATION(S): 213 SOLUTION TOPICAL at 12:32

## 2022-09-26 NOTE — CHART NOTE - NSCHARTNOTEFT_GEN_A_CORE
Assessment:   62yFemalePatient is a 62y old  Female who presents with a chief complaint of Other or unspecified ventral hernia with obstruction without gangrene     (19 Sep 2022 11:46)      Factors impacting intake: [ ] none [ ] nausea  [ ] vomiting [ ] diarrhea [ ] constipation  [ ]chewing problems [ ] swallowing issues  [x ] other: patient currently receiving TPN,  Total cycled volume: 2000ml/d for 20 hrs. First and last hour rate : 75ml/hr and remaining time rate: 103ml/hr. also receiving fat emulsion 20% at 25g/d.  TPN solution provides: 560kcal from amino acids, 612kcal from glucose, lipids: 250kcal. Total kcal: 1422 and 140g protein/d which meets needs. colostomy output normal per RN. provide TPN per ANDREY MD    Diet Presciption: Diet, NPO:   Except Medications  With Ice Chips/Sips of Water (09-20-22 @ 09:36)    Intake: meeting needs with TPN    Current Weight: none new   % Weight Change    Pertinent Medications: MEDICATIONS  (STANDING):  chlorhexidine 2% Cloths 1 Application(s) Topical daily  enoxaparin Injectable 40 milliGRAM(s) SubCutaneous every 24 hours  ertapenem  IVPB 1000 milliGRAM(s) IV Intermittent every 24 hours  fat emulsion (Fish Oil and Plant Based) 20% Infusion 0.192 Gm/kG/Day (10.4 mL/Hr) IV Continuous <Continuous>  fat emulsion (Fish Oil and Plant Based) 20% Infusion 0.192 Gm/kG/Day (10.4 mL/Hr) IV Continuous <Continuous>  insulin lispro (ADMELOG) corrective regimen sliding scale   SubCutaneous three times a day with meals  pantoprazole  Injectable 40 milliGRAM(s) IV Push daily  Parenteral Nutrition - Adult 1 Each TPN Continuous <Continuous>  Parenteral Nutrition - Adult 1 Each TPN Continuous <Continuous>  potassium chloride  10 mEq/100 mL IVPB 10 milliEquivalent(s) IV Intermittent every 1 hour    MEDICATIONS  (PRN):  ketorolac   Injectable 30 milliGRAM(s) IV Push every 6 hours PRN Moderate Pain (4 - 6)  ondansetron Injectable 4 milliGRAM(s) IV Push every 6 hours PRN Nausea  sodium chloride 0.9% lock flush 10 milliLiter(s) IV Push every 1 hour PRN Pre/post blood products, medications, blood draw, and to maintain line patency    Pertinent Labs: 09-26 Na141 mmol/L Glu 122 mg/dL<H> K+ 4.2 mmol/L Cr  0.59 mg/dL BUN 18 mg/dL 09-26 Phos 2.6 mg/dL 09-26 Alb 2.7 g/dL<L> 09-23 PAB 12 mg/dL<L> 09-23 Chol --    LDL --    HDL --    Trig 199 mg/dL<H>     CAPILLARY BLOOD GLUCOSE      POCT Blood Glucose.: 112 mg/dL (26 Sep 2022 11:35)  POCT Blood Glucose.: 110 mg/dL (26 Sep 2022 05:35)  POCT Blood Glucose.: 123 mg/dL (25 Sep 2022 21:25)  POCT Blood Glucose.: 119 mg/dL (25 Sep 2022 16:34)    Skin:     Estimated Needs:   [x ] no change since previous assessment  [ ] recalculated:     Previous Nutrition Diagnosis:   [ ] Inadequate Energy Intake [ ]Inadequate Oral Intake [ ] Excessive Energy Intake   [ ] Underweight [ ] Increased Nutrient Needs [ ] Overweight/Obesity   [ ] Altered GI Function [ ] Unintended Weight Loss [ ] Food & Nutrition Related Knowledge Deficit [ x] Malnutrition , severe     Nutrition Diagnosis is [x ] ongoing  [ ] resolved [ ] not applicable     New Nutrition Diagnosis: [ ] not applicable       Interventions:   Recommend  [ ] Change Diet To:  [ ] Nutrition Supplement  [x ] Nutrition Support ,  provide TPN per ANDREY MD   [ ] Other:     Monitoring and Evaluation:   [ ] PO intake [ x ] Tolerance to diet prescription [ x ] weights [ x ] labs[ x ] follow up per protocol  [ ] other:

## 2022-09-26 NOTE — PROGRESS NOTE ADULT - SUBJECTIVE AND OBJECTIVE BOX
Pt s- new complaints Admits bm,  ICU Vital Signs Last 24 Hrs  T(C): 36.6 (26 Sep 2022 06:30), Max: 36.9 (25 Sep 2022 21:00)  T(F): 97.8 (26 Sep 2022 06:30), Max: 98.4 (25 Sep 2022 21:00)  HR: 63 (26 Sep 2022 06:30) (57 - 63)  BP: 145/75 (26 Sep 2022 06:30) (104/57 - 149/72)  BP(mean): 70 (25 Sep 2022 14:49) (70 - 70)  ABP: --  ABP(mean): --  RR: 17 (26 Sep 2022 06:30) (17 - 19)  SpO2: 96% (26 Sep 2022 06:30) (96% - 99%)    O2 Parameters below as of 26 Sep 2022 06:30  Patient On (Oxygen Delivery Method): room air        Alert nad  abd: soft nt nd: midline wound: granulation tissue, no purulence, retention sutures in place  Left sided abd ostomy bag and drain in place: yellow particulate fluid in bag.                          11.5   11.66 )-----------( 420      ( 25 Sep 2022 07:16 )             37.7   09-25    144  |  111<H>  |  13  ----------------------------<  137<H>  4.2   |  26  |  0.63    Ca    7.9<L>      25 Sep 2022 07:16  Phos  2.7     09-25  Mg     2.3     09-25    TPro  6.6  /  Alb  2.2<L>  /  TBili  0.4  /  DBili  x   /  AST  119<H>  /  ALT  268<H>  /  AlkPhos  108  09-25

## 2022-09-26 NOTE — PROGRESS NOTE ADULT - SUBJECTIVE AND OBJECTIVE BOX
[facility] NEPHROLOGY- METABOLIC SUPPORT SERVICE PROGRESS NOTE    Patient is a 62y Female who presented to the hospital with abdominal pain and was found to have an incarcerated hernia.  She is s/p exploratory laparotomy, Small Bowel Resection, and hernia repair 9/16.  She had large volume feculent drainage from MIL and was found to have Focal Peritonitis / intraabdominal collection - secondary to an anastomotic leak.  She has been NPO since 9/14 (9 days) and is planned for further NPO.  Surgical plan is to give further time for Abx to work, then for further surgical intervention and possibly further NPO afterward.      REVIEW OF SYSTEMS: no cp, sob, dysuria, swelling. + abdominal discomfort +had palpitations yesterday but not today    VITALS:  T(F): 98 (09-26-22 @ 12:22), Max: 98.4 (09-25-22 @ 21:00)  HR: 56 (09-26-22 @ 12:22)  BP: 147/80 (09-26-22 @ 12:22)  RR: 17 (09-26-22 @ 12:22)  SpO2: 98% (09-26-22 @ 12:22)  Wt(kg): --    09-25 @ 07:01  -  09-26 @ 07:00  --------------------------------------------------------  IN: 2268 mL / OUT: 250 mL / NET: 2018 mL      PHYSICAL EXAM:  Constitutional: obese, appears fairly well-nourished  HEENT:, anicteric sclera,  Respiratory: CTAB, no wheezes, rales or rhonchi  Cardiovascular: S1, S2, RRR  Gastrointestinal: Dressing c/d/i.  MIL with feculent output via tube and around tube. Ostomy bag placed over MIL tubing.    Extremities:  Trace LE edema  Vascular Access: left UE PICC - benign    LABS:  09-26    141  |  111<H>  |  18  ----------------------------<  122<H>  4.2   |  22  |  0.59    Ca    8.0<L>      26 Sep 2022 11:23  Phos  2.6     09-26  Mg     2.3     09-26    TPro  6.4  /  Alb  2.7<L>  /  TBili  0.4  /  DBili      /  AST  66<H>  /  ALT  209<H>  /  AlkPhos  105  09-26    Creatinine Trend: 0.59 <--, 0.63 <--, 0.62 <--, 0.76 <--, 0.81 <--, 0.78 <--                        11.5   11.66 )-----------( 420      ( 25 Sep 2022 07:16 )             37.7     Urine Studies:

## 2022-09-26 NOTE — PROGRESS NOTE ADULT - ASSESSMENT
Incarcerated Hernia - s/p sb resection repair of strangulated incisional hernia  Focal Peritonitis / intraabdominal collection - secondary to an anastomotic leak, appears to be improving  Leukocytosis - stable    Plan:  ·	Cont Invanz 1 gm iv q24hrs  ·	Cont  TPN

## 2022-09-26 NOTE — PROGRESS NOTE ADULT - ASSESSMENT
1. Anastomotic leak with focal peritonitis resulting in prolonged NPO with pt at risk for PCM.  Pt tolerating TPN.  ? mild intermittent SOB?  Lungs clear.  Will check CXR in am as TPN is being cycled. Will plan to cycle TPN to 20h to allow for Abx to be used via PICC.  I would prefer pt have double lumen PICC placed to allow for one lumen to be saved for TPN, but pt does not hae access and does need abx.   Lipids only at 25g rather than 50g due to hypertriglyceridemia.  Calculations below.  Check FS q6h. FS acceptable. Check hgbA1C in am.   Check triglycerides in am. Check CMP/Mg/Phos/ Ionized calcium in am.   Daily weights. Strict I/O. Hold TPN if pt spikes fever and check BCX x2    2. Dehydration- with contraction alkalosis. No acetate in TPN for now. Continue TPN volume to 2L/24h and consider increasing further, though not yet as above.      3. Hypokalemia- resolved    4. Focal Peritonitis-    Abx choice per ID.  F/u closely with ID.      TPN calculations:  Due to hypertriglyceridemia, will not increase lipids (for now continue at 25g, but will increase amino acids slowly  Shade 1500 goal (not quite there yet)  AA 100g --> 400 shade   --------->  AA 140g  ---->560 shade  Lipids 50g --> 500 shade  -------> Lipids 25g ---> 250 shade  CHO 180g --> 600 shade  --------> CHO 180g --> 600 shade   Discussed with RD as well.      [facility] NEPHROLOGY  Anibal Clifton M.D.  Dylan Vieyra D.O.  Debbi Suarez M.D.  Sharmila Villarreal, CHARLI, ANP-C    Telephone: [phone]  Facsimile: [phone]    [address] [place] [address]

## 2022-09-26 NOTE — PROGRESS NOTE ADULT - ASSESSMENT
s/p exlap, sb resection, repair of strangulated incisional hernia  enterocutaneous fistula? drainage in ostomy bag  midline wound healing well  bowel function evident  TPN  iv abx    continue present care  mid-line ordered IR  oob  observation

## 2022-09-26 NOTE — PROGRESS NOTE ADULT - SUBJECTIVE AND OBJECTIVE BOX
62y Female    Meds:  ertapenem  IVPB 1000 milliGRAM(s) IV Intermittent every 24 hours    Allergies    No Known Drug Allergies  peanuts (Anaphylaxis)    Intolerances        VITALS:  Vital Signs Last 24 Hrs  T(C): 36.7 (26 Sep 2022 12:22), Max: 36.9 (25 Sep 2022 21:00)  T(F): 98 (26 Sep 2022 12:22), Max: 98.4 (25 Sep 2022 21:00)  HR: 56 (26 Sep 2022 12:22) (56 - 63)  BP: 147/80 (26 Sep 2022 12:22) (137/59 - 149/72)  BP(mean): --  RR: 17 (26 Sep 2022 12:22) (17 - 18)  SpO2: 98% (26 Sep 2022 12:22) (96% - 99%)    Parameters below as of 26 Sep 2022 06:30  Patient On (Oxygen Delivery Method): room air        LABS/DIAGNOSTIC TESTS:                          11.5   11.66 )-----------( 420      ( 25 Sep 2022 07:16 )             37.7         09-26    141  |  111<H>  |  18  ----------------------------<  122<H>  4.2   |  22  |  0.59    Ca    8.0<L>      26 Sep 2022 11:23  Phos  2.6     09-26  Mg     2.3     09-26    TPro  6.4  /  Alb  2.7<L>  /  TBili  0.4  /  DBili  x   /  AST  66<H>  /  ALT  209<H>  /  AlkPhos  105  09-26      LIVER FUNCTIONS - ( 26 Sep 2022 11:23 )  Alb: 2.7 g/dL / Pro: 6.4 g/dL / ALK PHOS: 105 U/L / ALT: 209 U/L DA / AST: 66 U/L / GGT: x             CULTURES: Catheterized Catheterized  09-18 @ 11:06   No growth  --  --      .Blood Blood-Venous  09-17 @ 09:55   No Growth Final  --  --            RADIOLOGY:      ROS:  [  ] UNABLE TO ELICIT 62y Female who is in better spirits , she has had about 250ml of drainage within the last 24hrs from her abdominal drain, she has no fevers or chills, no nausea or vomiting, she is having liquid BM's and passing flatus, she has minor abdominal pain at the incision site.    Meds:  ertapenem  IVPB 1000 milliGRAM(s) IV Intermittent every 24 hours    Allergies    No Known Drug Allergies  peanuts (Anaphylaxis)    Intolerances        VITALS:  Vital Signs Last 24 Hrs  T(C): 36.7 (26 Sep 2022 12:22), Max: 36.9 (25 Sep 2022 21:00)  T(F): 98 (26 Sep 2022 12:22), Max: 98.4 (25 Sep 2022 21:00)  HR: 56 (26 Sep 2022 12:22) (56 - 63)  BP: 147/80 (26 Sep 2022 12:22) (137/59 - 149/72)  BP(mean): --  RR: 17 (26 Sep 2022 12:22) (17 - 18)  SpO2: 98% (26 Sep 2022 12:22) (96% - 99%)    Parameters below as of 26 Sep 2022 06:30  Patient On (Oxygen Delivery Method): room air        LABS/DIAGNOSTIC TESTS:                          11.5   11.66 )-----------( 420      ( 25 Sep 2022 07:16 )             37.7         09-26    141  |  111<H>  |  18  ----------------------------<  122<H>  4.2   |  22  |  0.59    Ca    8.0<L>      26 Sep 2022 11:23  Phos  2.6     09-26  Mg     2.3     09-26    TPro  6.4  /  Alb  2.7<L>  /  TBili  0.4  /  DBili  x   /  AST  66<H>  /  ALT  209<H>  /  AlkPhos  105  09-26      LIVER FUNCTIONS - ( 26 Sep 2022 11:23 )  Alb: 2.7 g/dL / Pro: 6.4 g/dL / ALK PHOS: 105 U/L / ALT: 209 U/L DA / AST: 66 U/L / GGT: x             CULTURES: Catheterized Catheterized  09-18 @ 11:06   No growth  --  --      .Blood Blood-Venous  09-17 @ 09:55   No Growth Final  --  --            RADIOLOGY:      ROS:  [  ] UNABLE TO ELICIT

## 2022-09-27 LAB
A1C WITH ESTIMATED AVERAGE GLUCOSE RESULT: 6 % — HIGH (ref 4–5.6)
ALBUMIN SERPL ELPH-MCNC: 2.5 G/DL — LOW (ref 3.5–5)
ALP SERPL-CCNC: 104 U/L — SIGNIFICANT CHANGE UP (ref 40–120)
ALT FLD-CCNC: 162 U/L DA — HIGH (ref 10–60)
ANION GAP SERPL CALC-SCNC: 8 MMOL/L — SIGNIFICANT CHANGE UP (ref 5–17)
AST SERPL-CCNC: 47 U/L — HIGH (ref 10–40)
BILIRUB SERPL-MCNC: 0.4 MG/DL — SIGNIFICANT CHANGE UP (ref 0.2–1.2)
BUN SERPL-MCNC: 20 MG/DL — HIGH (ref 7–18)
CALCIUM SERPL-MCNC: 8.4 MG/DL — SIGNIFICANT CHANGE UP (ref 8.4–10.5)
CHLORIDE SERPL-SCNC: 109 MMOL/L — HIGH (ref 96–108)
CO2 SERPL-SCNC: 25 MMOL/L — SIGNIFICANT CHANGE UP (ref 22–31)
CREAT SERPL-MCNC: 0.61 MG/DL — SIGNIFICANT CHANGE UP (ref 0.5–1.3)
EGFR: 100 ML/MIN/1.73M2 — SIGNIFICANT CHANGE UP
ESTIMATED AVERAGE GLUCOSE: 126 MG/DL — HIGH (ref 68–114)
GLUCOSE BLDC GLUCOMTR-MCNC: 100 MG/DL — HIGH (ref 70–99)
GLUCOSE BLDC GLUCOMTR-MCNC: 96 MG/DL — SIGNIFICANT CHANGE UP (ref 70–99)
GLUCOSE SERPL-MCNC: 118 MG/DL — HIGH (ref 70–99)
HCT VFR BLD CALC: 35.5 % — SIGNIFICANT CHANGE UP (ref 34.5–45)
HGB BLD-MCNC: 11.2 G/DL — LOW (ref 11.5–15.5)
MAGNESIUM SERPL-MCNC: 2.3 MG/DL — SIGNIFICANT CHANGE UP (ref 1.6–2.6)
MCHC RBC-ENTMCNC: 30.1 PG — SIGNIFICANT CHANGE UP (ref 27–34)
MCHC RBC-ENTMCNC: 31.5 GM/DL — LOW (ref 32–36)
MCV RBC AUTO: 95.4 FL — SIGNIFICANT CHANGE UP (ref 80–100)
NRBC # BLD: 0 /100 WBCS — SIGNIFICANT CHANGE UP (ref 0–0)
PHOSPHATE SERPL-MCNC: 3 MG/DL — SIGNIFICANT CHANGE UP (ref 2.5–4.5)
PLATELET # BLD AUTO: 437 K/UL — HIGH (ref 150–400)
POTASSIUM SERPL-MCNC: 4.3 MMOL/L — SIGNIFICANT CHANGE UP (ref 3.5–5.3)
POTASSIUM SERPL-SCNC: 4.3 MMOL/L — SIGNIFICANT CHANGE UP (ref 3.5–5.3)
PREALB SERPL-MCNC: 19 MG/DL — LOW (ref 20–40)
PROT SERPL-MCNC: 6.5 G/DL — SIGNIFICANT CHANGE UP (ref 6–8.3)
RBC # BLD: 3.72 M/UL — LOW (ref 3.8–5.2)
RBC # FLD: 13.1 % — SIGNIFICANT CHANGE UP (ref 10.3–14.5)
SARS-COV-2 RNA SPEC QL NAA+PROBE: SIGNIFICANT CHANGE UP
SODIUM SERPL-SCNC: 142 MMOL/L — SIGNIFICANT CHANGE UP (ref 135–145)
TRIGL SERPL-MCNC: 195 MG/DL — HIGH
WBC # BLD: 11.69 K/UL — HIGH (ref 3.8–10.5)
WBC # FLD AUTO: 11.69 K/UL — HIGH (ref 3.8–10.5)

## 2022-09-27 RX ORDER — ELECTROLYTE SOLUTION,INJ
1 VIAL (ML) INTRAVENOUS
Refills: 0 | Status: DISCONTINUED | OUTPATIENT
Start: 2022-09-27 | End: 2022-09-27

## 2022-09-27 RX ORDER — I.V. FAT EMULSION 20 G/100ML
0.19 EMULSION INTRAVENOUS
Qty: 25 | Refills: 0 | Status: DISCONTINUED | OUTPATIENT
Start: 2022-09-27 | End: 2022-09-27

## 2022-09-27 RX ADMIN — ENOXAPARIN SODIUM 40 MILLIGRAM(S): 100 INJECTION SUBCUTANEOUS at 17:54

## 2022-09-27 RX ADMIN — Medication 1 EACH: at 22:22

## 2022-09-27 RX ADMIN — ERTAPENEM SODIUM 120 MILLIGRAM(S): 1 INJECTION, POWDER, LYOPHILIZED, FOR SOLUTION INTRAMUSCULAR; INTRAVENOUS at 13:01

## 2022-09-27 RX ADMIN — PANTOPRAZOLE SODIUM 40 MILLIGRAM(S): 20 TABLET, DELAYED RELEASE ORAL at 13:01

## 2022-09-27 RX ADMIN — I.V. FAT EMULSION 10.4 GM/KG/DAY: 20 EMULSION INTRAVENOUS at 22:02

## 2022-09-27 RX ADMIN — CHLORHEXIDINE GLUCONATE 1 APPLICATION(S): 213 SOLUTION TOPICAL at 13:01

## 2022-09-27 NOTE — PROGRESS NOTE ADULT - SUBJECTIVE AND OBJECTIVE BOX
INTERVAL HPI/OVERNIGHT EVENTS:    Pt seen and examined at bedside. Admits to [facility], offers no other complaints. Denies fever, chills, SOB or CP.      Vital Signs Last 24 Hrs  T(C): 36.6 (27 Sep 2022 05:35), Max: 36.8 (26 Sep 2022 21:05)  T(F): 97.9 (27 Sep 2022 05:35), Max: 98.3 (26 Sep 2022 21:05)  HR: 58 (27 Sep 2022 05:35) (56 - 60)  BP: 140/59 (27 Sep 2022 05:35) (140/59 - 147/80)  BP(mean): 79 (27 Sep 2022 05:35) (79 - 79)  RR: 18 (27 Sep 2022 05:35) (17 - 18)  SpO2: 99% (27 Sep 2022 05:35) (98% - 99%)    Parameters below as of 27 Sep 2022 05:35  Patient On (Oxygen Delivery Method): room air      I&O's Detail    26 Sep 2022 07:01  -  27 Sep 2022 07:00  --------------------------------------------------------  IN:    TPN (Total Parenteral Nutrition): 1208 mL  Total IN: 1208 mL    OUT:    Bulb (mL): 475 mL  Total OUT: 475 mL    Total NET: 733 mL        ertapenem  IVPB 1000 milliGRAM(s) IV Intermittent every 24 hours  pantoprazole  Injectable 40 milliGRAM(s) IV Push daily      Physical Exam  General: AAOx3, No acute distress  Skin: No jaundice, no icterus  Abdomen: soft, nontender, nondistended, midline wound w/  granulation tissue, no purulence, retention sutures in place, wet to dry dressing applied ; Left sided abd ostomy bag and drain in place: yellow particulate fluid in bag.  Extremities: non edematous, no calf pain bilaterally        Labs:                        11.2   11.69 )-----------( 437      ( 27 Sep 2022 07:35 )             35.5     09-27    142  |  109<H>  |  20<H>  ----------------------------<  118<H>  4.3   |  25  |  0.61    Ca    8.4      27 Sep 2022 07:35  Phos  3.0     09-27  Mg     2.3     09-27    TPro  6.5  /  Alb  2.5<L>  /  TBili  0.4  /  DBili  x   /  AST  47<H>  /  ALT  162<H>  /  AlkPhos  104  09-27

## 2022-09-27 NOTE — PROGRESS NOTE ADULT - ASSESSMENT
63 y/o female s/p exlap, sb resection, repair of strangulated incisional hernia  enterocutaneous fistula? drainage in ostomy bag  midline wound healing well    -TPN  -IV abx  -Pain medication PRN   -F/u IR re mid-line   -OOB/ Ambulate   -DVT ppx

## 2022-09-27 NOTE — PROGRESS NOTE ADULT - SUBJECTIVE AND OBJECTIVE BOX
[facility] NEPHROLOGY- METABOLIC SUPPORT SERVICE PROGRESS NOTE    Patient is a 62y Female who presented to the hospital with abdominal pain and was found to have an incarcerated hernia.  She is s/p exploratory laparotomy, Small Bowel Resection, and hernia repair 9/16.  She had large volume feculent drainage from MIL and was found to have Focal Peritonitis / intraabdominal collection - secondary to an anastomotic leak.  She has been NPO since 9/14 (9 days) and is planned for further NPO.  Surgical plan is to give further time for Abx to work, then for further surgical intervention and possibly further NPO afterward. Consulted for TPN        REVIEW OF SYSTEMS: no cp, sob, dysuria, swelling. + abdominal discomfort     VITALS:  T(F): 97.9 (09-27-22 @ 05:35), Max: 98.3 (09-26-22 @ 21:05)  HR: 58 (09-27-22 @ 05:35)  BP: 140/59 (09-27-22 @ 05:35)  RR: 18 (09-27-22 @ 05:35)  SpO2: 99% (09-27-22 @ 05:35)  Wt(kg): --    09-26 @ 07:01  -  09-27 @ 07:00  --------------------------------------------------------  IN: 1208 mL / OUT: 475 mL / NET: 733 mL        PHYSICAL EXAM:  Constitutional: obese, appears fairly well-nourished  HEENT:, anicteric sclera,  Respiratory: CTAB, no wheezes, rales or rhonchi  Cardiovascular: S1, S2, RRR  Gastrointestinal: Midline dressing c/d/i.   Ostomy bag placed over MIL tubing with feculent output  Extremities:  +1 LE edema  Vascular Access: left UE PICC - benign    LABS:  09-27    142  |  109<H>  |  20<H>  ----------------------------<  118<H>  4.3   |  25  |  0.61    Ca    8.4      27 Sep 2022 07:35  Phos  3.0     09-27  Mg     2.3     09-27    TPro  6.5  /  Alb  2.5<L>  /  TBili  0.4  /  DBili      /  AST  47<H>  /  ALT  162<H>  /  AlkPhos  104  09-27    Creatinine Trend: 0.61 <--, 0.59 <--, 0.63 <--, 0.62 <--, 0.76 <--, 0.81 <--                        11.2   11.69 )-----------( 437      ( 27 Sep 2022 07:35 )             35.5     Urine Studies:

## 2022-09-27 NOTE — PROGRESS NOTE ADULT - SUBJECTIVE AND OBJECTIVE BOX
62y Female    Meds:  ertapenem  IVPB 1000 milliGRAM(s) IV Intermittent every 24 hours    Allergies    No Known Drug Allergies  peanuts (Anaphylaxis)    Intolerances        VITALS:  Vital Signs Last 24 Hrs  T(C): 36.6 (27 Sep 2022 14:58), Max: 36.8 (26 Sep 2022 21:05)  T(F): 97.8 (27 Sep 2022 14:58), Max: 98.3 (26 Sep 2022 21:05)  HR: 60 (27 Sep 2022 14:58) (58 - 60)  BP: 125/62 (27 Sep 2022 14:58) (125/62 - 147/59)  BP(mean): 79 (27 Sep 2022 05:35) (79 - 79)  RR: 16 (27 Sep 2022 14:58) (16 - 18)  SpO2: 100% (27 Sep 2022 14:58) (99% - 100%)    Parameters below as of 27 Sep 2022 14:58  Patient On (Oxygen Delivery Method): room air        LABS/DIAGNOSTIC TESTS:                          11.2   11.69 )-----------( 437      ( 27 Sep 2022 07:35 )             35.5         09-27    142  |  109<H>  |  20<H>  ----------------------------<  118<H>  4.3   |  25  |  0.61    Ca    8.4      27 Sep 2022 07:35  Phos  3.0     09-27  Mg     2.3     09-27    TPro  6.5  /  Alb  2.5<L>  /  TBili  0.4  /  DBili  x   /  AST  47<H>  /  ALT  162<H>  /  AlkPhos  104  09-27      LIVER FUNCTIONS - ( 27 Sep 2022 07:35 )  Alb: 2.5 g/dL / Pro: 6.5 g/dL / ALK PHOS: 104 U/L / ALT: 162 U/L DA / AST: 47 U/L / GGT: x             CULTURES: Catheterized Catheterized  09-18 @ 11:06   No growth  --  --      .Blood Blood-Venous  09-17 @ 09:55   No Growth Final  --  --            RADIOLOGY:      ROS:  [  ] UNABLE TO ELICIT 62y Female who is doing well clinically but is having more drainage from her drain ( she put out 475ml in the last 24 hrs), she has no fevers or chills, she is urinating frequently and has frequent liquid BMs also. She has no nausea or vomiting.    Meds:  ertapenem  IVPB 1000 milliGRAM(s) IV Intermittent every 24 hours    Allergies    No Known Drug Allergies  peanuts (Anaphylaxis)    Intolerances        VITALS:  Vital Signs Last 24 Hrs  T(C): 36.6 (27 Sep 2022 14:58), Max: 36.8 (26 Sep 2022 21:05)  T(F): 97.8 (27 Sep 2022 14:58), Max: 98.3 (26 Sep 2022 21:05)  HR: 60 (27 Sep 2022 14:58) (58 - 60)  BP: 125/62 (27 Sep 2022 14:58) (125/62 - 147/59)  BP(mean): 79 (27 Sep 2022 05:35) (79 - 79)  RR: 16 (27 Sep 2022 14:58) (16 - 18)  SpO2: 100% (27 Sep 2022 14:58) (99% - 100%)    Parameters below as of 27 Sep 2022 14:58  Patient On (Oxygen Delivery Method): room air        LABS/DIAGNOSTIC TESTS:                          11.2   11.69 )-----------( 437      ( 27 Sep 2022 07:35 )             35.5         09-27    142  |  109<H>  |  20<H>  ----------------------------<  118<H>  4.3   |  25  |  0.61    Ca    8.4      27 Sep 2022 07:35  Phos  3.0     09-27  Mg     2.3     09-27    TPro  6.5  /  Alb  2.5<L>  /  TBili  0.4  /  DBili  x   /  AST  47<H>  /  ALT  162<H>  /  AlkPhos  104  09-27      LIVER FUNCTIONS - ( 27 Sep 2022 07:35 )  Alb: 2.5 g/dL / Pro: 6.5 g/dL / ALK PHOS: 104 U/L / ALT: 162 U/L DA / AST: 47 U/L / GGT: x             CULTURES: Catheterized Catheterized  09-18 @ 11:06   No growth  --  --      .Blood Blood-Venous  09-17 @ 09:55   No Growth Final  --  --            RADIOLOGY:      ROS:  [  ] UNABLE TO ELICIT

## 2022-09-27 NOTE — PROGRESS NOTE ADULT - NUTRITIONAL ASSESSMENT
This patient has been assessed with a concern for Malnutrition and has been determined to have a diagnosis/diagnoses of Severe protein-calorie malnutrition and Morbid obesity (BMI > 40).    This patient is being managed with:   Parenteral Nutrition - Adult-  Entered: Sep 26 2022 10:00PM    fat emulsion (Fish Oil and Plant Based) 20% Infusion-[Known as SMOFLIPID 20% Infusion]  25.04 Gram(s) in IV Solution 125.2 milliLiter(s) infuse at 10.4 mL/Hr  Dose Rate: 0.192 Gm/kG/Day Infuse Over: 12 Hours; Stop After 12 Hours  Administration Instructions: Use 1.2 micron in-line filter  Entered: Sep 26 2022 10:00PM    Diet NPO-  Except Medications  With Ice Chips/Sips of Water  Entered: Sep 20 2022  9:36AM    
This patient has been assessed with a concern for Malnutrition and has been determined to have a diagnosis/diagnoses of Morbid obesity (BMI > 40).    This patient is being managed with:   Diet NPO-  Except Medications  With Ice Chips/Sips of Water  Entered: Sep 20 2022  9:36AM

## 2022-09-27 NOTE — CHART NOTE - NSCHARTNOTEFT_GEN_A_CORE
Assessment:   62F s/p exploratory laparotomy, Small Bowel Resection, and hernia repair . large volume feculent drainage from MIL. Pt NPO (w/ clear liquids)/ prolonged, TPN started , now full dose in process of being cycled to 12 hrs. Dailt adjustments by ANDREY DONNELLY. Pt seen, in AM TPN/ lipids infusing as ordered. Discussed with ANDREY DONNELLY, Orderd today for 18 hr cycle (TPN), plans to increase TG to "full dose" with tomorrows order. See ANDREY DONNELLY document .     Factors impacting intake: [ ] none [ ] nausea  [ ] vomiting [ ] diarrhea [ ] constipation  [ ]chewing problems [ ] swallowing issues  [x ] other: altered GI fx/ structure    Diet Prescription: Diet, NPO:   Except Medications  With Ice Chips/Sips of Water (22 @ 09:36)    Intake:     Daily     Daily Weight in k.2 (23 Sep 2022 08:54)  Weight in k (22 Sep 2022 16:45)  Weight in k.7 (17 Sep 2022 07:00)    % Weight Change    Pertinent Medications: MEDICATIONS  (STANDING):  chlorhexidine 2% Cloths 1 Application(s) Topical daily  enoxaparin Injectable 40 milliGRAM(s) SubCutaneous every 24 hours  ertapenem  IVPB 1000 milliGRAM(s) IV Intermittent every 24 hours  fat emulsion (Fish Oil and Plant Based) 20% Infusion 0.1917 Gm/kG/Day (10.4 mL/Hr) IV Continuous <Continuous>  insulin lispro (ADMELOG) corrective regimen sliding scale   SubCutaneous three times a day with meals  pantoprazole  Injectable 40 milliGRAM(s) IV Push daily  Parenteral Nutrition - Adult 1 Each TPN Continuous <Continuous>  Parenteral Nutrition - Adult 1 Each TPN Continuous <Continuous>  potassium chloride  10 mEq/100 mL IVPB 10 milliEquivalent(s) IV Intermittent every 1 hour    MEDICATIONS  (PRN):  ketorolac   Injectable 30 milliGRAM(s) IV Push every 6 hours PRN Moderate Pain (4 - 6)  ondansetron Injectable 4 milliGRAM(s) IV Push every 6 hours PRN Nausea  sodium chloride 0.9% lock flush 10 milliLiter(s) IV Push every 1 hour PRN Pre/post blood products, medications, blood draw, and to maintain line patency    Pertinent Labs:  Na142 mmol/L Glu 118 mg/dL<H> K+ 4.3 mmol/L Cr  0.61 mg/dL BUN 20 mg/dL<H>  Phos 3.0 mg/dL  Alb 2.5 g/dL<L>  PAB 19 mg/dL<L>  Chol --    LDL --    HDL --    Trig 195 mg/dL<H>     CAPILLARY BLOOD GLUCOSE      POCT Blood Glucose.: 108 mg/dL (26 Sep 2022 17:46)      Estimated Needs:   [x ] no change since previous assessment  [ ] recalculated:       Previous Nutrition Diagnosis:   [ ] Altered GI function  [ ]Inadequate Oral Intake [ ] Swallowing Difficulty   [ ] Altered nutrition related labs [ ] Increased Nutrient Needs [ ] Overweight/Obesity   [ ] Unintended Weight Loss [ ] Food & Nutrition Related Knowledge Deficit [x ] Malnutrition (severe PCM)  [ ] Other:     Nutrition Diagnosis is [x ] ongoing  [ ] resolved [ ] not applicable       Interventions:   Recommend  [ ] Change Diet To:  [ ] Nutrition Supplement  [x ] Nutrition Support: TPN/ lipids order per [facility] MD  [x ] Other: MD to monitor. RD available.     Monitoring and Evaluation:    [ x ] Tolerance to diet prescription [ x ] weights [ x ] labs[ x ] follow up per protocol  [ ] other:

## 2022-09-27 NOTE — PROGRESS NOTE ADULT - ASSESSMENT
Incarcerated Hernia - s/p sb resection repair of strangulated incisional hernia  Focal Peritonitis / intraabdominal collection - secondary to an anastomotic leak  Leukocytosis - stable    Plan:  ·	Cont Invanz 1 gm iv q24hrs  ·	Cont  TPN  ·	will plan to repeat CT of abdomen on Thursday and if no collection then will plan to advance her diet and stop TPN.

## 2022-09-27 NOTE — PROGRESS NOTE ADULT - ASSESSMENT
Patient is a 62y Female who presented to the hospital with abdominal pain and was found to have an incarcerated hernia.  She is s/p exploratory laparotomy, Small Bowel Resection, and hernia repair 9/16.  She had large volume feculent drainage from MIL and was found to have Focal Peritonitis / intraabdominal collection - secondary to an anastomotic leak.  She has been NPO since 9/14 (9 days) and is planned for further NPO.  Surgical plan is to give further time for Abx to work, then for further surgical intervention and possibly further NPO afterward. Consulted for TPN    1. Anastomotic leak with focal peritonitis resulting in prolonged NPO with pt at risk for PCM.  Pt tolerating TPN.  ? mild intermittent SOB?  Lungs clear.  Will plan to cycle TPN to 18h (eventually cycle to 12hrs) to allow for Abx to be used via PICC.  I would prefer pt have double lumen PICC placed to allow for one lumen to be saved for TPN, but pt does not have access and does need abx.    on 9/27; Will increase Lipids to 50g starting 9/28.   Calculations below.  FS acceptable. Check FS q6h.  hgbA1C pending  Will adjust electrolytes accordingly. Check CMP/Mg/Phos/ Ionized calcium in am.   Daily weights. Strict I/O. Hold TPN if pt spikes fever and check BCX x2    2. Dehydration- with contraction alkalosis. No acetate in TPN for now. Continue TPN volume @ 2L    3. Hypokalemia- resolved    4. Focal Peritonitis-    Abx choice per ID.  F/u closely with ID.      TPN calculations:  Due to hypertriglyceridemia, will not increase lipids (for now continue at 25g, but will increase amino acids slowly  Shade 1500 goal (not quite there yet)  AA 100g --> 400 shade   --------->  AA 140g  ---->560 shade  Lipids 50g --> 500 shade    CHO 180g --> 600 shade  --------> CHO 180g --> 600 shade   Discussed with RD as well.      [facility] NEPHROLOGY  Anibal Clifton M.D.  Dylan Vieyra D.O.  Debbi Suarez M.D.  Sharmila Villarreal, MSN, ANP-C    Telephone: [phone]  Facsimile: [phone]    [address] [place] [address]

## 2022-09-28 LAB
ALBUMIN SERPL ELPH-MCNC: 2.4 G/DL — LOW (ref 3.5–5)
ALP SERPL-CCNC: 106 U/L — SIGNIFICANT CHANGE UP (ref 40–120)
ALT FLD-CCNC: 121 U/L DA — HIGH (ref 10–60)
ANION GAP SERPL CALC-SCNC: 8 MMOL/L — SIGNIFICANT CHANGE UP (ref 5–17)
AST SERPL-CCNC: 30 U/L — SIGNIFICANT CHANGE UP (ref 10–40)
BASOPHILS # BLD AUTO: 0.06 K/UL — SIGNIFICANT CHANGE UP (ref 0–0.2)
BASOPHILS NFR BLD AUTO: 0.6 % — SIGNIFICANT CHANGE UP (ref 0–2)
BILIRUB SERPL-MCNC: 0.4 MG/DL — SIGNIFICANT CHANGE UP (ref 0.2–1.2)
BUN SERPL-MCNC: 24 MG/DL — HIGH (ref 7–18)
CA-I BLD-SCNC: 4.7 MG/DL — SIGNIFICANT CHANGE UP (ref 4.5–5.6)
CA-I BLD-SCNC: 4.8 MG/DL — SIGNIFICANT CHANGE UP (ref 4.5–5.6)
CALCIUM SERPL-MCNC: 8.4 MG/DL — SIGNIFICANT CHANGE UP (ref 8.4–10.5)
CHLORIDE SERPL-SCNC: 111 MMOL/L — HIGH (ref 96–108)
CO2 SERPL-SCNC: 25 MMOL/L — SIGNIFICANT CHANGE UP (ref 22–31)
CREAT SERPL-MCNC: 0.71 MG/DL — SIGNIFICANT CHANGE UP (ref 0.5–1.3)
EGFR: 95 ML/MIN/1.73M2 — SIGNIFICANT CHANGE UP
EOSINOPHIL # BLD AUTO: 0.24 K/UL — SIGNIFICANT CHANGE UP (ref 0–0.5)
EOSINOPHIL NFR BLD AUTO: 2.5 % — SIGNIFICANT CHANGE UP (ref 0–6)
GLUCOSE BLDC GLUCOMTR-MCNC: 117 MG/DL — HIGH (ref 70–99)
GLUCOSE BLDC GLUCOMTR-MCNC: 79 MG/DL — SIGNIFICANT CHANGE UP (ref 70–99)
GLUCOSE BLDC GLUCOMTR-MCNC: 90 MG/DL — SIGNIFICANT CHANGE UP (ref 70–99)
GLUCOSE SERPL-MCNC: 118 MG/DL — HIGH (ref 70–99)
HCT VFR BLD CALC: 34.6 % — SIGNIFICANT CHANGE UP (ref 34.5–45)
HGB BLD-MCNC: 10.7 G/DL — LOW (ref 11.5–15.5)
IMM GRANULOCYTES NFR BLD AUTO: 1.1 % — HIGH (ref 0–0.9)
LYMPHOCYTES # BLD AUTO: 1.19 K/UL — SIGNIFICANT CHANGE UP (ref 1–3.3)
LYMPHOCYTES # BLD AUTO: 12.2 % — LOW (ref 13–44)
MAGNESIUM SERPL-MCNC: 2.3 MG/DL — SIGNIFICANT CHANGE UP (ref 1.6–2.6)
MCHC RBC-ENTMCNC: 29.6 PG — SIGNIFICANT CHANGE UP (ref 27–34)
MCHC RBC-ENTMCNC: 30.9 GM/DL — LOW (ref 32–36)
MCV RBC AUTO: 95.6 FL — SIGNIFICANT CHANGE UP (ref 80–100)
MONOCYTES # BLD AUTO: 0.8 K/UL — SIGNIFICANT CHANGE UP (ref 0–0.9)
MONOCYTES NFR BLD AUTO: 8.2 % — SIGNIFICANT CHANGE UP (ref 2–14)
NEUTROPHILS # BLD AUTO: 7.32 K/UL — SIGNIFICANT CHANGE UP (ref 1.8–7.4)
NEUTROPHILS NFR BLD AUTO: 75.4 % — SIGNIFICANT CHANGE UP (ref 43–77)
NRBC # BLD: 0 /100 WBCS — SIGNIFICANT CHANGE UP (ref 0–0)
PHOSPHATE SERPL-MCNC: 3.2 MG/DL — SIGNIFICANT CHANGE UP (ref 2.5–4.5)
PLATELET # BLD AUTO: 454 K/UL — HIGH (ref 150–400)
POTASSIUM SERPL-MCNC: 4.3 MMOL/L — SIGNIFICANT CHANGE UP (ref 3.5–5.3)
POTASSIUM SERPL-SCNC: 4.3 MMOL/L — SIGNIFICANT CHANGE UP (ref 3.5–5.3)
PROT SERPL-MCNC: 6.5 G/DL — SIGNIFICANT CHANGE UP (ref 6–8.3)
RBC # BLD: 3.62 M/UL — LOW (ref 3.8–5.2)
RBC # FLD: 13.1 % — SIGNIFICANT CHANGE UP (ref 10.3–14.5)
SODIUM SERPL-SCNC: 144 MMOL/L — SIGNIFICANT CHANGE UP (ref 135–145)
SURGICAL PATHOLOGY STUDY: SIGNIFICANT CHANGE UP
WBC # BLD: 9.72 K/UL — SIGNIFICANT CHANGE UP (ref 3.8–10.5)
WBC # FLD AUTO: 9.72 K/UL — SIGNIFICANT CHANGE UP (ref 3.8–10.5)

## 2022-09-28 PROCEDURE — 74176 CT ABD & PELVIS W/O CONTRAST: CPT | Mod: 26

## 2022-09-28 RX ORDER — DIATRIZOATE MEGLUMINE 180 MG/ML
30 INJECTION, SOLUTION INTRAVESICAL ONCE
Refills: 0 | Status: COMPLETED | OUTPATIENT
Start: 2022-09-28 | End: 2022-09-28

## 2022-09-28 RX ORDER — ELECTROLYTE SOLUTION,INJ
1 VIAL (ML) INTRAVENOUS
Refills: 0 | Status: DISCONTINUED | OUTPATIENT
Start: 2022-09-28 | End: 2022-09-28

## 2022-09-28 RX ORDER — I.V. FAT EMULSION 20 G/100ML
0.38 EMULSION INTRAVENOUS
Qty: 50 | Refills: 0 | Status: DISCONTINUED | OUTPATIENT
Start: 2022-09-28 | End: 2022-09-28

## 2022-09-28 RX ADMIN — DIATRIZOATE MEGLUMINE 30 MILLILITER(S): 180 INJECTION, SOLUTION INTRAVESICAL at 12:16

## 2022-09-28 RX ADMIN — ERTAPENEM SODIUM 120 MILLIGRAM(S): 1 INJECTION, POWDER, LYOPHILIZED, FOR SOLUTION INTRAMUSCULAR; INTRAVENOUS at 12:16

## 2022-09-28 RX ADMIN — ENOXAPARIN SODIUM 40 MILLIGRAM(S): 100 INJECTION SUBCUTANEOUS at 17:34

## 2022-09-28 RX ADMIN — PANTOPRAZOLE SODIUM 40 MILLIGRAM(S): 20 TABLET, DELAYED RELEASE ORAL at 12:15

## 2022-09-28 RX ADMIN — CHLORHEXIDINE GLUCONATE 1 APPLICATION(S): 213 SOLUTION TOPICAL at 12:15

## 2022-09-28 RX ADMIN — I.V. FAT EMULSION 20.8 GM/KG/DAY: 20 EMULSION INTRAVENOUS at 22:02

## 2022-09-28 RX ADMIN — Medication 1 EACH: at 22:07

## 2022-09-28 NOTE — PROGRESS NOTE ADULT - SUBJECTIVE AND OBJECTIVE BOX
62y Female    Meds:  ertapenem  IVPB 1000 milliGRAM(s) IV Intermittent every 24 hours    Allergies    No Known Drug Allergies  peanuts (Anaphylaxis)    Intolerances        VITALS:  Vital Signs Last 24 Hrs  T(C): 36.7 (28 Sep 2022 14:26), Max: 37 (28 Sep 2022 06:00)  T(F): 98.1 (28 Sep 2022 14:26), Max: 98.6 (28 Sep 2022 06:00)  HR: 60 (28 Sep 2022 14:26) (60 - 68)  BP: 164/83 (28 Sep 2022 14:26) (135/53 - 168/64)  BP(mean): 74 (27 Sep 2022 20:45) (74 - 74)  RR: 16 (28 Sep 2022 14:26) (16 - 18)  SpO2: 98% (28 Sep 2022 14:26) (98% - 100%)    Parameters below as of 28 Sep 2022 14:26  Patient On (Oxygen Delivery Method): room air        LABS/DIAGNOSTIC TESTS:                          10.7   9.72  )-----------( 454      ( 28 Sep 2022 09:51 )             34.6         09-28    144  |  111<H>  |  24<H>  ----------------------------<  118<H>  4.3   |  25  |  0.71    Ca    8.4      28 Sep 2022 09:51  Phos  3.2     09-28  Mg     2.3     09-28    TPro  6.5  /  Alb  2.4<L>  /  TBili  0.4  /  DBili  x   /  AST  30  /  ALT  121<H>  /  AlkPhos  106  09-28      LIVER FUNCTIONS - ( 28 Sep 2022 09:51 )  Alb: 2.4 g/dL / Pro: 6.5 g/dL / ALK PHOS: 106 U/L / ALT: 121 U/L DA / AST: 30 U/L / GGT: x             CULTURES: Catheterized Catheterized  09-18 @ 11:06   No growth  --  --      .Blood Blood-Venous  09-17 @ 09:55   No Growth Final  --  --            RADIOLOGY:      ROS:  [  ] UNABLE TO ELICIT 62y Female who is doing well clinically , she has no fevers or chills, she has frequency of urination and liquid BMs from her TPN but has minimal incisional abdominal pain, no nausea or vomiting or other complaints. She will be going for a repeat CT abdomen.    Meds:  ertapenem  IVPB 1000 milliGRAM(s) IV Intermittent every 24 hours    Allergies    No Known Drug Allergies  peanuts (Anaphylaxis)    Intolerances        VITALS:  Vital Signs Last 24 Hrs  T(C): 36.7 (28 Sep 2022 14:26), Max: 37 (28 Sep 2022 06:00)  T(F): 98.1 (28 Sep 2022 14:26), Max: 98.6 (28 Sep 2022 06:00)  HR: 60 (28 Sep 2022 14:26) (60 - 68)  BP: 164/83 (28 Sep 2022 14:26) (135/53 - 168/64)  BP(mean): 74 (27 Sep 2022 20:45) (74 - 74)  RR: 16 (28 Sep 2022 14:26) (16 - 18)  SpO2: 98% (28 Sep 2022 14:26) (98% - 100%)    Parameters below as of 28 Sep 2022 14:26  Patient On (Oxygen Delivery Method): room air        LABS/DIAGNOSTIC TESTS:                          10.7   9.72  )-----------( 454      ( 28 Sep 2022 09:51 )             34.6         09-28    144  |  111<H>  |  24<H>  ----------------------------<  118<H>  4.3   |  25  |  0.71    Ca    8.4      28 Sep 2022 09:51  Phos  3.2     09-28  Mg     2.3     09-28    TPro  6.5  /  Alb  2.4<L>  /  TBili  0.4  /  DBili  x   /  AST  30  /  ALT  121<H>  /  AlkPhos  106  09-28      LIVER FUNCTIONS - ( 28 Sep 2022 09:51 )  Alb: 2.4 g/dL / Pro: 6.5 g/dL / ALK PHOS: 106 U/L / ALT: 121 U/L DA / AST: 30 U/L / GGT: x             CULTURES: Catheterized Catheterized  09-18 @ 11:06   No growth  --  --      .Blood Blood-Venous  09-17 @ 09:55   No Growth Final  --  --            RADIOLOGY:      ROS:  [  ] UNABLE TO ELICIT

## 2022-09-28 NOTE — PROGRESS NOTE ADULT - ASSESSMENT
62y.o. Female with enteric leak s/p ex-lap, small resection, primary ventral hernia repair POD#12    -Repeat CT abd/pelvis  -con't TPN, poss taper and start diet if CT neg for collection  -Daily packing change  -Pain control prn

## 2022-09-28 NOTE — PROGRESS NOTE ADULT - TENDERNESS
minimal incisional tenderness
incisional
mild
over surgical site
mild incisional tenderness

## 2022-09-28 NOTE — CHART NOTE - NSCHARTNOTEFT_GEN_A_CORE
Assessment:   62F s/p exploratory laparotomy, Small Bowel Resection, and hernia repair . large volume feculent drainage from MIL. Pt NPO (w/ clear liquids)/ prolonged, TPN started , now full dose, cyclying (order for today to hang 10PM, includes 14 hr cycle on way to 12 hr cycle0, with full dose (50 gm lipids). Pt seen earlier, PN in progress. Discussed with RN manager, ID Attending, Sx Attending and [facility] MD re: plan. Pt for CAT scan, now S/P) If OK, may consider trying low fiber diet and check for tolerance while maintaining PN.     Factors impacting intake: [ ] none [ ] nausea  [ ] vomiting [ ] diarrhea [ ] constipation  [ ]chewing problems [ ] swallowing issues  [x ] other: altered GI fx/ structure    Diet Prescription: Diet, NPO:   Except Medications  With Ice Chips/Sips of Water (22 @ 09:36)    Intake: see ANDREY MD document and PN order    Daily     Daily Weight in k.1 (28 Sep 2022 08:40)  Weight in k.2 (23 Sep 2022 08:54)  Weight in k (22 Sep 2022 16:45)  Weight in k.7 (17 Sep 2022 07:00)    % Weight Change: I>O, 1+B/L feet edema    Pertinent Medications: MEDICATIONS  (STANDING):  chlorhexidine 2% Cloths 1 Application(s) Topical daily  enoxaparin Injectable 40 milliGRAM(s) SubCutaneous every 24 hours  ertapenem  IVPB 1000 milliGRAM(s) IV Intermittent every 24 hours  fat emulsion (Fish Oil and Plant Based) 20% Infusion 0.3834 Gm/kG/Day (20.8 mL/Hr) IV Continuous <Continuous>  insulin lispro (ADMELOG) corrective regimen sliding scale   SubCutaneous three times a day with meals  pantoprazole  Injectable 40 milliGRAM(s) IV Push daily  Parenteral Nutrition - Adult 1 Each TPN Continuous <Continuous>  Parenteral Nutrition - Adult 1 Each TPN Continuous <Continuous>  potassium chloride  10 mEq/100 mL IVPB 10 milliEquivalent(s) IV Intermittent every 1 hour    MEDICATIONS  (PRN):  ondansetron Injectable 4 milliGRAM(s) IV Push every 6 hours PRN Nausea  sodium chloride 0.9% lock flush 10 milliLiter(s) IV Push every 1 hour PRN Pre/post blood products, medications, blood draw, and to maintain line patency    Pertinent Labs:  Na144 mmol/L Glu 118 mg/dL<H> K+ 4.3 mmol/L Cr  0.71 mg/dL BUN 24 mg/dL<H>  Phos 3.2 mg/dL  Alb 2.4 g/dL<L>  PAB 19 mg/dL<L>  Chol --    LDL --    HDL --    Trig 195 mg/dL<H>     CAPILLARY BLOOD GLUCOSE      POCT Blood Glucose.: 90 mg/dL (28 Sep 2022 16:41)  POCT Blood Glucose.: 117 mg/dL (28 Sep 2022 12:31)  POCT Blood Glucose.: 117 mg/dL (28 Sep 2022 08:43)  POCT Blood Glucose.: 100 mg/dL (27 Sep 2022 20:40)        Estimated Needs:   [x ] no change since previous assessment  [ ] recalculated:       Previous Nutrition Diagnosis:   [ ] Altered GI function  [ ]Inadequate Oral Intake [ ] Swallowing Difficulty   [ ] Altered nutrition related labs [ ] Increased Nutrient Needs [ ] Overweight/Obesity   [ ] Unintended Weight Loss [ ] Food & Nutrition Related Knowledge Deficit [x ] Malnutrition (severe PCM)  [ ] Other:     Nutrition Diagnosis is [x ] ongoing  [ ] resolved [ ] not applicable       Interventions:   Recommend  [ ] Change Diet To:  [ ] Nutrition Supplement  [x ] Nutrition Support: PN orders per ANDREY MD  [x] MD to monitor. RD available.     Monitoring and Evaluation:   [ ] PO intake [ x ] Tolerance to diet prescription [ x ] weights [ x ] labs[ x ] follow up per protocol  [ ] other:

## 2022-09-28 NOTE — PROGRESS NOTE ADULT - ASSESSMENT
Incarcerated Hernia - s/p sb resection repair of strangulated incisional hernia  Focal Peritonitis / intraabdominal collection - secondary to an anastomotic leak  Leukocytosis - normalized.    Plan:  ·	Cont Invanz 1 gm iv q24hrs  ·	Cont  TPN  ·	 if  collection is smaller or a well formed fistula has formed then will plan to advance her diet and taper off her TPN.

## 2022-09-28 NOTE — PROGRESS NOTE ADULT - SUBJECTIVE AND OBJECTIVE BOX
[facility] NEPHROLOGY- METABOLIC SUPPORT SERVICE PROGRESS NOTE    Patient is a 62y Female who presented to the hospital with abdominal pain and was found to have an incarcerated hernia.  She is s/p exploratory laparotomy, Small Bowel Resection, and hernia repair 9/16.  She had large volume feculent drainage from MIL and was found to have Focal Peritonitis / intraabdominal collection - secondary to an anastomotic leak.  She has been NPO since 9/14 (9 days) and is planned for further NPO.  Surgical plan is to give further time for Abx to work, then for further surgical intervention and possibly further NPO afterward. Consulted for TPN        REVIEW OF SYSTEMS: no cp, sob, dysuria, swelling. + abdominal discomfort  +diarrhea    VITALS:  T(F): 98.1 (09-28-22 @ 14:26), Max: 98.6 (09-28-22 @ 06:00)  HR: 60 (09-28-22 @ 14:26)  BP: 164/83 (09-28-22 @ 14:26)  RR: 16 (09-28-22 @ 14:26)  SpO2: 98% (09-28-22 @ 14:26)  Wt(kg): --    09-27 @ 07:01  -  09-28 @ 07:00  --------------------------------------------------------  IN: 0 mL / OUT: 100 mL / NET: -100 mL      PHYSICAL EXAM:  Constitutional: obese, appears fairly well-nourished  HEENT:, anicteric sclera,  Respiratory: CTAB, no wheezes, rales or rhonchi  Cardiovascular: S1, S2, RRR  Gastrointestinal: Midline dressing c/d/i.   Ostomy bag placed over MIL tubing with feculent watery output  Extremities:  +1 LE edema  Vascular Access: left UE PICC - benign    LABS:  09-28    144  |  111<H>  |  24<H>  ----------------------------<  118<H>  4.3   |  25  |  0.71    Ca    8.4      28 Sep 2022 09:51  Phos  3.2     09-28  Mg     2.3     09-28    TPro  6.5  /  Alb  2.4<L>  /  TBili  0.4  /  DBili      /  AST  30  /  ALT  121<H>  /  AlkPhos  106  09-28    Creatinine Trend: 0.71 <--, 0.61 <--, 0.59 <--, 0.63 <--, 0.62 <--, 0.76 <--                        10.7   9.72  )-----------( 454      ( 28 Sep 2022 09:51 )             34.6     Urine Studies:

## 2022-09-28 NOTE — PROGRESS NOTE ADULT - ASSESSMENT
Patient is a 62y Female who presented to the hospital with abdominal pain and was found to have an incarcerated hernia.  She is s/p exploratory laparotomy, Small Bowel Resection, and hernia repair 9/16.  She had large volume feculent drainage from MIL and was found to have Focal Peritonitis / intraabdominal collection - secondary to an anastomotic leak.  She has been NPO since 9/14 (9 days) and is planned for further NPO.  Surgical plan is to give further time for Abx to work, then for further surgical intervention and possibly further NPO afterward. Consulted for TPN    1. Anastomotic leak with focal peritonitis resulting in prolonged NPO with pt at risk for PCM.  Pt tolerating TPN.  ? mild intermittent SOB?  Lungs clear.  Will plan to cycle TPN to 14h (eventually cycle to 12hrs) to allow for Abx to be used via PICC.  I would prefer pt have double lumen PICC placed to allow for one lumen to be saved for TPN, but pt does not have access and does need abx.    on 9/27; c/w TPN with lipids @50g @2L total volume. Pt for CT abd/pelvis today; advance diet as per surgery pending CT results. Will taper TPN once tolerating PO intake.    Calculations below.  FS acceptable. Check FS q6h.  hgbA1C 6.0  Will adjust electrolytes accordingly. Check CMP/Mg/Phos/ Ionized calcium in am.   Daily weights. Strict I/O. Hold TPN if pt spikes fever and check BCX x2    2. Dehydration- with contraction alkalosis. No acetate in TPN for now. Continue TPN volume @ 2L    3. Hypokalemia- resolved    4. Focal Peritonitis-    Abx choice per ID.  F/u closely with ID.      TPN calculations:   Shade 1500 goal (not quite there yet)  AA 100g --> 400 shade   --------->  AA 140g  ---->560 shade  Lipids 50g --> 500 shade    CHO 180g --> 600 shade  --------> CHO 180g --> 600 shade   Discussed with RD as well.      [facility] NEPHROLOGY  Anibal Clifton M.D.  Dylan Vieyra D.O.  Debbi Suarez M.D.  Sharmila Villarreal, CHARLI, ANP-C    Telephone: [phone]  Facsimile: [phone]    [address] [place] [id number]

## 2022-09-28 NOTE — PROGRESS NOTE ADULT - SUBJECTIVE AND OBJECTIVE BOX
INTERVAL HPI/OVERNIGHT EVENTS:  Pt resting comfortably. No acute complaints.   Ostomy bag over MIL site.  On TPN.  Denies N/V.  OOB.    MEDICATIONS  (STANDING):  chlorhexidine 2% Cloths 1 Application(s) Topical daily  enoxaparin Injectable 40 milliGRAM(s) SubCutaneous every 24 hours  ertapenem  IVPB 1000 milliGRAM(s) IV Intermittent every 24 hours  fat emulsion (Fish Oil and Plant Based) 20% Infusion 0.1917 Gm/kG/Day (10.4 mL/Hr) IV Continuous <Continuous>  insulin lispro (ADMELOG) corrective regimen sliding scale   SubCutaneous three times a day with meals  pantoprazole  Injectable 40 milliGRAM(s) IV Push daily  Parenteral Nutrition - Adult 1 Each TPN Continuous <Continuous>  potassium chloride  10 mEq/100 mL IVPB 10 milliEquivalent(s) IV Intermittent every 1 hour    MEDICATIONS  (PRN):  ondansetron Injectable 4 milliGRAM(s) IV Push every 6 hours PRN Nausea  sodium chloride 0.9% lock flush 10 milliLiter(s) IV Push every 1 hour PRN Pre/post blood products, medications, blood draw, and to maintain line patency    Vital Signs Last 24 Hrs  T(C): 37 (28 Sep 2022 06:00), Max: 37 (28 Sep 2022 06:00)  T(F): 98.6 (28 Sep 2022 06:00), Max: 98.6 (28 Sep 2022 06:00)  HR: 65 (28 Sep 2022 06:00) (60 - 68)  BP: 168/64 (28 Sep 2022 06:00) (125/62 - 168/64)  BP(mean): 74 (27 Sep 2022 20:45) (74 - 74)  RR: 18 (28 Sep 2022 06:00) (16 - 18)  SpO2: 98% (28 Sep 2022 06:00) (98% - 100%)    Physical:  General: A&Ox3. NAD.  Abdomen: Soft nondistended, nontender.    I&O's Detail    27 Sep 2022 07:01  -  28 Sep 2022 07:00  --------------------------------------------------------  IN:  Total IN: 0 mL    OUT:    Bulb (mL): 100 mL feculent  Total OUT: 100 mL    Total NET: -100 mL    LABS:                        11.2   11.69 )-----------( 437      ( 27 Sep 2022 07:35 )             35.5             09-27    142  |  109<H>  |  20<H>  ----------------------------<  118<H>  4.3   |  25  |  0.61    Ca    8.4      27 Sep 2022 07:35  Phos  3.0     09-27  Mg     2.3     09-27    TPro  6.5  /  Alb  2.5<L>  /  TBili  0.4  /  DBili  x   /  AST  47<H>  /  ALT  162<H>  /  AlkPhos  104  09-27

## 2022-09-29 LAB
ALBUMIN SERPL ELPH-MCNC: 2.4 G/DL — LOW (ref 3.5–5)
ALP SERPL-CCNC: 100 U/L — SIGNIFICANT CHANGE UP (ref 40–120)
ALT FLD-CCNC: 97 U/L DA — HIGH (ref 10–60)
ANION GAP SERPL CALC-SCNC: 9 MMOL/L — SIGNIFICANT CHANGE UP (ref 5–17)
AST SERPL-CCNC: 29 U/L — SIGNIFICANT CHANGE UP (ref 10–40)
BASOPHILS # BLD AUTO: 0.06 K/UL — SIGNIFICANT CHANGE UP (ref 0–0.2)
BASOPHILS NFR BLD AUTO: 0.7 % — SIGNIFICANT CHANGE UP (ref 0–2)
BILIRUB SERPL-MCNC: 0.4 MG/DL — SIGNIFICANT CHANGE UP (ref 0.2–1.2)
BUN SERPL-MCNC: 17 MG/DL — SIGNIFICANT CHANGE UP (ref 7–18)
CA-I BLD-SCNC: 4.9 MG/DL — SIGNIFICANT CHANGE UP (ref 4.5–5.6)
CALCIUM SERPL-MCNC: 8.3 MG/DL — LOW (ref 8.4–10.5)
CHLORIDE SERPL-SCNC: 111 MMOL/L — HIGH (ref 96–108)
CO2 SERPL-SCNC: 22 MMOL/L — SIGNIFICANT CHANGE UP (ref 22–31)
CREAT SERPL-MCNC: 0.66 MG/DL — SIGNIFICANT CHANGE UP (ref 0.5–1.3)
EGFR: 98 ML/MIN/1.73M2 — SIGNIFICANT CHANGE UP
EOSINOPHIL # BLD AUTO: 0.27 K/UL — SIGNIFICANT CHANGE UP (ref 0–0.5)
EOSINOPHIL NFR BLD AUTO: 3.2 % — SIGNIFICANT CHANGE UP (ref 0–6)
GLUCOSE BLDC GLUCOMTR-MCNC: 106 MG/DL — HIGH (ref 70–99)
GLUCOSE BLDC GLUCOMTR-MCNC: 116 MG/DL — HIGH (ref 70–99)
GLUCOSE SERPL-MCNC: 117 MG/DL — HIGH (ref 70–99)
HCT VFR BLD CALC: 32.5 % — LOW (ref 34.5–45)
HGB BLD-MCNC: 10.2 G/DL — LOW (ref 11.5–15.5)
IMM GRANULOCYTES NFR BLD AUTO: 0.8 % — SIGNIFICANT CHANGE UP (ref 0–0.9)
LYMPHOCYTES # BLD AUTO: 1.21 K/UL — SIGNIFICANT CHANGE UP (ref 1–3.3)
LYMPHOCYTES # BLD AUTO: 14.4 % — SIGNIFICANT CHANGE UP (ref 13–44)
MAGNESIUM SERPL-MCNC: 2.1 MG/DL — SIGNIFICANT CHANGE UP (ref 1.6–2.6)
MCHC RBC-ENTMCNC: 29.7 PG — SIGNIFICANT CHANGE UP (ref 27–34)
MCHC RBC-ENTMCNC: 31.4 GM/DL — LOW (ref 32–36)
MCV RBC AUTO: 94.8 FL — SIGNIFICANT CHANGE UP (ref 80–100)
MONOCYTES # BLD AUTO: 0.84 K/UL — SIGNIFICANT CHANGE UP (ref 0–0.9)
MONOCYTES NFR BLD AUTO: 10 % — SIGNIFICANT CHANGE UP (ref 2–14)
NEUTROPHILS # BLD AUTO: 5.94 K/UL — SIGNIFICANT CHANGE UP (ref 1.8–7.4)
NEUTROPHILS NFR BLD AUTO: 70.9 % — SIGNIFICANT CHANGE UP (ref 43–77)
NRBC # BLD: 0 /100 WBCS — SIGNIFICANT CHANGE UP (ref 0–0)
PHOSPHATE SERPL-MCNC: 3.5 MG/DL — SIGNIFICANT CHANGE UP (ref 2.5–4.5)
PLATELET # BLD AUTO: 428 K/UL — HIGH (ref 150–400)
POTASSIUM SERPL-MCNC: 3.7 MMOL/L — SIGNIFICANT CHANGE UP (ref 3.5–5.3)
POTASSIUM SERPL-SCNC: 3.7 MMOL/L — SIGNIFICANT CHANGE UP (ref 3.5–5.3)
PROT SERPL-MCNC: 6.2 G/DL — SIGNIFICANT CHANGE UP (ref 6–8.3)
RBC # BLD: 3.43 M/UL — LOW (ref 3.8–5.2)
RBC # FLD: 13.1 % — SIGNIFICANT CHANGE UP (ref 10.3–14.5)
SODIUM SERPL-SCNC: 142 MMOL/L — SIGNIFICANT CHANGE UP (ref 135–145)
WBC # BLD: 8.39 K/UL — SIGNIFICANT CHANGE UP (ref 3.8–10.5)
WBC # FLD AUTO: 8.39 K/UL — SIGNIFICANT CHANGE UP (ref 3.8–10.5)

## 2022-09-29 PROCEDURE — 36573 INSJ PICC RS&I 5 YR+: CPT | Mod: LT

## 2022-09-29 PROCEDURE — 36573 INSJ PICC RS&I 5 YR+: CPT

## 2022-09-29 RX ORDER — ELECTROLYTE SOLUTION,INJ
1 VIAL (ML) INTRAVENOUS
Refills: 0 | Status: DISCONTINUED | OUTPATIENT
Start: 2022-09-29 | End: 2022-09-29

## 2022-09-29 RX ORDER — SODIUM CHLORIDE 9 MG/ML
10 INJECTION INTRAMUSCULAR; INTRAVENOUS; SUBCUTANEOUS
Refills: 0 | Status: DISCONTINUED | OUTPATIENT
Start: 2022-09-29 | End: 2022-09-29

## 2022-09-29 RX ORDER — I.V. FAT EMULSION 20 G/100ML
0.38 EMULSION INTRAVENOUS
Qty: 50 | Refills: 0 | Status: DISCONTINUED | OUTPATIENT
Start: 2022-09-29 | End: 2022-09-29

## 2022-09-29 RX ADMIN — PANTOPRAZOLE SODIUM 40 MILLIGRAM(S): 20 TABLET, DELAYED RELEASE ORAL at 13:24

## 2022-09-29 RX ADMIN — ENOXAPARIN SODIUM 40 MILLIGRAM(S): 100 INJECTION SUBCUTANEOUS at 18:31

## 2022-09-29 RX ADMIN — I.V. FAT EMULSION 20.8 GM/KG/DAY: 20 EMULSION INTRAVENOUS at 22:15

## 2022-09-29 RX ADMIN — ERTAPENEM SODIUM 120 MILLIGRAM(S): 1 INJECTION, POWDER, LYOPHILIZED, FOR SOLUTION INTRAMUSCULAR; INTRAVENOUS at 13:25

## 2022-09-29 RX ADMIN — CHLORHEXIDINE GLUCONATE 1 APPLICATION(S): 213 SOLUTION TOPICAL at 13:23

## 2022-09-29 RX ADMIN — Medication 1 EACH: at 22:17

## 2022-09-29 NOTE — CHART NOTE - NSCHARTNOTEFT_GEN_A_CORE
Assessment:  2F s/p exploratory laparotomy, Small Bowel Resection, and hernia repair . large volume feculent drainage from MIL. Pt NPO (w/ clear liquids)/ prolonged, TPN started , now full dose, cyclying (order for today to hang 10PM, includes 12hr cycle , with full dose (50 gm lipids). Pt s/p CAT scan  PM and has been started on clear liquids. Pt seen in AM, PN infusing, pt took 1 broth and tea. For lunch pt also took lemon ice. Pt seems to be tolerating so far. Discussed with Sloane LYON, RN Manager and ANDREY MD.  Pt now s/p Single lumen to double lumen replacement.        (19 Sep 2022 11:46)      Factors impacting intake: [ ] none [ ] nausea  [ ] vomiting [ ] diarrhea [ ] constipation  [ ]chewing problems [ ] swallowing issues  [x ] other: altered GI fx/ structure    Diet Prescription: Diet, Clear Liquid (22 @ 07:31)        Daily     Daily Weight in k.7 (29 Sep 2022 08:20)  Weight in k.3 (29 Sep 2022 06:00)  Weight in k.1 (28 Sep 2022 08:40)  Weight in k.2 (23 Sep 2022 08:54)  Weight in k (22 Sep 2022 16:45)  Weight in k.7 (17 Sep 2022 07:00)    % Weight Change: see above    Pertinent Medications: MEDICATIONS  (STANDING):  chlorhexidine 2% Cloths 1 Application(s) Topical daily  enoxaparin Injectable 40 milliGRAM(s) SubCutaneous every 24 hours  ertapenem  IVPB 1000 milliGRAM(s) IV Intermittent every 24 hours  fat emulsion (Fish Oil and Plant Based) 20% Infusion 0.3834 Gm/kG/Day (20.8 mL/Hr) IV Continuous <Continuous>  insulin lispro (ADMELOG) corrective regimen sliding scale   SubCutaneous three times a day with meals  pantoprazole  Injectable 40 milliGRAM(s) IV Push daily  Parenteral Nutrition - Adult 1 Each TPN Continuous <Continuous>  Parenteral Nutrition - Adult 1 Each TPN Continuous <Continuous>  potassium chloride  10 mEq/100 mL IVPB 10 milliEquivalent(s) IV Intermittent every 1 hour    MEDICATIONS  (PRN):  ondansetron Injectable 4 milliGRAM(s) IV Push every 6 hours PRN Nausea  sodium chloride 0.9% lock flush 10 milliLiter(s) IV Push every 1 hour PRN Pre/post blood products, medications, blood draw, and to maintain line patency    Pertinent Labs:  Na142 mmol/L Glu 117 mg/dL<H> K+ 3.7 mmol/L Cr  0.66 mg/dL BUN 17 mg/dL  Phos 3.5 mg/dL  Alb 2.4 g/dL<L>  PAB 19 mg/dL<L>  Chol --    LDL --    HDL --    Trig 195 mg/dL<H>          POCT Blood Glucose.: 116 mg/dL (29 Sep 2022 12:18)  POCT Blood Glucose.: 79 mg/dL (28 Sep 2022 21:05)        Estimated Needs:   [x ] no change since previous assessment  [ ] recalculated:       Previous Nutrition Diagnosis:   [ ] Altered GI function  [ ]Inadequate Oral Intake [ ] Swallowing Difficulty   [ ] Altered nutrition related labs [ ] Increased Nutrient Needs [ ] Overweight/Obesity   [ ] Unintended Weight Loss [ ] Food & Nutrition Related Knowledge Deficit [x ] Malnutrition (severe PCM)  [ ] Other:     Nutrition Diagnosis is [x ] ongoing  [ ] resolved [ ] not applicable     Interventions:   Recommend  [ ] Change Diet To:  [ ] Nutrition Supplement  [x ] Nutrition Support: PN orders per ANDREY MD  [x ] Other: Diet advancement per Sx. MD to monitor. RD available.     Monitoring and Evaluation:   [x ] PO intake [ x ] Tolerance to diet prescription [ x ] weights [ x ] labs[ x ] follow up per protocol  [ ] other: Assessment:  62F s/p exploratory laparotomy, Small Bowel Resection, and hernia repair . large volume feculent drainage from MIL. Pt NPO (w/ clear liquids)/ prolonged, TPN started , now full dose, cyclying (order for today to hang 10PM, includes 12hr cycle , with full dose (50 gm lipids). Pt s/p CAT scan  PM and has been started on clear liquids. Pt seen in AM, PN infusing, pt took 1 broth and tea. For lunch pt also took lemon ice. Pt seems to be tolerating so far. Discussed with Sloane LYON, RN Manager and ANDREY MD.  Pt now s/p Single lumen to double lumen replacement.        (19 Sep 2022 11:46)      Factors impacting intake: [ ] none [ ] nausea  [ ] vomiting [ ] diarrhea [ ] constipation  [ ]chewing problems [ ] swallowing issues  [x ] other: altered GI fx/ structure    Diet Prescription: Diet, Clear Liquid (22 @ 07:31)        Daily     Daily Weight in k.7 (29 Sep 2022 08:20)  Weight in k.3 (29 Sep 2022 06:00)  Weight in k.1 (28 Sep 2022 08:40)  Weight in k.2 (23 Sep 2022 08:54)  Weight in k (22 Sep 2022 16:45)  Weight in k.7 (17 Sep 2022 07:00)    % Weight Change: see above    Pertinent Medications: MEDICATIONS  (STANDING):  chlorhexidine 2% Cloths 1 Application(s) Topical daily  enoxaparin Injectable 40 milliGRAM(s) SubCutaneous every 24 hours  ertapenem  IVPB 1000 milliGRAM(s) IV Intermittent every 24 hours  fat emulsion (Fish Oil and Plant Based) 20% Infusion 0.3834 Gm/kG/Day (20.8 mL/Hr) IV Continuous <Continuous>  insulin lispro (ADMELOG) corrective regimen sliding scale   SubCutaneous three times a day with meals  pantoprazole  Injectable 40 milliGRAM(s) IV Push daily  Parenteral Nutrition - Adult 1 Each TPN Continuous <Continuous>  Parenteral Nutrition - Adult 1 Each TPN Continuous <Continuous>  potassium chloride  10 mEq/100 mL IVPB 10 milliEquivalent(s) IV Intermittent every 1 hour    MEDICATIONS  (PRN):  ondansetron Injectable 4 milliGRAM(s) IV Push every 6 hours PRN Nausea  sodium chloride 0.9% lock flush 10 milliLiter(s) IV Push every 1 hour PRN Pre/post blood products, medications, blood draw, and to maintain line patency    Pertinent Labs:  Na142 mmol/L Glu 117 mg/dL<H> K+ 3.7 mmol/L Cr  0.66 mg/dL BUN 17 mg/dL  Phos 3.5 mg/dL  Alb 2.4 g/dL<L>  PAB 19 mg/dL<L>  Chol --    LDL --    HDL --    Trig 195 mg/dL<H>          POCT Blood Glucose.: 116 mg/dL (29 Sep 2022 12:18)  POCT Blood Glucose.: 79 mg/dL (28 Sep 2022 21:05)        Estimated Needs:   [x ] no change since previous assessment  [ ] recalculated:       Previous Nutrition Diagnosis:   [ ] Altered GI function  [ ]Inadequate Oral Intake [ ] Swallowing Difficulty   [ ] Altered nutrition related labs [ ] Increased Nutrient Needs [ ] Overweight/Obesity   [ ] Unintended Weight Loss [ ] Food & Nutrition Related Knowledge Deficit [x ] Malnutrition (severe PCM)  [ ] Other:     Nutrition Diagnosis is [x ] ongoing  [ ] resolved [ ] not applicable     Interventions:   Recommend  [ ] Change Diet To:  [ ] Nutrition Supplement  [x ] Nutrition Support: PN orders per ANDREY DONNELLY  [x ] Other: Diet advancement per Sx. MD to monitor. RD available.     Monitoring and Evaluation:   [x ] PO intake [ x ] Tolerance to diet prescription [ x ] weights [ x ] labs[ x ] follow up per protocol  [ ] other:

## 2022-09-29 NOTE — PROGRESS NOTE ADULT - ASSESSMENT
Patient is a 62y Female who presented to the hospital with abdominal pain and was found to have an incarcerated hernia.  She is s/p exploratory laparotomy, Small Bowel Resection, and hernia repair 9/16.  She had large volume feculent drainage from MIL and was found to have Focal Peritonitis / intraabdominal collection - secondary to an anastomotic leak.  She has been NPO since 9/14 (9 days) and is planned for further NPO.  Surgical plan is to give further time for Abx to work, then for further surgical intervention and possibly further NPO afterward. Consulted for TPN    1. Anastomotic leak with focal peritonitis resulting in prolonged NPO with pt at risk for PCM.  Pt tolerating TPN.  mild intermittent SOB?  Lungs clear.  Will plan to cycle TPN to 12 hr. Pt for IR double lumen PICC placement today; do not use single lumen PICC for abx. Pt started on CLD this am. Will c/w full dose TPN for now.  on 9/27; c/w TPN with lipids @50g @2L total volume. Will taper TPN once tolerating PO intake.    Calculations below.  FS acceptable. Check FS q6h.  hgbA1C 6.0  Will adjust electrolytes accordingly. Check CMP/Mg/Phos/ Ionized calcium in am.   Daily weights. Strict I/O. Hold TPN if pt spikes fever and check BCX x2    2. Dehydration- with contraction alkalosis. No acetate in TPN for now. Continue TPN volume @ 2L  LE edema due to 3rd spacing; avoid diuretics; consider compression devices.     3. Hypokalemia- resolved    4. Focal Peritonitis-    Abx choice per ID.  F/u ID      TPN calculations:   Shade 1500 goal (not quite there yet)  AA 100g --> 400 shade   --------->  AA 140g  ---->560 shade  Lipids 50g --> 500 shade    CHO 180g --> 600 shade  --------> CHO 180g --> 600 shade   Discussed with RD as well.      [facility] NEPHROLOGY  Anibal Clifton M.D.  Dylan Vieyra D.O.  Debbi Suarez M.D.  Sharmila Villarreal, MSN, ANP-C    Telephone: [phone]  Facsimile: [phone]    [id number] [place] [id number]

## 2022-09-29 NOTE — PROCEDURE NOTE - NSINDICATIONS_GEN_A_CORE
Total Parenteral Nutrition/TPN
drainage
arterial puncture to obtain ABG's/critical patient/monitoring purposes
antibiotic therapy/hyperalimentation administration
critical illness/emergency venous access/volume resuscitation

## 2022-09-29 NOTE — PROCEDURE NOTE - NSPOSTCAREGUIDE_GEN_A_CORE
Care for catheter as per unit/ICU protocols
Verbal/written post procedure instructions were given to patient/caregiver/Care for catheter as per unit/ICU protocols
Verbal/written post procedure instructions were given to patient/caregiver/Care for catheter as per unit/ICU protocols

## 2022-09-29 NOTE — PROGRESS NOTE ADULT - ASSESSMENT
62y.o. Female with enteric leak s/p ex-lap, small resection, primary ventral hernia repair POD#13    -PO challenge. Start clear liquid diet.   -Observe MIL drainage  -con't TPN for now, poss taper if tolerate clears without increase in MIL drainage  -Daily packing change with packing into tunnel  -Pain control prn

## 2022-09-29 NOTE — PROGRESS NOTE ADULT - SUBJECTIVE AND OBJECTIVE BOX
[facility] NEPHROLOGY- METABOLIC SUPPORT SERVICE PROGRESS NOTE    Patient is a 62y Female who presented to the hospital with abdominal pain and was found to have an incarcerated hernia.  She is s/p exploratory laparotomy, Small Bowel Resection, and hernia repair 9/16.  She had large volume feculent drainage from MIL and was found to have Focal Peritonitis / intraabdominal collection - secondary to an anastomotic leak.  She has been NPO since 9/14 (9 days) and is planned for further NPO.  Surgical plan is to give further time for Abx to work, then for further surgical intervention and possibly further NPO afterward. Consulted for TPN        REVIEW OF SYSTEMS: no cp, sob, dysuria, swelling. + abdominal discomfort  +diarrhea    VITALS:  T(F): 98 (09-29-22 @ 06:00), Max: 98.3 (09-28-22 @ 20:12)  HR: 63 (09-29-22 @ 06:00)  BP: 150/81 (09-29-22 @ 06:00)  RR: 18 (09-29-22 @ 06:00)  SpO2: 97% (09-29-22 @ 06:00)  Wt(kg): --        PHYSICAL EXAM:  Constitutional: obese, appears fairly well-nourished  HEENT:, anicteric sclera,  Respiratory: CTAB, no wheezes, rales or rhonchi  Cardiovascular: S1, S2, RRR  Gastrointestinal: Midline dressing c/d/i.   Ostomy bag placed over MIL tubing with feculent watery output  Extremities:  +2 LE edema  Vascular Access: left UE PICC - benign    LABS:  09-29    142  |  111<H>  |  17  ----------------------------<  117<H>  3.7   |  22  |  0.66    Ca    8.3<L>      29 Sep 2022 05:20  Phos  3.5     09-29  Mg     2.1     09-29    TPro  6.2  /  Alb  2.4<L>  /  TBili  0.4  /  DBili      /  AST  29  /  ALT  97<H>  /  AlkPhos  100  09-29    Creatinine Trend: 0.66 <--, 0.71 <--, 0.61 <--, 0.59 <--, 0.63 <--, 0.62 <--, 0.76 <--                        10.2   8.39  )-----------( 428      ( 29 Sep 2022 05:20 )             32.5     Urine Studies:      < from: CT Abdomen and Pelvis w/ Oral Cont (09.28.22 @ 18:11) >    ACC: [id number] EXAM:  CT ABDOMEN AND PELVIS OC                          PROCEDURE DATE:  09/28/2022        < end of copied text >  < from: CT Abdomen and Pelvis w/ Oral Cont (09.28.22 @ 18:11) >  IMPRESSION:  Right lower quadrant abdominal wall thickening collection with drainage   catheter reidentified now demonstrating enteric contrast and probable   communication with the small bowel anastomotic segment.    Small developing abdominal wall collection deep to the midline incision.      < end of copied text >

## 2022-09-29 NOTE — PROGRESS NOTE ADULT - SUBJECTIVE AND OBJECTIVE BOX
INTERVAL HPI/OVERNIGHT EVENTS:  Pt resting comfortably. No acute complaints.   NPO.  No further leak from ostomy bag.   +flatus/BM.   Denies N/V    MEDICATIONS  (STANDING):  chlorhexidine 2% Cloths 1 Application(s) Topical daily  enoxaparin Injectable 40 milliGRAM(s) SubCutaneous every 24 hours  ertapenem  IVPB 1000 milliGRAM(s) IV Intermittent every 24 hours  fat emulsion (Fish Oil and Plant Based) 20% Infusion 0.3834 Gm/kG/Day (20.8 mL/Hr) IV Continuous <Continuous>  insulin lispro (ADMELOG) corrective regimen sliding scale   SubCutaneous three times a day with meals  pantoprazole  Injectable 40 milliGRAM(s) IV Push daily  Parenteral Nutrition - Adult 1 Each TPN Continuous <Continuous>  potassium chloride  10 mEq/100 mL IVPB 10 milliEquivalent(s) IV Intermittent every 1 hour    MEDICATIONS  (PRN):  ondansetron Injectable 4 milliGRAM(s) IV Push every 6 hours PRN Nausea  sodium chloride 0.9% lock flush 10 milliLiter(s) IV Push every 1 hour PRN Pre/post blood products, medications, blood draw, and to maintain line patency    Vital Signs Last 24 Hrs  T(C): 36.7 (29 Sep 2022 06:00), Max: 36.8 (28 Sep 2022 20:12)  T(F): 98 (29 Sep 2022 06:00), Max: 98.3 (28 Sep 2022 20:12)  HR: 63 (29 Sep 2022 06:00) (60 - 70)  BP: 150/81 (29 Sep 2022 06:00) (114/66 - 164/83)  BP(mean): --  RR: 18 (29 Sep 2022 06:00) (16 - 18)  SpO2: 97% (29 Sep 2022 06:00) (97% - 99%)    Physical:  General: A&Ox3. NAD.  Abdomen: Soft nondistended, protuberant. midline wound with soiled packing. Granulating tissue along wound wall. Mild fibrinous slough at wound base. Retention suture in place. At right side wound at level of lower retention suture, 6.5cm tunneling to level of fascia tracking superiorly. Seropurulent drainage expressed.    LABS:                        10.2   8.39  )-----------( 428      ( 29 Sep 2022 05:20 )             32.5             09-29    142  |  111<H>  |  17  ----------------------------<  117<H>  3.7   |  22  |  0.66    Ca    8.3<L>      29 Sep 2022 05:20  Phos  3.5     09-29  Mg     2.1     09-29    TPro  6.2  /  Alb  2.4<L>  /  TBili  0.4  /  DBili  x   /  AST  29  /  ALT  97<H>  /  AlkPhos  100  09-29    < from: CT Abdomen and Pelvis w/ Oral Cont (09.28.22 @ 18:11) >  BOWEL/abdominal wall: No bowel obstruction. Appendix is not visualized   and cannot be assessed. Enteric contrast is present throughout the colon.   Again noted is a feculent appearing collection in the right lower   quadrant abdominal wall which again contains a drainage catheter and is   relatively unchanged in size measuring 9.9 x 6.5 cm. Enteric contrast is   now present within the collection. There appears to be a tract from the   collection to the small bowel anastomotic segment (4:46-49). A midline   incision with packing material is reidentified. There is a 4.1 x 2.3 cm   collection (2:72) within the abdominal wall deep to the incision   containing an air-fluid level.    < end of copied text >

## 2022-09-29 NOTE — PROCEDURE NOTE - NSPROCDETAILS_GEN_ALL_CORE
nasogastric
location identified, draped/prepped, sterile technique used, needle inserted/introduced/positive blood return obtained via catheter/connected to a pressurized flush line/sutured in place/hemostasis with direct pressure, dressing applied/Seldinger technique/all materials/supplies accounted for at end of procedure
guidewire recovered/lumen(s) aspirated and flushed/sterile dressing applied/sterile technique, catheter placed/ultrasound guidance with use of sterile gel and probe cove
location identified, draped/prepped, sterile technique used/sterile dressing applied/sterile technique, catheter placed/supine position/ultrasound guidance
location identified, draped/prepped, sterile technique used/sterile dressing applied/sterile technique, catheter placed/ultrasound guidance

## 2022-09-29 NOTE — PROCEDURE NOTE - NSSITEPREP_SKIN_A_CORE
chlorhexidine/Adherence to aseptic technique: hand hygiene prior to donning barriers (gown, gloves), don cap and mask, sterile drape over patient
chlorhexidine
chlorhexidine/Adherence to aseptic technique: hand hygiene prior to donning barriers (gown, gloves), don cap and mask, sterile drape over patient
alcohol/chlorhexidine

## 2022-09-29 NOTE — PROCEDURE NOTE - NSICDXPROCEDURE_GEN_ALL_CORE_FT
PROCEDURES:  FL guided PICC insertion 22-Sep-2022 12:07:56  Ramandeep Jalloh  
PROCEDURES:  FL guided PICC insertion 22-Sep-2022 12:07:56  Ramandeep Jalloh, PICC, with imaging guidance 29-Sep-2022 13:09:09  Ramandeep Jalloh  
PROCEDURES:  Insertion, arterial line, percutaneous 17-Sep-2022 22:48:39  Handy Cota R

## 2022-09-29 NOTE — PROCEDURE NOTE - ADDITIONAL PROCEDURE DETAILS
42 cm  5 Fr  PICC exchanged via the existing tract.  Sterile dressing applied.  Tip location SVC/ RA Junction.

## 2022-09-29 NOTE — PROCEDURE NOTE - NSINFORMCONSENT_GEN_A_CORE
Benefits, risks, and possible complications of procedure explained to patient/caregiver who verbalized understanding and gave written consent.
Benefits, risks, and possible complications of procedure explained to patient/caregiver who verbalized understanding and gave written consent.
Benefits, risks, and possible complications of procedure explained to patient/caregiver who verbalized understanding and gave verbal consent.
Benefits, risks, and possible complications of procedure explained to patient/caregiver who verbalized understanding and gave verbal consent.
This was an emergent procedure.

## 2022-09-29 NOTE — PROGRESS NOTE ADULT - SUBJECTIVE AND OBJECTIVE BOX
62y Female is under our care for incarcerated hernia, s/p sb resection repair of strangulated incisional hernia and focal peritonitis / intraabdominal collection secondary to an anastomotic leak. Patient is in better spirits today and content that she was upgraded to clears diet. Repeat CT A/P was done yesterday, showed no significant change in size of abscess and additional developing small abscess by wall. Remains afebrile with normal wbc count. About to go to IR for exchange to dual-lumen PICC line.     MEDS:  ertapenem  IVPB 1000 milliGRAM(s) IV Intermittent every 24 hours    ALLERGIES: Allergies    No Known Drug Allergies  peanuts (Anaphylaxis)    Intolerances    REVIEW OF SYSTEMS:  [  ] Not able to illicit  General: no fevers no malaise  Chest: no cough no sob  GI: no nvd  : no urinary sxs   Skin: no rashes  Musculoskeletal: no trauma no LBP  Neuro: no ha's no dizziness     VITALS:  Vital Signs Last 24 Hrs  T(C): 36.7 (29 Sep 2022 06:00), Max: 36.8 (28 Sep 2022 20:12)  T(F): 98 (29 Sep 2022 06:00), Max: 98.3 (28 Sep 2022 20:12)  HR: 63 (29 Sep 2022 06:00) (60 - 70)  BP: 150/81 (29 Sep 2022 06:00) (114/66 - 164/83)  BP(mean): --  RR: 18 (29 Sep 2022 06:00) (16 - 18)  SpO2: 97% (29 Sep 2022 06:00) (97% - 99%)    Parameters below as of 29 Sep 2022 06:00  Patient On (Oxygen Delivery Method): room air    PHYSICAL EXAM:  HEENT: n/a  Neck: supple no LN's   Respiratory: lungs clear no rales  Cardiovascular: S1 S2 reg no murmurs  Gastrointestinal: +BS with soft, mildly distended abdomen; nontender +RUQ drain with watery feculant fluid  Extremities: no edema, left arm PICC  Skin: no rashes  Ortho: n/a  Neuro: awake and alert      LABS/DIAGNOSTIC TESTS:                        10.2   8.39  )-----------( 428      ( 29 Sep 2022 05:20 )             32.5     WBC Count: 8.39 K/uL (09-29 @ 05:20)  WBC Count: 9.72 K/uL (09-28 @ 09:51)  WBC Count: 11.69 K/uL (09-27 @ 07:35)  WBC Count: 11.66 K/uL (09-25 @ 07:16)    09-29    142  |  111<H>  |  17  ----------------------------<  117<H>  3.7   |  22  |  0.66    Ca    8.3<L>      29 Sep 2022 05:20  Phos  3.5     09-29  Mg     2.1     09-29    TPro  6.2  /  Alb  2.4<L>  /  TBili  0.4  /  DBili  x   /  AST  29  /  ALT  97<H>  /  AlkPhos  100  09-29      CULTURES:   Catheterized Catheterized  09-18 @ 11:06   No growth  --  --      .Blood Blood-Venous  09-17 @ 09:55   No Growth Final  --  --        RADIOLOGY:    ACC: [id number] EXAM:  CT ABDOMEN AND PELVIS OC                          PROCEDURE DATE:  09/28/2022          INTERPRETATION:  CLINICAL INFORMATION: 62 years  Female with Enteric leak      eval leak. Status post repair of incisional hernia with smallbowel   obstruction 9/16/2022.    COMPARISON: 9/20/2022    CONTRAST/COMPLICATIONS:  IV Contrast: NONE  Oral Contrast: Gastroview  Complications: None reported at time of study completion    PROCEDURE:  CT of the Abdomen and Pelvis was performed.  Sagittal and coronal reformats were performed.    FINDINGS:  LOWER CHEST: Within normal limits.    LIVER: Within normal limits.  BILE DUCTS: Normal caliber.  GALLBLADDER: Cholecystectomy.  SPLEEN: Within normal limits.  PANCREAS: Within normal limits.  ADRENALS: Within normal limits.  KIDNEYS/URETERS: Within normal limits.    BLADDER: Within normal limits.  REPRODUCTIVE ORGANS: Hysterectomy.    BOWEL/abdominal wall: No bowel obstruction. Appendix is not visualized   and cannot be assessed. Enteric contrast is present throughout the colon.   Again noted is a feculent appearing collection in the right lower   quadrant abdominal wall which again contains a drainage catheter and is   relatively unchanged in size measuring 9.9 x 6.5 cm. Enteric contrast is   now present within the collection. There appears to be a tract from the   collection to the small bowel anastomotic segment (4:46-49). A midline   incision with packing material is reidentified. There is a 4.1 x 2.3 cm   collection (2:72) within the abdominal wall deep to the incision   containing an air-fluid level.  PERITONEUM: Small ascites. No pneumoperitoneum.  VESSELS: Within normal limits.  RETROPERITONEUM/LYMPH NODES: No lymphadenopathy.  BONES: Degenerative changes.    IMPRESSION:  Right lower quadrant abdominal wall thickening collection with drainage   catheter reidentified now demonstrating enteric contrast and probable   communication with the small bowel anastomotic segment.    Small developing abdominal wall collection deep to the midline incision.

## 2022-09-29 NOTE — PROGRESS NOTE ADULT - ASSESSMENT
Incarcerated Hernia - s/p sb resection repair of strangulated incisional hernia  Focal Peritonitis / intraabdominal collection - secondary to an anastomotic leak    Plan:  ·	Cont Invanz 1 gm iv q24hrs  ·	Cont TPN, was started on clears diet this am  ·	about to go to IR for exchange to dual-lumen PICC line

## 2022-09-29 NOTE — PROCEDURE NOTE - NSPROCNAME_GEN_A_CORE
Central Line Insertion
Gastric Intubation/Gastric Lavage
PICC Line Insertion
PICC Line Insertion
Arterial Puncture/Cannulation

## 2022-09-30 LAB
ALBUMIN SERPL ELPH-MCNC: 2.3 G/DL — LOW (ref 3.5–5)
ALP SERPL-CCNC: 101 U/L — SIGNIFICANT CHANGE UP (ref 40–120)
ALT FLD-CCNC: 85 U/L DA — HIGH (ref 10–60)
ANION GAP SERPL CALC-SCNC: 6 MMOL/L — SIGNIFICANT CHANGE UP (ref 5–17)
AST SERPL-CCNC: 21 U/L — SIGNIFICANT CHANGE UP (ref 10–40)
BASOPHILS # BLD AUTO: 0.06 K/UL — SIGNIFICANT CHANGE UP (ref 0–0.2)
BASOPHILS NFR BLD AUTO: 0.7 % — SIGNIFICANT CHANGE UP (ref 0–2)
BILIRUB SERPL-MCNC: 0.3 MG/DL — SIGNIFICANT CHANGE UP (ref 0.2–1.2)
BUN SERPL-MCNC: 24 MG/DL — HIGH (ref 7–18)
CA-I BLD-SCNC: 4.8 MG/DL — SIGNIFICANT CHANGE UP (ref 4.5–5.6)
CALCIUM SERPL-MCNC: 8.1 MG/DL — LOW (ref 8.4–10.5)
CHLORIDE SERPL-SCNC: 111 MMOL/L — HIGH (ref 96–108)
CO2 SERPL-SCNC: 23 MMOL/L — SIGNIFICANT CHANGE UP (ref 22–31)
CREAT SERPL-MCNC: 0.67 MG/DL — SIGNIFICANT CHANGE UP (ref 0.5–1.3)
EGFR: 98 ML/MIN/1.73M2 — SIGNIFICANT CHANGE UP
EOSINOPHIL # BLD AUTO: 0.25 K/UL — SIGNIFICANT CHANGE UP (ref 0–0.5)
EOSINOPHIL NFR BLD AUTO: 3 % — SIGNIFICANT CHANGE UP (ref 0–6)
GLUCOSE BLDC GLUCOMTR-MCNC: 104 MG/DL — HIGH (ref 70–99)
GLUCOSE BLDC GLUCOMTR-MCNC: 120 MG/DL — HIGH (ref 70–99)
GLUCOSE BLDC GLUCOMTR-MCNC: 90 MG/DL — SIGNIFICANT CHANGE UP (ref 70–99)
GLUCOSE BLDC GLUCOMTR-MCNC: 95 MG/DL — SIGNIFICANT CHANGE UP (ref 70–99)
GLUCOSE SERPL-MCNC: 130 MG/DL — HIGH (ref 70–99)
HCT VFR BLD CALC: 34.5 % — SIGNIFICANT CHANGE UP (ref 34.5–45)
HGB BLD-MCNC: 10.8 G/DL — LOW (ref 11.5–15.5)
IMM GRANULOCYTES NFR BLD AUTO: 1 % — HIGH (ref 0–0.9)
LYMPHOCYTES # BLD AUTO: 1.27 K/UL — SIGNIFICANT CHANGE UP (ref 1–3.3)
LYMPHOCYTES # BLD AUTO: 15.4 % — SIGNIFICANT CHANGE UP (ref 13–44)
MAGNESIUM SERPL-MCNC: 1.9 MG/DL — SIGNIFICANT CHANGE UP (ref 1.6–2.6)
MCHC RBC-ENTMCNC: 29.8 PG — SIGNIFICANT CHANGE UP (ref 27–34)
MCHC RBC-ENTMCNC: 31.3 GM/DL — LOW (ref 32–36)
MCV RBC AUTO: 95.3 FL — SIGNIFICANT CHANGE UP (ref 80–100)
MONOCYTES # BLD AUTO: 0.97 K/UL — HIGH (ref 0–0.9)
MONOCYTES NFR BLD AUTO: 11.8 % — SIGNIFICANT CHANGE UP (ref 2–14)
NEUTROPHILS # BLD AUTO: 5.61 K/UL — SIGNIFICANT CHANGE UP (ref 1.8–7.4)
NEUTROPHILS NFR BLD AUTO: 68.1 % — SIGNIFICANT CHANGE UP (ref 43–77)
NRBC # BLD: 0 /100 WBCS — SIGNIFICANT CHANGE UP (ref 0–0)
PHOSPHATE SERPL-MCNC: 3 MG/DL — SIGNIFICANT CHANGE UP (ref 2.5–4.5)
PLATELET # BLD AUTO: 385 K/UL — SIGNIFICANT CHANGE UP (ref 150–400)
POTASSIUM SERPL-MCNC: 4.5 MMOL/L — SIGNIFICANT CHANGE UP (ref 3.5–5.3)
POTASSIUM SERPL-SCNC: 4.5 MMOL/L — SIGNIFICANT CHANGE UP (ref 3.5–5.3)
PROT SERPL-MCNC: 6.4 G/DL — SIGNIFICANT CHANGE UP (ref 6–8.3)
RBC # BLD: 3.62 M/UL — LOW (ref 3.8–5.2)
RBC # FLD: 13 % — SIGNIFICANT CHANGE UP (ref 10.3–14.5)
SODIUM SERPL-SCNC: 140 MMOL/L — SIGNIFICANT CHANGE UP (ref 135–145)
TRIGL SERPL-MCNC: 178 MG/DL — HIGH
WBC # BLD: 8.24 K/UL — SIGNIFICANT CHANGE UP (ref 3.8–10.5)
WBC # FLD AUTO: 8.24 K/UL — SIGNIFICANT CHANGE UP (ref 3.8–10.5)

## 2022-09-30 RX ORDER — I.V. FAT EMULSION 20 G/100ML
0.19 EMULSION INTRAVENOUS
Qty: 25 | Refills: 0 | Status: DISCONTINUED | OUTPATIENT
Start: 2022-09-30 | End: 2022-09-30

## 2022-09-30 RX ORDER — ELECTROLYTE SOLUTION,INJ
1 VIAL (ML) INTRAVENOUS
Refills: 0 | Status: DISCONTINUED | OUTPATIENT
Start: 2022-09-30 | End: 2022-09-30

## 2022-09-30 RX ADMIN — CHLORHEXIDINE GLUCONATE 1 APPLICATION(S): 213 SOLUTION TOPICAL at 16:13

## 2022-09-30 RX ADMIN — I.V. FAT EMULSION 10.4 GM/KG/DAY: 20 EMULSION INTRAVENOUS at 22:06

## 2022-09-30 RX ADMIN — ENOXAPARIN SODIUM 40 MILLIGRAM(S): 100 INJECTION SUBCUTANEOUS at 18:07

## 2022-09-30 RX ADMIN — ERTAPENEM SODIUM 120 MILLIGRAM(S): 1 INJECTION, POWDER, LYOPHILIZED, FOR SOLUTION INTRAMUSCULAR; INTRAVENOUS at 16:14

## 2022-09-30 RX ADMIN — PANTOPRAZOLE SODIUM 40 MILLIGRAM(S): 20 TABLET, DELAYED RELEASE ORAL at 14:15

## 2022-09-30 RX ADMIN — Medication 1 EACH: at 22:05

## 2022-09-30 NOTE — PROGRESS NOTE ADULT - ASSESSMENT
Incarcerated Hernia - s/p sb resection repair of strangulated incisional hernia  Focal Peritonitis / intraabdominal collection - secondary to an anastomotic leak    Plan:  ·	Cont Invanz 1 gm iv q24hrs  ·	Cont TPN, was started on clears diet this am  ·	s/p Left dual-lumen PICC line placement yesterday Incarcerated Hernia - s/p sb resection repair of strangulated incisional hernia  Focal Peritonitis / intraabdominal collection - secondary to an anastomotic leak    Plan:  ·	Cont Invanz 1 gm iv q24hrs  ·	TPN was stopped this morning, was started on regular diet this morning  ·	s/p Left dual-lumen PICC line placement yesterday Incarcerated Hernia - s/p sb resection repair of strangulated incisional hernia  Focal Peritonitis / intraabdominal collection - secondary to an anastomotic leak    Plan:  ·	Cont Invanz 1 gm iv q24hrs  ·	continue TPN, was started on regular fiber diet this morning  ·	s/p Left dual-lumen PICC line placement yesterday

## 2022-09-30 NOTE — PROGRESS NOTE ADULT - SUBJECTIVE AND OBJECTIVE BOX
62y Female is under our care for     REVIEW OF SYSTEMS:  [  ] Not able to elicit      MEDS:  ertapenem  IVPB 1000 milliGRAM(s) IV Intermittent every 24 hours    ALLERGIES: Allergies    No Known Drug Allergies  peanuts (Anaphylaxis)    Intolerances        VITALS:  Vital Signs Last 24 Hrs  T(C): 36.7 (30 Sep 2022 06:26), Max: 36.7 (30 Sep 2022 06:26)  T(F): 98.1 (30 Sep 2022 06:26), Max: 98.1 (30 Sep 2022 06:26)  HR: 65 (30 Sep 2022 06:26) (64 - 66)  BP: 143/80 (30 Sep 2022 06:26) (132/63 - 143/80)  BP(mean): --  RR: 16 (30 Sep 2022 06:26) (16 - 16)  SpO2: 99% (30 Sep 2022 06:26) (97% - 99%)    Parameters below as of 30 Sep 2022 06:26  Patient On (Oxygen Delivery Method): room air          PHYSICAL EXAM:      LABS/DIAGNOSTIC TESTS:                        10.8   8.24  )-----------( 385      ( 30 Sep 2022 07:00 )             34.5     WBC Count: 8.24 K/uL (09-30 @ 07:00)  WBC Count: 8.39 K/uL (09-29 @ 05:20)  WBC Count: 9.72 K/uL (09-28 @ 09:51)  WBC Count: 11.69 K/uL (09-27 @ 07:35)    09-30    140  |  111<H>  |  24<H>  ----------------------------<  130<H>  4.5   |  23  |  0.67    Ca    8.1<L>      30 Sep 2022 07:00  Phos  3.0     09-30  Mg     1.9     09-30    TPro  6.4  /  Alb  2.3<L>  /  TBili  0.3  /  DBili  x   /  AST  21  /  ALT  85<H>  /  AlkPhos  101  09-30      CULTURES:   Catheterized Catheterized  09-18 @ 11:06   No growth  --  --      .Blood Blood-Venous  09-17 @ 09:55   No Growth Final  --  --        RADIOLOGY:  no new studies 62y Female is under our care for incarcerated hernia, s/p sb resection repair of strangulated incisional hernia and focal peritonitis / intraabdominal collection secondary to an anastomotic leak.  Patient was seen sitting comfortably in the chair with no acute distress.  She is s/p left basilic PICC line yesterday, remains afebrile and denies any pain at this time.    REVIEW OF SYSTEMS:  [  ] Not able to elicit  General: no fevers no malaise  Chest: no cough no sob  GI: no nvd  : no urinary sxs   Skin: no rashes  Musculoskeletal: no trauma no LBP  Neuro: no ha's no dizziness     MEDS:  ertapenem  IVPB 1000 milliGRAM(s) IV Intermittent every 24 hours    ALLERGIES: Allergies    No Known Drug Allergies  peanuts (Anaphylaxis)    Intolerances        VITALS:  Vital Signs Last 24 Hrs  T(C): 36.7 (30 Sep 2022 06:26), Max: 36.7 (30 Sep 2022 06:26)  T(F): 98.1 (30 Sep 2022 06:26), Max: 98.1 (30 Sep 2022 06:26)  HR: 65 (30 Sep 2022 06:26) (64 - 66)  BP: 143/80 (30 Sep 2022 06:26) (132/63 - 143/80)  BP(mean): --  RR: 16 (30 Sep 2022 06:26) (16 - 16)  SpO2: 99% (30 Sep 2022 06:26) (97% - 99%)    Parameters below as of 30 Sep 2022 06:26  Patient On (Oxygen Delivery Method): room air          PHYSICAL EXAM:  HEENT: n/a  Neck: supple no LN's   Respiratory: lungs clear no rales  Cardiovascular: S1 S2 reg no murmurs  Gastrointestinal: +BS with soft, nondistended abdomen; nontender, RUQ drain in place  Extremities: no edema, left basilic PICC line  Skin: no rashes  Ortho: n/a  Neuro: AAO x 4      LABS/DIAGNOSTIC TESTS:                        10.8   8.24  )-----------( 385      ( 30 Sep 2022 07:00 )             34.5     WBC Count: 8.24 K/uL (09-30 @ 07:00)  WBC Count: 8.39 K/uL (09-29 @ 05:20)  WBC Count: 9.72 K/uL (09-28 @ 09:51)  WBC Count: 11.69 K/uL (09-27 @ 07:35)    09-30    140  |  111<H>  |  24<H>  ----------------------------<  130<H>  4.5   |  23  |  0.67    Ca    8.1<L>      30 Sep 2022 07:00  Phos  3.0     09-30  Mg     1.9     09-30    TPro  6.4  /  Alb  2.3<L>  /  TBili  0.3  /  DBili  x   /  AST  21  /  ALT  85<H>  /  AlkPhos  101  09-30      CULTURES:   Catheterized Catheterized  09-18 @ 11:06   No growth  --  --      .Blood Blood-Venous  09-17 @ 09:55   No Growth Final  --  --        RADIOLOGY:  no new studies 62y Female is under our care for incarcerated hernia, s/p sb resection repair of strangulated incisional hernia and focal peritonitis / intraabdominal collection secondary to an anastomotic leak.  Patient was seen sitting comfortably in the chair with no acute distress.  She is s/p left basilic PICC line yesterday, remains afebrile and denies any pain at this time.  Patient was started on fiber diet this morning, tolerating well and denies any abdominal pain, nausea or vomiting.    REVIEW OF SYSTEMS:  [  ] Not able to elicit  General: no fevers no malaise  Chest: no cough no sob  GI: no nvd  : no urinary sxs   Skin: no rashes  Musculoskeletal: no trauma no LBP  Neuro: no ha's no dizziness     MEDS:  ertapenem  IVPB 1000 milliGRAM(s) IV Intermittent every 24 hours    ALLERGIES: Allergies    No Known Drug Allergies  peanuts (Anaphylaxis)    Intolerances        VITALS:  Vital Signs Last 24 Hrs  T(C): 36.7 (30 Sep 2022 06:26), Max: 36.7 (30 Sep 2022 06:26)  T(F): 98.1 (30 Sep 2022 06:26), Max: 98.1 (30 Sep 2022 06:26)  HR: 65 (30 Sep 2022 06:26) (64 - 66)  BP: 143/80 (30 Sep 2022 06:26) (132/63 - 143/80)  BP(mean): --  RR: 16 (30 Sep 2022 06:26) (16 - 16)  SpO2: 99% (30 Sep 2022 06:26) (97% - 99%)    Parameters below as of 30 Sep 2022 06:26  Patient On (Oxygen Delivery Method): room air          PHYSICAL EXAM:  HEENT: n/a  Neck: supple no LN's   Respiratory: lungs clear no rales  Cardiovascular: S1 S2 reg no murmurs  Gastrointestinal: +BS with soft, nondistended abdomen; nontender, RUQ drain in place  Extremities: no edema, left basilic PICC line  Skin: no rashes  Ortho: n/a  Neuro: AAO x 4      LABS/DIAGNOSTIC TESTS:                        10.8   8.24  )-----------( 385      ( 30 Sep 2022 07:00 )             34.5     WBC Count: 8.24 K/uL (09-30 @ 07:00)  WBC Count: 8.39 K/uL (09-29 @ 05:20)  WBC Count: 9.72 K/uL (09-28 @ 09:51)  WBC Count: 11.69 K/uL (09-27 @ 07:35)    09-30    140  |  111<H>  |  24<H>  ----------------------------<  130<H>  4.5   |  23  |  0.67    Ca    8.1<L>      30 Sep 2022 07:00  Phos  3.0     09-30  Mg     1.9     09-30    TPro  6.4  /  Alb  2.3<L>  /  TBili  0.3  /  DBili  x   /  AST  21  /  ALT  85<H>  /  AlkPhos  101  09-30      CULTURES:   Catheterized Catheterized  09-18 @ 11:06   No growth  --  --      .Blood Blood-Venous  09-17 @ 09:55   No Growth Final  --  --        RADIOLOGY:  no new studies

## 2022-09-30 NOTE — PROGRESS NOTE ADULT - SUBJECTIVE AND OBJECTIVE BOX
INTERVAL HPI/OVERNIGHT EVENTS:  Pt resting comfortably. No acute complaints.   Tolerating clears.  No further leak from ostomy bag.   +flatus/BM.   Denies N/V  s/p dual lumen PICC.    MEDICATIONS  (STANDING):  chlorhexidine 2% Cloths 1 Application(s) Topical daily  enoxaparin Injectable 40 milliGRAM(s) SubCutaneous every 24 hours  ertapenem  IVPB 1000 milliGRAM(s) IV Intermittent every 24 hours  fat emulsion (Fish Oil and Plant Based) 20% Infusion 0.3834 Gm/kG/Day (20.8 mL/Hr) IV Continuous <Continuous>  fat emulsion (Fish Oil and Plant Based) 20% Infusion 0.1917 Gm/kG/Day (10.4 mL/Hr) IV Continuous <Continuous>  insulin lispro (ADMELOG) corrective regimen sliding scale   SubCutaneous three times a day with meals  pantoprazole  Injectable 40 milliGRAM(s) IV Push daily  Parenteral Nutrition - Adult 1 Each TPN Continuous <Continuous>  Parenteral Nutrition - Adult 1 Each TPN Continuous <Continuous>  potassium chloride  10 mEq/100 mL IVPB 10 milliEquivalent(s) IV Intermittent every 1 hour    MEDICATIONS  (PRN):  ondansetron Injectable 4 milliGRAM(s) IV Push every 6 hours PRN Nausea  sodium chloride 0.9% lock flush 10 milliLiter(s) IV Push every 1 hour PRN Pre/post blood products, medications, blood draw, and to maintain line patency      Vital Signs Last 24 Hrs  T(C): 36.7 (30 Sep 2022 14:56), Max: 36.7 (30 Sep 2022 06:26)  T(F): 98.1 (30 Sep 2022 14:56), Max: 98.1 (30 Sep 2022 06:26)  HR: 64 (30 Sep 2022 14:56) (64 - 66)  BP: 135/78 (30 Sep 2022 14:56) (133/74 - 143/80)  BP(mean): --  RR: 16 (30 Sep 2022 14:56) (16 - 16)  SpO2: 97% (30 Sep 2022 14:56) (97% - 99%)    Physical:  General: A&Ox3. NAD.  Abdomen: Soft nondistended, protuberant. midline wound with soiled packing. Granulating tissue along wound wall. Mild fibrinous slough at wound base. Retention suture in place. L aspect of inferior retention suture in subq tissue. At right side wound at level of lower retention suture, 6.5cm tunneling to level of fascia tracking superiorly. Seropurulent drainage expressed.      I&O's Detail    29 Sep 2022 07:01  -  30 Sep 2022 07:00  --------------------------------------------------------  IN:  Total IN: 0 mL    OUT:    Bulb (mL): 150 mL  Total OUT: 150 mL    Total NET: -150 mL    LABS:                        10.8   8.24  )-----------( 385      ( 30 Sep 2022 07:00 )             34.5             09-30    140  |  111<H>  |  24<H>  ----------------------------<  130<H>  4.5   |  23  |  0.67    Ca    8.1<L>      30 Sep 2022 07:00  Phos  3.0     09-30  Mg     1.9     09-30    TPro  6.4  /  Alb  2.3<L>  /  TBili  0.3  /  DBili  x   /  AST  21  /  ALT  85<H>  /  AlkPhos  101  09-30

## 2022-09-30 NOTE — PROGRESS NOTE ADULT - ASSESSMENT
62y.o. Female with enteric leak s/p ex-lap, small resection, primary ventral hernia repair POD#13    -Advance to low residue diet.  -Taper TPN per nephrology.  -Observe MIL drainage, poss d/c this weekend.  -Daily packing change with packing into tunnel  -Pain control prn  -OOB ambulate

## 2022-09-30 NOTE — CHART NOTE - NSCHARTNOTEFT_GEN_A_CORE
Assessment:   62F s/p exploratory laparotomy, Small Bowel Resection, and hernia repair . large volume feculent drainage from MIL. Pt NPO (w/ clear liquids)/ prolonged, TPN started , now full dose, cyclying (order for today to hang 10PM, includes 12hr cycle (1/2 dose)/ being tapered starting this PM for plan to D/C PN. (pt seen, advanced to low fiber diet. Pt seen several times (including meal replacement for food requests/ tolerances). Pt reports consuming 60% chicken leg (skin removed) and 20% mashed potato and 20 % carrots. Pt also had 1/2 cup icecream. Food prefernces also discussed with diet tech (kitchen). Pt discussed with Sx PMD and ANDREY DONNELLY.     (19 Sep 2022 11:46)      Factors impacting intake: [ ] none [ ] nausea  [ ] vomiting [ ] diarrhea [ ] constipation  [ ]chewing problems [ ] swallowing issues  [x ] other: altered GI fx/ structure    Diet Prescription: Diet, Regular:   Fiber/Residue Restricted (22 @ 08:47)        Daily     Daily Weight in k.7 (29 Sep 2022 08:20)  Weight in k.3 (29 Sep 2022 06:00)  Weight in k.1 (28 Sep 2022 08:40)  Weight in k.2 (23 Sep 2022 08:54)  Weight in k (22 Sep 2022 16:45)  Weight in k.7 (17 Sep 2022 07:00)    % Weight Change (see above)    Pertinent Medications: MEDICATIONS  (STANDING):  chlorhexidine 2% Cloths 1 Application(s) Topical daily  enoxaparin Injectable 40 milliGRAM(s) SubCutaneous every 24 hours  ertapenem  IVPB 1000 milliGRAM(s) IV Intermittent every 24 hours  fat emulsion (Fish Oil and Plant Based) 20% Infusion 0.1917 Gm/kG/Day (10.4 mL/Hr) IV Continuous <Continuous>  insulin lispro (ADMELOG) corrective regimen sliding scale   SubCutaneous three times a day with meals  pantoprazole  Injectable 40 milliGRAM(s) IV Push daily  Parenteral Nutrition - Adult 1 Each TPN Continuous <Continuous>  Parenteral Nutrition - Adult 1 Each TPN Continuous <Continuous>  potassium chloride  10 mEq/100 mL IVPB 10 milliEquivalent(s) IV Intermittent every 1 hour    MEDICATIONS  (PRN):  ondansetron Injectable 4 milliGRAM(s) IV Push every 6 hours PRN Nausea  sodium chloride 0.9% lock flush 10 milliLiter(s) IV Push every 1 hour PRN Pre/post blood products, medications, blood draw, and to maintain line patency    Pertinent Labs:  Na140 mmol/L Glu 130 mg/dL<H> K+ 4.5 mmol/L Cr  0.67 mg/dL BUN 24 mg/dL<H>  Phos 3.0 mg/dL  Alb 2.3 g/dL<L>  PAB 19 mg/dL<L>  Chol --    LDL --    HDL --    Trig 178 mg/dL<H>     CAPILLARY BLOOD GLUCOSE      POCT Blood Glucose.: 104 mg/dL (30 Sep 2022 16:24)  POCT Blood Glucose.: 90 mg/dL (30 Sep 2022 11:37)  POCT Blood Glucose.: 120 mg/dL (30 Sep 2022 05:59)  POCT Blood Glucose.: 106 mg/dL (29 Sep 2022 18:10)        Estimated Needs:   [x ] no change since previous assessment  [ ] recalculated:       Previous Nutrition Diagnosis:   [ ] Altered GI function  [ ]Inadequate Oral Intake [ ] Swallowing Difficulty   [ ] Altered nutrition related labs [ ] Increased Nutrient Needs [ ] Overweight/Obesity   [ ] Unintended Weight Loss [ ] Food & Nutrition Related Knowledge Deficit [x ] Malnutrition (severe PCM)  [ ] Other:     Nutrition Diagnosis is [x ] ongoing  [ ] resolved [ ] not applicable     Interventions:   Recommend  [x ] Continue: current PO diet order  [ ] Nutrition Supplement  [x ] Nutrition Support: PN (cycled, now being tapered for D/C)  [x ] Other: MD to monitor. RD available.     Monitoring and Evaluation:   [ x ] follow up per protocol  [ ] other:

## 2022-09-30 NOTE — PROGRESS NOTE ADULT - ASSESSMENT
Patient is a 62y Female who presented to the hospital with abdominal pain and was found to have an incarcerated hernia.  She is s/p exploratory laparotomy, Small Bowel Resection, and hernia repair 9/16.  She had large volume feculent drainage from MIL and was found to have Focal Peritonitis / intraabdominal collection - secondary to an anastomotic leak.  She has been NPO since 9/14 (9 days) and is planned for further NPO.  Surgical plan is to give further time for Abx to work, then for further surgical intervention and possibly further NPO afterward. Consulted for TPN    1. Anastomotic leak with focal peritonitis resulting in prolonged NPO with pt at risk for PCM.  Pt on TPN.  Pt tolerated CLD yesterday and diet advanced to low fiber diet today. Will taper TPN with lipids to 1/2 dose over 12hrs. Will stop by 10 am on 10/1 if tolerating diet.   FS acceptable. Check FS q6h.  hgbA1C 6.0  Will adjust electrolytes accordingly. Check CMP/Mg/Phos/ Ionized calcium in am.   Daily weights. Strict I/O. Hold TPN if pt spikes fever and check BCX x2    2. Dehydration- with contraction alkalosis. No acetate in TPN for now.   LE edema due to 3rd spacing; avoid diuretics; consider compression devices.     3. Hypokalemia- resolved    4. Focal Peritonitis-    Abx choice per ID.  F/u ID      TPN calculations:   Shade 1500 goal (not quite there yet)  AA 100g --> 400 shade   --------->  AA 140g  ---->560 shade  Lipids 50g --> 500 shade    CHO 180g --> 600 shade  --------> CHO 180g --> 600 shade   Discussed with RD as well.      [facility] NEPHROLOGY  Anibal Clifton M.D.  Dylan Vieyra D.O.  Debbi Suarez M.D.  Sharmila Villarreal, MSN, ANP-C    Telephone: [phone]  Facsimile: [phone]    [address] [place] [address]

## 2022-09-30 NOTE — PROGRESS NOTE ADULT - SUBJECTIVE AND OBJECTIVE BOX
[facility] NEPHROLOGY- METABOLIC SUPPORT SERVICE PROGRESS NOTE    Patient is a 62y Female who presented to the hospital with abdominal pain and was found to have an incarcerated hernia.  She is s/p exploratory laparotomy, Small Bowel Resection, and hernia repair 9/16.  She had large volume feculent drainage from MIL and was found to have Focal Peritonitis / intraabdominal collection - secondary to an anastomotic leak.  She has been NPO since 9/14 (9 days) and is planned for further NPO.  Surgical plan is to give further time for Abx to work, then for further surgical intervention and possibly further NPO afterward. Consulted for TPN        REVIEW OF SYSTEMS: no cp, sob, dysuria, swelling.  +diarrhea tolerating CLD/ solid diet; denies any n/v or abd discomfort    VITALS:  T(F): 98.1 (09-30-22 @ 14:56), Max: 98.1 (09-30-22 @ 06:26)  HR: 64 (09-30-22 @ 14:56)  BP: 135/78 (09-30-22 @ 14:56)  RR: 16 (09-30-22 @ 14:56)  SpO2: 97% (09-30-22 @ 14:56)  Wt(kg): --    09-29 @ 07:01  -  09-30 @ 07:00  --------------------------------------------------------  IN: 0 mL / OUT: 150 mL / NET: -150 mL        PHYSICAL EXAM:  Constitutional: obese, appears fairly well-nourished  HEENT:, anicteric sclera,  Respiratory: CTAB, no wheezes, rales or rhonchi  Cardiovascular: S1, S2, RRR  Gastrointestinal: Midline dressing c/d/i.   Ostomy bag placed over MIL tubing with feculent watery output  Extremities:  +2 LE edema  Vascular Access: left UE PICC -double lumen- one lumen saved for TPN- benign    LABS:  09-30    140  |  111<H>  |  24<H>  ----------------------------<  130<H>  4.5   |  23  |  0.67    Ca    8.1<L>      30 Sep 2022 07:00  Phos  3.0     09-30  Mg     1.9     09-30    TPro  6.4  /  Alb  2.3<L>  /  TBili  0.3  /  DBili      /  AST  21  /  ALT  85<H>  /  AlkPhos  101  09-30    Creatinine Trend: 0.67 <--, 0.66 <--, 0.71 <--, 0.61 <--, 0.59 <--, 0.63 <--, 0.62 <--                        10.8   8.24  )-----------( 385      ( 30 Sep 2022 07:00 )             34.5     Urine Studies:   Name band;

## 2022-10-01 LAB
ALBUMIN SERPL ELPH-MCNC: 2.3 G/DL — LOW (ref 3.5–5)
ALP SERPL-CCNC: 99 U/L — SIGNIFICANT CHANGE UP (ref 40–120)
ALT FLD-CCNC: 66 U/L DA — HIGH (ref 10–60)
ANION GAP SERPL CALC-SCNC: 10 MMOL/L — SIGNIFICANT CHANGE UP (ref 5–17)
AST SERPL-CCNC: 17 U/L — SIGNIFICANT CHANGE UP (ref 10–40)
BASOPHILS # BLD AUTO: 0.07 K/UL — SIGNIFICANT CHANGE UP (ref 0–0.2)
BASOPHILS NFR BLD AUTO: 1 % — SIGNIFICANT CHANGE UP (ref 0–2)
BILIRUB SERPL-MCNC: 0.3 MG/DL — SIGNIFICANT CHANGE UP (ref 0.2–1.2)
BUN SERPL-MCNC: 19 MG/DL — HIGH (ref 7–18)
CA-I BLD-SCNC: 4.6 MG/DL — SIGNIFICANT CHANGE UP (ref 4.5–5.6)
CALCIUM SERPL-MCNC: 8.2 MG/DL — LOW (ref 8.4–10.5)
CHLORIDE SERPL-SCNC: 110 MMOL/L — HIGH (ref 96–108)
CO2 SERPL-SCNC: 20 MMOL/L — LOW (ref 22–31)
CREAT SERPL-MCNC: 0.73 MG/DL — SIGNIFICANT CHANGE UP (ref 0.5–1.3)
EGFR: 92 ML/MIN/1.73M2 — SIGNIFICANT CHANGE UP
EOSINOPHIL # BLD AUTO: 0.21 K/UL — SIGNIFICANT CHANGE UP (ref 0–0.5)
EOSINOPHIL NFR BLD AUTO: 3 % — SIGNIFICANT CHANGE UP (ref 0–6)
GLUCOSE BLDC GLUCOMTR-MCNC: 126 MG/DL — HIGH (ref 70–99)
GLUCOSE BLDC GLUCOMTR-MCNC: 92 MG/DL — SIGNIFICANT CHANGE UP (ref 70–99)
GLUCOSE BLDC GLUCOMTR-MCNC: 98 MG/DL — SIGNIFICANT CHANGE UP (ref 70–99)
GLUCOSE SERPL-MCNC: 132 MG/DL — HIGH (ref 70–99)
HCT VFR BLD CALC: 32.9 % — LOW (ref 34.5–45)
HGB BLD-MCNC: 10.6 G/DL — LOW (ref 11.5–15.5)
IMM GRANULOCYTES NFR BLD AUTO: 0.6 % — SIGNIFICANT CHANGE UP (ref 0–0.9)
LYMPHOCYTES # BLD AUTO: 1.42 K/UL — SIGNIFICANT CHANGE UP (ref 1–3.3)
LYMPHOCYTES # BLD AUTO: 20.2 % — SIGNIFICANT CHANGE UP (ref 13–44)
MAGNESIUM SERPL-MCNC: 2.1 MG/DL — SIGNIFICANT CHANGE UP (ref 1.6–2.6)
MCHC RBC-ENTMCNC: 30.1 PG — SIGNIFICANT CHANGE UP (ref 27–34)
MCHC RBC-ENTMCNC: 32.2 GM/DL — SIGNIFICANT CHANGE UP (ref 32–36)
MCV RBC AUTO: 93.5 FL — SIGNIFICANT CHANGE UP (ref 80–100)
MONOCYTES # BLD AUTO: 0.86 K/UL — SIGNIFICANT CHANGE UP (ref 0–0.9)
MONOCYTES NFR BLD AUTO: 12.2 % — SIGNIFICANT CHANGE UP (ref 2–14)
NEUTROPHILS # BLD AUTO: 4.43 K/UL — SIGNIFICANT CHANGE UP (ref 1.8–7.4)
NEUTROPHILS NFR BLD AUTO: 63 % — SIGNIFICANT CHANGE UP (ref 43–77)
NRBC # BLD: 0 /100 WBCS — SIGNIFICANT CHANGE UP (ref 0–0)
PHOSPHATE SERPL-MCNC: 3.8 MG/DL — SIGNIFICANT CHANGE UP (ref 2.5–4.5)
PLATELET # BLD AUTO: 262 K/UL — SIGNIFICANT CHANGE UP (ref 150–400)
POTASSIUM SERPL-MCNC: 4 MMOL/L — SIGNIFICANT CHANGE UP (ref 3.5–5.3)
POTASSIUM SERPL-SCNC: 4 MMOL/L — SIGNIFICANT CHANGE UP (ref 3.5–5.3)
PROT SERPL-MCNC: 6.3 G/DL — SIGNIFICANT CHANGE UP (ref 6–8.3)
RBC # BLD: 3.52 M/UL — LOW (ref 3.8–5.2)
RBC # FLD: 12.8 % — SIGNIFICANT CHANGE UP (ref 10.3–14.5)
SODIUM SERPL-SCNC: 140 MMOL/L — SIGNIFICANT CHANGE UP (ref 135–145)
WBC # BLD: 7.03 K/UL — SIGNIFICANT CHANGE UP (ref 3.8–10.5)
WBC # FLD AUTO: 7.03 K/UL — SIGNIFICANT CHANGE UP (ref 3.8–10.5)

## 2022-10-01 RX ADMIN — PANTOPRAZOLE SODIUM 40 MILLIGRAM(S): 20 TABLET, DELAYED RELEASE ORAL at 17:18

## 2022-10-01 RX ADMIN — ENOXAPARIN SODIUM 40 MILLIGRAM(S): 100 INJECTION SUBCUTANEOUS at 18:06

## 2022-10-01 RX ADMIN — ERTAPENEM SODIUM 120 MILLIGRAM(S): 1 INJECTION, POWDER, LYOPHILIZED, FOR SOLUTION INTRAMUSCULAR; INTRAVENOUS at 13:52

## 2022-10-01 RX ADMIN — CHLORHEXIDINE GLUCONATE 1 APPLICATION(S): 213 SOLUTION TOPICAL at 13:32

## 2022-10-01 NOTE — PROGRESS NOTE ADULT - SUBJECTIVE AND OBJECTIVE BOX
62y Female    Meds:    Allergies    No Known Drug Allergies  peanuts (Anaphylaxis)    Intolerances        VITALS:  Vital Signs Last 24 Hrs  T(C): 36.8 (01 Oct 2022 14:28), Max: 36.8 (01 Oct 2022 14:28)  T(F): 98.2 (01 Oct 2022 14:28), Max: 98.2 (01 Oct 2022 14:28)  HR: 65 (01 Oct 2022 14:28) (64 - 75)  BP: 130/60 (01 Oct 2022 14:28) (118/68 - 135/78)  BP(mean): --  RR: 16 (01 Oct 2022 14:28) (16 - 18)  SpO2: 100% (01 Oct 2022 14:28) (97% - 100%)    Parameters below as of 01 Oct 2022 14:28  Patient On (Oxygen Delivery Method): room air        LABS/DIAGNOSTIC TESTS:                          10.6   7.03  )-----------( 262      ( 01 Oct 2022 07:15 )             32.9         10-01    140  |  110<H>  |  19<H>  ----------------------------<  132<H>  4.0   |  20<L>  |  0.73    Ca    8.2<L>      01 Oct 2022 07:15  Phos  3.8     10-01  Mg     2.1     10-01    TPro  6.3  /  Alb  2.3<L>  /  TBili  0.3  /  DBili  x   /  AST  17  /  ALT  66<H>  /  AlkPhos  99  10-01      LIVER FUNCTIONS - ( 01 Oct 2022 07:15 )  Alb: 2.3 g/dL / Pro: 6.3 g/dL / ALK PHOS: 99 U/L / ALT: 66 U/L DA / AST: 17 U/L / GGT: x             CULTURES: Catheterized Catheterized  09-18 @ 11:06   No growth  --  --      .Blood Blood-Venous  09-17 @ 09:55   No Growth Final  --  --            RADIOLOGY:      ROS:  [  ] UNABLE TO ELICIT 62y Female who is doing well clinically, she has no fevers  , chills , nausea , vomiting or abdominal pain. She just stopped her TPN today, she still has diarrhea, her drainage tube appears to have extruded a few inches but is still attached    Meds:    Allergies    No Known Drug Allergies  peanuts (Anaphylaxis)    Intolerances        VITALS:  Vital Signs Last 24 Hrs  T(C): 36.8 (01 Oct 2022 14:28), Max: 36.8 (01 Oct 2022 14:28)  T(F): 98.2 (01 Oct 2022 14:28), Max: 98.2 (01 Oct 2022 14:28)  HR: 65 (01 Oct 2022 14:28) (64 - 75)  BP: 130/60 (01 Oct 2022 14:28) (118/68 - 135/78)  BP(mean): --  RR: 16 (01 Oct 2022 14:28) (16 - 18)  SpO2: 100% (01 Oct 2022 14:28) (97% - 100%)    Parameters below as of 01 Oct 2022 14:28  Patient On (Oxygen Delivery Method): room air        LABS/DIAGNOSTIC TESTS:                          10.6   7.03  )-----------( 262      ( 01 Oct 2022 07:15 )             32.9         10-01    140  |  110<H>  |  19<H>  ----------------------------<  132<H>  4.0   |  20<L>  |  0.73    Ca    8.2<L>      01 Oct 2022 07:15  Phos  3.8     10-01  Mg     2.1     10-01    TPro  6.3  /  Alb  2.3<L>  /  TBili  0.3  /  DBili  x   /  AST  17  /  ALT  66<H>  /  AlkPhos  99  10-01      LIVER FUNCTIONS - ( 01 Oct 2022 07:15 )  Alb: 2.3 g/dL / Pro: 6.3 g/dL / ALK PHOS: 99 U/L / ALT: 66 U/L DA / AST: 17 U/L / GGT: x             CULTURES: Catheterized Catheterized  09-18 @ 11:06   No growth  --  --      .Blood Blood-Venous  09-17 @ 09:55   No Growth Final  --  --            RADIOLOGY:      ROS:  [  ] UNABLE TO ELICIT 62y Female who is doing well clinically, she has no fevers  , chills , nausea , vomiting or abdominal pain. She just stopped her TPN today, she still has diarrhea, her drainage tube appears to have extruded a few inches but is still attached but a suture. She had a about 150ml of drainage over the last 24hrs and has about 150ml in her bag now.     Meds:    Allergies    No Known Drug Allergies  peanuts (Anaphylaxis)    Intolerances        VITALS:  Vital Signs Last 24 Hrs  T(C): 36.8 (01 Oct 2022 14:28), Max: 36.8 (01 Oct 2022 14:28)  T(F): 98.2 (01 Oct 2022 14:28), Max: 98.2 (01 Oct 2022 14:28)  HR: 65 (01 Oct 2022 14:28) (64 - 75)  BP: 130/60 (01 Oct 2022 14:28) (118/68 - 135/78)  BP(mean): --  RR: 16 (01 Oct 2022 14:28) (16 - 18)  SpO2: 100% (01 Oct 2022 14:28) (97% - 100%)    Parameters below as of 01 Oct 2022 14:28  Patient On (Oxygen Delivery Method): room air        LABS/DIAGNOSTIC TESTS:                          10.6   7.03  )-----------( 262      ( 01 Oct 2022 07:15 )             32.9         10-01    140  |  110<H>  |  19<H>  ----------------------------<  132<H>  4.0   |  20<L>  |  0.73    Ca    8.2<L>      01 Oct 2022 07:15  Phos  3.8     10-01  Mg     2.1     10-01    TPro  6.3  /  Alb  2.3<L>  /  TBili  0.3  /  DBili  x   /  AST  17  /  ALT  66<H>  /  AlkPhos  99  10-01      LIVER FUNCTIONS - ( 01 Oct 2022 07:15 )  Alb: 2.3 g/dL / Pro: 6.3 g/dL / ALK PHOS: 99 U/L / ALT: 66 U/L DA / AST: 17 U/L / GGT: x             CULTURES: Catheterized Catheterized  09-18 @ 11:06   No growth  --  --      .Blood Blood-Venous  09-17 @ 09:55   No Growth Final  --  --            RADIOLOGY:      ROS:  [  ] UNABLE TO ELICIT

## 2022-10-01 NOTE — PROGRESS NOTE ADULT - SUBJECTIVE AND OBJECTIVE BOX
Pt resting comfortably. No acute complaints.   Tolerating clears.  No further leak from ostomy bag.   +flatus/BM.   Denies N/V  s/p dual lumen PICC.    MEDICATIONS  (STANDING):  chlorhexidine 2% Cloths 1 Application(s) Topical daily  enoxaparin Injectable 40 milliGRAM(s) SubCutaneous every 24 hours  ertapenem  IVPB 1000 milliGRAM(s) IV Intermittent every 24 hours  fat emulsion (Fish Oil and Plant Based) 20% Infusion 0.3834 Gm/kG/Day (20.8 mL/Hr) IV Continuous <Continuous>  fat emulsion (Fish Oil and Plant Based) 20% Infusion 0.1917 Gm/kG/Day (10.4 mL/Hr) IV Continuous <Continuous>  insulin lispro (ADMELOG) corrective regimen sliding scale   SubCutaneous three times a day with meals  pantoprazole  Injectable 40 milliGRAM(s) IV Push daily  Parenteral Nutrition - Adult 1 Each TPN Continuous <Continuous>  Parenteral Nutrition - Adult 1 Each TPN Continuous <Continuous>  potassium chloride  10 mEq/100 mL IVPB 10 milliEquivalent(s) IV Intermittent every 1 hour    MEDICATIONS  (PRN):  ondansetron Injectable 4 milliGRAM(s) IV Push every 6 hours PRN Nausea  sodium chloride 0.9% lock flush 10 milliLiter(s) IV Push every 1 hour PRN Pre/post blood products, medications, blood draw, and to maintain line patency      ICU Vital Signs Last 24 Hrs  T(C): 36.7 (01 Oct 2022 05:20), Max: 36.7 (30 Sep 2022 14:56)  T(F): 98 (01 Oct 2022 05:20), Max: 98.1 (30 Sep 2022 14:56)  HR: 75 (01 Oct 2022 05:20) (64 - 75)  BP: 125/78 (01 Oct 2022 05:20) (118/68 - 135/78)  BP(mean): --  ABP: --  ABP(mean): --  RR: 18 (01 Oct 2022 05:20) (16 - 18)  SpO2: 100% (01 Oct 2022 05:20) (97% - 100%)    O2 Parameters below as of 01 Oct 2022 05:20  Patient On (Oxygen Delivery Method): room air            Physical:  General: A&Ox3. NAD.  Abdomen: Soft nondistended, midline wound. Granulating tissue evident, Minimal fibrinous slough at wound base. Retention suture in place.,  no drainage expressed.    I&O's Detail    30 Sep 2022 07:01  -  01 Oct 2022 07:00  --------------------------------------------------------  IN:  Total IN: 0 mL    OUT:    Bulb (mL): 100 mL  Total OUT: 100 mL    Total NET: -100 mL        LABS:                                      10.6   7.03  )-----------( x        ( 01 Oct 2022 07:15 )             32.9   10-01    140  |  110<H>  |  19<H>  ----------------------------<  132<H>  4.0   |  20<L>  |  0.73    Ca    8.2<L>      01 Oct 2022 07:15  Phos  3.8     10-01  Mg     2.1     10-01    TPro  6.3  /  Alb  2.3<L>  /  TBili  0.3  /  DBili  x   /  AST  17  /  ALT  66<H>  /  AlkPhos  99  10-01

## 2022-10-01 NOTE — PROGRESS NOTE ADULT - ASSESSMENT
62y.o. Female with enteric leak s/p ex-lap, small resection, primary ventral hernia repair POD#14    -melba low residue diet.  -Taper TPN per nephrology.  -Observe MIL drainage, poss d/c this weekend  -Daily packing change  -Pain control prn  -OOB ambulate

## 2022-10-01 NOTE — PROGRESS NOTE ADULT - SUBJECTIVE AND OBJECTIVE BOX
[facility] NEPHROLOGY- METABOLIC SUPPORT SERVICE PROGRESS NOTE    Patient is a 62y Female who presented to the hospital with abdominal pain and was found to have an incarcerated hernia.  She is s/p exploratory laparotomy, Small Bowel Resection, and hernia repair 9/16.  She had large volume feculent drainage from MIL and was found to have Focal Peritonitis / intraabdominal collection - secondary to an anastomotic leak.  She has been NPO since 9/14 (9 days) and is planned for further NPO.  Surgical plan is to give further time for Abx to work, then for further surgical intervention and possibly further NPO afterward. Consulted for TPN        REVIEW OF SYSTEMS: no cp, sob, dysuria, swelling.  +diarrhea tolerating solid diet; denies any n/v or abd discomfort      VITALS:  T(F): 98 (10-01-22 @ 05:20), Max: 98.1 (09-30-22 @ 14:56)  HR: 75 (10-01-22 @ 05:20)  BP: 125/78 (10-01-22 @ 05:20)  RR: 18 (10-01-22 @ 05:20)  SpO2: 100% (10-01-22 @ 05:20)  Wt(kg): --    09-30 @ 07:01  -  10-01 @ 07:00  --------------------------------------------------------  IN: 0 mL / OUT: 100 mL / NET: -100 mL      PHYSICAL EXAM:  Constitutional: obese, appears fairly well-nourished  HEENT:, anicteric sclera,  Respiratory: CTAB, no wheezes, rales or rhonchi  Cardiovascular: S1, S2, RRR  Gastrointestinal: Midline dressing c/d/i.   Ostomy bag placed over MIL tubing with feculent watery output  Extremities:  +2 LE edema  Vascular Access: left UE PICC -double lumen- one lumen saved for TPN- benign      LABS:  10-01    140  |  110<H>  |  19<H>  ----------------------------<  132<H>  4.0   |  20<L>  |  0.73    Ca    8.2<L>      01 Oct 2022 07:15  Phos  3.8     10-01  Mg     2.1     10-01    TPro  6.3  /  Alb  2.3<L>  /  TBili  0.3  /  DBili      /  AST  17  /  ALT  66<H>  /  AlkPhos  99  10-01    Creatinine Trend: 0.73 <--, 0.67 <--, 0.66 <--, 0.71 <--, 0.61 <--, 0.59 <--, 0.63 <--, 0.62 <--                        10.6   7.03  )-----------( x        ( 01 Oct 2022 07:15 )             32.9     Urine Studies:

## 2022-10-01 NOTE — PROGRESS NOTE ADULT - ASSESSMENT
Patient is a 62y Female who presented to the hospital with abdominal pain and was found to have an incarcerated hernia.  She is s/p exploratory laparotomy, Small Bowel Resection, and hernia repair 9/16.  She had large volume feculent drainage from MIL and was found to have Focal Peritonitis / intraabdominal collection - secondary to an anastomotic leak.  She has been NPO since 9/14 (9 days) and is planned for further NPO.  Surgical plan is to give further time for Abx to work, then for further surgical intervention and possibly further NPO afterward. Consulted for TPN    1. Anastomotic leak with focal peritonitis resulting in prolonged NPO with pt at risk for PCM.  Pt on TPN.  Pt tolerated low fiber diet. Will discontinue TPN this morning.  FS acceptable. Can D/C FS Q6 hours.  hgbA1C 6.0  Monitor CMP/Mg/Phos/ Ionized calcium in am.   Daily weights. Strict I/O.    2. Dehydration- with contraction alkalosis. Resolved.   LE edema due to 3rd spacing; avoid diuretics; consider compression devices.     3. Hypokalemia- resolved    4. Focal Peritonitis-    Abx choice per ID.  F/u ID      Will sign off. Please call if patient not tolerating oral diet.      TPN calculations:   Shade 1500 goal  AA 100g --> 400 shade   --------->  AA 140g  ---->560 shade  Lipids 50g --> 500 shade    CHO 180g --> 600 shade  --------> CHO 180g --> 600 shade   Discussed with RD as well.      [facility] NEPHROLOGY  Anibal Clifton M.D.  Dylan Vieyra D.O.  Debbi Suarez M.D.  Sharmila Villarreal, MSN, ANP-C    Telephone: [phone]  Facsimile: [phone]    [id number] [place] [address]

## 2022-10-01 NOTE — PROGRESS NOTE ADULT - ASSESSMENT
Incarcerated Hernia - s/p sb resection repair of strangulated incisional hernia  Focal Peritonitis / intraabdominal collection - secondary to an anastomotic leak    Plan:  ·	Cont Invanz 1 gm iv q24hrs  ·	TPN DCed

## 2022-10-02 LAB
ANION GAP SERPL CALC-SCNC: 9 MMOL/L — SIGNIFICANT CHANGE UP (ref 5–17)
BUN SERPL-MCNC: 12 MG/DL — SIGNIFICANT CHANGE UP (ref 7–18)
CALCIUM SERPL-MCNC: 8.5 MG/DL — SIGNIFICANT CHANGE UP (ref 8.4–10.5)
CHLORIDE SERPL-SCNC: 110 MMOL/L — HIGH (ref 96–108)
CO2 SERPL-SCNC: 22 MMOL/L — SIGNIFICANT CHANGE UP (ref 22–31)
CREAT SERPL-MCNC: 0.76 MG/DL — SIGNIFICANT CHANGE UP (ref 0.5–1.3)
EGFR: 87 ML/MIN/1.73M2 — SIGNIFICANT CHANGE UP
GLUCOSE BLDC GLUCOMTR-MCNC: 136 MG/DL — HIGH (ref 70–99)
GLUCOSE BLDC GLUCOMTR-MCNC: 99 MG/DL — SIGNIFICANT CHANGE UP (ref 70–99)
GLUCOSE SERPL-MCNC: 97 MG/DL — SIGNIFICANT CHANGE UP (ref 70–99)
HCT VFR BLD CALC: 37.2 % — SIGNIFICANT CHANGE UP (ref 34.5–45)
HGB BLD-MCNC: 11.5 G/DL — SIGNIFICANT CHANGE UP (ref 11.5–15.5)
MCHC RBC-ENTMCNC: 29.7 PG — SIGNIFICANT CHANGE UP (ref 27–34)
MCHC RBC-ENTMCNC: 30.9 GM/DL — LOW (ref 32–36)
MCV RBC AUTO: 96.1 FL — SIGNIFICANT CHANGE UP (ref 80–100)
NRBC # BLD: 0 /100 WBCS — SIGNIFICANT CHANGE UP (ref 0–0)
PLATELET # BLD AUTO: 407 K/UL — HIGH (ref 150–400)
POTASSIUM SERPL-MCNC: 3.8 MMOL/L — SIGNIFICANT CHANGE UP (ref 3.5–5.3)
POTASSIUM SERPL-SCNC: 3.8 MMOL/L — SIGNIFICANT CHANGE UP (ref 3.5–5.3)
RBC # BLD: 3.87 M/UL — SIGNIFICANT CHANGE UP (ref 3.8–5.2)
RBC # FLD: 12.8 % — SIGNIFICANT CHANGE UP (ref 10.3–14.5)
SODIUM SERPL-SCNC: 141 MMOL/L — SIGNIFICANT CHANGE UP (ref 135–145)
WBC # BLD: 8.27 K/UL — SIGNIFICANT CHANGE UP (ref 3.8–10.5)
WBC # FLD AUTO: 8.27 K/UL — SIGNIFICANT CHANGE UP (ref 3.8–10.5)

## 2022-10-02 RX ORDER — ERTAPENEM SODIUM 1 G/1
1000 INJECTION, POWDER, LYOPHILIZED, FOR SOLUTION INTRAMUSCULAR; INTRAVENOUS EVERY 24 HOURS
Refills: 0 | Status: DISCONTINUED | OUTPATIENT
Start: 2022-10-02 | End: 2022-10-04

## 2022-10-02 RX ADMIN — PANTOPRAZOLE SODIUM 40 MILLIGRAM(S): 20 TABLET, DELAYED RELEASE ORAL at 15:38

## 2022-10-02 RX ADMIN — ERTAPENEM SODIUM 120 MILLIGRAM(S): 1 INJECTION, POWDER, LYOPHILIZED, FOR SOLUTION INTRAMUSCULAR; INTRAVENOUS at 17:29

## 2022-10-02 RX ADMIN — CHLORHEXIDINE GLUCONATE 1 APPLICATION(S): 213 SOLUTION TOPICAL at 15:38

## 2022-10-02 RX ADMIN — ENOXAPARIN SODIUM 40 MILLIGRAM(S): 100 INJECTION SUBCUTANEOUS at 17:39

## 2022-10-02 NOTE — PROGRESS NOTE ADULT - ASSESSMENT
62y.o. Female with enteric leak s/p ex-lap, small resection, primary ventral hernia repair POD#15  s/p double lumen PICC line 9/29     - Low residue diet, advance as tolerated  - Discontinue TPN, as per nephrology.  - IV abx, as per ID recs   - Observe MIL drainage, poss d/c 10/3. Monitor and record output  - Daily packing change   - Pain control prn  - OOB ambulate as tolerated, dvt ppx  - Discussed and agreed with covering attending, Dr. Ponce

## 2022-10-02 NOTE — PROGRESS NOTE ADULT - ASSESSMENT
Incarcerated Hernia - s/p sb resection repair of strangulated incisional hernia  Focal Peritonitis / intraabdominal collection - secondary to an anastomotic leak    Plan:  ·	Cont Invanz 1 gm iv q24hrs  ·	might repeat a CT abdomen with oral contrast only prior to her leaving the hospital if Dr Uriostegui agrees.

## 2022-10-02 NOTE — PROGRESS NOTE ADULT - SUBJECTIVE AND OBJECTIVE BOX
62y Female    Meds:  ertapenem  IVPB 1000 milliGRAM(s) IV Intermittent every 24 hours    Allergies    No Known Drug Allergies  peanuts (Anaphylaxis)    Intolerances        VITALS:  Vital Signs Last 24 Hrs  T(C): 36.4 (02 Oct 2022 14:37), Max: 36.6 (01 Oct 2022 21:39)  T(F): 97.6 (02 Oct 2022 14:37), Max: 97.8 (01 Oct 2022 21:39)  HR: 71 (02 Oct 2022 14:37) (62 - 71)  BP: 117/67 (02 Oct 2022 14:37) (117/67 - 123/53)  BP(mean): --  RR: 16 (02 Oct 2022 14:37) (16 - 18)  SpO2: 95% (02 Oct 2022 14:37) (95% - 100%)    Parameters below as of 02 Oct 2022 14:37  Patient On (Oxygen Delivery Method): room air        LABS/DIAGNOSTIC TESTS:                          11.5   8.27  )-----------( 407      ( 02 Oct 2022 07:05 )             37.2         10-02    141  |  110<H>  |  12  ----------------------------<  97  3.8   |  22  |  0.76    Ca    8.5      02 Oct 2022 07:05  Phos  3.8     10-01  Mg     2.1     10-01    TPro  6.3  /  Alb  2.3<L>  /  TBili  0.3  /  DBili  x   /  AST  17  /  ALT  66<H>  /  AlkPhos  99  10-01      LIVER FUNCTIONS - ( 01 Oct 2022 07:15 )  Alb: 2.3 g/dL / Pro: 6.3 g/dL / ALK PHOS: 99 U/L / ALT: 66 U/L DA / AST: 17 U/L / GGT: x             CULTURES: Catheterized Catheterized  09-18 @ 11:06   No growth  --  --      .Blood Blood-Venous  09-17 @ 09:55   No Growth Final  --  --            RADIOLOGY:      ROS:  [  ] UNABLE TO ELICIT 62y Female who is doing well, she is tolerating a solid diet, she has much less drainage from her drainage catheter since being started on a solid diet. Her drainage is catheter is out of the skin by about 7-8inches at this time. She has no nausea, vomiting, fevers or chills but is still having some diarrhea though a little less.     Meds:  ertapenem  IVPB 1000 milliGRAM(s) IV Intermittent every 24 hours    Allergies    No Known Drug Allergies  peanuts (Anaphylaxis)    Intolerances        VITALS:  Vital Signs Last 24 Hrs  T(C): 36.4 (02 Oct 2022 14:37), Max: 36.6 (01 Oct 2022 21:39)  T(F): 97.6 (02 Oct 2022 14:37), Max: 97.8 (01 Oct 2022 21:39)  HR: 71 (02 Oct 2022 14:37) (62 - 71)  BP: 117/67 (02 Oct 2022 14:37) (117/67 - 123/53)  BP(mean): --  RR: 16 (02 Oct 2022 14:37) (16 - 18)  SpO2: 95% (02 Oct 2022 14:37) (95% - 100%)    Parameters below as of 02 Oct 2022 14:37  Patient On (Oxygen Delivery Method): room air        LABS/DIAGNOSTIC TESTS:                          11.5   8.27  )-----------( 407      ( 02 Oct 2022 07:05 )             37.2         10-02    141  |  110<H>  |  12  ----------------------------<  97  3.8   |  22  |  0.76    Ca    8.5      02 Oct 2022 07:05  Phos  3.8     10-01  Mg     2.1     10-01    TPro  6.3  /  Alb  2.3<L>  /  TBili  0.3  /  DBili  x   /  AST  17  /  ALT  66<H>  /  AlkPhos  99  10-01      LIVER FUNCTIONS - ( 01 Oct 2022 07:15 )  Alb: 2.3 g/dL / Pro: 6.3 g/dL / ALK PHOS: 99 U/L / ALT: 66 U/L DA / AST: 17 U/L / GGT: x             CULTURES: Catheterized Catheterized  09-18 @ 11:06   No growth  --  --      .Blood Blood-Venous  09-17 @ 09:55   No Growth Final  --  --            RADIOLOGY:      ROS:  [  ] UNABLE TO ELICIT

## 2022-10-02 NOTE — PROGRESS NOTE ADULT - SUBJECTIVE AND OBJECTIVE BOX
INTERVAL HPI/OVERNIGHT EVENTS:  Patient seen and examined at bedside.    Pt resting comfortably. No acute complaints.   Tolerating low residue diet. Denies N/V, abdominal pain.    Denies any leakage from ostomy bag.   Patient denies chest pain, shortness of breath, fever, chills or any other constitutional complaints.       MEDICATIONS  (STANDING):  chlorhexidine 2% Cloths 1 Application(s) Topical daily  enoxaparin Injectable 40 milliGRAM(s) SubCutaneous every 24 hours  ertapenem  IVPB 1000 milliGRAM(s) IV Intermittent every 24 hours  insulin lispro (ADMELOG) corrective regimen sliding scale   SubCutaneous three times a day with meals  pantoprazole  Injectable 40 milliGRAM(s) IV Push daily    MEDICATIONS  (PRN):  ondansetron Injectable 4 milliGRAM(s) IV Push every 6 hours PRN Nausea  sodium chloride 0.9% lock flush 10 milliLiter(s) IV Push every 1 hour PRN Pre/post blood products, medications, blood draw, and to maintain line patency      Vital Signs Last 24 Hrs  T(C): 36.5 (02 Oct 2022 06:20), Max: 36.8 (01 Oct 2022 14:28)  T(F): 97.7 (02 Oct 2022 06:20), Max: 98.2 (01 Oct 2022 14:28)  HR: 62 (02 Oct 2022 06:20) (62 - 65)  BP: 123/53 (02 Oct 2022 06:20) (122/81 - 130/60)  BP(mean): --  RR: 18 (02 Oct 2022 06:20) (16 - 18)  SpO2: 100% (02 Oct 2022 06:20) (98% - 100%)    Parameters below as of 02 Oct 2022 06:20  Patient On (Oxygen Delivery Method): room air        Physical:  General: A&Ox3. NAD.  Respiratory: non labored  Abdomen: Soft, nondistended, nontender. Granulating pink tissue evident, Minimal fibrinous slough at wound base. Retention suture in place. No drainage expressed. Dressing changed at bedside.     I&O's Detail    01 Oct 2022 07:01  -  02 Oct 2022 07:00  --------------------------------------------------------  IN:  Total IN: 0 mL    OUT:    Bulb (mL): 170 mL  Total OUT: 170 mL    Total NET: -170 mL          LABS:                        11.5   8.27  )-----------( 407      ( 02 Oct 2022 07:05 )             37.2             10-02    141  |  110<H>  |  12  ----------------------------<  97  3.8   |  22  |  0.76    Ca    8.5      02 Oct 2022 07:05  Phos  3.8     10-01  Mg     2.1     10-01    TPro  6.3  /  Alb  2.3<L>  /  TBili  0.3  /  DBili  x   /  AST  17  /  ALT  66<H>  /  AlkPhos  99  10-01

## 2022-10-03 LAB
ANION GAP SERPL CALC-SCNC: 9 MMOL/L — SIGNIFICANT CHANGE UP (ref 5–17)
BUN SERPL-MCNC: 10 MG/DL — SIGNIFICANT CHANGE UP (ref 7–18)
CA-I BLD-SCNC: 4.7 MG/DL — SIGNIFICANT CHANGE UP (ref 4.5–5.6)
CALCIUM SERPL-MCNC: 8.6 MG/DL — SIGNIFICANT CHANGE UP (ref 8.4–10.5)
CHLORIDE SERPL-SCNC: 108 MMOL/L — SIGNIFICANT CHANGE UP (ref 96–108)
CO2 SERPL-SCNC: 23 MMOL/L — SIGNIFICANT CHANGE UP (ref 22–31)
CREAT SERPL-MCNC: 0.74 MG/DL — SIGNIFICANT CHANGE UP (ref 0.5–1.3)
EGFR: 90 ML/MIN/1.73M2 — SIGNIFICANT CHANGE UP
GLUCOSE BLDC GLUCOMTR-MCNC: 112 MG/DL — HIGH (ref 70–99)
GLUCOSE BLDC GLUCOMTR-MCNC: 117 MG/DL — HIGH (ref 70–99)
GLUCOSE BLDC GLUCOMTR-MCNC: 90 MG/DL — SIGNIFICANT CHANGE UP (ref 70–99)
GLUCOSE BLDC GLUCOMTR-MCNC: 97 MG/DL — SIGNIFICANT CHANGE UP (ref 70–99)
GLUCOSE SERPL-MCNC: 103 MG/DL — HIGH (ref 70–99)
HCT VFR BLD CALC: 35.5 % — SIGNIFICANT CHANGE UP (ref 34.5–45)
HGB BLD-MCNC: 11.4 G/DL — LOW (ref 11.5–15.5)
MCHC RBC-ENTMCNC: 29.8 PG — SIGNIFICANT CHANGE UP (ref 27–34)
MCHC RBC-ENTMCNC: 32.1 GM/DL — SIGNIFICANT CHANGE UP (ref 32–36)
MCV RBC AUTO: 92.9 FL — SIGNIFICANT CHANGE UP (ref 80–100)
NRBC # BLD: 0 /100 WBCS — SIGNIFICANT CHANGE UP (ref 0–0)
PLATELET # BLD AUTO: 281 K/UL — SIGNIFICANT CHANGE UP (ref 150–400)
POTASSIUM SERPL-MCNC: 3.8 MMOL/L — SIGNIFICANT CHANGE UP (ref 3.5–5.3)
POTASSIUM SERPL-SCNC: 3.8 MMOL/L — SIGNIFICANT CHANGE UP (ref 3.5–5.3)
RBC # BLD: 3.82 M/UL — SIGNIFICANT CHANGE UP (ref 3.8–5.2)
RBC # FLD: 12.8 % — SIGNIFICANT CHANGE UP (ref 10.3–14.5)
SODIUM SERPL-SCNC: 140 MMOL/L — SIGNIFICANT CHANGE UP (ref 135–145)
WBC # BLD: 7.16 K/UL — SIGNIFICANT CHANGE UP (ref 3.8–10.5)
WBC # FLD AUTO: 7.16 K/UL — SIGNIFICANT CHANGE UP (ref 3.8–10.5)

## 2022-10-03 RX ADMIN — ERTAPENEM SODIUM 120 MILLIGRAM(S): 1 INJECTION, POWDER, LYOPHILIZED, FOR SOLUTION INTRAMUSCULAR; INTRAVENOUS at 13:36

## 2022-10-03 RX ADMIN — CHLORHEXIDINE GLUCONATE 1 APPLICATION(S): 213 SOLUTION TOPICAL at 12:29

## 2022-10-03 RX ADMIN — PANTOPRAZOLE SODIUM 40 MILLIGRAM(S): 20 TABLET, DELAYED RELEASE ORAL at 12:27

## 2022-10-03 RX ADMIN — ENOXAPARIN SODIUM 40 MILLIGRAM(S): 100 INJECTION SUBCUTANEOUS at 18:31

## 2022-10-03 NOTE — PROGRESS NOTE ADULT - NEGATIVE GASTROINTESTINAL SYMPTOMS
no nausea/no vomiting
no nausea/no vomiting/no abdominal pain
no nausea/no vomiting/no diarrhea
no nausea/no vomiting/no diarrhea
no nausea/no vomiting/no diarrhea/no abdominal pain
no nausea/no vomiting/no diarrhea
no nausea/no vomiting/no abdominal pain
no nausea/no vomiting/no abdominal pain
no nausea/no vomiting

## 2022-10-03 NOTE — CHART NOTE - NSCHARTNOTEFT_GEN_A_CORE
Assessment:   62y.o. Female with enteric leak s/p ex-lap, small resection, primary ventral hernia repair POD#16. s/p double lumen PICC line . Pt's TPN/ lipids D/Matthew 10/1. Tolerating low fiber diet. Pt seen thi PM, reports eating OK, offers no c/o. Noted for possible D/C tomorrow       (19 Sep 2022 11:46)      Factors impacting intake: [ ] none [ ] nausea  [ ] vomiting [ ] diarrhea [ ] constipation  [ ]chewing problems [ ] swallowing issues  [x ] other: altered GI fx/ structure    Diet Prescription: Diet, Regular:   Fiber/Residue Restricted  No Beef  No Pork (10-03-22 @ 10:28)        Daily     Daily Weight in k.7 (29 Sep 2022 08:20)  Weight in k.3 (29 Sep 2022 06:00)  Weight in k.1 (28 Sep 2022 08:40)  Weight in k.2 (23 Sep 2022 08:54)  Weight in k (22 Sep 2022 16:45)        Pertinent Medications: MEDICATIONS  (STANDING):  chlorhexidine 2% Cloths 1 Application(s) Topical daily  enoxaparin Injectable 40 milliGRAM(s) SubCutaneous every 24 hours  ertapenem  IVPB 1000 milliGRAM(s) IV Intermittent every 24 hours  insulin lispro (ADMELOG) corrective regimen sliding scale   SubCutaneous three times a day with meals  pantoprazole  Injectable 40 milliGRAM(s) IV Push daily    MEDICATIONS  (PRN):  ondansetron Injectable 4 milliGRAM(s) IV Push every 6 hours PRN Nausea  sodium chloride 0.9% lock flush 10 milliLiter(s) IV Push every 1 hour PRN Pre/post blood products, medications, blood draw, and to maintain line patency    Pertinent Labs: 10-03 Na140 mmol/L Glu 103 mg/dL<H> K+ 3.8 mmol/L Cr  0.74 mg/dL BUN 10 mg/dL 10-01 Phos 3.8 mg/dL 10-01 Alb 2.3 g/dL<L>  PAB 19 mg/dL<L>  Chol --    LDL --    HDL --    Trig 178 mg/dL<H>     CAPILLARY BLOOD GLUCOSE      POCT Blood Glucose.: 112 mg/dL (03 Oct 2022 16:16)  POCT Blood Glucose.: 97 mg/dL (03 Oct 2022 11:30)  POCT Blood Glucose.: 90 mg/dL (03 Oct 2022 07:45)  POCT Blood Glucose.: 99 mg/dL (02 Oct 2022 16:30)        Estimated Needs:   [x ] no change since previous assessment  [ ] recalculated:       Previous Nutrition Diagnosis:   [ ] Altered GI function  [ ]Inadequate Oral Intake [ ] Swallowing Difficulty   [ ] Altered nutrition related labs [ ] Increased Nutrient Needs [ ] Overweight/Obesity   [ ] Unintended Weight Loss [ ] Food & Nutrition Related Knowledge Deficit [x ] Malnutrition (severe PCM)  [ ] Other:     Nutrition Diagnosis is [x ] ongoing [ ] resolved [ ] not applicable     Interventions:   Recommend  [ ] Change Diet To:  [ ] Nutrition Supplement  [ ] Nutrition Support  [x ] Other: MD to monitor. RD available.     Monitoring and Evaluation:   [x ] PO intake [ x ] Tolerance to diet prescription [ x ] weights [ x ] labs[ x ] follow up per protocol  [ ] other:

## 2022-10-03 NOTE — PROGRESS NOTE ADULT - NEGATIVE GENERAL SYMPTOMS
no fever/no chills

## 2022-10-03 NOTE — PROGRESS NOTE ADULT - GASTROINTESTINAL COMMENTS
much less output from abdominal drainage site today
drainage is coming out of abdominal wound about 7-8 inches out, decreased drainage into bag
colostomy bag over the old drainage tube site and is draing feculent material
MIL drain in place with fecaloid smelling liquid output
drain in place with liquid stool
drain still in but out about 7-8 inches in the drainage bag.
MIL drain in place with liquid stool in bag
drain in place with feculent drainage
MIL drain still in place has come out a few inches and is at the bottom of her drainage bag.

## 2022-10-03 NOTE — PROGRESS NOTE ADULT - RESPIRATORY
normal/clear to auscultation bilaterally/no wheezes/no rales/no rhonchi
clear to auscultation bilaterally/no wheezes/no rales/no rhonchi
normal/clear to auscultation bilaterally/no wheezes/no rales/no rhonchi
clear to auscultation bilaterally/no wheezes/no rales/no rhonchi
normal/clear to auscultation bilaterally/no wheezes/no rales/no rhonchi
clear to auscultation bilaterally/no wheezes/no rales/no rhonchi
normal/clear to auscultation bilaterally/no wheezes/no rales/no rhonchi

## 2022-10-03 NOTE — PROGRESS NOTE ADULT - NEGATIVE CARDIOVASCULAR SYMPTOMS
no chest pain/no dyspnea on exertion
no chest pain/no palpitations/no dyspnea on exertion
no chest pain/no dyspnea on exertion
no chest pain/no palpitations/no dyspnea on exertion/no peripheral edema
no chest pain
no chest pain/no dyspnea on exertion
no chest pain/no dyspnea on exertion
no chest pain/no palpitations/no dyspnea on exertion
no chest pain/no dyspnea on exertion

## 2022-10-03 NOTE — PROGRESS NOTE ADULT - NEGATIVE RESPIRATORY AND THORAX SYMPTOMS
no dyspnea/no cough
no dyspnea/no cough
no dyspnea/no cough/no pleuritic chest pain
no dyspnea/no cough/no pleuritic chest pain
no dyspnea/no cough

## 2022-10-03 NOTE — PROGRESS NOTE ADULT - GIT GEN HX ROS MEA POS PC
mild/abdominal pain
diarrhea
minimal/abdominal pain
abdominal pain
diarrhea/abdominal pain
mild/abdominal pain
mild/flatulence/abdominal pain
diarrhea/abdominal pain

## 2022-10-03 NOTE — PROGRESS NOTE ADULT - SUBJECTIVE AND OBJECTIVE BOX
INTERVAL HPI/OVERNIGHT EVENTS:  Patient seen and examined at bedside.    Pt resting comfortably. No acute complaints.   Tolerating low residue diet. Denies N/V, abdominal pain.  Admits to flatus/ BM.   Denies any leakage from ostomy bag.   Patient denies chest pain, shortness of breath, fever, chills or any other constitutional complaints.       MEDICATIONS  (STANDING):  chlorhexidine 2% Cloths 1 Application(s) Topical daily  enoxaparin Injectable 40 milliGRAM(s) SubCutaneous every 24 hours  ertapenem  IVPB 1000 milliGRAM(s) IV Intermittent every 24 hours  insulin lispro (ADMELOG) corrective regimen sliding scale   SubCutaneous three times a day with meals  pantoprazole  Injectable 40 milliGRAM(s) IV Push daily    MEDICATIONS  (PRN):  ondansetron Injectable 4 milliGRAM(s) IV Push every 6 hours PRN Nausea  sodium chloride 0.9% lock flush 10 milliLiter(s) IV Push every 1 hour PRN Pre/post blood products, medications, blood draw, and to maintain line patency      Vital Signs Last 24 Hrs  T(C): 36.9 (03 Oct 2022 05:09), Max: 36.9 (03 Oct 2022 05:09)  T(F): 98.5 (03 Oct 2022 05:09), Max: 98.5 (03 Oct 2022 05:09)  HR: 74 (03 Oct 2022 05:09) (69 - 74)  BP: 152/70 (03 Oct 2022 05:09) (117/67 - 163/79)  BP(mean): --  RR: 18 (03 Oct 2022 05:09) (16 - 20)  SpO2: 98% (03 Oct 2022 05:09) (95% - 99%)    Parameters below as of 03 Oct 2022 05:09  Patient On (Oxygen Delivery Method): room air      Physical:  General: A&Ox3. NAD.  Respiratory: non labored  Abdomen: Soft, nondistended, nontender. Granulating pink tissue evident, Minimal fibrinous slough at wound base. Retention suture in place. Slight drainage expressed. Dressing changed at bedside.     I&O's Detail    02 Oct 2022 07:01  -  03 Oct 2022 07:00  --------------------------------------------------------  IN:  Total IN: 0 mL    OUT:    Bulb (mL): 50 mL  Total OUT: 50 mL    Total NET: -50 mL          LABS:                        11.4   7.16  )-----------( 281      ( 03 Oct 2022 06:45 )             35.5             10-03    140  |  108  |  10  ----------------------------<  103<H>  3.8   |  23  |  0.74    Ca    8.6      03 Oct 2022 06:45

## 2022-10-03 NOTE — PROGRESS NOTE ADULT - ASSESSMENT
Incarcerated Hernia - s/p sb resection repair of strangulated incisional hernia  Focal Peritonitis / intraabdominal collection - secondary to an anastomotic leak  Diarrhea - secondary to Invanz    Plan:  ·	Cont Invanz 1 gm iv q24hrs x 10 days more as an outpatient  ·	repeat a CT abdomen as an outpatient in a few weeks

## 2022-10-03 NOTE — PROGRESS NOTE ADULT - SUBJECTIVE AND OBJECTIVE BOX
62y Female    Meds:  ertapenem  IVPB 1000 milliGRAM(s) IV Intermittent every 24 hours    Allergies    No Known Drug Allergies  peanuts (Anaphylaxis)    Intolerances        VITALS:  Vital Signs Last 24 Hrs  T(C): 36.9 (03 Oct 2022 05:09), Max: 36.9 (03 Oct 2022 05:09)  T(F): 98.5 (03 Oct 2022 05:09), Max: 98.5 (03 Oct 2022 05:09)  HR: 74 (03 Oct 2022 05:09) (69 - 74)  BP: 152/70 (03 Oct 2022 05:09) (117/67 - 163/79)  BP(mean): --  RR: 18 (03 Oct 2022 05:09) (16 - 20)  SpO2: 98% (03 Oct 2022 05:09) (95% - 99%)    Parameters below as of 03 Oct 2022 05:09  Patient On (Oxygen Delivery Method): room air        LABS/DIAGNOSTIC TESTS:                          11.4   7.16  )-----------( 281      ( 03 Oct 2022 06:45 )             35.5         10-03    140  |  108  |  10  ----------------------------<  103<H>  3.8   |  23  |  0.74    Ca    8.6      03 Oct 2022 06:45            CULTURES: Catheterized Catheterized  09-18 @ 11:06   No growth  --  --      .Blood Blood-Venous  09-17 @ 09:55   No Growth Final  --  --            RADIOLOGY:      ROS:  [  ] UNABLE TO ELICIT 62y Female who is doing well, she is tolerating her solid diet and has only 50ml of drainage in the last 24hrs , she is still having diarrhea but might be secondary to Invanz. She has no nausea, vomiting , abdominal pain or other complaints. She will be going home tomorrow.    Meds:  ertapenem  IVPB 1000 milliGRAM(s) IV Intermittent every 24 hours    Allergies    No Known Drug Allergies  peanuts (Anaphylaxis)    Intolerances        VITALS:  Vital Signs Last 24 Hrs  T(C): 36.9 (03 Oct 2022 05:09), Max: 36.9 (03 Oct 2022 05:09)  T(F): 98.5 (03 Oct 2022 05:09), Max: 98.5 (03 Oct 2022 05:09)  HR: 74 (03 Oct 2022 05:09) (69 - 74)  BP: 152/70 (03 Oct 2022 05:09) (117/67 - 163/79)  BP(mean): --  RR: 18 (03 Oct 2022 05:09) (16 - 20)  SpO2: 98% (03 Oct 2022 05:09) (95% - 99%)    Parameters below as of 03 Oct 2022 05:09  Patient On (Oxygen Delivery Method): room air        LABS/DIAGNOSTIC TESTS:                          11.4   7.16  )-----------( 281      ( 03 Oct 2022 06:45 )             35.5         10-03    140  |  108  |  10  ----------------------------<  103<H>  3.8   |  23  |  0.74    Ca    8.6      03 Oct 2022 06:45            CULTURES: Catheterized Catheterized  09-18 @ 11:06   No growth  --  --      .Blood Blood-Venous  09-17 @ 09:55   No Growth Final  --  --            RADIOLOGY:      ROS:  [  ] UNABLE TO ELICIT

## 2022-10-03 NOTE — PROGRESS NOTE ADULT - ASSESSMENT
62y.o. Female with enteric leak s/p ex-lap, small resection, primary ventral hernia repair POD#16  s/p double lumen PICC line 9/29     - Low residue diet, advance as tolerated  - Discontinue TPN, as per nephrology.  - IV abx, as per ID recs, Invanz 1 gm IV QD for 10 more days   - Observe MIL drainage, poss d/c 10/4. Monitor and record output  - Daily packing change   - Pain control prn  - OOB ambulate as tolerated, dvt ppx  - Discussed and agreed with Dr. Uriostegui

## 2022-10-03 NOTE — PROGRESS NOTE ADULT - CONSTITUTIONAL
no distress

## 2022-10-03 NOTE — PROGRESS NOTE ADULT - SKIN
no rashes

## 2022-10-04 ENCOUNTER — TRANSCRIPTION ENCOUNTER (OUTPATIENT)
Age: 62
End: 2022-10-04

## 2022-10-04 VITALS
OXYGEN SATURATION: 99 % | RESPIRATION RATE: 16 BRPM | TEMPERATURE: 100 F | SYSTOLIC BLOOD PRESSURE: 137 MMHG | DIASTOLIC BLOOD PRESSURE: 82 MMHG | HEART RATE: 67 BPM

## 2022-10-04 LAB
ANION GAP SERPL CALC-SCNC: 11 MMOL/L — SIGNIFICANT CHANGE UP (ref 5–17)
BUN SERPL-MCNC: 9 MG/DL — SIGNIFICANT CHANGE UP (ref 7–18)
CALCIUM SERPL-MCNC: 8.7 MG/DL — SIGNIFICANT CHANGE UP (ref 8.4–10.5)
CHLORIDE SERPL-SCNC: 105 MMOL/L — SIGNIFICANT CHANGE UP (ref 96–108)
CO2 SERPL-SCNC: 23 MMOL/L — SIGNIFICANT CHANGE UP (ref 22–31)
CREAT SERPL-MCNC: 0.89 MG/DL — SIGNIFICANT CHANGE UP (ref 0.5–1.3)
EGFR: 72 ML/MIN/1.73M2 — SIGNIFICANT CHANGE UP
GLUCOSE BLDC GLUCOMTR-MCNC: 131 MG/DL — HIGH (ref 70–99)
GLUCOSE SERPL-MCNC: 118 MG/DL — HIGH (ref 70–99)
HCT VFR BLD CALC: 34.3 % — LOW (ref 34.5–45)
HGB BLD-MCNC: 11.2 G/DL — LOW (ref 11.5–15.5)
MCHC RBC-ENTMCNC: 30 PG — SIGNIFICANT CHANGE UP (ref 27–34)
MCHC RBC-ENTMCNC: 32.7 GM/DL — SIGNIFICANT CHANGE UP (ref 32–36)
MCV RBC AUTO: 92 FL — SIGNIFICANT CHANGE UP (ref 80–100)
NRBC # BLD: 0 /100 WBCS — SIGNIFICANT CHANGE UP (ref 0–0)
PLATELET # BLD AUTO: 374 K/UL — SIGNIFICANT CHANGE UP (ref 150–400)
POTASSIUM SERPL-MCNC: 3.8 MMOL/L — SIGNIFICANT CHANGE UP (ref 3.5–5.3)
POTASSIUM SERPL-SCNC: 3.8 MMOL/L — SIGNIFICANT CHANGE UP (ref 3.5–5.3)
RBC # BLD: 3.73 M/UL — LOW (ref 3.8–5.2)
RBC # FLD: 12.4 % — SIGNIFICANT CHANGE UP (ref 10.3–14.5)
SODIUM SERPL-SCNC: 139 MMOL/L — SIGNIFICANT CHANGE UP (ref 135–145)
WBC # BLD: 6.68 K/UL — SIGNIFICANT CHANGE UP (ref 3.8–10.5)
WBC # FLD AUTO: 6.68 K/UL — SIGNIFICANT CHANGE UP (ref 3.8–10.5)

## 2022-10-04 PROCEDURE — 87086 URINE CULTURE/COLONY COUNT: CPT

## 2022-10-04 PROCEDURE — 74019 RADEX ABDOMEN 2 VIEWS: CPT

## 2022-10-04 PROCEDURE — 80048 BASIC METABOLIC PNL TOTAL CA: CPT

## 2022-10-04 PROCEDURE — 74177 CT ABD & PELVIS W/CONTRAST: CPT | Mod: MA

## 2022-10-04 PROCEDURE — 83036 HEMOGLOBIN GLYCOSYLATED A1C: CPT

## 2022-10-04 PROCEDURE — 36415 COLL VENOUS BLD VENIPUNCTURE: CPT

## 2022-10-04 PROCEDURE — 94003 VENT MGMT INPAT SUBQ DAY: CPT

## 2022-10-04 PROCEDURE — 83690 ASSAY OF LIPASE: CPT

## 2022-10-04 PROCEDURE — 84134 ASSAY OF PREALBUMIN: CPT

## 2022-10-04 PROCEDURE — 82009 KETONE BODYS QUAL: CPT

## 2022-10-04 PROCEDURE — 82962 GLUCOSE BLOOD TEST: CPT

## 2022-10-04 PROCEDURE — 84295 ASSAY OF SERUM SODIUM: CPT

## 2022-10-04 PROCEDURE — 86900 BLOOD TYPING SEROLOGIC ABO: CPT

## 2022-10-04 PROCEDURE — 84484 ASSAY OF TROPONIN QUANT: CPT

## 2022-10-04 PROCEDURE — P9047: CPT

## 2022-10-04 PROCEDURE — 87641 MR-STAPH DNA AMP PROBE: CPT

## 2022-10-04 PROCEDURE — 88307 TISSUE EXAM BY PATHOLOGIST: CPT

## 2022-10-04 PROCEDURE — 85027 COMPLETE CBC AUTOMATED: CPT

## 2022-10-04 PROCEDURE — 87640 STAPH A DNA AMP PROBE: CPT

## 2022-10-04 PROCEDURE — 85025 COMPLETE CBC W/AUTO DIFF WBC: CPT

## 2022-10-04 PROCEDURE — 80053 COMPREHEN METABOLIC PANEL: CPT

## 2022-10-04 PROCEDURE — 97161 PT EVAL LOW COMPLEX 20 MIN: CPT

## 2022-10-04 PROCEDURE — 99285 EMERGENCY DEPT VISIT HI MDM: CPT | Mod: 25

## 2022-10-04 PROCEDURE — 86901 BLOOD TYPING SEROLOGIC RH(D): CPT

## 2022-10-04 PROCEDURE — 96375 TX/PRO/DX INJ NEW DRUG ADDON: CPT

## 2022-10-04 PROCEDURE — 77001 FLUOROGUIDE FOR VEIN DEVICE: CPT

## 2022-10-04 PROCEDURE — 74176 CT ABD & PELVIS W/O CONTRAST: CPT

## 2022-10-04 PROCEDURE — 82803 BLOOD GASES ANY COMBINATION: CPT

## 2022-10-04 PROCEDURE — 84132 ASSAY OF SERUM POTASSIUM: CPT

## 2022-10-04 PROCEDURE — 86703 HIV-1/HIV-2 1 RESULT ANTBDY: CPT

## 2022-10-04 PROCEDURE — 36573 INSJ PICC RS&I 5 YR+: CPT

## 2022-10-04 PROCEDURE — 93971 EXTREMITY STUDY: CPT | Mod: 26,LT

## 2022-10-04 PROCEDURE — C1889: CPT

## 2022-10-04 PROCEDURE — 85610 PROTHROMBIN TIME: CPT

## 2022-10-04 PROCEDURE — C1751: CPT

## 2022-10-04 PROCEDURE — 87635 SARS-COV-2 COVID-19 AMP PRB: CPT

## 2022-10-04 PROCEDURE — 81001 URINALYSIS AUTO W/SCOPE: CPT

## 2022-10-04 PROCEDURE — 76937 US GUIDE VASCULAR ACCESS: CPT

## 2022-10-04 PROCEDURE — 86850 RBC ANTIBODY SCREEN: CPT

## 2022-10-04 PROCEDURE — 84478 ASSAY OF TRIGLYCERIDES: CPT

## 2022-10-04 PROCEDURE — 85730 THROMBOPLASTIN TIME PARTIAL: CPT

## 2022-10-04 PROCEDURE — 93005 ELECTROCARDIOGRAM TRACING: CPT

## 2022-10-04 PROCEDURE — 93971 EXTREMITY STUDY: CPT

## 2022-10-04 PROCEDURE — 83735 ASSAY OF MAGNESIUM: CPT

## 2022-10-04 PROCEDURE — 71045 X-RAY EXAM CHEST 1 VIEW: CPT

## 2022-10-04 PROCEDURE — 96374 THER/PROPH/DIAG INJ IV PUSH: CPT

## 2022-10-04 PROCEDURE — 83605 ASSAY OF LACTIC ACID: CPT

## 2022-10-04 PROCEDURE — 84100 ASSAY OF PHOSPHORUS: CPT

## 2022-10-04 PROCEDURE — 87040 BLOOD CULTURE FOR BACTERIA: CPT

## 2022-10-04 PROCEDURE — 97162 PT EVAL MOD COMPLEX 30 MIN: CPT

## 2022-10-04 PROCEDURE — 82330 ASSAY OF CALCIUM: CPT

## 2022-10-04 PROCEDURE — 96376 TX/PRO/DX INJ SAME DRUG ADON: CPT

## 2022-10-04 PROCEDURE — 82570 ASSAY OF URINE CREATININE: CPT

## 2022-10-04 PROCEDURE — 84300 ASSAY OF URINE SODIUM: CPT

## 2022-10-04 RX ORDER — PANTOPRAZOLE SODIUM 20 MG/1
40 TABLET, DELAYED RELEASE ORAL
Refills: 0 | Status: DISCONTINUED | OUTPATIENT
Start: 2022-10-04 | End: 2022-10-04

## 2022-10-04 RX ORDER — APIXABAN 2.5 MG/1
10 TABLET, FILM COATED ORAL EVERY 12 HOURS
Refills: 0 | Status: DISCONTINUED | OUTPATIENT
Start: 2022-10-04 | End: 2022-10-04

## 2022-10-04 RX ORDER — ERTAPENEM SODIUM 1 G/1
0 INJECTION, POWDER, LYOPHILIZED, FOR SOLUTION INTRAMUSCULAR; INTRAVENOUS
Qty: 0 | Refills: 0 | DISCHARGE
Start: 2022-10-04

## 2022-10-04 RX ORDER — ERGOCALCIFEROL 1.25 MG/1
1 CAPSULE ORAL
Qty: 0 | Refills: 0 | DISCHARGE

## 2022-10-04 RX ORDER — ZINC SULFATE TAB 220 MG (50 MG ZINC EQUIVALENT) 220 (50 ZN) MG
1 TAB ORAL
Qty: 0 | Refills: 0 | DISCHARGE

## 2022-10-04 RX ORDER — ASCORBIC ACID 60 MG
1 TABLET,CHEWABLE ORAL
Qty: 0 | Refills: 0 | DISCHARGE

## 2022-10-04 RX ORDER — ANASTROZOLE 1 MG/1
1 TABLET ORAL
Qty: 0 | Refills: 0 | DISCHARGE

## 2022-10-04 RX ORDER — APIXABAN 2.5 MG/1
2 TABLET, FILM COATED ORAL
Qty: 14 | Refills: 0
Start: 2022-10-04

## 2022-10-04 RX ORDER — METHENAMINE MANDELATE 1 G
1 TABLET ORAL
Qty: 0 | Refills: 0 | DISCHARGE

## 2022-10-04 RX ORDER — MILK THISTLE 150 MG
1 CAPSULE ORAL
Qty: 0 | Refills: 0 | DISCHARGE

## 2022-10-04 RX ORDER — IBUPROFEN 200 MG
1 TABLET ORAL
Qty: 0 | Refills: 0 | DISCHARGE

## 2022-10-04 RX ADMIN — CHLORHEXIDINE GLUCONATE 1 APPLICATION(S): 213 SOLUTION TOPICAL at 13:35

## 2022-10-04 RX ADMIN — APIXABAN 10 MILLIGRAM(S): 2.5 TABLET, FILM COATED ORAL at 18:25

## 2022-10-04 RX ADMIN — ERTAPENEM SODIUM 120 MILLIGRAM(S): 1 INJECTION, POWDER, LYOPHILIZED, FOR SOLUTION INTRAMUSCULAR; INTRAVENOUS at 13:35

## 2022-10-04 NOTE — PROGRESS NOTE ADULT - NSPROGADDITIONALINFOA_GEN_ALL_CORE
ADDENDUM    she was seen in the morning yesterday  Clinically stable
Clinically stable with no new complaints  No abdominal pain  Will get ct abdomen f/u
Examined the pt twice today  Clinically no evidence of sepsis  Fistula to the previous strangulated hernial sit  Had a long d/w the pt and the   Will try conservative rx with TPN and npo, iv abx  There is a chance it may not work. In the at case she needs ileostomy  They understand the plan of rx
Had a long d/w the pt and the family  The plan was discussed  Presently she is stable  Has a bag around the fistulous site  If she worsens, would need ileostomy anyway which is going to be difficult due to the body habitus, multiple surgeries and phlegmonous bowel  Going to get TPN  She is oob walking around  Denies any [pain in diana abdomen  Dr. Verma will cover for me for the weekend unless there is an emergency
I agree with above  she has some swelling of her left upper arm and hand and mild warmth of her left upper arm.  will get an Urgent left upper arm venous doppler to R/O DVT as she has a PICC line in the arm.
NGT d/cd
Seen in am  Clinically feeling well with no abdominal pain  Has bms  Bag about 100  Cat scan reviewed, the collection is in the hernial site with the drain  will start on clears.   If there is any issue with the drainage, the tube can be converted to larger drain  Will follow
pt seen  twice today  Retention sutures removed  Wound dressings checked  Drain site where the bag is checked  Out put considerably less  Presently has line related thrombosis and going to be treated  All the plan has been discussed wit the pt
pt seen in am  clinically stable  Out put from the drain much less  No abdominal tenderness  Having bms, on TPN
pt seen twice today  Large output from the drain which is on the abdominal wall / hernial site  cat scan today  Had a long d/w the pt and the   The potential for ileostomy was also discussed
pt was seen in am  awake  needs more u/o
pt with small bowel obstruction with multiple surgeries in the past  Morbidly obese with multiple abdominal scars  Has also incarcerated incisional hernia with multiple other incisional hernias.  Had a long d/w the pt and the . Needs explorative laparotomy, lysis of adhesions, possible bowel resection, ostomy  They understand that and wants to go ahead with the surgery  The option of having Dr. Noel do the surgery was offered but wants me to go ahead with the surgery
start on clears
Dressings changed in am  Dressings changed  colostomy bag applied
Had a again long d/w th ept and the family  Present condition and the plan was reiterated again  abdomen benign
pt seen and examined in am  Abdomen soft  Had a long d/w the pt   Will observe today  NGT repositioned  will get axr  If does not improve needs exploration  She has had 7 - 8 abdominal surgeries  The potential for bowel resection, mesh insertion, mesh infection and other related complications were discussed with the pt.  She is also very obese with multiple abdominal scars

## 2022-10-04 NOTE — DISCHARGE NOTE PROVIDER - NSDCMRMEDTOKEN_GEN_ALL_CORE_FT
anastrozole 1 mg oral tablet: 1 tab(s) orally once a day  ertapenem 1 g injection:  injectable    ertapenem 1 g injection: injectable once a day   apixaban 5 mg oral tablet: 2 tab(s) orally every 12 hours MDD:2  ertapenem 1 g injection: injectable once a day

## 2022-10-04 NOTE — CHART NOTE - NSCHARTNOTEFT_GEN_A_CORE
patient had her venous doppler of left arm and I spoke with the tech and was in the room at the same time and the patient might have a DVT in the left axillary vein and very little flow is seen in the Subclavian vein and we do not know if it is from the catheter blocking the lumen or from clot, I spoke with Ramandeep and I agree with her that we will not take out the line until she is done with her IV antibiotics.  We will start her on Eliquis 5mgs po BID x 6 weeks to treat a possible DVT , her line will be removed on 10/14/22 at home.  She will also see me at my office or in the hospital if needed as well as her PCP in the community.  Dr link will refer her to hematology in the outpatient setting also. We will still plan to DC the patient home today. The patient has my cell phone number and will be calling me on a regular basis every few days to keep me appraised of her condition. patient had her venous doppler of left arm and I spoke with the tech and was in the room at the same time and the patient might have a DVT in the left axillary vein and very little flow is seen in the Subclavian vein and we do not know if it is from the catheter blocking the lumen or from clot, I spoke with Ramandeep and I agree with her that we will not take out the line until she is done with her IV antibiotics.  We will start her on Eliquis 10mgs po BID x 7 days loading and then 5mgs po bid x 5 weeks to treat a possible DVT , her line will be removed on 10/14/22 at home.  She will also see me at my office or in the hospital if needed as well as her PCP in the community.  Dr link will refer her to hematology in the outpatient setting also. We will still plan to DC the patient home today. The patient has my cell phone number and will be calling me on a regular basis every few days to keep me appraised of her condition.

## 2022-10-04 NOTE — PROGRESS NOTE ADULT - ASSESSMENT
62 year old female S/p exploratory laparotomy, Small Bowel Resection, hernia repair 9/16, with enteric leak. Improved on TPN with bowel rest. Double lumen PICC placed 9/29. Now tolerating oral diet and TPN stopped.     - continue diet  - IV antibiotics  - local wound care  - discharge planning with VNS, home antibiotics  - will need outpatient CT scan. Drain and retention sutures to be removed as an outpatient  - discuss with Dr. Uriostegui

## 2022-10-04 NOTE — PROGRESS NOTE ADULT - SUBJECTIVE AND OBJECTIVE BOX
62y Female is under our care for incarcerated hernia, s/p sb resection repair of strangulated incisional hernia and focal peritonitis / intraabdominal collection secondary to an anastomotic leak. Patient continues to do well and is in good spirits since she is doing for dc home today. Remains afebrile with normal wbc count. Dc planning today on IV antibiotics via PICC line. Has followup appt with Surgery this Monday for drain removal.     MEDS:  ertapenem  IVPB 1000 milliGRAM(s) IV Intermittent every 24 hours    ALLERGIES: Allergies    No Known Drug Allergies  peanuts (Anaphylaxis)    Intolerances    REVIEW OF SYSTEMS:  [  ] Not able to illicit  General: no fevers no malaise  Chest: no cough no sob  GI: no nvd  : no urinary sxs   Skin: no rashes  Musculoskeletal: no trauma no LBP  Neuro: no ha's no dizziness 	    VITALS:  Vital Signs Last 24 Hrs  T(C): 36.5 (04 Oct 2022 06:17), Max: 36.9 (03 Oct 2022 20:59)  T(F): 97.7 (04 Oct 2022 06:17), Max: 98.4 (03 Oct 2022 20:59)  HR: 62 (04 Oct 2022 06:17) (62 - 68)  BP: 114/59 (04 Oct 2022 06:17) (111/72 - 142/78)  BP(mean): 77 (04 Oct 2022 06:17) (77 - 77)  RR: 18 (04 Oct 2022 06:17) (18 - 18)  SpO2: 96% (04 Oct 2022 06:17) (96% - 96%)    Parameters below as of 04 Oct 2022 06:17  Patient On (Oxygen Delivery Method): room air    PHYSICAL EXAM:  HEENT: n/a  Neck: supple no LN's   Respiratory: lungs clear no rales  Cardiovascular: S1 S2 reg no murmurs  Gastrointestinal: +BS with soft, mildly distended abdomen; nontender +RUQ drain with scant feculent fluid draining into ostomy bag  Extremities: no edema, +dual-lumen left arm PICC  Skin: no rashes  Ortho: n/a  Neuro: awake and alert      LABS/DIAGNOSTIC TESTS:                        11.2   6.68  )-----------( 374      ( 04 Oct 2022 07:15 )             34.3     WBC Count: 6.68 K/uL (10-04 @ 07:15)  WBC Count: 7.16 K/uL (10-03 @ 06:45)  WBC Count: 8.27 K/uL (10-02 @ 07:05)  WBC Count: 7.03 K/uL (10-01 @ 07:15)  WBC Count: 8.24 K/uL (09-30 @ 07:00)    10-04    139  |  105  |  9   ----------------------------<  118<H>  3.8   |  23  |  0.89    Ca    8.7      04 Oct 2022 07:15        CULTURES:   Catheterized Catheterized  09-18 @ 11:06   No growth  --  --      .Blood Blood-Venous  09-17 @ 09:55   No Growth Final  --  --        RADIOLOGY:  no new studies

## 2022-10-04 NOTE — PROGRESS NOTE ADULT - NS ATTEND OPT1 GEN_ALL_CORE
I attest my time as attending is greater than 50% of the total combined time spent on qualifying patient care activities by the PA/NP and attending.
verbalization

## 2022-10-04 NOTE — PROGRESS NOTE ADULT - NS ATTEND AMEND GEN_ALL_CORE FT
Output from drain 500  Pt denies any nausea or emesis    - continue NPO and TPN  - continue IV abx per ID  - encourage ambulation  - continue wound care    Remington Valle MD  Attending Physician
Pt doing well and denies any issues  Had a BM and still passing a little flatus  Thinks drain output might be a little less today    - continue NPO, TPN and octreotide  - monitor drain/pouch output  - encourage ambulation  - continue abx    Remington Valle MD  Attending Physician
I agree with above

## 2022-10-04 NOTE — PROGRESS NOTE ADULT - ASSESSMENT
Incarcerated Hernia - s/p sb resection repair of strangulated incisional hernia  Focal Peritonitis / intraabdominal collection - secondary to an anastomotic leak  Diarrhea - secondary to Invanz    Plan:  ·	dc planning today  ·	cont Invanz 1 gm iv q24hrs x 10 days more   ·	Follow-up with Surgery on Monday for drain removal as an outpatient.  ·	repeat a CT abdomen as an outpatient in a few weeks  ·	reconsult prn

## 2022-10-04 NOTE — DISCHARGE NOTE NURSING/CASE MANAGEMENT/SOCIAL WORK - PATIENT PORTAL LINK FT
You can access the FollowMyHealth Patient Portal offered by [facility] by registering at the following website: [email or web address]. By joining [facility]’s FollowMyHealth portal, you will also be able to view your health information using other applications (apps) compatible with our system.

## 2022-10-04 NOTE — PROGRESS NOTE ADULT - SUBJECTIVE AND OBJECTIVE BOX
S/p exploratory laparotomy, Small Bowel Resection, hernia repair 9/16  Patient seen and examined at bedside  Admits to flatus/BM.   Denies pain, nausea/ vomiting.   Tolerating diet.    Vital Signs Last 24 Hrs  T(F): 97.7 (10-04-22 @ 06:17), Max: 98.4 (10-03-22 @ 20:59)  HR: 62 (10-04-22 @ 06:17)  BP: 114/59 (10-04-22 @ 06:17)  RR: 18 (10-04-22 @ 06:17)  SpO2: 96% (10-04-22 @ 06:17)  POCT Blood Glucose.: 117 mg/dL (03 Oct 2022 21:15)    GENERAL: Alert, NAD  CHEST/LUNG: respirations nonlabored  ABDOMEN: Ostomy bag over drain with 110ml/24hours enteric output recorded, bag changed at bedside. Midline dressing taken down, noted to have serous staining. New wet to dry packing placed around retention sutures x2. Covered with DSD. soft, Nontender, Nondistended  EXTREMITIES:  no calf tenderness, No edema    I&O's Detail    03 Oct 2022 07:01  -  04 Oct 2022 07:00  --------------------------------------------------------  IN:  Total IN: 0 mL    OUT:    Bulb (mL): 110 mL  Total OUT: 110 mL    Total NET: -110 mL    LABS:                        11.2   6.68  )-----------( 374      ( 04 Oct 2022 07:15 )             34.3     10-04    x   |  x   |  x   ----------------------------<  x   3.8   |  x   |  x     Ca    8.6      03 Oct 2022 06:45

## 2022-10-04 NOTE — DISCHARGE NOTE PROVIDER - NSDCHHNEEDSERVICE_GEN_ALL_CORE
Medication teaching and assessment/Ostomy care and management/Teaching and training/Wound care and assessment

## 2022-10-04 NOTE — PROGRESS NOTE ADULT - PROVIDER SPECIALTY LIST ADULT
Critical Care
Infectious Disease
Nephrology
Surgery
Infectious Disease
Nephrology
Surgery
Critical Care
Infectious Disease
Nephrology
Nephrology
Surgery

## 2022-10-04 NOTE — DISCHARGE NOTE PROVIDER - NSDCCPTREATMENT_GEN_ALL_CORE_FT
PRINCIPAL PROCEDURE  Procedure: Exploratory laparotomy with lysis of adhesions  Findings and Treatment:       SECONDARY PROCEDURE  Procedure: Small bowel resection  Findings and Treatment:     Procedure: FL guided PICC insertion  Findings and Treatment:

## 2022-10-04 NOTE — DISCHARGE NOTE PROVIDER - DETAILS OF MALNUTRITION DIAGNOSIS/DIAGNOSES
This patient has been assessed with a concern for Malnutrition and was treated during this hospitalization for the following Nutrition diagnosis/diagnoses:     -  09/23/2022: Severe protein-calorie malnutrition   -  09/23/2022: Morbid obesity (BMI > 40)   -  09/19/2022: Morbid obesity (BMI > 40)

## 2022-10-04 NOTE — DISCHARGE NOTE PROVIDER - CARE PROVIDER_API CALL
Porter Uriostegui (MD)  Surgery  [address] [place], [place] [id number]  Phone: [phone]  Fax: [phone]  Follow Up Time:

## 2022-10-04 NOTE — DISCHARGE NOTE NURSING/CASE MANAGEMENT/SOCIAL WORK - NSDCPEFALRISK_GEN_ALL_CORE
For information on Fall & Injury Prevention, visit: [email or web address] OR  [email or web address] OR  https://www.cdc.gov/steadi/patient.html

## 2022-10-04 NOTE — DISCHARGE NOTE PROVIDER - NSDCCPCAREPLAN_GEN_ALL_CORE_FT
PRINCIPAL DISCHARGE DIAGNOSIS  Diagnosis: Incarcerated ventral hernia  Assessment and Plan of Treatment: Please follow-up with your surgeon in 1 week. Drink plenty of fluids and rest as needed. Call for any fever over 101, nausea, vomiting, severe pain, no passing of gas or bowel movement.   DIET  You may resume your regular diet as normal.   SURGICAL SITES  Remove outer dressing and remove packing. You may moisten packing to remove it more gently. Repack wound gently with moist Kerlix. Cover with dry gauze and dry ABD pad. Secure with medipore or tape.   Empty ostomy bag daily and when full. Drain to be removed by Dr. Uriostegui as an outpatient.  ACTIVITY  Do not lift anything heavier than 10 pounds for 2 weeks and avoid strenuous activity for 4-6 weeks.   PAIN CONTROL  You may take Motrin 600mg (with food) or Tylenol 650mg as needed for mild pain. Stagger one medication 3 hours after the other for maximum pain control. Maximum daily dose of Tylenol should not exceed 4grams/day.      SECONDARY DISCHARGE DIAGNOSES  Diagnosis: Enterocutaneous fistula  Assessment and Plan of Treatment: You may continue low fiber diet. Monitor drain output. Call if large increase in drainage. Follow up outpatient for drain removal. You will need to have reapeat CT scan as an outpatient.   You are being discharged with IV antibiotics via PICC line. Antibiotis will be delivered to your house.

## 2022-10-04 NOTE — CHART NOTE - NSCHARTNOTESELECT_GEN_ALL_CORE
Event Note
Nutrition Services
Self-Extubation/Event Note
Transfer to Surgery/Transfer Note
Central & Arterial Line Removal/Event Note
Nutrition Services
POCUS/Event Note
Surgery/Event Note
infectious Diseases/Event Note

## 2022-10-05 ENCOUNTER — FORM ENCOUNTER (OUTPATIENT)
Age: 62
End: 2022-10-05

## 2022-10-10 ENCOUNTER — APPOINTMENT (OUTPATIENT)
Dept: SURGERY | Facility: CLINIC | Age: 62
End: 2022-10-10

## 2022-10-10 VITALS
WEIGHT: 244 LBS | BODY MASS INDEX: 44.9 KG/M2 | TEMPERATURE: 98 F | DIASTOLIC BLOOD PRESSURE: 74 MMHG | HEART RATE: 76 BPM | HEIGHT: 62 IN | SYSTOLIC BLOOD PRESSURE: 145 MMHG

## 2022-10-10 DIAGNOSIS — Z93.3 COLOSTOMY STATUS: ICD-10-CM

## 2022-10-10 PROCEDURE — 99024 POSTOP FOLLOW-UP VISIT: CPT

## 2022-10-10 NOTE — HISTORY OF PRESENT ILLNESS
[de-identified] : Patient is a 62 y.o F w PMHX obesity, diverticulitis, ventral hernia, hx Breast CA, PSH Ernestine's procedure and reversal, S/P lap macarena and appendectomy, admitted with right lower abdominal wall hernia ventral hernia which contained small bowel loops, S/P ex laparotomy, small bowel resection and hernia repair, 09/16/22. \par \par A drain was kept in place postoperatively and patient developed large volume, foul smelling, enteric drainage from within and around the drain. CT showed possible fistula or collection. Patient was made NPO and after PICC line was placed TPN was initiated. Drainage improved. Repeat CT scan showed fistula or leak again. PICC was changed to double lumen so IV antibiotics could also be given. Patient was able to be restarted on oral diet, TPN was discontinued. Patient was cleared for discharge on IV antibiotics via PICC. \par She presents to the office today for post operative visit. Patient states that she still has some diarrhea, but usually does not have any BM's after 4 PM. She has no abdominal pain, stating she is feeling well. No fevers/chills. Her appetite is normal and she is tolerating a regular diet. \par \par

## 2022-10-10 NOTE — REASON FOR VISIT
[Post Op: _________] : a [unfilled] post op visit [Spouse] : spouse [FreeTextEntry1] : incarcerated incisional hernia ;  S/P ex laparotomy, small bowel resection and hernia repair, 09/16/22.

## 2022-10-10 NOTE — PLAN
[FreeTextEntry1] : continue with wet /dry dressing changes\par drain removed / was already in the bag\par CT of abd/pelvis w PO contrast ordered\par \par Post operative wound care, activity and restrictions/precautions were reinforced. \par Patient was instructed to refrain from any heavy lifting >10-15 lbs for at least 4 weeks post operatively. \par Presently minimal drainage from the ileostomy site\par No abdominal pain\par \par Patient's questions and concerns addressed.\par

## 2022-10-10 NOTE — ASSESSMENT
[FreeTextEntry1] : Ms. NEEL MARINO is a 62 year y/o F with incarcerated incisional hernia ;  S/P ex laparotomy, small bowel resection and hernia repair, 09/16/22.

## 2022-10-10 NOTE — PHYSICAL EXAM
[de-identified] : A/Ox3; NAD. appears comfortable [de-identified] : EOMI; sclera anicteric. [de-identified] : surgical wound is healing well, no evidence of pustular drainage or acute inflammation ; ostomy bag in place w stool present

## 2022-10-10 NOTE — REVIEW OF SYSTEMS
[Diarrhea] : diarrhea [Fever] : no fever [Chills] : no chills [Feeling Poorly] : not feeling poorly [Abdominal Pain] : no abdominal pain [Anxiety] : no anxiety [de-identified] : surgical wound to abdomen

## 2022-11-01 ENCOUNTER — OUTPATIENT (OUTPATIENT)
Dept: OUTPATIENT SERVICES | Facility: HOSPITAL | Age: 62
LOS: 1 days | End: 2022-11-01
Payer: COMMERCIAL

## 2022-11-01 ENCOUNTER — APPOINTMENT (OUTPATIENT)
Dept: CT IMAGING | Facility: HOSPITAL | Age: 62
End: 2022-11-01

## 2022-11-01 DIAGNOSIS — Z90.49 ACQUIRED ABSENCE OF OTHER SPECIFIED PARTS OF DIGESTIVE TRACT: Chronic | ICD-10-CM

## 2022-11-01 DIAGNOSIS — K56.609 UNSPECIFIED INTESTINAL OBSTRUCTION, UNSPECIFIED AS TO PARTIAL VERSUS COMPLETE OBSTRUCTION: ICD-10-CM

## 2022-11-01 DIAGNOSIS — Z98.890 OTHER SPECIFIED POSTPROCEDURAL STATES: Chronic | ICD-10-CM

## 2022-11-01 DIAGNOSIS — Z93.3 COLOSTOMY STATUS: Chronic | ICD-10-CM

## 2022-11-01 DIAGNOSIS — K43.2 INCISIONAL HERNIA WITHOUT OBSTRUCTION OR GANGRENE: ICD-10-CM

## 2022-11-01 DIAGNOSIS — Z90.89 ACQUIRED ABSENCE OF OTHER ORGANS: Chronic | ICD-10-CM

## 2022-11-01 DIAGNOSIS — Z93.3 COLOSTOMY STATUS: ICD-10-CM

## 2022-11-01 DIAGNOSIS — Z90.710 ACQUIRED ABSENCE OF BOTH CERVIX AND UTERUS: Chronic | ICD-10-CM

## 2022-11-01 PROCEDURE — 74176 CT ABD & PELVIS W/O CONTRAST: CPT

## 2022-11-14 ENCOUNTER — APPOINTMENT (OUTPATIENT)
Dept: SURGERY | Facility: CLINIC | Age: 62
End: 2022-11-14

## 2022-11-14 VITALS — TEMPERATURE: 97.5 F

## 2022-11-14 VITALS
WEIGHT: 233 LBS | HEART RATE: 80 BPM | OXYGEN SATURATION: 98 % | BODY MASS INDEX: 42.88 KG/M2 | DIASTOLIC BLOOD PRESSURE: 83 MMHG | HEIGHT: 62 IN | SYSTOLIC BLOOD PRESSURE: 145 MMHG

## 2022-11-14 DIAGNOSIS — K43.2 INCISIONAL HERNIA W/OUT OBSTRUCTION OR GANGRENE: ICD-10-CM

## 2022-11-14 DIAGNOSIS — Z93.3 COLOSTOMY STATUS: ICD-10-CM

## 2022-11-14 PROCEDURE — 99024 POSTOP FOLLOW-UP VISIT: CPT

## 2022-11-14 NOTE — ASSESSMENT
[FreeTextEntry1] : Ms. NEEL MARINO is a 62 year y/o F with incarcerated incisional hernia ; S/P ex laparotomy, small bowel resection and hernia repair, 09/16/22. Recent CT was done 11/01/22. Patient c/o loose stool/diarrhea.

## 2022-11-14 NOTE — REVIEW OF SYSTEMS
[Diarrhea] : diarrhea [Arthralgias] : arthralgias [Fever] : no fever [Chills] : no chills [Feeling Poorly] : not feeling poorly [Chest Pain] : no chest pain [Lower Ext Edema] : no lower extremity edema [Shortness Of Breath] : no shortness of breath [Cough] : no cough [Abdominal Pain] : no abdominal pain [Constipation] : no constipation [Skin Lesions] : no skin lesions [Skin Wound] : no skin wound [Dizziness] : no dizziness [Anxiety] : no anxiety [Muscle Weakness] : no muscle weakness [Swollen Glands] : no swollen glands

## 2022-11-14 NOTE — HISTORY OF PRESENT ILLNESS
[de-identified] : Patient is a 62 y.o F w PMHX obesity, diverticulitis, ventral hernia, hx Breast CA, PSH Ernestine's procedure and reversal, S/P lap macarena and appendectomy, admitted with right lower abdominal wall hernia ventral hernia which contained small bowel loops, S/P ex laparotomy, small bowel resection and hernia repair, 09/16/22. \par \par She presents to the office today for follow up visit; Patient recently had CT of abdomen/pelvis, 11/01/22. \par Today, she is without reported complaints. No nausea/vomiting. No fevers/chills. No abdominal pain/cramps. She c/o having a lot of diarrhea, 4-5 x/day. Patient D/C ABX 10/14/22. \par

## 2022-11-14 NOTE — PHYSICAL EXAM
[No Rash or Lesion] : No rash or lesion [Alert] : alert [Oriented to Person] : oriented to person [Oriented to Place] : oriented to place [Oriented to Time] : oriented to time [Calm] : calm [de-identified] : A/Ox3; NAD. appears comfortable [de-identified] : EOMI; sclera anicteric. [de-identified] : surgical wound healing well w granulating tissue, no evidence of pustular drainage or acute inflammation ; ostomy functioning, w stool present

## 2022-11-14 NOTE — PHYSICAL THERAPY INITIAL EVALUATION ADULT - GAIT PATTERN USED, PT EVAL
Physical Therapy    Initial Assessment     Name: Kaylynn Wills  : 1959  MRN: [id number]      Date of Service: 2022    Evaluating PT: Asia Randle, PT, DPT [id number]      Room #:  1300/2844-K  Diagnosis:  CLEMENTE (acute kidney injury) ([facility] [address] [N17.9]  Weakness [R53.1]  PMHx/PSHx:  DM, HTN, third nerve palsy, anxiety, diabetic retinopathy, lumbago, depression, cellulitis  Precautions:  Fall risk, TAPS, ham, alarm    SUBJECTIVE:    Pt lives alone in a single story house with 4-5 stair(s) and 1 rail(s) to enter. Pt ambulated without AD prior to admission. OBJECTIVE:   Initial Evaluation  Date: 22 Treatment Date: Short Term/ Long Term   Goals   AM-PAC 6 Clicks 83/75     Was pt agreeable to Eval/treatment? Yes     Does pt have pain? No current complaints of pain     Bed Mobility  Rolling: NT  Supine to sit: SBA  Sit to supine: SBA  Scooting: SBA to EOB  Rolling: Independent   Supine to sit: Independent   Sit to supine: Independent   Scooting: Independent    Transfers Sit to stand: Min A  Stand to sit: Min A  Stand pivot: Min A with [employer]  Sit to stand: Independent   Stand to sit: Independent   Stand pivot: Supervision with [employer]   Ambulation   50 feet with [employer] with Min A  >200 feet with [employer] with Supervision   Stair negotiation: ascended and descended NT  >5 step(s) with 1 rail(s) with SBA   ROM BUE: Refer to OT note  BLE: WFL     Strength BUE: Refer to OT note  BLE: WFL     Balance Sitting EOB: Supervision  Dynamic Standing: SBA with [employer]  Sitting EOB: Independent   Dynamic Standing: Mod Independent with [employer]     Pt is A & O x: 3 to person, place, and month/year. Pt unsure of why he is in the hospital.  Sensation: Pt denies numbness and tingling of extremities. Edema: Unremarkable. Patient education  Pt educated on hand placement during transfers.     Patient response to education:   Pt verbalized understanding Pt demonstrated skill Pt requires further education in this area   Yes With cues Yes ASSESSMENT:    Conditions Requiring Skilled Therapeutic Intervention:    [x]Decreased strength     []Decreased ROM  [x]Decreased functional mobility  [x]Decreased balance   [x]Decreased endurance   []Decreased posture  []Decreased sensation  []Decreased coordination   []Decreased vision  [x]Decreased safety awareness   []Increased pain       Comments:    Pt was in bed upon room entry; agreeable to PT evaluation. Pt is oriented but delayed. Questions and commands sometimes have to be repeated. Pt moves slowly but completed supine to sit without need for hands on assist. Sitting balance was Fair at EOB. Pt ambulated in hallway. Gait was slow and mildly unsteady. Steps are small. Pt ambulated back to room and was assisted back to bed per RN's request. Pt was left in bed with all needs met at conclusion of session. Treatment:  Patient practiced and was instructed in the following treatment:    Therapeutic activities:  Bed mobility: Pt was cued for technique during bed mobility transfers. Transfers: Pt was cued for hand placement during sit <> stand transfers. Pt completed x2 transfers from EOB. Ambulation: Pt ambulated short distance with [address]. Pt was cued for [address] technique, step length, [address] approximation, and line safety. Skillful positioning in bed to protect skin/joint integrity. Pt's/family goals:  1. None stated. Prognosis is Good for reaching above PT goals. Patient and or family understand(s) diagnosis, prognosis, and plan of care. Yes.     PHYSICAL THERAPY PLAN OF CARE:    PT POC is established based on physician order and patient diagnosis     Referring provider/PT Order:    Start   Ordering Provider    11/14/22 0800  PT eval and treat  Start:  11/14/22 0800,   End:  11/14/22 0800,   ONE TIME,   Standing Count:  1 Occurrences,   Media MD Opal      Diagnosis:  CLEMENTE (acute kidney injury) ([facility] [address] [N17.9]  Weakness [R53.1]  Specific instructions for next treatment:  Progress activity. Current Treatment Recommendations:     [x] Strengthening to improve independence with functional mobility   [] ROM to improve independence with functional mobility   [x] Balance Training to improve static/dynamic balance and to reduce fall risk  [] Endurance Training to improve activity tolerance during functional mobility   [x] Transfer Training to improve safety and independence with all functional transfers   [x] Gait Training to improve gait mechanics, endurance and assess need for appropriate assistive device  [x] Stair Training in preparation for safe discharge home and/or into the community   [] Positioning to prevent skin breakdown and contractures  [x] Safety and Education Training   [] Patient/Caregiver Education   [] HEP  [] Other     PT long term treatment goals are located in above grid    Frequency of treatments: 2-5x/week x 1-2 weeks. Time in  1335  Time out  1356    Total Treatment Time  10 minutes     Evaluation Time includes thorough review of current medical information, gathering information on past medical history/social history and prior level of function, completion of standardized testing/informal observation of tasks, assessment of data and education on plan of care and goals.     CPT codes:  [x] Low Complexity PT evaluation 05282  [] Moderate Complexity PT evaluation 76949  [] High Complexity PT evaluation 78448  [] PT Re-evaluation 88703  [] Gait training 28139 0 minutes  [] Manual therapy 15151 0 minutes  [x] Therapeutic activities 27938 10 minutes  [] Therapeutic exercises 62425 0 minutes  [] Neuromuscular reeducation 54447 0 minutes     Aiden Burleson, PT, DPT  [id number] 2-point gait

## 2022-11-14 NOTE — PLAN
[FreeTextEntry1] : Cdiff stool test ordered\par RTO for follow up in 1 month \par \par Patient's questions and concerns addressed to their satisfaction, and patient verbalized an understanding of the information discussed.\par

## 2022-11-14 NOTE — REASON FOR VISIT
[Follow-Up: _____] : a [unfilled] follow-up visit [Spouse] : spouse [FreeTextEntry1] : Right abdominal colostomy

## 2022-11-14 NOTE — DATA REVIEWED
[FreeTextEntry1] : ACC: [id number] EXAM: CT ABDOMEN AND PELVIS OC\par \par PROCEDURE DATE: 11/01/2022\par \par \par INTERPRETATION: CLINICAL INFORMATION: 62 years Female with incarcerated incisional hernia. Status post repair of incisional hernia with small bowel obstruction 9/16/2022. Prior history of Elias's pouch reversal, breast cancer, laparoscopic cholecystectomy and appendectomy and small bowel resection.\par \par COMPARISON: 9/28/2022\par \par CONTRAST/COMPLICATIONS:\par IV Contrast: NONE\par Oral Contrast: Smoothie Readi-Cat 2\par Complications: None reported at time of study completion\par \par PROCEDURE:\par CT of the Abdomen and Pelvis was performed.\par Sagittal and coronal reformats were performed.\par \par FINDINGS:\par LOWER CHEST: Within normal limits.\par \par LIVER: Within normal limits.\par BILE DUCTS: Normal caliber.\par GALLBLADDER: Cholecystectomy.\par SPLEEN: Within normal limits.\par PANCREAS: Within normal limits. Surgical clip reidentified inferior to the pancreatic tail.\par ADRENALS: Within normal limits.\par KIDNEYS/URETERS: Within normal limits.\par \par BLADDER: Within normal limits.\par REPRODUCTIVE ORGANS: Hysterectomy.\par \par BOWEL/ABDOMINAL WALL: No bowel obstruction. Again identified is a right lower quadrant colostomy. There is an adjacent thick-walled collection containing an enteric contrast now measuring 5.8 x 3.6 cm (2:84) with surrounding subcutaneous fat stranding. The collection appears to communicate with the colostomy (4:38). A small bowel anastomosis is present within the abdomen just deep to the collection. There may also be a tract extending from the small bowel anastomosis to the collection (2:83). There is likely an enteroenteric fistula between small bowel loops adjacent to the anastomosis (2:70) containing droplets of air. A second fistula is present inferiorly on series 2 image 80. Focal fat necrosis is present in the mesentery (2:71).\par Postoperative changes and healing incision are present in the midline as well. Fine linear high attenuation is present in the midline, likely suture material.\par PERITONEUM: No ascites. No pneumoperitoneum.\par VESSELS: Within normal limits.\par RETROPERITONEUM/LYMPH NODES: No lymphadenopathy.\par BONES: Degenerative changes.\par \par IMPRESSION:\par Right abdominal colostomy with adjacent collection containing enteric contrast. The collection appears to communicate both with the colostomy segment and with the small bowel anastomosis. Small bowel anastomosis and at least 2 enteroenteric fistulae intra-abdominally, deep to the collection.

## 2022-11-21 ENCOUNTER — APPOINTMENT (OUTPATIENT)
Dept: SURGERY | Facility: CLINIC | Age: 62
End: 2022-11-21

## 2022-12-01 ENCOUNTER — NON-APPOINTMENT (OUTPATIENT)
Age: 62
End: 2022-12-01

## 2022-12-01 LAB — CDIFF BY PCR: NOT DETECTED

## 2022-12-15 ENCOUNTER — APPOINTMENT (OUTPATIENT)
Dept: SURGERY | Facility: CLINIC | Age: 62
End: 2022-12-15

## 2022-12-15 DIAGNOSIS — R19.7 DIARRHEA, UNSPECIFIED: ICD-10-CM

## 2022-12-15 DIAGNOSIS — E66.01 MORBID (SEVERE) OBESITY DUE TO EXCESS CALORIES: ICD-10-CM

## 2022-12-15 DIAGNOSIS — A04.72 ENTEROCOLITIS DUE TO CLOSTRIDIUM DIFFICILE, NOT SPECIFIED AS RECURRENT: ICD-10-CM

## 2022-12-15 DIAGNOSIS — K56.609 UNSPECIFIED INTESTINAL OBSTRUCTION, UNSPECIFIED AS TO PARTIAL VERSUS COMPLETE OBSTRUCTION: ICD-10-CM

## 2022-12-15 PROCEDURE — 99024 POSTOP FOLLOW-UP VISIT: CPT

## 2022-12-15 NOTE — REASON FOR VISIT
[Spouse] : spouse [FreeTextEntry1] :  S/P ex laparotomy, small bowel resection and hernia repair, 09/16/22.

## 2022-12-15 NOTE — HISTORY OF PRESENT ILLNESS
[de-identified] : Patient is a 62 y.o F w PMHX obesity, diverticulitis, ventral hernia, hx Breast CA, PSH Ernestine's procedure and reversal, S/P lap macarena and appendectomy, admitted with right lower abdominal wall hernia ventral hernia which contained small bowel loops, S/P ex laparotomy, small bowel resection and hernia repair, 09/16/22. \par \par She presents to the office today for follow up visit; she is without reported complaints today.

## 2022-12-15 NOTE — PHYSICAL EXAM
[No Rash or Lesion] : No rash or lesion [Alert] : alert [Oriented to Person] : oriented to person [Oriented to Place] : oriented to place [Oriented to Time] : oriented to time [Calm] : calm [de-identified] : A/Ox3; NAD. appears comfortable [de-identified] : EOMI; sclera anicteric. [de-identified] : wound is healing well w granulating tissue, no evidence of pustular drainage or acute inflammation

## 2022-12-15 NOTE — ASSESSMENT
[FreeTextEntry1] : Ms. WILSON is a 62 year y/o F.  S/P ex laparotomy, small bowel resection and hernia repair, 09/16/22. She is presently on Vancomycin, stating her diarrhea has improved. She is avoiding diary foods.

## 2022-12-15 NOTE — PLAN
[FreeTextEntry1] : repeat stool testing was discussed , she will submit stool specimen in 1 week \par silver nitrate applied to healing wound \par F/U in 3 months \par \par Patient's questions and concerns addressed to their satisfaction, and patient verbalized an understanding of the information discussed.\par

## 2022-12-15 NOTE — PHYSICAL EXAM
[No Rash or Lesion] : No rash or lesion [Alert] : alert [Oriented to Person] : oriented to person [Oriented to Place] : oriented to place [Oriented to Time] : oriented to time [Calm] : calm [de-identified] : A/Ox3; NAD. appears comfortable [de-identified] : EOMI; sclera anicteric. [de-identified] : wound is healing well w granulating tissue, no evidence of pustular drainage or acute inflammation

## 2022-12-15 NOTE — HISTORY OF PRESENT ILLNESS
[de-identified] : Patient is a 62 y.o F w PMHX obesity, diverticulitis, ventral hernia, hx Breast CA, PSH Ernestine's procedure and reversal, S/P lap macarena and appendectomy, admitted with right lower abdominal wall hernia ventral hernia which contained small bowel loops, S/P ex laparotomy, small bowel resection and hernia repair, 09/16/22. \par \par She presents to the office today for follow up visit; she is without reported complaints today.

## 2023-01-09 ENCOUNTER — APPOINTMENT (OUTPATIENT)
Dept: SURGERY | Facility: CLINIC | Age: 63
End: 2023-01-09
Payer: COMMERCIAL

## 2023-01-09 VITALS
BODY MASS INDEX: 42.33 KG/M2 | TEMPERATURE: 98 F | DIASTOLIC BLOOD PRESSURE: 87 MMHG | WEIGHT: 230 LBS | HEART RATE: 69 BPM | SYSTOLIC BLOOD PRESSURE: 166 MMHG | OXYGEN SATURATION: 96 % | HEIGHT: 62 IN

## 2023-01-09 PROCEDURE — 99213 OFFICE O/P EST LOW 20 MIN: CPT

## 2023-01-09 NOTE — HISTORY OF PRESENT ILLNESS
[de-identified] : Patient is a 62 y.o F w PMHX obesity, diverticulitis, ventral hernia, hx Breast CA, PSH Ernestine's procedure and reversal, S/P lap macarena and appendectomy, admitted with right lower abdominal wall hernia ventral hernia which contained small bowel loops, S/P ex laparotomy, small bowel resection and hernia repair, 09/16/22. \par Patient states she's been doing some heavy lifting, taking care of her mother, prior to her recent passing. She wants to make sure she does not have an incarcerated ventral hernia.

## 2023-01-09 NOTE — PHYSICAL EXAM
[No Rash or Lesion] : No rash or lesion [Alert] : alert [Oriented to Person] : oriented to person [Oriented to Place] : oriented to place [Oriented to Time] : oriented to time [Calm] : calm [de-identified] : A/Ox3; NAD. appears comfortable [de-identified] : EOMI; sclera anicteric. [de-identified] : abd is obese, well healed surgical scars \par abdominal wall/ventral hernia, no evidence of incarceration \par abd is soft, NT ND

## 2023-01-09 NOTE — REASON FOR VISIT
[Follow-Up: _____] : a [unfilled] follow-up visit [FreeTextEntry1] : S/P ex laparotomy, small bowel resection and hernia repair, 09/16/22.

## 2023-01-09 NOTE — PLAN
[FreeTextEntry1] : recommended abdominal binder and to avoid heavy lifting \par she is considering bariatric surgery for weight loss \par \par Patient's questions and concerns addressed to their satisfaction, and patient verbalized an understanding of the information discussed.\par

## 2023-01-09 NOTE — ASSESSMENT
[FreeTextEntry1] : Ms. WILSON is a 62 year y/o F. S/P ex laparotomy, small bowel resection and hernia repair, 09/16/22.

## 2023-01-11 ENCOUNTER — APPOINTMENT (OUTPATIENT)
Dept: SURGERY | Facility: CLINIC | Age: 63
End: 2023-01-11
Payer: COMMERCIAL

## 2023-01-11 VITALS
HEART RATE: 66 BPM | DIASTOLIC BLOOD PRESSURE: 80 MMHG | HEIGHT: 60.63 IN | WEIGHT: 234 LBS | BODY MASS INDEX: 44.75 KG/M2 | SYSTOLIC BLOOD PRESSURE: 161 MMHG

## 2023-01-11 DIAGNOSIS — Z85.3 PERSONAL HISTORY OF MALIGNANT NEOPLASM OF BREAST: ICD-10-CM

## 2023-01-11 DIAGNOSIS — Z87.19 PERSONAL HISTORY OF OTHER DISEASES OF THE DIGESTIVE SYSTEM: ICD-10-CM

## 2023-01-11 DIAGNOSIS — Z87.891 PERSONAL HISTORY OF NICOTINE DEPENDENCE: ICD-10-CM

## 2023-01-11 DIAGNOSIS — E66.01 MORBID (SEVERE) OBESITY DUE TO EXCESS CALORIES: ICD-10-CM

## 2023-01-11 DIAGNOSIS — Z84.1 FAMILY HISTORY OF DISORDERS OF KIDNEY AND URETER: ICD-10-CM

## 2023-01-11 PROCEDURE — 99214 OFFICE O/P EST MOD 30 MIN: CPT

## 2023-01-11 RX ORDER — NEOMYCIN SULFATE 500 MG/1
500 TABLET ORAL
Qty: 3 | Refills: 0 | Status: COMPLETED | COMMUNITY
Start: 2020-09-11 | End: 2023-01-11

## 2023-01-11 RX ORDER — METRONIDAZOLE 500 MG/1
500 TABLET ORAL
Qty: 3 | Refills: 0 | Status: COMPLETED | COMMUNITY
Start: 2020-09-11 | End: 2023-01-11

## 2023-01-11 RX ORDER — VANCOMYCIN HYDROCHLORIDE 125 MG/1
125 CAPSULE ORAL
Qty: 40 | Refills: 0 | Status: COMPLETED | COMMUNITY
Start: 2022-12-01 | End: 2023-01-11

## 2023-01-11 NOTE — HISTORY OF PRESENT ILLNESS
[de-identified] : Ms. ROBER WILSON  presents today  for bariatric surgery consultation. she has a long-standing history of severe obesity refractory to multiple prior attempts at weight loss including conservative treatments of diet and exercise.  Ms. ROBER WILSON has been obese since childhood, and the most weight that she  has been able to lose by any means is negligible.   Previous weight loss efforts include: Calorie-counting, restricted intake. Ms. WILSON has limited capacity for exercise due to excess weight.  she  feels that weight loss surgery is the only option at this point for effective and clinically meaningful weight loss.patient reports multiple abdominal surgeries and now has a recurrent incisional hernia and was advised to lose weight prior to considering hernia repair. she states that she started taking Ozempic last week to help with weight lose.  she  is interested in sleeve gastrectomy.  She is concerned that her being overweight is exacerbating and increasing the risk of her having a recurrent  complication  due to her hernia, and she would like to address this.

## 2023-01-11 NOTE — ASSESSMENT
[FreeTextEntry1] : Clinical Impression\par Patient is a 62 year  old female with a BMI of 44 which places patient in the morbidly obese category. This has directly contributed to patient's current medical conditions as well as, a decreased quality of life. Patient has very little chance of successfully losing and maintaining a significant amount of weight with non-surgical management, and would benefit from surgical intervention. Patient meets the criteria for weight loss surgery as defined in the NIH consensus statement, surgery is medically necessary. \par Given patient’s current BMI and obesity-related comorbidities, I feel the patient is a candidate for and would benefit from weight loss surgery, as all prior attempts by non-surgical means have been futile.\par \par \par STOP BANG score - 3, High risk of LARISSA \par \par VTE Risk Calculation:\par \par Does patient have PERSONAL history of VTE? n\par Does patient have PERSONAL history of myocardial infarction, cardiac stent, or acute coronary syndrome? n\par \par Does patient have FAMILY history of VTE? n\par Does patient have FAMILY history of myocardial infarction, cardiac stent, or acute coronary syndrome? n

## 2023-01-11 NOTE — PHYSICAL EXAM
[Obese, well nourished, in no acute distress] : obese, well nourished, in no acute distress [Normal] : affect appropriate [de-identified] : multiple abdominal scars, reducible recurrent incisional hernia in the epigastric area.

## 2023-01-11 NOTE — PLAN
[FreeTextEntry1] : I have had a lengthy conversation with the patient and have discussed my impression and treatment plan with the patient. \par The risks, benefits and alternatives, including laparoscopic gastric bypass, and laparoscopic vertical sleeve gastrectomy, were discussed at length and all of his questions were answered. The patient appears to understand and wishes to proceed.\par \par The patient was given the following instructions:\par \par 1.	Patient needs a complete medical evaluation including echocardiogram, stress test, pulmonary function test and any additional indicated tests.\par 2.	Patient needs evaluation by GI including an upper endoscopy.\par 3.	Patient needs nutritional and psychological evaluations.\par 4.	Patient must attend a preoperative information meeting.\par 5.	Patient must undergo a right upper quadrant ultrasound, if there is no history of prior cholecystectomy.\par \par The weight loss surgery information packet as well as the nutrition education packet have been reviewed and given to the patient, and I provided and reviewed detailed documents addressing these same topics. I have provided literature about the procedures and encouraged the patient to further research the surgeries, and patient feels well informed. I also provided patient with detailed information regarding the pre-operative requirements with respect to testing, medical, nutritional and psychological evaluations and documentation of same. \par \par Also discussed was importance of taking supplements and attending regular follow-up. I reviewed importance of behavioral modification and follow-up in order to optimize outcomes and avoid complications. The patient is aware of the expected length of stay and discharge plan for a sleeve gastrectomy and gastric bypass. I encouraged the patient to maintain a diet and exercise program to promote optimum health for the surgical procedure and post-op course. \par \par The patient clearly understood that surgery would only be scheduled if there are no medical or psychiatric contraindications and that follow up pre-operative office visits are required. Patient agrees to begin a multi-disciplinary evaluation as discussed and provide required documentation and progress. I informed patient that once all testing and evaluations (as deemed necessary) are completed, reviewed, and documentation received, this information to justify medical necessity for weight loss surgery will be submitted to insurance for pre-certification. \par \par Patient will begin the pre-operative process with a visit to PCP, for whom detailed information regarding the necessary evaluations and documentation of obesity-related medical care was given to patient to share with PCP. \par \par The patient had the opportunity to ask pertinent questions which were answered. All of patient’s questions and concerns were addressed to patient’s satisfaction, and patient verbalized an understanding of the information discussed.\par

## 2023-03-21 ENCOUNTER — APPOINTMENT (OUTPATIENT)
Dept: PULMONOLOGY | Facility: CLINIC | Age: 63
End: 2023-03-21

## 2023-04-08 NOTE — PROCEDURE NOTE - NSANTIMICROB_VASC_A_CORE
Called to report message below. Pt confirms understanding and has no further questions.     Please notify the patient of urine culture results. Per culture and sensitivity, ordered antibiotic will cure infection. Complete full course of antibiotics. Follow up as planned.      No

## 2023-11-20 NOTE — ED ADULT NURSE NOTE - NS ED NURSE RECORD ANOTHER HT AND WT
Physical Therapy Evaluation    Visit Type: Initial Evaluation -  Daily Treatment Note  Visit: 1  Referring Provider: Suzy Johnson*  Next referring provider visit: 12/1/2023  Medical Diagnosis (from order): S76.111D - Rupture of right quadriceps tendon, subsequent csltdhisaX35.112D - Rupture of left quadriceps tendon, subsequent encounter   Treatment Diagnosis: S/P B quadriceps tendon repair - increased pain/symptoms, impaired posture, impaired range of motion, impaired muscle length/flexibility, impaired gait, impaired strength, impaired activity tolerance and increased risk for falls.  Onset  - Date of onset: 10/19/2023  - Date of surgery: 10/20/2023    Diagnosis Precautions: WBAT; Continue with knee braces. Hinge opened up 0-110 degrees.  He may unlock while ambulating.  He should be careful with flexion and pushing off like ambulating on stairs getting in and out of a chair. He should avoid exercises against resistance.       SUBJECTIVE                                                                                                               11/20/23 Evaluation: Rodger Jose is S/P 1 month post-op bilateral quadriceps repair. Patient had mechanical fall on 10/19/23 and ruptured bilateral quadriceps tendons. Was transported to Emergency room and surgery performed the next day. Patient received home health care until 11/7/23 and is now ready for outpatient. He denies pain. He is not taking anything for pain.  He has been walking frequently. He has new knee immobilizers. Currently opened 0-70 degrees.  He has been walking with the immobilizers in extension, however they do slide down so there is some flexion with walking.  He has been doing some upper body exercises.  Uses hinged knee braces at all times including sleep.    Pain / Symptoms  - Patient denies pain / symptoms.  - Pain rating (out of 10): Current: 0     Function:   Limitations / Exacerbation Factors:   - Patient reports difficulty and  increased time with function reported below.  - Increased difficulty with ambulation and transfers due to precautions and use of knee brace at all times.  Prior Level of Function: no limitation in involved extremity,    Patient Goals: return to work. He wishes to return to work 11/27/2023 with 9 hour patient contact hours with no more than 2 patients an hour 2x/week.    Prior treatment  - home PT  - Discharged from hospital, home health, or skilled nursing facility in last 30 days: yes  Home Environment   - Patient lives with: significant other  - Type of home: multiple level home  - Assistance available: no assist  - Denies 2 or more falls or an unexplained fall with injury in the last year.  - Feel safe at home / work / school: yes    Worker's Compensation Information  Occupation Information  - Occupation: physican  - Employer:[employer] ( [employer] [address]     - Restrictions: limited hours  - Current Work Status: off work due to current condition  - Full Duty Work Demands: sedentary (less than 5lbs)  - standing >50% of the day, sitting >50% of the day and computer/office work       OBJECTIVE                                                                                                                     Range of Motion (ROM)   (degrees unless noted; active unless noted; norms in ( ); negative=lacking to 0, positive=beyond 0)  Knee:   - Flexion (150):      • Left:  85   Passive: 90      • Right:  85  Symptom reaction  Passive: 90   - Extension (0-10):      • Left:  0       • Right:  -2     Strength  (out of 5 unless noted, standard test position unless noted)   Knee:    - Flexion:        • Left: 3-, pain        • Right: 3- and pain  Comments / Details: Knee extension not tested due to precautions               Ambulation / Gait  Walks with hinged knee brace on; Patient uses 2 cables to keep braces from falling or slipping down the leg. BLE externally rotated, increased  lateral sway;           Outcome/Assessments  Outcome Measures:   Lower Extremity Functional Scale: LEFS Calculated Total: 34 (0=extreme difficulty; 80=no difficulty) see flowsheet for additional documentation        Treatment     Therapeutic Exercise  Pt education on diagnosis and POC  *quad sets x 10 x 10 sec hold  *SLR x 10 x 10 sec hold  *Hip abd in SL x 10 x 10 sec hold  *prone hip ext x 10 x 10 sec hold  *heel slides x 10 with use of belt  *ankle circles  *calf stretch      Manual Therapy   Patella mobilization on B  PROM/AAROM stretch to B knee flexion to 90    Home Exercise Program  *above indicates provided as part of home exercise program      ASSESSMENT                                                                                                          54 year old patient has reported functional limitations listed above impacted by signs and symptoms consistent with treatment diagnosis below.  Treatment Diagnosis:   - S/P B quadriceps tendon repair  - Symptoms/impairments: increased pain/symptoms, impaired posture, impaired range of motion, impaired muscle length/flexibility, impaired gait, impaired strength, impaired activity tolerance and increased risk for falls.      Problem List  LEFS 34/80  Gait dysfunction  Decreased B knee motions/strength  Decreased transfers   Pain/symptoms after session (out of 10): 0    Prognosis: Patient will benefit from skilled therapy.  Rehabilitative potential is: good.  Predicted patient presentation: Low (stable) - Patient comorbidities and complexities, as defined above, will have little effect on progress for prescribed plan of care.  Education:   - Present and ready to learn: patient    PLAN                                                                                                                         The following skilled interventions to be implemented to achieve goals listed below:  Activities of Daily Living/Self Care (97449)  Gait Training  (97338)  Neuromuscular Re-Education (90324)  Therapeutic Exercise (62503)  Manual Therapy (39035)  Therapeutic Activity (17492)  Aquatic Therapy (48221)    Frequency / Duration  2 times per week tapering as patient progresses for 12 weeks for an estimated total of 12 visits    Patient involved in and agreed to plan of care and goals.  Patient given attendance policy at time of initial evaluation., Patient offered attendance policy at [facility]. and Attendance policy reviewed with patient.      Goals  Long Term Goals: to be met by end of plan of care  1. Decrease patient's perceived disability to less than 20%.  2. Increase B knee motions to 130(week 9-10) to enable ease with function/mobility.  3. Independent with HEP/self management techniques.  4. Return to prior level of function including work full duty.  5. Patient to be able to go up/down stairs without difficulty.  6. Normalize gait.      Therapy procedure time and total treatment time can be found documented on the Time Entry flowsheet     Yes

## 2023-12-27 NOTE — H&P PST ADULT - PRO PAIN LIFE ADAPT
Anesthesia Pre-Procedure Evaluation    Patient: Boogie Villa   MRN: [id number] : 2002        Procedure : Procedure(s):  Kidney Transplant Non-Directed Recipient          Past Medical History:   Diagnosis Date    ADD (attention deficit disorder)     Allergic rhinitis     ESRD (end stage renal disease) on dialysis (H)     Hypertension     Mitrofanoff appendicovesicostomy present (H)     Obesity     Posterior urethral valves     Secondary renal hyperparathyroidism (H24)     Vitamin D deficiency       Past Surgical History:   Procedure Laterality Date    ABDOMEN SURGERY      Bilateral urereral tapering and re-implantation    ABDOMEN SURGERY      Mitrofanoff    ablation of posterior urethral valves      APPENDECTOMY  2008    CREATE FISTULA ARTERIOVENOUS UPPER EXTREMITY Left 2023    Procedure: LEFT Brachial Basilic ARTERIOVENOUS FISTULA CREATION SURGERY WITH INTRAOPERATIVE ULTRASOUND .;  Surgeon: Nita Martinez MD;  Location: [facility] OR    CREATE GRAFT LOOP ARTERIOVENOUS UPPER EXTREMITY Right 2023    Procedure: Right RadioCephalic AV Fistula and Branch Ligation x3;  Surgeon: Nita Martinez MD;  Location: [facility] OR    IR CVC TUNNEL PLACEMENT > 5 YRS OF AGE  2023    ureteral reimplantation with tapering        Allergies   Allergen Reactions    Banana Itching     Raw banana; itchy mouth      Dust Mites      Runny nose and watery eyes    Mold      Runny nose and watery eyes    Nsaids Other (See Comments)     CKD    Other [Seasonal Allergies]      Grass, Ragweed - gets runny nose and watery eyes    Sulfa Antibiotics      PN: LW Reaction: GI Upset      Social History     Tobacco Use    Smoking status: Never     Passive exposure: Never    Smokeless tobacco: Never   Substance Use Topics    Alcohol use: Never      Wt Readings from Last 1 Encounters:   23 126.7 kg (279 lb 6.4 oz)        Anesthesia Evaluation   Pt has had prior anesthetic. Type: General.        ROS/MED  HX  ENT/Pulmonary:    (-) asthma and sleep apnea   Neurologic:    (-) no seizures   Cardiovascular:  - neg cardiovascular ROS   (+)  hypertension- -   -  - -                                 Previous cardiac testing   Echo: Date: 3/29/2021 Results:  Normal ejection fraction and no significant valvular dysfunction  Interpretation Summary  Global and regional left ventricular function is normal with an EF of 60-65%.  Global right ventricular function is normal.  No significant valvular abnormalities were noted.  Previous study not available for comparison.  Stress Test:  Date: Results:    ECG Reviewed:  Date: 12/19/23 Results:  NSR  Cath:  Date: Results:   (-) CAD   METS/Exercise Tolerance:     Hematologic:     (+)      anemia (Hgb 12.5 (12/19/23)),          Musculoskeletal:  - neg musculoskeletal ROS     GI/Hepatic:  - neg GI/hepatic ROS     Renal/Genitourinary:     (+) renal disease, type: ESRD, Pt requires dialysis, type: Hemodialysis,          Endo:     (+)               Obesity,    (-) Type II DM   Psychiatric/Substance Use:       Infectious Disease:  - neg infectious disease ROS     Malignancy:  - neg malignancy ROS     Other:            Physical Exam    Airway        Mallampati: II   TM distance: > 3 FB   Neck ROM: full   Mouth opening: > 3 cm    Respiratory Devices and Support         Dental       (+) Minor Abnormalities - some fillings, tiny chips      Cardiovascular          Rhythm and rate: regular     Pulmonary   pulmonary exam normal                OUTSIDE LABS:  CBC:   Lab Results   Component Value Date    WBC 6.1 12/19/2023    WBC 5.9 08/24/2023    HGB 12.5 (L) 12/19/2023    HGB 12.3 (L) 08/24/2023    HCT 36.3 (L) 12/19/2023    HCT 38.6 (L) 08/24/2023     12/19/2023     08/24/2023     BMP:   Lab Results   Component Value Date     12/19/2023     05/25/2023    POTASSIUM 3.5 12/19/2023    POTASSIUM 4.5 05/25/2023    CHLORIDE 100 12/19/2023    CHLORIDE 103 07/13/2023    CO2 28  "12/19/2023    CO2 21 (L) 05/25/2023    BUN 21.6 (H) 12/19/2023    BUN 16.5 05/25/2023    CR 7.12 (H) 12/19/2023    CR 7.61 (H) 05/25/2023    GLC 94 12/19/2023     (H) 05/25/2023     COAGS:   Lab Results   Component Value Date    PTT 26 03/29/2021    INR 1.02 12/19/2023     POC: No results found for: \"BGM\", \"HCG\", \"HCGS\"  HEPATIC:   Lab Results   Component Value Date    ALBUMIN 4.4 12/19/2023    PROTTOTAL 7.6 12/19/2023    ALT 41 12/19/2023    AST 27 12/19/2023    ALKPHOS 67 12/19/2023    BILITOTAL 0.5 12/19/2023     OTHER:   Lab Results   Component Value Date    A1C 5.2 12/19/2023    NABIL 9.5 12/19/2023    PHOS 5.1 (H) 12/19/2023    MAG 1.7 02/26/2020    CRP <2.9 08/09/2017       Anesthesia Plan    ASA Status:  4       Anesthesia Type: General.     - Airway: ETT   Induction: Intravenous.   Maintenance: Balanced.   Techniques and Equipment:     - Lines/Monitors: 2nd IV, Central Line     Consents    Anesthesia Plan(s) and associated risks, benefits, and realistic alternatives discussed. Questions answered and patient/representative(s) expressed understanding.     - Discussed: Risks, Benefits and Alternatives for BOTH SEDATION and the PROCEDURE were discussed     - Discussed with:  Patient, Parent (Mother and/or Father)            Postoperative Care    Pain management: IV analgesics, Oral pain medications.   PONV prophylaxis: Ondansetron (or other 5HT-3), Promethazine or metoclopramide     Comments:               Debi Rossi MD    I have reviewed the pertinent notes and labs in the chart from the past 30 days and (re)examined the patient.  Any updates or changes from those notes are reflected in this note.              # Obesity: Estimated body mass index is 38.97 kg/m  as calculated from the following:    Height as of 12/19/23: 1.803 m (5' 11\").    Weight as of 12/19/23: 126.7 kg (279 lb 6.4 oz).      " none

## 2024-01-04 NOTE — PATIENT PROFILE ADULT - FUNCTIONAL SCREEN CURRENT LEVEL: SWALLOWING (IF SCORE 2 OR MORE FOR ANY ITEM, CONSULT REHAB SERVICES), MLM)
"BP (!) 145/94 (BP Location: Left arm)   Pulse 86   Temp 98.9  F (37.2  C) (Axillary)   Resp 16   Ht 1.803 m (5' 11\")   Wt 129 kg (284 lb 8 oz)   SpO2 96%   BMI 39.68 kg/m       Patient is alert and oriented x 4. Hypertensive. All other VS stable. Patient on room air. Patient denies pain. Patient denies nausea. Urine Output - voiding without difficulty. Bowel Function - BM during shift. Nutrition - Regular diet. Good appetite. PIV - saline locked. CVC - sodium bicarbonate 150 mEq @ 100 ml/hr, thymo, and saline locked. Abdominal incision. Activity - UAL. Plan of Care - Will continue with plan of care and notify care team of any changes.    " NPO/0 = swallows foods/liquids without difficulty

## 2024-04-16 NOTE — ED ADULT TRIAGE NOTE - NS ED NURSE BANDS TYPE
Approved on April 15  The request has been approved. The authorization is effective from 04/15/2024 to 04/14/2025, as long as the member is enrolled in their current health plan. The request was approved as submitted. This request has been approved with a quantity limit of 3 sensors per 30 days.  
Submitted PA for Dexcom Sensor  Via [employer] Key: [id number]  STATUS: PENDING.    Follow up done daily; if no decision with in three days we will refax.  If another three days goes by with no decision will call the insurance for status.    
Name band;

## 2024-07-15 NOTE — PATIENT PROFILE ADULT - NSTRANSFEREYEGLASSESPAIRS_GEN_A_NUR
Perry Bartholomew MD  You2 days ago       He may need to be seen.  I will be out of the office next week.   Patient scheduled to see Jennifer ADKINS tomorrow at 2:30 PM  
Pt called, states he has a rash that has appeared on arms, neck, and leg near knee. Pt states this began after taking antibiotics for leg drainage. (Pt seen 6/24)  
1 pair

## 2024-07-23 NOTE — PHYSICAL EXAM
[Normal Breath Sounds] : Normal breath sounds [Normal Heart Sounds] : normal heart sounds [Alert] : alert [Oriented to Place] : oriented to place [Oriented to Time] : oriented to time [Oriented to Person] : oriented to person [Calm] : calm [de-identified] : The patient is alert, well-groomed, well developed and cheerful.  [de-identified] : Breath sounds equal and bilateral, no wheezing no rales or rhonchi  [de-identified] :  good S1, S2, no m/r/g bilateral  [de-identified] : Normoactive bowel sounds, soft and nontender, no hepatosplenomegaly or masses noted, ostomy viable and productive of stool; skin around area is erythematous, but less so; there is good granulation tissue in the dehisced incision below. [de-identified] : WNL [de-identified] : Incision site is healing well. lower pole of the wound is healing and granulating well. 1

## 2024-10-08 NOTE — ED PROVIDER NOTE - ATTESTATION, MLM
Assessment & Plan:    Essential hypertension  BP elevated in the office today. His medication list shows active scripts for lisinopril and valsartan. Patient unsure of what he takes at home and his granddaughter manages his medications. Advised to ask his granddaughter to call back with the list of his medications so that his antihypertensives can be adjusting accordingly.     Prostate cancer  BPH with obstruction/lower urinary tract symptoms  Message sent to Urology to clarify the need for HH nursing for urinary retention. Per Urology, patient declined Lee and therefore HH nursing was not discussed. Keep follow up with Urology on Friday.    Controlled type 2 diabetes mellitus with microalbuminuria, without long-term current use of insulin  -     Hemoglobin A1C; Future; Expected date: 10/08/2024  -     Lipid Panel; Future; Expected date: 10/08/2024    Controlled. Labs added to upcoming lab appointment in November. Advised to fast.    Dyslipidemia associated with type 2 diabetes mellitus  Aortic atherosclerosis  Coronary artery disease involving coronary bypass graft of native heart without angina pectoris        -     Lipid Panel; Future; Expected date: 10/08/2024    CKD stage 3 due to type 2 diabetes mellitus  Idiopathic chronic gout without tophus, unspecified site  Advised to avoid meloxicam due to CKD.     Attempted to order influenza but office is out of high dose influenza vaccine so he was advised to get this vaccine, as well as RSV and shingles at his pharmacy.       Follow-up: Follow up in about 3 months (around 1/8/2025).  ______________________________________________________________________    Chief Complaint  Chief Complaint   Patient presents with    Hypertension       HPI  Destin Barber is a 85 y.o. male with medical diagnoses as listed in the medical history and problem list that presents to the office to follow up on his chronic conditions. He was last seen on 7/12/2024. Patient is not sure what  medications he is taking at home but endorses compliance. He states that he needs a HH nurse to come assist him with urinary retention. He follows up with Urology for this concern and prostate cancer. Patient presented to the ER on 9/23 for urinary retention and a Lee was placed. Message was sent to Urology to clarify whether patient needed HH orders and this was reportedly not discussed with the patient. Lee was offered but patient declined. He has a follow up with Urology on Friday. Patient is also requesting a refill of meloxicam for gout but he denies any gout flares since his last visit.     Health Maintenance         Date Due Completion Date    Aspirin/Antiplatelet Therapy Never done ---    Shingles Vaccine (1 of 2) Never done ---    RSV Vaccine (Age 60+ and Pregnant patients) (1 - 1-dose 75+ series) Never done ---    Influenza Vaccine (1) 09/01/2024 12/2/2023    COVID-19 Vaccine (7 - 2024-25 season) 09/01/2024 12/4/2021    Hemoglobin A1c 09/26/2024 3/26/2024    Lipid Panel 03/26/2025 3/26/2024    Diabetes Urine Screening 07/01/2025 7/1/2024    Eye Exam 07/15/2025 7/15/2024    TETANUS VACCINE 04/20/2028 4/20/2018              PAST MEDICAL HISTORY:  Past Medical History:   Diagnosis Date    Acute coronary syndrome 06/24/2016    s/p 3V CABG 7/2016    Anemia     Coronary artery disease     Diastolic dysfunction     Gout, chronic     Heart failure     HTN (hypertension)     Hyperlipidemia     Myocardial infarction     Pneumonia due to other staphylococcus     Pressure ulcer     Renal manifestation of secondary diabetes mellitus     Type 2 diabetes mellitus     diet controlled    Urge incontinence of urine 7/21/2023       PAST SURGICAL HISTORY:  Past Surgical History:   Procedure Laterality Date    CATARACT EXTRACTION Bilateral     CATARACT EXTRACTION W/ ANTERIOR VITRECTOMY      CORONARY ARTERY BYPASS GRAFT  07/06/2016    PAIZ-LAD, SVG-Ramus, SVG-PDA    FLUOROSCOPIC URODYNAMIC STUDY N/A 4/25/2023    Procedure:  URODYNAMIC STUDY, FLUOROSCOPIC;  Surgeon: Raji Mcmullen MD;  Location: [facility] OR;  Service: Urology;  Laterality: N/A;  RN PHONE PREOP WITH GRANDDAKEYLA DURAN 23    HEMORRHOID SURGERY      LASER ENUCLEATION OF PROSTATE N/A 2023    Procedure: ENUCLEATION, PROSTATE, USING LASER; cystolithalopaxy;  Surgeon: Raji Mcmullen MD;  Location: [facility] OR;  Service: Urology;  Laterality: N/A;  notify [facility] [phone] SPOKE TO NICK ON 2023 @ 10:37AM. CONFIRMATION NUMBER [id number]  FIORDALIZA SNELL RANJEET [phone] EMAILED CHANNING ON 6/15/2023@ 3:07PM-KEYON  RN PREOP 2023   T/S ON 2023  RN PREOP 2023 --       SOCIAL HISTORY:  Social History     Socioeconomic History    Marital status:     Number of children: 3    Highest education level: 6th grade   Tobacco Use    Smoking status: Former     Types: Cigars     Start date:      Quit date:      Years since quittin.7    Smokeless tobacco: Never    Tobacco comments:     Former smoker. Pt. Smoked 2 cigars daily.   Substance and Sexual Activity    Alcohol use: No    Drug use: No    Sexual activity: Not Currently     Partners: Female     Social Drivers of Health     Financial Resource Strain: Low Risk  (3/22/2023)    Overall Financial Resource Strain (CARDIA)     Difficulty of Paying Living Expenses: Not hard at all   Food Insecurity: No Food Insecurity (3/22/2023)    Hunger Vital Sign     Worried About Running Out of Food in the Last Year: Never true     Ran Out of Food in the Last Year: Never true   Transportation Needs: No Transportation Needs (3/22/2023)    PRAPARE - Transportation     Lack of Transportation (Medical): No     Lack of Transportation (Non-Medical): No   Physical Activity: Sufficiently Active (3/22/2023)    Exercise Vital Sign     Days of Exercise per Week: 5 days     Minutes of Exercise per Session: 30 min   Stress: No Stress Concern Present (3/22/2023)    [employer] [place] [employer] - Occupational  Stress Questionnaire     Feeling of Stress : Not at all   Housing Stability: Unknown (3/22/2023)    Housing Stability Vital Sign     Unable to Pay for Housing in the Last Year: No     Unstable Housing in the Last Year: No       FAMILY HISTORY:  Family History   Problem Relation Name Age of Onset    Hypertension Mother      Heart disease Mother      Cancer Father          colon    Heart disease Sister      No Known Problems Sister      No Known Problems Sister      No Known Problems Sister      No Known Problems Sister      Heart disease Brother      No Known Problems Brother      No Known Problems Brother      No Known Problems Brother      No Known Problems Brother      No Known Problems Brother      No Known Problems Daughter      No Known Problems Daughter      No Known Problems Son      Amblyopia Neg Hx      Blindness Neg Hx      Cataracts Neg Hx      Diabetes Neg Hx      Glaucoma Neg Hx      Macular degeneration Neg Hx      Retinal detachment Neg Hx      Strabismus Neg Hx      Stroke Neg Hx      Thyroid disease Neg Hx         ALLERGIES AND MEDICATIONS: updated and reviewed.  Review of patient's allergies indicates:   Allergen Reactions    Penicillins Nausea And Vomiting     Pt reports he is not allergic to penicillin       Current Outpatient Medications   Medication Sig Dispense Refill    amLODIPine (NORVASC) 10 MG tablet Take 1 tablet (10 mg total) by mouth once daily. 90 tablet 3    benzonatate (TESSALON) 200 MG capsule Take 1 capsule (200 mg total) by mouth 3 (three) times daily as needed for Cough. 30 capsule 1    blood sugar diagnostic Strp To check BG daily, to use with insurance preferred meter 200 each 11    blood-glucose meter kit To check BG daily, to use with insurance preferred meter 1 each 0    isosorbide mononitrate (IMDUR) 60 MG 24 hr tablet Take 1 tablet (60 mg total) by mouth once daily. 90 tablet 3    lancets Misc To check BG daily, to use with insurance preferred meter 200 each 11     "lisinopriL (PRINIVIL,ZESTRIL) 40 MG tablet Take 1 tablet (40 mg total) by mouth once daily. 90 tablet 3    metFORMIN (GLUCOPHAGE-XR) 500 MG ER 24hr tablet TAKE 1 TABLET BY MOUTH DAILY WITH BREAKFAST 90 tablet 3    metoprolol tartrate (LOPRESSOR) 25 MG tablet Take 1 tablet by mouth 2 (two) times daily.      pravastatin (PRAVACHOL) 40 MG tablet Take 1 tablet (40 mg total) by mouth once daily. 90 tablet 3    somatropin (NORDITROPIN FLEXPRO) 10 mg/1.5 mL (6.7 mg/mL) PnIj Inject into the skin.      tamsulosin (FLOMAX) 0.4 mg Cap Take 1 capsule (0.4 mg total) by mouth once daily. 30 capsule 11    traZODone (DESYREL) 150 MG tablet TAKE 1 TABLET BY MOUTH EVERY NIGHT AS NEEDED FOR INSOMNIA. 90 tablet 3    valsartan (DIOVAN) 320 MG tablet Take 320 mg by mouth.      albuterol (PROVENTIL) 2.5 mg /3 mL (0.083 %) nebulizer solution Inhale 2.5 mg into the lungs. (Patient not taking: Reported on 10/8/2024)      allopurinoL (ZYLOPRIM) 100 MG tablet Take 1 tablet by mouth once daily. (Patient not taking: Reported on 10/8/2024)       No current facility-administered medications for this visit.     Facility-Administered Medications Ordered in Other Visits   Medication Dose Route Frequency Provider Last Rate Last Admin    LIDOcaine (PF) 10 mg/ml (1%) injection 10 mg  1 mL Intradermal Once Julia Fong MD ROS  Review of Systems   Genitourinary:  Positive for difficulty urinating.   Musculoskeletal:  Negative for arthralgias.           Physical Exam  Vitals:    10/08/24 0812   BP: (!) 170/64   BP Location: Right arm   Patient Position: Sitting   Pulse: 106   Temp: 97.5 °F (36.4 °C)   TempSrc: Oral   SpO2: 98%   Weight: 62.9 kg (138 lb 10.7 oz)   Height: 5' 6" (1.676 m)    Body mass index is 22.38 kg/m².  Weight: 62.9 kg (138 lb 10.7 oz)   Height: 5' 6" (167.6 cm)   Physical Exam  Constitutional:       General: He is not in acute distress.  HENT:      Head: Normocephalic and atraumatic.   Neck:      Thyroid: No " thyromegaly.      Vascular: No carotid bruit.   Cardiovascular:      Rate and Rhythm: Normal rate and regular rhythm.   Pulmonary:      Effort: Pulmonary effort is normal. No respiratory distress.      Breath sounds: Normal breath sounds.   Musculoskeletal:      Cervical back: Neck supple.      Right lower leg: No edema.      Left lower leg: No edema.   Lymphadenopathy:      Cervical: No cervical adenopathy.   Skin:     General: Skin is warm and dry.      Findings: No rash.   Neurological:      General: No focal deficit present.      Mental Status: He is alert and oriented to person, place, and time.   Psychiatric:         Mood and Affect: Mood normal.         Behavior: Behavior normal.         Thought Content: Thought content normal.              I have reviewed and confirmed nurses' notes for patient's medications, allergies, medical history, and surgical history.

## 2024-11-05 NOTE — DISCHARGE NOTE PROVIDER - NSDCCPTREATMENT_GEN_ALL_CORE_FT
Statement Selected PRINCIPAL PROCEDURE  Procedure: Closure, ileostomy  Findings and Treatment:

## 2024-11-14 ENCOUNTER — NON-APPOINTMENT (OUTPATIENT)
Age: 64
End: 2024-11-14

## 2024-11-14 ENCOUNTER — APPOINTMENT (OUTPATIENT)
Dept: CARDIOLOGY | Facility: CLINIC | Age: 64
End: 2024-11-14
Payer: COMMERCIAL

## 2024-11-14 VITALS — SYSTOLIC BLOOD PRESSURE: 144 MMHG | HEART RATE: 63 BPM | OXYGEN SATURATION: 98 % | DIASTOLIC BLOOD PRESSURE: 80 MMHG

## 2024-11-14 VITALS — HEIGHT: 60 IN | BODY MASS INDEX: 45.22 KG/M2 | WEIGHT: 230.31 LBS

## 2024-11-14 DIAGNOSIS — Z13.6 ENCOUNTER FOR SCREENING FOR CARDIOVASCULAR DISORDERS: ICD-10-CM

## 2024-11-14 DIAGNOSIS — R94.31 ABNORMAL ELECTROCARDIOGRAM [ECG] [EKG]: ICD-10-CM

## 2024-11-14 DIAGNOSIS — I10 ESSENTIAL (PRIMARY) HYPERTENSION: ICD-10-CM

## 2024-11-14 PROCEDURE — 93000 ELECTROCARDIOGRAM COMPLETE: CPT

## 2024-11-14 PROCEDURE — 99204 OFFICE O/P NEW MOD 45 MIN: CPT

## 2024-11-14 PROCEDURE — G2211 COMPLEX E/M VISIT ADD ON: CPT | Mod: NC

## 2025-06-25 NOTE — PATIENT PROFILE ADULT - BILL PAYMENT
[facility]  [address]  [address]  [place] [address]  Dept: [phone]  Dept Fax: [phone]  Loc: [phone]     6/25/2025    Visit type: Acute care    Reason for Visit: Congestion (10 days cough is non stop both wet and dry little coming up. Sob and feels like she has to cough all the time )       ASSESSMENT/PLAN   1. Mild intermittent asthma with acute exacerbation  -     albuterol sulfate HFA (PROVENTIL;VENTOLIN;PROAIR) 108 (90 Base) MCG/ACT inhaler; Inhale 2 puffs into the lungs every 4 hours as needed for Wheezing or Shortness of Breath, Disp-3 each, R-0Print  -     mometasone (ASMANEX HFA) 100 MCG/ACT AERO inhaler; Inhale 2 puffs into the lungs 2 times daily, Disp-3 each, R-0Print  2. URI with cough and congestion  -     benzonatate (TESSALON PERLES) 100 MG capsule; Take 1 capsule by mouth in the morning and 1 capsule at noon and 1 capsule in the evening. Do all this for 10 days., Disp-30 capsule, R-0Normal  -     azithromycin (ZITHROMAX) 500 MG tablet; Take 1 tablet by mouth daily for 3 days, Disp-3 tablet, R-0Normal        No follow-ups on file.  Orders Placed This Encounter   Medications    benzonatate (TESSALON PERLES) 100 MG capsule     Sig: Take 1 capsule by mouth in the morning and 1 capsule at noon and 1 capsule in the evening. Do all this for 10 days.     Dispense:  30 capsule     Refill:  0    azithromycin (ZITHROMAX) 500 MG tablet     Sig: Take 1 tablet by mouth daily for 3 days     Dispense:  3 tablet     Refill:  0    albuterol sulfate HFA (PROVENTIL;VENTOLIN;PROAIR) 108 (90 Base) MCG/ACT inhaler     Sig: Inhale 2 puffs into the lungs every 4 hours as needed for Wheezing or Shortness of Breath     Dispense:  3 each     Refill:  0    mometasone (ASMANEX HFA) 100 MCG/ACT AERO inhaler     Sig: Inhale 2 puffs into the lungs 2 times daily     Dispense:  3 each     Refill:  0      Subjective    Patient: [place]  no

## 2025-07-09 NOTE — H&P PST ADULT - NS MD HP PULSE RADIAL
Continue with the current  regimen while inpatient   if hyperglycemia persists may need more insulin   also if the base for the antibiotics can be changed to NS , it will help control blood glucose better Continue with the current  regimen while inpatient   can be discharged on current dose/ regimen   Patient can resume  home regimen upon discharge Continue with the current  regimen while inpatient   decreasing glucose toxicity   while inpatient, finger sticks should be 100-180   Encourage more nutrition right normal/left normal

## (undated) DEVICE — LIGASURE IMPACT

## (undated) DEVICE — SUT SOFSILK 2-0 18" C-15

## (undated) DEVICE — SOL IRR POUR NS 0.9% 1500ML

## (undated) DEVICE — WARMING BLANKET UPPER ADULT

## (undated) DEVICE — SUT SOFSILK 0 30" TIES

## (undated) DEVICE — ELCTR GROUNDING PAD ADULT COVIDIEN

## (undated) DEVICE — Device

## (undated) DEVICE — DRSG TRACH DRAINAGE 4X4

## (undated) DEVICE — DRAIN RESERVOIR FOR JACKSON PRATT 100CC CARDINAL

## (undated) DEVICE — ELCTR BOVIE BLADE 3/4" EXTENDED LENGTH 6"

## (undated) DEVICE — DRAPE LIGHT HANDLE COVER (BLUE)

## (undated) DEVICE — VENODYNE/SCD SLEEVE CALF MEDIUM

## (undated) DEVICE — SUT SOFSILK 0 18" TIES

## (undated) DEVICE — PACK MAJOR ABDOMINAL WITH LAP

## (undated) DEVICE — DRSG MASTISOL

## (undated) DEVICE — GLV 7 PROTEXIS (WHITE)

## (undated) DEVICE — SUT MAXON 1 96" T-60

## (undated) DEVICE — SUT SOFSILK 3-0 30" V-20

## (undated) DEVICE — SOL IRR POUR H2O 1500ML

## (undated) DEVICE — FOLEY TRAY 16FR SURESTEP LTX BG

## (undated) DEVICE — STAPLER COVIDIEN ENDO GIA SHORT HANDLE

## (undated) DEVICE — SUT SOFSILK 2-0 30" V-20

## (undated) DEVICE — DRSG CURITY GAUZE SPONGE 4 X 4" 12-PLY

## (undated) DEVICE — GOWN XL

## (undated) DEVICE — DRSG MEDIPORE DRESS IT 5-7/8 X 11"

## (undated) DEVICE — FOR-ESU VALLEYLAB T7E14842DX: Type: DURABLE MEDICAL EQUIPMENT

## (undated) DEVICE — STAPLER SKIN VISI-STAT 35 WIDE